# Patient Record
Sex: MALE | Race: WHITE | NOT HISPANIC OR LATINO | Employment: OTHER | URBAN - METROPOLITAN AREA
[De-identification: names, ages, dates, MRNs, and addresses within clinical notes are randomized per-mention and may not be internally consistent; named-entity substitution may affect disease eponyms.]

---

## 2017-01-04 ENCOUNTER — TELEPHONE (OUTPATIENT)
Dept: ADMINISTRATIVE | Facility: HOSPITAL | Age: 67
End: 2017-01-04

## 2017-01-09 ENCOUNTER — OFFICE VISIT (OUTPATIENT)
Dept: FAMILY MEDICINE | Facility: CLINIC | Age: 67
End: 2017-01-09
Payer: MEDICARE

## 2017-01-09 VITALS
HEIGHT: 68 IN | SYSTOLIC BLOOD PRESSURE: 126 MMHG | WEIGHT: 219.13 LBS | HEART RATE: 65 BPM | DIASTOLIC BLOOD PRESSURE: 80 MMHG | OXYGEN SATURATION: 100 % | BODY MASS INDEX: 33.21 KG/M2

## 2017-01-09 DIAGNOSIS — Z29.9 PREVENTIVE MEASURE: ICD-10-CM

## 2017-01-09 DIAGNOSIS — E78.5 HYPERLIPIDEMIA, UNSPECIFIED HYPERLIPIDEMIA TYPE: ICD-10-CM

## 2017-01-09 DIAGNOSIS — E11.59 HYPERTENSION ASSOCIATED WITH DIABETES: ICD-10-CM

## 2017-01-09 DIAGNOSIS — E66.9 OBESITY (BMI 30-39.9): ICD-10-CM

## 2017-01-09 DIAGNOSIS — I15.2 HYPERTENSION ASSOCIATED WITH DIABETES: ICD-10-CM

## 2017-01-09 DIAGNOSIS — I25.10 CORONARY ARTERY DISEASE INVOLVING NATIVE CORONARY ARTERY OF NATIVE HEART WITHOUT ANGINA PECTORIS: ICD-10-CM

## 2017-01-09 DIAGNOSIS — E11.42 DIABETIC POLYNEUROPATHY ASSOCIATED WITH TYPE 2 DIABETES MELLITUS: ICD-10-CM

## 2017-01-09 PROCEDURE — 99999 PR PBB SHADOW E&M-EST. PATIENT-LVL IV: CPT | Mod: PBBFAC,,,

## 2017-01-09 PROCEDURE — 3045F PR MOST RECENT HEMOGLOBIN A1C LEVEL 7.0-9.0%: CPT | Mod: S$GLB,,,

## 2017-01-09 PROCEDURE — 1160F RVW MEDS BY RX/DR IN RCRD: CPT | Mod: S$GLB,,,

## 2017-01-09 PROCEDURE — 99214 OFFICE O/P EST MOD 30 MIN: CPT | Mod: 25,S$GLB,,

## 2017-01-09 PROCEDURE — 3074F SYST BP LT 130 MM HG: CPT | Mod: S$GLB,,,

## 2017-01-09 PROCEDURE — G0009 ADMIN PNEUMOCOCCAL VACCINE: HCPCS | Mod: S$GLB,,,

## 2017-01-09 PROCEDURE — 3079F DIAST BP 80-89 MM HG: CPT | Mod: S$GLB,,,

## 2017-01-09 PROCEDURE — 99499 UNLISTED E&M SERVICE: CPT | Mod: S$GLB,,,

## 2017-01-09 PROCEDURE — 2022F DILAT RTA XM EVC RTNOPTHY: CPT | Mod: S$GLB,,,

## 2017-01-09 PROCEDURE — 90670 PCV13 VACCINE IM: CPT | Mod: S$GLB,,,

## 2017-01-09 PROCEDURE — 1159F MED LIST DOCD IN RCRD: CPT | Mod: S$GLB,,,

## 2017-01-09 PROCEDURE — 1126F AMNT PAIN NOTED NONE PRSNT: CPT | Mod: S$GLB,,,

## 2017-01-09 PROCEDURE — 4010F ACE/ARB THERAPY RXD/TAKEN: CPT | Mod: S$GLB,,,

## 2017-01-09 PROCEDURE — 1157F ADVNC CARE PLAN IN RCRD: CPT | Mod: S$GLB,,,

## 2017-01-09 RX ORDER — INSULIN ASPART 100 [IU]/ML
10 INJECTION, SOLUTION INTRAVENOUS; SUBCUTANEOUS 3 TIMES DAILY PRN
Qty: 30 ML | Refills: 11 | Status: SHIPPED | OUTPATIENT
Start: 2017-01-09 | End: 2017-06-02 | Stop reason: SDUPTHER

## 2017-01-09 NOTE — PROGRESS NOTES
Subjective:       Patient ID: Roger Zavaleta Jr. is a 66 y.o. male.    Chief Complaint: Annual Exam    HPI The patient presents for medical management of his chronic medical problems including diabetes with neuropathy, hypertension and hypercholesterolemia. Although he has a history of MI he has no stents or blockages and has no symptoms. He is compliant with his medication but is out of victoza for the past month. His Hgb A1C is 7.5.     Review of Systems   Constitutional: Negative.    Respiratory: Negative.  Negative for shortness of breath.    Cardiovascular: Negative for chest pain.   Psychiatric/Behavioral: Negative.    All other systems reviewed and are negative.      Objective:      Vitals:    01/09/17 1412   BP: 126/80   Pulse: 65     Physical Exam   Constitutional: He is oriented to person, place, and time. He appears well-developed and well-nourished. He is cooperative. No distress.   Obese    HENT:   Head: Normocephalic and atraumatic.   Right Ear: Hearing, tympanic membrane, external ear and ear canal normal.   Left Ear: Hearing, external ear and ear canal normal.   Nose: Nose normal.   Mouth/Throat: Oropharynx is clear and moist.   Eyes: Conjunctivae are normal. Pupils are equal, round, and reactive to light.   Neck: Normal range of motion. Neck supple. No thyromegaly present.   Cardiovascular: Normal rate, regular rhythm, normal heart sounds and intact distal pulses.    Pulmonary/Chest: Effort normal and breath sounds normal. No respiratory distress.   Musculoskeletal: Normal range of motion. He exhibits no edema or tenderness.   Lymphadenopathy:     He has no cervical adenopathy.   Neurological: He is alert and oriented to person, place, and time. He has normal strength. Coordination and gait normal.   Unable to sense monofilament to big toe bilaterally    Skin: Skin is warm, dry and intact. No cyanosis. Nails show no clubbing.   Psychiatric: He has a normal mood and affect. His speech is normal and  behavior is normal. Judgment and thought content normal. Cognition and memory are normal.   Vitals reviewed.      Assessment:       1. Uncontrolled type 2 diabetes mellitus with diabetic neuropathy, with long-term current use of insulin    2. Coronary artery disease involving native coronary artery of native heart without angina pectoris    3. Hyperlipidemia, unspecified hyperlipidemia type    4. Hypertension associated with diabetes    5. Obesity (BMI 30-39.9)    6. Diabetic polyneuropathy associated with type 2 diabetes mellitus    7. Preventive measure        Plan:       Uncontrolled type 2 diabetes mellitus with diabetic neuropathy, with long-term current use of insulin  -     Ambulatory referral to Ophthalmology    Coronary artery disease involving native coronary artery of native heart without angina pectoris    Hyperlipidemia, unspecified hyperlipidemia type    Hypertension associated with diabetes    Obesity (BMI 30-39.9)    Diabetic polyneuropathy associated with type 2 diabetes mellitus    Preventive measure  -     Pneumococcal Conjugate Vaccine (13 Valent) (IM)  -     zoster vaccine live, PF, (ZOSTAVAX, PF,) 19,400 unit/0.65 mL injection; Inject 19,400 Units into the skin once.  Dispense: 1 vial; Refill: 0    Other orders  -     insulin aspart (NOVOLOG) 100 unit/mL injection; Inject 10 Units into the skin 3 (three) times daily as needed for High Blood Sugar.  Dispense: 30 mL; Refill: 11  -     insulin detemir (LEVEMIR FLEXTOUCH) 100 unit/mL (3 mL) SubQ InPn pen; 30 Units once a day  Dispense: 15 mL; Refill: 11  -     liraglutide 0.6 mg/0.1 mL, 18 mg/3 mL, subq PNIJ (VICTOZA 3-BEL) 0.6 mg/0.1 mL (18 mg/3 mL) PnIj; Inject 1.8 mg into the skin once daily.  Dispense: 9 mL; Refill: 11      Return in about 3 months (around 4/9/2017).

## 2017-01-09 NOTE — MR AVS SNAPSHOT
[address] [facility] [place] [address]  Phone: [phone]  Fax: [phone]                  Roger Zavaleta JrPeg   2017 2:20 PM   Office Visit    Description:  Male : 1950   Provider:  Aldair Booth Jr., MD   Department:  [facility]           Reason for Visit     Annual Exam           Diagnoses this Visit        Comments    Uncontrolled type 2 diabetes mellitus with diabetic neuropathy, with long-term current use of insulin    -  Primary     Coronary artery disease involving native coronary artery of native heart without angina pectoris         Hyperlipidemia, unspecified hyperlipidemia type         Hypertension associated with diabetes         Obesity (BMI 30-39.9)         Diabetic polyneuropathy associated with type 2 diabetes mellitus         Preventive measure                To Do List           Goals (5 Years of Data)     None      Follow-Up and Disposition     Return in about 3 months (around 2017).       These Medications        Disp Refills Start End    insulin aspart (NOVOLOG) 100 unit/mL injection 30 mL 11 2017     Inject 10 Units into the skin 3 (three) times daily as needed for High Blood Sugar. - Subcutaneous    Pharmacy: [facility] [address] [place] [address] [id number] Ph #: [phone]       insulin detemir (LEVEMIR FLEXTOUCH) 100 unit/mL (3 mL) SubQ InPn pen 15 mL 11 2017     30 Units once a day    Pharmacy: [facility] [address] [place] [address] [id number] Ph #: [phone]       liraglutide 0.6 mg/0.1 mL, 18 mg/3 mL, subq PNIJ (VICTOZA 3-BEL) 0.6 mg/0.1 mL (18 mg/3 mL) PnIj 9 mL 11 2017    Inject 1.8 mg into the skin once daily. - Subcutaneous    Pharmacy: [facility] [address] [place] [address] [id number] Ph #: [phone]       zoster vaccine live, PF, (ZOSTAVAX, PF,) 19,400 unit/0.65 mL injection 1 vial 0 2017    Inject 19,400 Units into the skin once. - Subcutaneous     Pharmacy: [facility] [address] [place], [place] [id number], [place] - [address]  #: [phone]         [facility]     [facility] - 24/7 Assistance  Registered nurses in the [facility] provide clinical advisement, health education, appointment booking, and other advisory services.  Call for this free service at [phone].             Medications           Message regarding Medications     Verify the changes and/or additions to your medication regime listed below are the same as discussed with your clinician today.  If any of these changes or additions are incorrect, please notify your healthcare provider.        START taking these NEW medications        Refills    liraglutide 0.6 mg/0.1 mL, 18 mg/3 mL, subq PNIJ (VICTOZA 3-BEL) 0.6 mg/0.1 mL (18 mg/3 mL) PnIj 11    Sig: Inject 1.8 mg into the skin once daily.    Class: Normal    Route: Subcutaneous    zoster vaccine live, PF, (ZOSTAVAX, PF,) 19,400 unit/0.65 mL injection 0    Sig: Inject 19,400 Units into the skin once.    Class: Normal    Route: Subcutaneous      CHANGE how you are taking these medications     Start Taking Instead of    insulin aspart (NOVOLOG) 100 unit/mL injection NOVOLOG 100 unit/mL injection    Dosage:  Inject 10 Units into the skin 3 (three) times daily as needed for High Blood Sugar. Dosage:  INJECT 30 UNITS INTO THE SKIN THREE TIMES DAILY BEFORE MEALS    Reason for Change:  Reorder     insulin detemir (LEVEMIR FLEXTOUCH) 100 unit/mL (3 mL) SubQ InPn pen LEVEMIR FLEXTOUCH 100 unit/mL (3 mL) InPn pen    Dosage:  30 Units once a day Dosage:  INJECT 26 UNITS INTO THE SKIN TWO TIMES DAILY    Reason for Change:  Reorder            Verify that the below list of medications is an accurate representation of the medications you are currently taking.  If none reported, the list may be blank. If incorrect, please contact your healthcare provider. Carry this list with you in case of emergency.           Current Medications      "alpha lipoic acid 600 mg Cap Take 600 mg by mouth once daily.    aspirin 81 MG chewable tablet Take 81 mg by mouth once daily.      blood sugar diagnostic (ACCU-CHEK SMARTVIEW TEST STRIP) Strp 1 strip by Misc.(Non-Drug; Combo Route) route 3 (three) times daily as needed.    insulin aspart (NOVOLOG) 100 unit/mL injection Inject 10 Units into the skin 3 (three) times daily as needed for High Blood Sugar.    insulin detemir (LEVEMIR FLEXTOUCH) 100 unit/mL (3 mL) SubQ InPn pen 30 Units once a day    insulin syringe-needle U-100 0.3 mL 31 x 5/16" Syrg INJECT 30 UNITS UNDER THE SKIN THREE TIMES PER DAY    lancets Misc 1 lancet by Misc.(Non-Drug; Combo Route) route 3 (three) times daily as needed.    lisinopril (PRINIVIL,ZESTRIL) 20 MG tablet Take 1 tablet (20 mg total) by mouth once daily.    metformin (GLUCOPHAGE) 1000 MG tablet TAKE ONE TABLET BY MOUTH TWICE DAILY WITH MEALS    nitroGLYCERIN (NITROSTAT) 0.4 MG SL tablet Place 0.4 mg under the tongue every 5 (five) minutes as needed.      PEN NEEDLE 31 gauge x 5/16" Ndle USE ONE PEN NEEDLE ONCE DAILY    pravastatin (PRAVACHOL) 10 MG tablet TAKE ONE TABLET BY MOUTH ONCE DAILY IN THE EVENING    liraglutide 0.6 mg/0.1 mL, 18 mg/3 mL, subq PNIJ (VICTOZA 3-BEL) 0.6 mg/0.1 mL (18 mg/3 mL) PnIj Inject 1.8 mg into the skin once daily.    zoster vaccine live, PF, (ZOSTAVAX, PF,) 19,400 unit/0.65 mL injection Inject 19,400 Units into the skin once.           Clinical Reference Information           Vital Signs - Last Recorded  Most recent update: 1/9/2017  2:15 PM by Chaparrita Galarza MA    BP Pulse Ht Wt SpO2 BMI    126/80 (BP Location: Left arm, Patient Position: Sitting, BP Method: Manual) 65 5' 8" (1.727 m) 99.4 kg (219 lb 2.2 oz) 100% 33.32 kg/m2      Blood Pressure          Most Recent Value    BP  126/80      Allergies as of 1/9/2017     No Known Allergies      Immunizations Administered on Date of Encounter - 1/9/2017     Name Date Dose VIS Date Route    Pneumococcal " Conjugate - 13 Valent  Incomplete 0.5 mL 11/5/2015 Intramuscular      Orders Placed During Today's Visit      Normal Orders This Visit    Ambulatory referral to Ophthalmology     Pneumococcal Conjugate Vaccine (13 Valent) (IM)

## 2017-01-10 ENCOUNTER — PATIENT MESSAGE (OUTPATIENT)
Dept: FAMILY MEDICINE | Facility: CLINIC | Age: 67
End: 2017-01-10

## 2017-01-19 ENCOUNTER — PATIENT MESSAGE (OUTPATIENT)
Dept: FAMILY MEDICINE | Facility: CLINIC | Age: 67
End: 2017-01-19

## 2017-01-19 RX ORDER — LISINOPRIL 40 MG/1
40 TABLET ORAL DAILY
Qty: 90 TABLET | Refills: 3 | Status: SHIPPED | OUTPATIENT
Start: 2017-01-19 | End: 2017-05-25

## 2017-01-25 RX ORDER — PEN NEEDLE, DIABETIC 30 GX3/16"
1 NEEDLE, DISPOSABLE MISCELLANEOUS 3 TIMES DAILY
Qty: 150 EACH | Refills: 6 | OUTPATIENT
Start: 2017-01-25

## 2017-01-25 RX ORDER — PEN NEEDLE, DIABETIC 30 GX3/16"
NEEDLE, DISPOSABLE MISCELLANEOUS
COMMUNITY
End: 2017-06-13 | Stop reason: SDUPTHER

## 2017-02-02 ENCOUNTER — PATIENT MESSAGE (OUTPATIENT)
Dept: FAMILY MEDICINE | Facility: CLINIC | Age: 67
End: 2017-02-02

## 2017-02-02 RX ORDER — HYDROCHLOROTHIAZIDE 25 MG/1
25 TABLET ORAL DAILY
Qty: 90 TABLET | Refills: 3 | Status: SHIPPED | OUTPATIENT
Start: 2017-02-02 | End: 2018-01-29 | Stop reason: SDUPTHER

## 2017-02-09 ENCOUNTER — PATIENT MESSAGE (OUTPATIENT)
Dept: FAMILY MEDICINE | Facility: CLINIC | Age: 67
End: 2017-02-09

## 2017-02-09 ENCOUNTER — TELEPHONE (OUTPATIENT)
Dept: FAMILY MEDICINE | Facility: CLINIC | Age: 67
End: 2017-02-09

## 2017-02-27 RX ORDER — LISINOPRIL 20 MG/1
TABLET ORAL
Qty: 90 TABLET | Refills: 3 | Status: SHIPPED | OUTPATIENT
Start: 2017-02-27 | End: 2018-06-08

## 2017-03-07 RX ORDER — METFORMIN HYDROCHLORIDE 1000 MG/1
TABLET ORAL
Qty: 60 TABLET | Refills: 11 | Status: SHIPPED | OUTPATIENT
Start: 2017-03-07 | End: 2018-03-12 | Stop reason: SDUPTHER

## 2017-04-12 RX ORDER — BLOOD SUGAR DIAGNOSTIC
STRIP MISCELLANEOUS
Qty: 100 STRIP | Refills: 3 | Status: SHIPPED | OUTPATIENT
Start: 2017-04-12 | End: 2017-08-10 | Stop reason: SDUPTHER

## 2017-05-25 ENCOUNTER — OFFICE VISIT (OUTPATIENT)
Dept: INTERNAL MEDICINE | Facility: CLINIC | Age: 67
End: 2017-05-25
Payer: MEDICARE

## 2017-05-25 VITALS
OXYGEN SATURATION: 99 % | TEMPERATURE: 98 F | DIASTOLIC BLOOD PRESSURE: 68 MMHG | BODY MASS INDEX: 32.61 KG/M2 | WEIGHT: 215.19 LBS | HEIGHT: 68 IN | HEART RATE: 64 BPM | SYSTOLIC BLOOD PRESSURE: 132 MMHG | RESPIRATION RATE: 18 BRPM

## 2017-05-25 DIAGNOSIS — I15.2 HYPERTENSION ASSOCIATED WITH DIABETES: ICD-10-CM

## 2017-05-25 DIAGNOSIS — E11.59 HYPERTENSION ASSOCIATED WITH DIABETES: ICD-10-CM

## 2017-05-25 DIAGNOSIS — M10.9 GOUT, UNSPECIFIED CAUSE, UNSPECIFIED CHRONICITY, UNSPECIFIED SITE: ICD-10-CM
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
## 2017-05-25 DIAGNOSIS — E78.5 HYPERLIPIDEMIA, UNSPECIFIED HYPERLIPIDEMIA TYPE: ICD-10-CM

## 2017-05-25 PROCEDURE — 1125F AMNT PAIN NOTED PAIN PRSNT: CPT | Mod: S$GLB,,, | Performed by: INTERNAL MEDICINE

## 2017-05-25 PROCEDURE — 1159F MED LIST DOCD IN RCRD: CPT | Mod: S$GLB,,, | Performed by: INTERNAL MEDICINE

## 2017-05-25 PROCEDURE — 3045F PR MOST RECENT HEMOGLOBIN A1C LEVEL 7.0-9.0%: CPT | Mod: S$GLB,,, | Performed by: INTERNAL MEDICINE

## 2017-05-25 PROCEDURE — 4010F ACE/ARB THERAPY RXD/TAKEN: CPT | Mod: S$GLB,,, | Performed by: INTERNAL MEDICINE

## 2017-05-25 PROCEDURE — 99214 OFFICE O/P EST MOD 30 MIN: CPT | Mod: S$GLB,,, | Performed by: INTERNAL MEDICINE

## 2017-05-25 PROCEDURE — 99499 UNLISTED E&M SERVICE: CPT | Mod: S$GLB,,, | Performed by: INTERNAL MEDICINE

## 2017-05-25 RX ORDER — CALCIUM CARB/VITAMIN D3/VIT K1 500-100-40
TABLET,CHEWABLE ORAL
Qty: 300 EACH | Refills: 3 | Status: SHIPPED | OUTPATIENT
Start: 2017-05-25 | End: 2018-06-16 | Stop reason: SDUPTHER

## 2017-05-25 RX ORDER — ALLOPURINOL 300 MG/1
300 TABLET ORAL DAILY
Qty: 30 TABLET | Refills: 3 | Status: SHIPPED | OUTPATIENT
Start: 2017-05-25 | End: 2017-09-25 | Stop reason: SDUPTHER

## 2017-05-25 NOTE — PATIENT INSTRUCTIONS

## 2017-05-25 NOTE — PROGRESS NOTES
"Subjective:      Patient ID: Roger Zavaleta Jr. is a 66 y.o. male.    Chief Complaint: Knee Pain (left )    HPI: 66y/oWM, in his usual state of good health until he had an accidental fall.(trip).  Now has pain in his left knee.  Has dx:  -HTN  -Hyperlipidemia  -T2DM on long term insulin  -wonders re: gout, since he has arthritic type symptoms with certain foods.  -Hx  MI, but no stents/ACBG.    Review of Systems   Constitutional: Negative.    HENT: Negative.    Eyes: Negative.    Respiratory: Negative for chest tightness and shortness of breath.    Cardiovascular: Negative.    Gastrointestinal: Negative.    Endocrine: Negative.    Genitourinary: Negative.    Musculoskeletal: Positive for arthralgias and myalgias.   Allergic/Immunologic: Negative.    Neurological: Negative.    Hematological: Negative.    Psychiatric/Behavioral: Negative.        Objective:   /68 (BP Location: Left arm, Patient Position: Sitting, BP Method: Manual)   Pulse 64   Temp 97.7 °F (36.5 °C) (Oral)   Resp 18   Ht 5' 8" (1.727 m)   Wt 97.6 kg (215 lb 2.7 oz)   SpO2 99%   BMI 32.72 kg/m²     Physical Exam   Constitutional: He is oriented to person, place, and time. He appears well-developed and well-nourished.   HENT:   Head: Normocephalic and atraumatic.   Right Ear: Tympanic membrane, external ear and ear canal normal.   Left Ear: Tympanic membrane, external ear and ear canal normal.   Nose: Nose normal.   Mouth/Throat: Oropharynx is clear and moist.   Eyes: Conjunctivae and EOM are normal. Pupils are equal, round, and reactive to light.   Neck: Normal range of motion. Neck supple.   Cardiovascular: Normal rate, regular rhythm and normal heart sounds.    Pulmonary/Chest: Effort normal and breath sounds normal.   Abdominal: Soft. Bowel sounds are normal. There is no hepatosplenomegaly. There is no tenderness.   Musculoskeletal: Normal range of motion. He exhibits no edema.   Neurological: He is alert and oriented to person, place, " and time.   Skin: Skin is warm and dry.   Psychiatric: He has a normal mood and affect. His behavior is normal.   Nursing note and vitals reviewed.      Assessment:     1. Fall, accidental, initial encounter    2. Gout, unspecified cause, unspecified chronicity, unspecified site    3. Hyperlipidemia, unspecified hyperlipidemia type    4. Hypertension associated with diabetes    5. Uncontrolled type 2 diabetes mellitus with diabetic neuropathy, with long-term current use of insulin      Plan:     Fall, accidental, initial encounter  -     Cancel: Td Vaccinie  -     Tdap Vaccine    Gout, unspecified cause, unspecified chronicity, unspecified site  -     allopurinol (ZYLOPRIM) 300 MG tablet; Take 1 tablet (300 mg total) by mouth once daily.  Dispense: 30 tablet; Refill: 3  -     Uric acid; Future; Expected date: 05/25/2017    Hyperlipidemia, unspecified hyperlipidemia type    Hypertension associated with diabetes  -     CBC auto differential; Future; Expected date: 05/25/2017  -     Comprehensive metabolic panel; Future; Expected date: 05/25/2017    Uncontrolled type 2 diabetes mellitus with diabetic neuropathy, with long-term current use of insulin  -     Hemoglobin A1c; Future; Expected date: 05/25/2017  -     Urinalysis; Future; Expected date: 05/25/2017    Other orders  -     insulin syringe-needle U-100 0.3 mL 31 gauge x 5/16 Syrg; INJECT 30 UNITS UNDER THE SKIN THREE TIMES PER DAY  Dispense: 300 each; Refill: 3

## 2017-05-29 ENCOUNTER — PATIENT MESSAGE (OUTPATIENT)
Dept: ADMINISTRATIVE | Facility: OTHER | Age: 67
End: 2017-05-29

## 2017-05-31 ENCOUNTER — PATIENT MESSAGE (OUTPATIENT)
Dept: ADMINISTRATIVE | Facility: HOSPITAL | Age: 67
End: 2017-05-31

## 2017-06-01 ENCOUNTER — PATIENT MESSAGE (OUTPATIENT)
Dept: INTERNAL MEDICINE | Facility: CLINIC | Age: 67
End: 2017-06-01

## 2017-06-01 RX ORDER — INSULIN ASPART 100 [IU]/ML
INJECTION, SOLUTION INTRAVENOUS; SUBCUTANEOUS
Qty: 30 ML | Refills: 11 | Status: SHIPPED | OUTPATIENT
Start: 2017-06-01 | End: 2017-06-02 | Stop reason: DRUGHIGH

## 2017-06-02 ENCOUNTER — OFFICE VISIT (OUTPATIENT)
Dept: INTERNAL MEDICINE | Facility: CLINIC | Age: 67
End: 2017-06-02
Payer: MEDICARE

## 2017-06-02 VITALS
BODY MASS INDEX: 32.81 KG/M2 | DIASTOLIC BLOOD PRESSURE: 64 MMHG | SYSTOLIC BLOOD PRESSURE: 124 MMHG | HEIGHT: 68 IN | WEIGHT: 216.5 LBS | OXYGEN SATURATION: 98 % | TEMPERATURE: 98 F | RESPIRATION RATE: 16 BRPM | HEART RATE: 86 BPM

## 2017-06-02 DIAGNOSIS — L03.115 CELLULITIS OF RIGHT LOWER EXTREMITY: ICD-10-CM

## 2017-06-02 DIAGNOSIS — E11.59 HYPERTENSION ASSOCIATED WITH DIABETES: ICD-10-CM

## 2017-06-02 DIAGNOSIS — E78.5 HYPERLIPIDEMIA, UNSPECIFIED HYPERLIPIDEMIA TYPE: ICD-10-CM

## 2017-06-02 DIAGNOSIS — I15.2 HYPERTENSION ASSOCIATED WITH DIABETES: ICD-10-CM

## 2017-06-02 DIAGNOSIS — M10.9 GOUT, UNSPECIFIED CAUSE, UNSPECIFIED CHRONICITY, UNSPECIFIED SITE: ICD-10-CM

## 2017-06-02 PROCEDURE — 1159F MED LIST DOCD IN RCRD: CPT | Mod: S$GLB,,, | Performed by: INTERNAL MEDICINE

## 2017-06-02 PROCEDURE — 99214 OFFICE O/P EST MOD 30 MIN: CPT | Mod: S$GLB,,, | Performed by: INTERNAL MEDICINE

## 2017-06-02 PROCEDURE — 4010F ACE/ARB THERAPY RXD/TAKEN: CPT | Mod: S$GLB,,, | Performed by: INTERNAL MEDICINE

## 2017-06-02 PROCEDURE — 99499 UNLISTED E&M SERVICE: CPT | Mod: S$GLB,,, | Performed by: INTERNAL MEDICINE

## 2017-06-02 PROCEDURE — 1125F AMNT PAIN NOTED PAIN PRSNT: CPT | Mod: S$GLB,,, | Performed by: INTERNAL MEDICINE

## 2017-06-02 PROCEDURE — 3045F PR MOST RECENT HEMOGLOBIN A1C LEVEL 7.0-9.0%: CPT | Mod: S$GLB,,, | Performed by: INTERNAL MEDICINE

## 2017-06-02 RX ORDER — CEPHALEXIN 500 MG/1
500 CAPSULE ORAL 4 TIMES DAILY
Qty: 28 CAPSULE | Refills: 0 | Status: SHIPPED | OUTPATIENT
Start: 2017-06-02 | End: 2018-06-08

## 2017-06-02 RX ORDER — INSULIN ASPART 100 [IU]/ML
10 INJECTION, SOLUTION INTRAVENOUS; SUBCUTANEOUS 3 TIMES DAILY PRN
Qty: 30 ML | Refills: 11 | Status: SHIPPED | OUTPATIENT
Start: 2017-06-02 | End: 2018-10-25 | Stop reason: SDUPTHER

## 2017-06-02 NOTE — PROGRESS NOTES
Subjective:      Patient ID: Roger Zavaleta Jr. is a 66 y.o. male.    Chief Complaint: Knee Pain    HPI: 66y/oWM, had fallen in his yard 1 week ago.  Fell on top of his prosthetic right knee, which now is painful,  With a scab. No fluctuation, however is red.    His most recent labs show a high uric acid, high HGBA1C.  He admits to cheating in his diet, and with sweets.  05/25/17 1000 WBC 9.81 - Final result   05/25/17 1000 RBC 4.90 - Final result   05/25/17 1000 HGB 13.9 Low Final result   05/25/17 1000 HCT 39.7 Low Final result   05/25/17 1000 MCH 28.4 - Final result   05/25/17 1000 RDW 13.4 - Final result   05/25/17 1000  - Final result   05/25/17 1000 MPV 9.0 Low Final result   05/25/17 1000  High Final result   05/25/17 1000 BUN 16 - Final result   05/25/17 1000 CREATININE 0.98 - Final result   05/25/17 1000 CALCIUM 9.5 - Final result   05/25/17 1000  - Final result   05/25/17 1000 K 3.5 - Final result   05/25/17 1000 CL 96 - Final result   05/25/17 1000 PROT 7.8 - Final result   05/25/17 1000 ALBUMIN 4.1 - Final result   05/25/17 1000 BILITOT 1.1 High Final result   05/25/17 1000 AST 29 - Final result   05/25/17 1000 ALKPHOS 109 - Final result   05/25/17 1000 CO2 32 High Final result   05/25/17 1000 ALT 35 - Final result   05/25/17 1000 ANIONGAP 10 - Final result   05/25/17 1000 EGFRNONAA >60.0 - Final result   05/25/17 1000 ESTGFRAFRICA >60.0 - Final result   03/10/10 1213 MG 1.8 - Final result   05/25/17 1000           05/25/17 1000 HGBA1C 8.1 High Final result   05/25/17 1037 COLORU Yellow - Final result   05/25/17 1037 APPEARANCEUA Clear - Final result   05/25/17 1037 SPECGRAV 1.015 - Final result   05/25/17 1037 PHUR 6.0 - Final result   05/25/17 1037 KETONESU Negative - Final result   05/25/17 1037 OCCULTUA Negative - Final result   05/25/17 1037 NITRITE Negative - Final result   05/25/17 1037 UROBILINOGEN Negative -        Review of Systems   Constitutional: Negative.    HENT:  "Negative.    Eyes: Negative.    Respiratory: Negative.    Cardiovascular: Negative.    Gastrointestinal: Negative.    Endocrine: Positive for polydipsia, polyphagia and polyuria.   Genitourinary: Negative.    Musculoskeletal: Positive for arthralgias.   Skin: Negative.    Neurological: Negative.    Hematological: Negative.    Psychiatric/Behavioral: Negative.        Objective:   /64 (BP Location: Right arm, Patient Position: Sitting, BP Method: Manual)   Pulse 86   Temp 98.1 °F (36.7 °C) (Oral)   Resp 16   Ht 5' 8" (1.727 m)   Wt 98.2 kg (216 lb 7.9 oz)   SpO2 98%   BMI 32.92 kg/m²     Physical Exam   Constitutional: He is oriented to person, place, and time. He appears well-developed and well-nourished.   HENT:   Head: Normocephalic and atraumatic.   Eyes: Conjunctivae are normal. Pupils are equal, round, and reactive to light.   Neck: Normal range of motion. Neck supple.   Cardiovascular: Normal rate, regular rhythm and normal heart sounds.    Pulmonary/Chest: Effort normal and breath sounds normal.   Abdominal: Bowel sounds are normal.   Musculoskeletal: Normal range of motion. He exhibits edema and tenderness.        Right knee: He exhibits swelling, effusion and ecchymosis. He exhibits no laceration. Tenderness found. Medial joint line tenderness noted.   Neurological: He is alert and oriented to person, place, and time.   Skin: Skin is warm and dry.   Psychiatric: He has a normal mood and affect. His behavior is normal.   Nursing note and vitals reviewed.      Assessment:     1. Uncontrolled type 2 diabetes mellitus with diabetic neuropathy, with long-term current use of insulin    2. Hyperlipidemia, unspecified hyperlipidemia type    3. Hypertension associated with diabetes    4. Gout, unspecified cause, unspecified chronicity, unspecified site    5. Cellulitis of right lower extremity    In light of prosthesis, and being a diabetic, need to aggressively treat this. Received his tetanus " shot.  Plan:     Uncontrolled type 2 diabetes mellitus with diabetic neuropathy, with long-term current use of insulin  -     insulin aspart (NOVOLOG) 100 unit/mL injection; Inject 10 Units into the skin 3 (three) times daily as needed for High Blood Sugar.  Dispense: 30 mL; Refill: 11    Hyperlipidemia, unspecified hyperlipidemia type    Hypertension associated with diabetes    Gout, unspecified cause, unspecified chronicity, unspecified site    Cellulitis of right lower extremity  -     cephALEXin (KEFLEX) 500 MG capsule; Take 1 capsule (500 mg total) by mouth 4 (four) times daily.  Dispense: 28 capsule; Refill: 0    Will place on a sliding scale with Novolo-150   0 units                                                                    151-200 3 units                                                                     201-250  5 units                                                                     251-300  8 units                                                                     301-350  12 units                                                                     > 350  16 units   Increase Levemir to 40 units at night.

## 2017-06-12 ENCOUNTER — PATIENT MESSAGE (OUTPATIENT)
Dept: INTERNAL MEDICINE | Facility: CLINIC | Age: 67
End: 2017-06-12

## 2017-06-13 RX ORDER — PEN NEEDLE, DIABETIC 30 GX3/16"
NEEDLE, DISPOSABLE MISCELLANEOUS
Qty: 100 EACH | Refills: 3 | Status: SHIPPED | OUTPATIENT
Start: 2017-06-13 | End: 2018-07-17 | Stop reason: SDUPTHER

## 2017-08-10 RX ORDER — BLOOD SUGAR DIAGNOSTIC
STRIP MISCELLANEOUS
Qty: 100 STRIP | Refills: 3 | Status: SHIPPED | OUTPATIENT
Start: 2017-08-10 | End: 2017-11-29 | Stop reason: SDUPTHER

## 2017-09-25 DIAGNOSIS — M10.9 GOUT, UNSPECIFIED CAUSE, UNSPECIFIED CHRONICITY, UNSPECIFIED SITE: ICD-10-CM

## 2017-09-25 RX ORDER — ALLOPURINOL 300 MG/1
TABLET ORAL
Qty: 30 TABLET | Refills: 3 | Status: SHIPPED | OUTPATIENT
Start: 2017-09-25 | End: 2018-01-29 | Stop reason: SDUPTHER

## 2017-09-27 ENCOUNTER — PATIENT OUTREACH (OUTPATIENT)
Dept: ADMINISTRATIVE | Facility: HOSPITAL | Age: 67
End: 2017-09-27

## 2017-09-27 NOTE — PATIENT INSTRUCTIONS
Contacted patient to find if they have seen the eye doctor this year. Reminder sent to schedule an appointment for annual diabetic eye exam.

## 2017-10-15 RX ORDER — LANCETS
EACH MISCELLANEOUS
Qty: 200 EACH | Refills: 3 | Status: SHIPPED | OUTPATIENT
Start: 2017-10-15 | End: 2018-07-30

## 2017-11-02 ENCOUNTER — PATIENT MESSAGE (OUTPATIENT)
Dept: FAMILY MEDICINE | Facility: CLINIC | Age: 67
End: 2017-11-02

## 2017-11-07 ENCOUNTER — IMMUNIZATION (OUTPATIENT)
Dept: FAMILY MEDICINE | Facility: CLINIC | Age: 67
End: 2017-11-07
Payer: MEDICARE

## 2017-11-07 PROCEDURE — 90662 IIV NO PRSV INCREASED AG IM: CPT | Mod: S$GLB,,, | Performed by: FAMILY MEDICINE

## 2017-11-07 PROCEDURE — 99999 PR PBB SHADOW E&M-EST. PATIENT-LVL II: CPT | Mod: PBBFAC,,,

## 2017-11-07 PROCEDURE — G0008 ADMIN INFLUENZA VIRUS VAC: HCPCS | Mod: S$GLB,,, | Performed by: FAMILY MEDICINE

## 2017-11-15 RX ORDER — LANCETS
EACH MISCELLANEOUS
Qty: 200 EACH | Refills: 3 | Status: SHIPPED | OUTPATIENT
Start: 2017-11-15 | End: 2018-06-08 | Stop reason: SDUPTHER

## 2017-11-29 RX ORDER — BLOOD SUGAR DIAGNOSTIC
STRIP MISCELLANEOUS
Qty: 100 STRIP | Refills: 3 | Status: SHIPPED | OUTPATIENT
Start: 2017-11-29 | End: 2018-03-26 | Stop reason: SDUPTHER

## 2017-12-15 DIAGNOSIS — E11.9 TYPE 2 DIABETES MELLITUS WITHOUT COMPLICATION: ICD-10-CM

## 2018-01-29 DIAGNOSIS — M10.9 GOUT, UNSPECIFIED CAUSE, UNSPECIFIED CHRONICITY, UNSPECIFIED SITE: ICD-10-CM

## 2018-01-30 RX ORDER — INSULIN DETEMIR 100 [IU]/ML
INJECTION, SOLUTION SUBCUTANEOUS
Qty: 15 PACKET | Refills: 11 | Status: SHIPPED | OUTPATIENT
Start: 2018-01-30 | End: 2018-08-08 | Stop reason: SDUPTHER

## 2018-01-30 RX ORDER — ALLOPURINOL 300 MG/1
TABLET ORAL
Qty: 90 TABLET | Refills: 3 | Status: SHIPPED | OUTPATIENT
Start: 2018-01-30 | End: 2018-06-08

## 2018-01-30 RX ORDER — HYDROCHLOROTHIAZIDE 25 MG/1
TABLET ORAL
Qty: 90 TABLET | Refills: 3 | Status: SHIPPED | OUTPATIENT
Start: 2018-01-30 | End: 2018-06-08 | Stop reason: HOSPADM

## 2018-01-31 RX ORDER — LIRAGLUTIDE 6 MG/ML
1.8 INJECTION SUBCUTANEOUS DAILY
Qty: 9 SYRINGE | Refills: 11 | Status: SHIPPED | OUTPATIENT
Start: 2018-01-31 | End: 2018-10-15

## 2018-02-08 ENCOUNTER — PES CALL (OUTPATIENT)
Dept: ADMINISTRATIVE | Facility: CLINIC | Age: 68
End: 2018-02-08

## 2018-03-12 RX ORDER — METFORMIN HYDROCHLORIDE 1000 MG/1
TABLET ORAL
Qty: 60 TABLET | Refills: 0 | Status: SHIPPED | OUTPATIENT
Start: 2018-03-12 | End: 2018-04-14 | Stop reason: SDUPTHER

## 2018-03-27 RX ORDER — BLOOD SUGAR DIAGNOSTIC
STRIP MISCELLANEOUS
Qty: 100 STRIP | Refills: 3 | Status: SHIPPED | OUTPATIENT
Start: 2018-03-27 | End: 2018-07-30 | Stop reason: SDUPTHER

## 2018-04-15 RX ORDER — METFORMIN HYDROCHLORIDE 1000 MG/1
TABLET ORAL
Qty: 60 TABLET | Refills: 0 | Status: SHIPPED | OUTPATIENT
Start: 2018-04-15 | End: 2018-06-12 | Stop reason: SDUPTHER

## 2018-05-11 RX ORDER — METFORMIN HYDROCHLORIDE 1000 MG/1
1000 TABLET ORAL 2 TIMES DAILY WITH MEALS
Qty: 60 TABLET | Refills: 0 | OUTPATIENT
Start: 2018-05-11

## 2018-06-08 ENCOUNTER — OFFICE VISIT (OUTPATIENT)
Dept: FAMILY MEDICINE | Facility: CLINIC | Age: 68
End: 2018-06-08
Payer: MEDICARE

## 2018-06-08 VITALS
DIASTOLIC BLOOD PRESSURE: 60 MMHG | HEIGHT: 68 IN | HEART RATE: 73 BPM | OXYGEN SATURATION: 98 % | BODY MASS INDEX: 30.77 KG/M2 | WEIGHT: 203 LBS | SYSTOLIC BLOOD PRESSURE: 112 MMHG

## 2018-06-08 DIAGNOSIS — R10.13 EPIGASTRIC PAIN: ICD-10-CM

## 2018-06-08 DIAGNOSIS — R35.1 NOCTURIA: ICD-10-CM

## 2018-06-08 DIAGNOSIS — E78.5 HYPERLIPIDEMIA, UNSPECIFIED HYPERLIPIDEMIA TYPE: ICD-10-CM

## 2018-06-08 DIAGNOSIS — I25.10 CORONARY ARTERY DISEASE INVOLVING NATIVE CORONARY ARTERY OF NATIVE HEART WITHOUT ANGINA PECTORIS: ICD-10-CM

## 2018-06-08 DIAGNOSIS — Z12.5 SCREENING FOR PROSTATE CANCER: ICD-10-CM

## 2018-06-08 DIAGNOSIS — Z11.59 ENCOUNTER FOR HEPATITIS C SCREENING TEST FOR LOW RISK PATIENT: ICD-10-CM

## 2018-06-08 DIAGNOSIS — I15.2 HYPERTENSION ASSOCIATED WITH DIABETES: ICD-10-CM

## 2018-06-08 DIAGNOSIS — E11.42 DIABETIC POLYNEUROPATHY ASSOCIATED WITH TYPE 2 DIABETES MELLITUS: Primary | ICD-10-CM

## 2018-06-08 DIAGNOSIS — E11.59 HYPERTENSION ASSOCIATED WITH DIABETES: ICD-10-CM

## 2018-06-08 LAB
BILIRUB SERPL-MCNC: NEGATIVE MG/DL
BLOOD, POC UA: NEGATIVE
GLUCOSE UR QL STRIP: NORMAL
KETONES UR QL STRIP: NEGATIVE
LEUKOCYTE ESTERASE URINE, POC: NEGATIVE
NITRITE, POC UA: NEGATIVE
PH, POC UA: 6
PROTEIN, POC: NORMAL
SPECIFIC GRAVITY, POC UA: 1.01
UROBILINOGEN, POC UA: NORMAL

## 2018-06-08 PROCEDURE — 3078F DIAST BP <80 MM HG: CPT | Mod: CPTII,S$GLB,, | Performed by: INTERNAL MEDICINE

## 2018-06-08 PROCEDURE — 3074F SYST BP LT 130 MM HG: CPT | Mod: CPTII,S$GLB,, | Performed by: INTERNAL MEDICINE

## 2018-06-08 PROCEDURE — 99999 PR PBB SHADOW E&M-EST. PATIENT-LVL III: CPT | Mod: PBBFAC,,, | Performed by: INTERNAL MEDICINE

## 2018-06-08 PROCEDURE — 81000 URINALYSIS NONAUTO W/SCOPE: CPT | Mod: S$GLB,,, | Performed by: INTERNAL MEDICINE

## 2018-06-08 PROCEDURE — 99215 OFFICE O/P EST HI 40 MIN: CPT | Mod: 25,S$GLB,, | Performed by: INTERNAL MEDICINE

## 2018-06-08 PROCEDURE — 3045F PR MOST RECENT HEMOGLOBIN A1C LEVEL 7.0-9.0%: CPT | Mod: CPTII,S$GLB,, | Performed by: INTERNAL MEDICINE

## 2018-06-08 RX ORDER — CEPHRADINE 500 MG
CAPSULE ORAL
COMMUNITY
End: 2021-01-14

## 2018-06-08 RX ORDER — METFORMIN HYDROCHLORIDE 1000 MG/1
1000 TABLET ORAL 2 TIMES DAILY WITH MEALS
Qty: 60 TABLET | Refills: 0 | Status: CANCELLED | OUTPATIENT
Start: 2018-06-08

## 2018-06-08 RX ORDER — SYRINGE AND NEEDLE,INSULIN,1ML 31GX15/64"
SYRINGE, EMPTY DISPOSABLE MISCELLANEOUS
COMMUNITY
Start: 2018-04-30 | End: 2018-06-08

## 2018-06-08 RX ORDER — LISINOPRIL AND HYDROCHLOROTHIAZIDE 20; 25 MG/1; MG/1
TABLET ORAL
COMMUNITY
End: 2018-06-08

## 2018-06-08 RX ORDER — HYDROCHLOROTHIAZIDE 25 MG/1
25 TABLET ORAL DAILY
Qty: 90 TABLET | Refills: 3 | Status: CANCELLED | OUTPATIENT
Start: 2018-06-08

## 2018-06-08 RX ORDER — INSULIN ASPART 100 [IU]/ML
10 INJECTION, SOLUTION INTRAVENOUS; SUBCUTANEOUS 3 TIMES DAILY PRN
Qty: 30 ML | Refills: 11 | Status: CANCELLED | OUTPATIENT
Start: 2018-06-08

## 2018-06-08 RX ORDER — LISINOPRIL 10 MG/1
10 TABLET ORAL DAILY
Qty: 30 TABLET | Refills: 5 | Status: SHIPPED | OUTPATIENT
Start: 2018-06-08 | End: 2018-09-03 | Stop reason: SDUPTHER

## 2018-06-08 NOTE — PATIENT INSTRUCTIONS
I am changing your hydrochlorothiazide to lisinopril because it has more benefits for your kidneys and your heart. I am prescribing ranitidine for your abdominal pain and checking some labs.I am ordering your one-time hepatitis C screening. I have also made a foot doctor referral.  I am also checking your prostate test. Your urine was OK. Let's have you increase your levemir to 45 units. I have written you a sliding scale to follow for the novolog.     If sugar is :                                                      Then take: (novolog)    Less than 150                                                  0 units  151-200                                                            5 units  201-250                                                            10 units  251-300                                                            15 units  301-350                                                            20 units  351-400                                                            25 units  More than 400                                                  30 units and call MD

## 2018-06-11 NOTE — PROGRESS NOTES
Subjective:       Patient ID: Roger Zavaleta Jr. is a 67 y.o. male.    Chief Complaint: Establish Care    This is a new patient to me.  I have reviewed the PMH,  and FH for this patient. He is here to establish care and to check on diabetes. His last HgbA1c was 8.1. HE saw Dr Freeman last month. His last LDL was 109. He has had diabetes for over 20 years. His sugars have been 130-150 in am, 160-170 at lunch and 200-220 in the evenings. He had an MI years ago. He has been having nocturia.       Diabetes   He has type 2 diabetes mellitus. No MedicAlert identification noted. The initial diagnosis of diabetes was made 20 years ago. Hypoglycemia symptoms include dizziness, headaches, hunger, nervousness/anxiousness, sleepiness, speech difficulty, sweats and tremors. Pertinent negatives for hypoglycemia include no confusion, mood changes, pallor or seizures. Associated symptoms include blurred vision, chest pain, fatigue, foot paresthesias, polydipsia, polyphagia, polyuria, visual change and weakness. Pertinent negatives for diabetes include no foot ulcerations and no weight loss. Hypoglycemia complications include nocturnal hypoglycemia and required assistance. Pertinent negatives for hypoglycemia complications include no blackouts, no hospitalization and no required glucagon injection. Symptoms are stable. Diabetic complications include autonomic neuropathy, heart disease, peripheral neuropathy and retinopathy. Pertinent negatives for diabetic complications include no CVA or nephropathy. Risk factors for coronary artery disease include family history, hypertension, obesity and diabetes mellitus. Current diabetic treatment includes insulin injections and oral agent (triple therapy). He is compliant with treatment all of the time. He is currently taking insulin pre-breakfast, pre-lunch, pre-dinner and at bedtime. Insulin injections are given by patient. Rotation sites for injection include the abdominal wall. His weight  is stable. He is following a diabetic and generally healthy diet. When asked about meal planning, he reported none. He has not had a previous visit with a dietitian. He participates in exercise intermittently. He monitors blood glucose at home 3-4 x per day. He monitors urine at home <1 x per month. Blood glucose monitoring compliance is good. His home blood glucose trend is fluctuating minimally. He does not see a podiatrist.Eye exam is current.     Review of Systems   Constitutional: Positive for fatigue. Negative for chills, fever and weight loss.   HENT: Negative for congestion, ear discharge, ear pain, rhinorrhea and sore throat.    Eyes: Positive for blurred vision. Negative for discharge, redness and itching.   Respiratory: Negative for cough, chest tightness, shortness of breath and wheezing.    Cardiovascular: Positive for chest pain. Negative for palpitations and leg swelling.   Gastrointestinal: Negative for abdominal pain, constipation, diarrhea, nausea and vomiting.   Endocrine: Positive for polydipsia, polyphagia and polyuria. Negative for cold intolerance and heat intolerance.   Genitourinary: Negative for dysuria, flank pain, frequency and hematuria.   Musculoskeletal: Negative for arthralgias, back pain, joint swelling and myalgias.   Skin: Negative for color change, pallor and rash.   Neurological: Positive for dizziness, tremors, speech difficulty, weakness and headaches. Negative for seizures and numbness.   Psychiatric/Behavioral: Negative for confusion, dysphoric mood and sleep disturbance. The patient is nervous/anxious.        Objective:      Physical Exam   Constitutional: He appears well-developed and well-nourished.   HENT:   Head: Normocephalic and atraumatic.   Right Ear: External ear normal. Tympanic membrane is not erythematous. No middle ear effusion.   Left Ear: External ear normal. Tympanic membrane is not erythematous.  No middle ear effusion.   Mouth/Throat: No posterior  oropharyngeal edema or posterior oropharyngeal erythema. No tonsillar exudate.   Eyes: Conjunctivae and EOM are normal. Pupils are equal, round, and reactive to light.   Neck: No thyromegaly present.   Cardiovascular: Normal rate, regular rhythm, normal heart sounds and intact distal pulses.    No murmur heard.  Pulmonary/Chest: Effort normal and breath sounds normal. He has no wheezes.   Abdominal: He exhibits no distension. There is no tenderness.   Musculoskeletal: He exhibits no edema or tenderness.   Lymphadenopathy:     He has no cervical adenopathy.   Neurological: He displays normal reflexes. No cranial nerve deficit.   Skin: Skin is warm and dry. No rash noted.   Psychiatric: He has a normal mood and affect. His behavior is normal.       Assessment and Plan:     Problem List Items Addressed This Visit     CAD (coronary artery disease) - follow-up with cardiology.      HLD (hyperlipidemia) - HE has stopped taking his pravachol.       Diabetes mellitus type 2, uncontrolled with complications (CAD) - We will check his labs. I would like to increase his levemir and make a sliding scale for him to use.     Relevant Orders    Hemoglobin A1c (Completed)    Comprehensive metabolic panel (Completed)    CBC auto differential (Completed)    Lipid panel (Completed)    Microalbumin/creatinine urine ratio (Completed)    Ambulatory referral to Podiatry    Diabetic neuropathy - Primary - We will check labs.       Hypertension associated with diabetes - BP looks good today.       Epigastric pain Let's check labs. - we will rule out pancreatitis since he is on victoza. We will try ranitidine.     Relevant Orders    Amylase (Completed)    H. PYLORI ANTIBODY, IGG      Other Visit Diagnoses     Nocturia     - UA was OK.     Relevant Orders    POCT Urinalysis (Completed)    Screening for prostate cancer    - check PSA    Relevant Orders    PSA, Screening (Completed)    Encounter for hepatitis C screening test for low risk  patient    - check screening test for hep C.     Relevant Orders    Hepatitis C antibody

## 2018-06-12 RX ORDER — METFORMIN HYDROCHLORIDE 1000 MG/1
1000 TABLET ORAL 2 TIMES DAILY WITH MEALS
Qty: 60 TABLET | Refills: 5 | Status: SHIPPED | OUTPATIENT
Start: 2018-06-12 | End: 2018-12-10 | Stop reason: SDUPTHER

## 2018-06-18 ENCOUNTER — OFFICE VISIT (OUTPATIENT)
Dept: PODIATRY | Facility: CLINIC | Age: 68
End: 2018-06-18
Payer: MEDICARE

## 2018-06-18 VITALS
DIASTOLIC BLOOD PRESSURE: 70 MMHG | WEIGHT: 216 LBS | HEART RATE: 69 BPM | HEIGHT: 68 IN | BODY MASS INDEX: 32.74 KG/M2 | SYSTOLIC BLOOD PRESSURE: 157 MMHG | RESPIRATION RATE: 18 BRPM

## 2018-06-18 DIAGNOSIS — E11.49 TYPE II DIABETES MELLITUS WITH NEUROLOGICAL MANIFESTATIONS: Primary | ICD-10-CM

## 2018-06-18 DIAGNOSIS — B35.1 ONYCHOMYCOSIS DUE TO DERMATOPHYTE: ICD-10-CM

## 2018-06-18 DIAGNOSIS — R60.0 BILATERAL LOWER EXTREMITY EDEMA: ICD-10-CM

## 2018-06-18 DIAGNOSIS — L84 CORN OR CALLUS: ICD-10-CM

## 2018-06-18 DIAGNOSIS — M20.41 HAMMER TOES OF BOTH FEET: ICD-10-CM

## 2018-06-18 DIAGNOSIS — R20.0 NUMBNESS OF TOES: ICD-10-CM

## 2018-06-18 DIAGNOSIS — M20.42 HAMMER TOES OF BOTH FEET: ICD-10-CM

## 2018-06-18 PROCEDURE — 3045F PR MOST RECENT HEMOGLOBIN A1C LEVEL 7.0-9.0%: CPT | Mod: CPTII,S$GLB,, | Performed by: PODIATRIST

## 2018-06-18 PROCEDURE — 11721 DEBRIDE NAIL 6 OR MORE: CPT | Mod: Q9,S$GLB,, | Performed by: PODIATRIST

## 2018-06-18 PROCEDURE — 99499 UNLISTED E&M SERVICE: CPT | Mod: ,,, | Performed by: PODIATRIST

## 2018-06-18 PROCEDURE — 3077F SYST BP >= 140 MM HG: CPT | Mod: CPTII,S$GLB,, | Performed by: PODIATRIST

## 2018-06-18 PROCEDURE — 99203 OFFICE O/P NEW LOW 30 MIN: CPT | Mod: 25,S$GLB,, | Performed by: PODIATRIST

## 2018-06-18 PROCEDURE — 3078F DIAST BP <80 MM HG: CPT | Mod: CPTII,S$GLB,, | Performed by: PODIATRIST

## 2018-06-18 RX ORDER — SYRINGE AND NEEDLE,INSULIN,1ML 31GX15/64"
SYRINGE, EMPTY DISPOSABLE MISCELLANEOUS
Qty: 100 SYRINGE | Refills: 11 | Status: SHIPPED | OUTPATIENT
Start: 2018-06-18 | End: 2019-05-14 | Stop reason: SDUPTHER

## 2018-06-18 NOTE — PROGRESS NOTES
Subjective:      Patient ID: Roger Zavaleta Jr. is a 67 y.o. male.    Chief Complaint: Diabetic Foot Exam (Pcp Dr perez last seen 6/8/2018 )    Roger is a 67 y.o. male who presents to the clinic for evaluation and treatment of high risk feet. Roger has a past medical history of CAD (coronary artery disease) (50); Diabetes mellitus type II; and HLD (hyperlipidemia). The patient's chief complaint is painful, long, thick toenails on both feet. Especially both great toes and left 3rd to hurt to touch due to being incurvated around the borders. Has not seen a podiatrist in the past. Patient is here to have comprehensive DM foot exam and to establish care per recommendations of PCP. No other major pedal complaints today.    This patient has documented high risk feet requiring routine maintenance secondary to diabetes mellitis and those secondary complications of diabetes, as mentioned..    PCP: Evelia Perez MD    Date Last Seen by PCP:   Chief Complaint   Patient presents with    Diabetic Foot Exam     Pcp Dr perez last seen 6/8/2018        Current shoe gear:   Slip-on shoes          Hemoglobin A1C   Date Value Ref Range Status   06/08/2018 8.6 (H) 4.0 - 5.6 % Final     Comment:     ADA Screening Guidelines:  5.7-6.4%  Consistent with prediabetes  >or=6.5%  Consistent with diabetes  High levels of fetal hemoglobin interfere with the HbA1C  assay. Heterozygous hemoglobin variants (HbS, HgC, etc)do  not significantly interfere with this assay.   However, presence of multiple variants may affect accuracy.     05/25/2017 8.1 (H) 4.5 - 6.2 % Final     Comment:     According to ADA guidelines, hemoglobin A1C <7.0% represents  optimal control in non-pregnant diabetic patients.  Different  metrics may apply to specific populations.   Standards of Medical Care in Diabetes - 2016.  For the purpose of screening for the presence of diabetes:  <5.7%     Consistent with the absence of diabetes  5.7-6.4%  Consistent with increasing  risk for diabetes   (prediabetes)  >or=6.5%  Consistent with diabetes  Currently no consensus exists for use of hemoglobin A1C  for diagnosis of diabetes for children.     01/05/2017 7.5 (H) 4.5 - 6.2 % Final     Comment:     According to ADA guidelines, hemoglobin A1C <7.0% represents  optimal control in non-pregnant diabetic patients.  Different  metrics may apply to specific populations.   Standards of Medical Care in Diabetes - 2016.  For the purpose of screening for the presence of diabetes:  <5.7%     Consistent with the absence of diabetes  5.7-6.4%  Consistent with increasing risk for diabetes   (prediabetes)  >or=6.5%  Consistent with diabetes  Currently no consensus exists for use of hemoglobin A1C  for diagnosis of diabetes for children.       Past Medical History:   Diagnosis Date    CAD (coronary artery disease) 50    Diabetes mellitus type II     HLD (hyperlipidemia)        Past Surgical History:   Procedure Laterality Date    2 nasal surgery      HERNIA REPAIR      umbilical    rectal fissure      TONSILLECTOMY      TOTAL KNEE ARTHROPLASTY      TRIGGER FINGER RELEASE  9-30-13    left hand (middle finger)       Family History   Problem Relation Age of Onset    Heart disease Unknown         premature    Cancer Neg Hx     Heart disease Mother     Diabetes Mother     Alcohol abuse Father        Social History     Social History    Marital status:      Spouse name: N/A    Number of children: N/A    Years of education: N/A     Social History Main Topics    Smoking status: Never Smoker    Smokeless tobacco: Never Used    Alcohol use No    Drug use: No    Sexual activity: No     Other Topics Concern    None     Social History Narrative    He is a retired  and teacher. He grew up in [place]. He moved to [place] after going to [place]. 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 He is a non-smoker and doesn't use alcohol. He is  for over 40  "years. He lives in his own home with his wife. He has 2 adult daughters. He has his daughters cats in the house. He enjoys playing baseball with his grandson.                Current Outpatient Prescriptions   Medication Sig Dispense Refill    ACCU-CHEK SMARTVIEW TEST STRIP Strp USE ONE STRIP TO CHECK GLUCOSE THREE TIMES DAILY AS NEEDED 100 strip 3    alpha lipoic acid 200 mg Cap Take 1 capsule PO once daily      aspirin 81 MG chewable tablet Take 81 mg by mouth once daily.        insulin aspart (NOVOLOG) 100 unit/mL injection Inject 10 Units into the skin 3 (three) times daily as needed for High Blood Sugar. 30 mL 11    insulin syringe-needle U-100 0.3 mL 31 gauge x 15/64" Syrg USE SYRINGE THREE TIMES DAILY 100 Syringe 11    lancets Misc USE ONE  TO CHECK GLUCOSE THREE TIMES DAILY AS NEEDED 200 each 3    LEVEMIR FLEXTOUCH 100 unit/mL (3 mL) InPn pen INJECT 30 UNITS INTO THE SKIN ONCE DAILY AS DIRECTED (Patient taking differently: INJECT 40 UNITS INTO THE SKIN ONCE DAILY AS DIRECTED) 15 packet 11    lisinopril 10 MG tablet Take 1 tablet (10 mg total) by mouth once daily. 30 tablet 5    metFORMIN (GLUCOPHAGE) 1000 MG tablet Take 1 tablet (1,000 mg total) by mouth 2 (two) times daily with meals. 60 tablet 5    nitroGLYCERIN (NITROSTAT) 0.4 MG SL tablet Place 0.4 mg under the tongue every 5 (five) minutes as needed.        pen needle, diabetic (PEN NEEDLE) 31 gauge x 5/16" Ndle USE ONE PEN NEEDLE ONCE DAILY 100 each 3    ranitidine (ZANTAC) 300 MG tablet Take 1 tablet (300 mg total) by mouth nightly. 30 tablet 5    VICTOZA 2-BEL 0.6 mg/0.1 mL (18 mg/3 mL) PnIj INJECT 1.8 MG INTO THE SKIN ONCE DAILY 9 Syringe 11     No current facility-administered medications for this visit.        Review of patient's allergies indicates:  No Known Allergies    Review of Systems   Constitution: Negative for chills and fever.   Cardiovascular: Positive for leg swelling. Negative for chest pain and claudication. "   Respiratory: Negative for cough and shortness of breath.    Skin: Positive for dry skin and nail changes.   Musculoskeletal:        Toenail pain   Gastrointestinal: Negative for nausea and vomiting.   Neurological: Positive for numbness. Negative for paresthesias.   Psychiatric/Behavioral: Negative for altered mental status.           Objective:      Physical Exam   Constitutional: He is oriented to person, place, and time. He appears well-developed and well-nourished.   HENT:   Head: Normocephalic.   Cardiovascular: Intact distal pulses.    Pulses:       Dorsalis pedis pulses are 2+ on the right side, and 2+ on the left side.        Posterior tibial pulses are 1+ on the right side, and 1+ on the left side.   CRT < 3 sec to tips of toes. 1+ edema noted to b/l LE. Mild vericosities noted to b/l LEs.      Pulmonary/Chest: No respiratory distress.   Musculoskeletal:   Gastrocnemius equinus noted to b/l ankles with decreased DF noted on exam. MMT 5/5 in DF/PF/Inv/Ev resistance with no reproduction of pain in any direction. Passive range of motion of ankle and pedal joints is painless. Adequate pedal joint ROM.   Semi-reducible hammertoe contractures noted to toes 2-4 b/l-asymptomatic.     Feet:   Right Foot:   Protective Sensation: 10 sites tested. 4 sites sensed.   Skin Integrity: Positive for callus and dry skin. Negative for skin breakdown.   Left Foot:   Protective Sensation: 10 sites tested. 4 sites sensed.   Skin Integrity: Positive for callus and dry skin. Negative for skin breakdown.   Neurological: He is alert and oriented to person, place, and time. He has normal strength. A sensory deficit is present.   Light touch, proprioception, and sharp/dull sensation are all intact bilaterally. Protective threshold with the [place]-Wienstein monofilament is decreased distally bilaterally.     Skin: Skin is warm, dry and intact. No erythema.   No open lesions, lacerations or wounds noted. Nails are thickened, elongated,  discolored yellow/brown with subungual debris and brittleness to R 1-5 and L 1-5. Interdigital spaces clean, dry and intact b/l. No erythema noted to b/l foot. Skin texture thin, atrophic, dry. Pedal hair diminished. Skin temperature normal b/l foot.   Hyperkeratotic lesion noted to plantar medial hallux IPJ b/l (well trimmed). Skin lines present to lesion/s. No signs of deep tissue injury.      Psychiatric: He has a normal mood and affect. His behavior is normal. Judgment and thought content normal.   Vitals reviewed.            Assessment:       Encounter Diagnoses   Name Primary?    Type II diabetes mellitus with neurological manifestations Yes    Bilateral lower extremity edema     Corn or callus     Hammer toes of both feet     Numbness of toes     Onychomycosis due to dermatophyte          Plan:       Roger was seen today for diabetic foot exam.    Diagnoses and all orders for this visit:    Type II diabetes mellitus with neurological manifestations  -     DIABETIC SHOES FOR HOME USE    Bilateral lower extremity edema  -     DIABETIC SHOES FOR HOME USE    Corn or callus  -     DIABETIC SHOES FOR HOME USE    Hammer toes of both feet  -     DIABETIC SHOES FOR HOME USE    Numbness of toes  -     DIABETIC SHOES FOR HOME USE    Onychomycosis due to dermatophyte      I counseled the patient on his conditions, their implications and medical management.    - Shoe inspection. Diabetic Foot Education. Patient reminded of the importance of good nutrition and blood sugar control to help prevent podiatric complications of diabetes. Patient instructed on proper foot hygeine. We discussed wearing proper shoe gear, daily foot inspections, never walking without protective shoe gear, caution putting sharp instruments to feet     - Discussed DM foot care:  Wear comfortable, proper fitting shoes. Wash feet daily. Dry well. After drying, apply moisturizer to feet (no lotion to webspaces). Inspect feet daily for skin breaks,  blisters, swelling, or redness. Wear cotton socks (preferably white)  Change socks every day. Do NOT walk barefoot. Do NOT use heating pads or warm/hot water soaks     With patient's permission, nails were aggressively reduced and debrided 1,2,3,4, 5 R and 1,2, 3,4,5 L and filed to their soft tissue attachment mechanically and with electric , removing all offending nail and debris.  Patient tolerated this well and no blood was drawn. Patient reports relief following the procedure.     Rx diabetic shoes with custom molded inserts to be worn at all times while ambulating. Prescription provided with list of local retailers.     Discussed proper and consistent elevation of lower extremities, above the level of the heart, while at rest, to help control/improve edema.     Discussed regular and routine moisturizer to skin of both feet to help improve dry skin. Advised to apply twice daily until resolution of symptoms. Avoid between toes.     RTC 3 months, sooner PRN

## 2018-06-18 NOTE — LETTER
June 18, 2018      Evelia Yanez MD  [address] [address]           [facility]  [address], [address]   [place] [address]  Phone: [phone]  Fax: [phone]          Patient: Roger Zavaleta Jr.   MR Number: [id number]   YOB: 1950   Date of Visit: 6/18/2018       Dear Dr. Evelia Yanez:    Thank you for referring Roger Zavaleta to me for evaluation. Attached you will find relevant portions of my assessment and plan of care.    If you have questions, please do not hesitate to call me. I look forward to following Roger Zavaleta along with you.    Sincerely,    Shaniqua Brewer DPM    Enclosure  CC:  No Recipients    If you would like to receive this communication electronically, please contact [email or web address] or [phone] to request more information on [employer] Link access.    For providers and/or their staff who would like to refer a patient to [facility], please contact us through our one-stop-shop provider referral line, [facility], at [phone].    If you feel you have received this communication in error or would no longer like to receive these types of communications, please e-mail [email or web address]

## 2018-06-19 ENCOUNTER — PATIENT MESSAGE (OUTPATIENT)
Dept: FAMILY MEDICINE | Facility: CLINIC | Age: 68
End: 2018-06-19

## 2018-06-20 ENCOUNTER — TELEPHONE (OUTPATIENT)
Dept: FAMILY MEDICINE | Facility: CLINIC | Age: 68
End: 2018-06-20

## 2018-06-20 NOTE — TELEPHONE ENCOUNTER
----- Message from Britany Howe LPN sent at 6/20/2018  8:17 AM CDT ----  Dr Yanez,  when I went to my Podiatry appointment on the 18th my check in weight was 216 lbs.  Ten days earlier at my appointment with you it was 203.  I verified both of these with my home scale.  That's a 13# gain in just 10 days.  Am I overly concerned

## 2018-06-21 RX ORDER — FUROSEMIDE 20 MG/1
20 TABLET ORAL DAILY PRN
Qty: 30 TABLET | Refills: 2 | Status: SHIPPED | OUTPATIENT
Start: 2018-06-21 | End: 2019-01-01 | Stop reason: SDUPTHER

## 2018-06-21 NOTE — TELEPHONE ENCOUNTER
OK. It sounds like he might be retaining fluid at times. Watch salt intake . I have sent a prescription for a fluid pill that you can take once a day as needed for swelling. If that does not seem to help, make an appointment for further evaluation.

## 2018-06-21 NOTE — TELEPHONE ENCOUNTER
His legs were swollen on the 18th, today weight is 213 and less swollen, denies shortness of breath

## 2018-07-09 ENCOUNTER — OFFICE VISIT (OUTPATIENT)
Dept: FAMILY MEDICINE | Facility: CLINIC | Age: 68
End: 2018-07-09
Payer: MEDICARE

## 2018-07-09 VITALS
TEMPERATURE: 98 F | HEIGHT: 68 IN | RESPIRATION RATE: 14 BRPM | WEIGHT: 206.13 LBS | BODY MASS INDEX: 31.24 KG/M2 | SYSTOLIC BLOOD PRESSURE: 122 MMHG | HEART RATE: 66 BPM | DIASTOLIC BLOOD PRESSURE: 60 MMHG | OXYGEN SATURATION: 98 %

## 2018-07-09 DIAGNOSIS — I15.2 HYPERTENSION ASSOCIATED WITH DIABETES: ICD-10-CM

## 2018-07-09 DIAGNOSIS — K21.9 GASTROESOPHAGEAL REFLUX DISEASE, ESOPHAGITIS PRESENCE NOT SPECIFIED: ICD-10-CM

## 2018-07-09 DIAGNOSIS — E11.59 HYPERTENSION ASSOCIATED WITH DIABETES: ICD-10-CM

## 2018-07-09 DIAGNOSIS — Z79.4 LONG-TERM INSULIN USE: ICD-10-CM

## 2018-07-09 PROCEDURE — 3074F SYST BP LT 130 MM HG: CPT | Mod: CPTII,S$GLB,, | Performed by: INTERNAL MEDICINE

## 2018-07-09 PROCEDURE — 99999 PR PBB SHADOW E&M-EST. PATIENT-LVL III: CPT | Mod: PBBFAC,,, | Performed by: INTERNAL MEDICINE

## 2018-07-09 PROCEDURE — 3078F DIAST BP <80 MM HG: CPT | Mod: CPTII,S$GLB,, | Performed by: INTERNAL MEDICINE

## 2018-07-09 PROCEDURE — 99213 OFFICE O/P EST LOW 20 MIN: CPT | Mod: S$GLB,,, | Performed by: INTERNAL MEDICINE

## 2018-07-09 PROCEDURE — 3045F PR MOST RECENT HEMOGLOBIN A1C LEVEL 7.0-9.0%: CPT | Mod: CPTII,S$GLB,, | Performed by: INTERNAL MEDICINE

## 2018-07-09 NOTE — PATIENT INSTRUCTIONS
Continue ranitidine for stomach pains. Continue current insulin regimens for now. BS's are pretty good. Continue lisinopril for BP. Take furosemide if needed, but no swelling currently.

## 2018-07-09 NOTE — PROGRESS NOTES
Subjective:       Patient ID: Roger Zavaleta Jr. is a 67 y.o. male.    Chief Complaint: Follow-up (1 month Diabetes)     I have reviewed the PMH,  and  for this patient. He is here for follow-up after I increased his levemir to 45 units and put him on sliding scale for novolog. His sugars have been better. HE still has some high ones mid-day. I also gave him zantac for abdominal pain and he reports that it is helping. I changed his HCTZ to lisinopril and he initially had some swelling, but it resolved with just one dose of lasix. He is feeling good currently.       Diabetes   He has type 2 diabetes mellitus. No MedicAlert identification noted. The initial diagnosis of diabetes was made 20 years ago. Hypoglycemia symptoms include dizziness and sweats. Pertinent negatives for hypoglycemia include no confusion, headaches, hunger, mood changes, nervousness/anxiousness, pallor, seizures, speech difficulty or tremors. Associated symptoms include blurred vision, foot paresthesias, polyuria and weakness. Pertinent negatives for diabetes include no chest pain, no fatigue, no foot ulcerations, no polyphagia and no weight loss. Hypoglycemia complications include required assistance. Pertinent negatives for hypoglycemia complications include no blackouts, no hospitalization and no required glucagon injection. Symptoms are stable. Diabetic complications include autonomic neuropathy, heart disease, peripheral neuropathy and retinopathy. Pertinent negatives for diabetic complications include no CVA or nephropathy. Risk factors for coronary artery disease include family history, hypertension, obesity, diabetes mellitus and male sex. Current diabetic treatment includes diet, insulin injections, oral agent (triple therapy) and intensive insulin program. He is compliant with treatment most of the time. He is currently taking insulin pre-breakfast, pre-lunch, pre-dinner and at bedtime. Insulin injections are given by patient.  Rotation sites for injection include the abdominal wall. His weight is fluctuating minimally. He is following a low fat/cholesterol diet. Meal planning includes avoidance of concentrated sweets. He has not had a previous visit with a dietitian. He participates in exercise intermittently. He monitors blood glucose at home 3-4 x per day. He monitors urine at home <1 x per month. Blood glucose monitoring compliance is good. His home blood glucose trend is fluctuating minimally. He sees a podiatrist.Eye exam is current.     Review of Systems   Constitutional: Negative for chills, fatigue, fever and weight loss.   HENT: Negative for congestion, ear discharge, ear pain, rhinorrhea and sore throat.    Eyes: Positive for blurred vision. Negative for discharge, redness and itching.   Respiratory: Negative for cough, chest tightness, shortness of breath and wheezing.    Cardiovascular: Negative for chest pain, palpitations and leg swelling.   Gastrointestinal: Negative for abdominal pain, constipation, diarrhea, nausea and vomiting.   Endocrine: Positive for polyuria. Negative for cold intolerance, heat intolerance and polyphagia.   Genitourinary: Negative for dysuria, flank pain, frequency and hematuria.   Musculoskeletal: Negative for arthralgias, back pain, joint swelling and myalgias.   Skin: Negative for color change, pallor and rash.   Neurological: Positive for dizziness and weakness. Negative for tremors, seizures, speech difficulty, numbness and headaches.   Psychiatric/Behavioral: Negative for confusion, dysphoric mood and sleep disturbance. The patient is not nervous/anxious.        Objective:      Physical Exam   Constitutional: He appears well-developed and well-nourished.   HENT:   Head: Normocephalic and atraumatic.   Right Ear: External ear normal. Tympanic membrane is not erythematous. No middle ear effusion.   Left Ear: External ear normal. Tympanic membrane is not erythematous.  No middle ear effusion.    Mouth/Throat: No posterior oropharyngeal edema or posterior oropharyngeal erythema. No tonsillar exudate.   Eyes: Conjunctivae and EOM are normal. Pupils are equal, round, and reactive to light.   Neck: No thyromegaly present.   Cardiovascular: Normal rate, regular rhythm, normal heart sounds and intact distal pulses.    No murmur heard.  Pulmonary/Chest: Effort normal and breath sounds normal. He has no wheezes.   Abdominal: He exhibits no distension. There is no tenderness.   Musculoskeletal: He exhibits no edema or tenderness.   Lymphadenopathy:     He has no cervical adenopathy.   Neurological: He displays normal reflexes. No cranial nerve deficit.   Skin: Skin is warm and dry. No rash noted.   Psychiatric: He has a normal mood and affect. His behavior is normal.       Assessment and Plan:     Problem List Items Addressed This Visit     Diabetes mellitus type 2, uncontrolled with complications (CAD) - Primary - continue current medication regimen.       Hypertension associated with diabetes - bp is doing better after getting adjusted to lisinopril.       Long-term insulin use - on levemir and novolog.       Gastroesophageal reflux disease - zantac is helping. Continue it.

## 2018-07-18 RX ORDER — SYRINGE-NEEDLE,INSULIN,0.5 ML 30 GX5/16"
SYRINGE, EMPTY DISPOSABLE MISCELLANEOUS
Qty: 50 EACH | Refills: 7 | Status: SHIPPED | OUTPATIENT
Start: 2018-07-18 | End: 2019-08-26 | Stop reason: SDUPTHER

## 2018-07-30 ENCOUNTER — OFFICE VISIT (OUTPATIENT)
Dept: FAMILY MEDICINE | Facility: CLINIC | Age: 68
End: 2018-07-30
Payer: MEDICARE

## 2018-07-30 VITALS
SYSTOLIC BLOOD PRESSURE: 138 MMHG | HEIGHT: 68 IN | RESPIRATION RATE: 18 BRPM | DIASTOLIC BLOOD PRESSURE: 80 MMHG | OXYGEN SATURATION: 98 % | BODY MASS INDEX: 30.86 KG/M2 | TEMPERATURE: 98 F | HEART RATE: 78 BPM | WEIGHT: 203.63 LBS

## 2018-07-30 DIAGNOSIS — E11.59 HYPERTENSION ASSOCIATED WITH DIABETES: ICD-10-CM

## 2018-07-30 DIAGNOSIS — J20.9 ACUTE BRONCHITIS, UNSPECIFIED ORGANISM: Primary | ICD-10-CM

## 2018-07-30 DIAGNOSIS — I15.2 HYPERTENSION ASSOCIATED WITH DIABETES: ICD-10-CM

## 2018-07-30 DIAGNOSIS — J00 ACUTE RHINITIS: ICD-10-CM

## 2018-07-30 PROCEDURE — 99213 OFFICE O/P EST LOW 20 MIN: CPT | Mod: 25,S$GLB,, | Performed by: INTERNAL MEDICINE

## 2018-07-30 PROCEDURE — 96372 THER/PROPH/DIAG INJ SC/IM: CPT | Mod: S$GLB,,, | Performed by: INTERNAL MEDICINE

## 2018-07-30 PROCEDURE — 3045F PR MOST RECENT HEMOGLOBIN A1C LEVEL 7.0-9.0%: CPT | Mod: CPTII,S$GLB,, | Performed by: INTERNAL MEDICINE

## 2018-07-30 PROCEDURE — 3079F DIAST BP 80-89 MM HG: CPT | Mod: CPTII,S$GLB,, | Performed by: INTERNAL MEDICINE

## 2018-07-30 PROCEDURE — 3075F SYST BP GE 130 - 139MM HG: CPT | Mod: CPTII,S$GLB,, | Performed by: INTERNAL MEDICINE

## 2018-07-30 PROCEDURE — 99999 PR PBB SHADOW E&M-EST. PATIENT-LVL V: CPT | Mod: PBBFAC,,, | Performed by: INTERNAL MEDICINE

## 2018-07-30 RX ORDER — LANCETS
EACH MISCELLANEOUS 3 TIMES DAILY
COMMUNITY
End: 2018-07-30

## 2018-07-30 RX ORDER — BLOOD-GLUCOSE CONTROL, NORMAL
1 EACH MISCELLANEOUS 3 TIMES DAILY
Qty: 200 EACH | Refills: 3 | Status: SHIPPED | OUTPATIENT
Start: 2018-07-30 | End: 2018-08-08 | Stop reason: SDUPTHER

## 2018-07-30 RX ORDER — BENZONATATE 100 MG/1
100 CAPSULE ORAL 3 TIMES DAILY PRN
Qty: 30 CAPSULE | Refills: 1 | Status: SHIPPED | OUTPATIENT
Start: 2018-07-30 | End: 2018-08-09

## 2018-07-30 RX ORDER — TRIAMCINOLONE ACETONIDE 40 MG/ML
40 INJECTION, SUSPENSION INTRA-ARTICULAR; INTRAMUSCULAR
Status: COMPLETED | OUTPATIENT
Start: 2018-07-30 | End: 2018-07-30

## 2018-07-30 RX ADMIN — TRIAMCINOLONE ACETONIDE 40 MG: 40 INJECTION, SUSPENSION INTRA-ARTICULAR; INTRAMUSCULAR at 09:07

## 2018-07-30 NOTE — PROGRESS NOTES
Subjective:       Patient ID: Roger Zavaleta Jr. is a 67 y.o. male.    Chief Complaint: Cough (x 4 days); Nasal Congestion (x 4 days); Ears (both ears are stopped up x 4 days); Sore Throat; and Medication Refill     I have reviewed the PMH,  and  for this patient. He presents today for nasal congestion and cough x 4 days. He has had no fever and no sputum production. He has had fatigue. He tried treating with benadryl. He has diabetes and his last HgbA1c was 8.6, a month ago. His BP at home has been good at about 120/65.       Cough   This is a new problem. The current episode started in the past 7 days. The problem has been unchanged. The problem occurs every few minutes. The cough is non-productive. Associated symptoms include ear congestion, nasal congestion, postnasal drip and rhinorrhea. Pertinent negatives include no chest pain, chills, ear pain, eye redness, fever, headaches, heartburn, hemoptysis, myalgias, rash, sore throat, shortness of breath, sweats, weight loss or wheezing. Nothing aggravates the symptoms. Treatments tried: antihistamine. The treatment provided mild relief. His past medical history is significant for environmental allergies.     Review of Systems   Constitutional: Negative for activity change, appetite change, chills, diaphoresis, fatigue, fever and weight loss.   HENT: Positive for postnasal drip and rhinorrhea. Negative for congestion, drooling, ear discharge, ear pain, nosebleeds, sinus pain, sinus pressure, sneezing, sore throat, trouble swallowing and voice change.    Eyes: Negative for pain, discharge, redness and itching.   Respiratory: Negative for cough, hemoptysis, chest tightness, shortness of breath and wheezing.    Cardiovascular: Negative for chest pain and leg swelling.   Gastrointestinal: Negative for abdominal pain, constipation, diarrhea, heartburn and nausea.   Musculoskeletal: Negative for arthralgias, joint swelling and myalgias.   Skin: Negative for pallor and  rash.   Allergic/Immunologic: Positive for environmental allergies.   Neurological: Negative for dizziness, weakness, light-headedness and headaches.   Hematological: Negative for adenopathy. Does not bruise/bleed easily.   Psychiatric/Behavioral: Negative for agitation and sleep disturbance.       Objective:      Physical Exam   Constitutional: He is oriented to person, place, and time.   HENT:   Head: Normocephalic and atraumatic.   Right Ear: External ear normal. Tympanic membrane is not injected, not erythematous and not retracted. No middle ear effusion.   Left Ear: External ear normal. Tympanic membrane is not injected, not erythematous and not retracted.  No middle ear effusion.   Nose: Rhinorrhea present.   Mouth/Throat: Posterior oropharyngeal erythema present. No oropharyngeal exudate or posterior oropharyngeal edema.   Eyes: Conjunctivae and EOM are normal. Pupils are equal, round, and reactive to light. Right eye exhibits no discharge. Left eye exhibits no discharge.   Neck: Normal range of motion. Neck supple. No thyromegaly present.   Cardiovascular: Normal rate, regular rhythm, normal heart sounds and intact distal pulses.    No murmur heard.  Pulmonary/Chest: Effort normal and breath sounds normal. No respiratory distress. He has no wheezes. He has no rales.   Abdominal: Soft. He exhibits no distension.   Musculoskeletal: He exhibits no edema or tenderness.   Lymphadenopathy:     He has no cervical adenopathy.   Neurological: He is alert and oriented to person, place, and time.   Skin: Skin is warm and dry. No rash noted. No erythema.   Psychiatric: He has a normal mood and affect. His behavior is normal.       Assessment and Plan:     Problem List Items Addressed This Visit     Diabetes mellitus type 2, uncontrolled with complications (CAD) - stable. Working on sugars. HE is aware steroid shot may elevate sugars.       Hypertension associated with diabetes - BP controlled.       Other Visit  Diagnoses     Acute bronchitis, unspecified organism    -  Primary - treat with steroid shot and cough medicine.       Relevant Medications    triamcinolone acetonide injection 40 mg (Completed)    benzonatate (TESSALON) 100 MG capsule    Acute rhinitis    - steroid shot. OK to take benadryl if it helps.

## 2018-07-30 NOTE — PATIENT INSTRUCTIONS
I have refilled your test strips and lancets. You have an upper respiratory infection. I have ordered a steroid shot to help with symptoms. This may cause your sugars to go up temporarily, so be careful about diet. BP has been good. If you feel like you are getting worse -- with fever and colored drainage, please notify us. Rest and drink plenty of fluids.        What Is Acute Bronchitis?  Acute bronchitis is when the airways in your lungs (bronchial tubes) become red and swollen (inflamed). It is usually caused by a viral infection. But it can also occur because of a bacteria or allergen. Symptoms include a cough that produces yellow or greenish mucus and can last for days or sometimes weeks.  Inside healthy lungs    Air travels in and out of the lungs through the airways. The linings of these airways produce sticky mucus. This mucus traps particles that enter the lungs. Tiny structures called cilia then sweep the particles out of the airways.     Healthy airway: Airways are normally open. Air moves in and out easily.      Healthy cilia: Tiny, hairlike cilia sweep mucus and particles up and out of the airways.   Lungs with bronchitis  Bronchitis often occurs with a cold or the flu virus. The airways become inflamed (red and swollen). There is a deep hacking cough from the extra mucus. Other symptoms may include:  · Wheezing  · Chest discomfort  · Shortness of breath  · Mild fever  A second infection, this time due to bacteria, may then occur. And airways irritated by allergens or smoke are more likely to get infected.        Inflamed airway: Inflammation and extra mucus narrow the airway, causing shortness of breath.      Impaired cilia: Extra mucus impairs cilia, causing congestion and wheezing. Smoking makes the problem worse.   Making a diagnosis  A physical exam, health history, and certain tests help your healthcare provider make the diagnosis.  Health history  Your healthcare provider will ask you about your  symptoms.  The exam  Your provider listens to your chest for signs of congestion. He or she may also check your ears, nose, and throat.  Possible tests  · A sputum test for bacteria. This requires a sample of mucus from your lungs.  · A nasal or throat swab. This tests to see if you have a bacterial infection.  · A chest X-ray. This is done if your healthcare provider thinks you have pneumonia.  · Tests to check for an underlying condition. Other tests may be done to check for things such as allergies, asthma, or COPD (chronic obstructive pulmonary disease). You may need to see a specialist for more lung function testing.  Treating a cough  The main treatment for bronchitis is easing symptoms. Avoiding smoke, allergens, and other things that trigger coughing can often help. If the infection is bacterial, you may be given antibiotics. During the illness, it's important to get plenty of sleep. To ease symptoms:  · Dont smoke. Also avoid secondhand smoke.  · Use a humidifier. Or try breathing in steam from a hot shower. This may help loosen mucus.  · Drink a lot of water and juice. They can soothe the throat and may help thin mucus.  · Sit up or use extra pillows when in bed. This helps to lessen coughing and congestion.  · Ask your provider about using medicine. Ask about using cough medicine, pain and fever medicine, or a decongestant.  Antibiotics  Most cases of bronchitis are caused by cold or flu viruses. They dont need antibiotics to treat them, even if your mucus is thick and green or yellow. Antibiotics dont treat viral illness and antibiotics have not been shown to have any benefit in cases of acute bronchitis. Taking antibiotics when they are not needed increases your risk of getting an infection later that is antibiotic-resistant. Antibiotics can also cause severe cases of diarrhea that require other antibiotics to treat.  It is important that you accept your healthcare provider's opinion to not use  antibiotics. Your provider will prescribe antibiotics if the infection is caused by bacteria. If they are prescribed:  · Take all of the medicine. Take the medicine until it is used up, even if symptoms have improved. If you dont, the bronchitis may come back.  · Take the medicines as directed. For instance, some medicines should be taken with food.  · Ask about side effects. Ask your provider or pharmacist what side effects are common, and what to do about them.  Follow-up care  You should see your provider again in 2 to 3 weeks. By this time, symptoms should have improved. An infection that lasts longer may mean you have a more serious problem.  Prevention  · Avoid tobacco smoke. If you smoke, quit. Stay away from smoky places. Ask friends and family not to smoke around you, or in your home or car.  · Get checked for allergies.  · Ask your provider about getting a yearly flu shot. Also ask about pneumococcal or pneumonia shots.  · Wash your hands often. This helps reduce the chance of picking up viruses that cause colds and flu.  Call your healthcare provider if:  · Symptoms worsen, or you have new symptoms  · Breathing problems worsen or  become severe  · Symptoms dont get better within a week, or within 3 days of taking antibiotics   Date Last Reviewed: 2/1/2017  © 1746-7621 [employer]. [address] [place] [address], [place] [address]. All rights reserved. This information is not intended as a substitute for professional medical care. Always follow your healthcare professional's instructions.

## 2018-08-07 ENCOUNTER — PATIENT MESSAGE (OUTPATIENT)
Dept: FAMILY MEDICINE | Facility: CLINIC | Age: 68
End: 2018-08-07

## 2018-08-08 RX ORDER — BLOOD-GLUCOSE CONTROL, NORMAL
1 EACH MISCELLANEOUS 3 TIMES DAILY
Qty: 200 EACH | Refills: 3 | Status: SHIPPED | OUTPATIENT
Start: 2018-08-08 | End: 2022-01-12 | Stop reason: SDUPTHER

## 2018-08-10 DIAGNOSIS — E11.9 DIABETES MELLITUS WITHOUT COMPLICATION: ICD-10-CM

## 2018-09-04 ENCOUNTER — PATIENT MESSAGE (OUTPATIENT)
Dept: FAMILY MEDICINE | Facility: CLINIC | Age: 68
End: 2018-09-04

## 2018-09-05 ENCOUNTER — OFFICE VISIT (OUTPATIENT)
Dept: FAMILY MEDICINE | Facility: CLINIC | Age: 68
End: 2018-09-05
Payer: MEDICARE

## 2018-09-05 VITALS
BODY MASS INDEX: 31.67 KG/M2 | SYSTOLIC BLOOD PRESSURE: 184 MMHG | RESPIRATION RATE: 18 BRPM | HEIGHT: 68 IN | OXYGEN SATURATION: 98 % | DIASTOLIC BLOOD PRESSURE: 86 MMHG | WEIGHT: 209 LBS | HEART RATE: 78 BPM

## 2018-09-05 DIAGNOSIS — I10 HYPERTENSION, ESSENTIAL: Primary | ICD-10-CM

## 2018-09-05 PROBLEM — R10.13 EPIGASTRIC PAIN: Status: RESOLVED | Noted: 2018-06-08 | Resolved: 2018-09-05

## 2018-09-05 PROCEDURE — 99213 OFFICE O/P EST LOW 20 MIN: CPT | Mod: S$PBB,,, | Performed by: FAMILY MEDICINE

## 2018-09-05 PROCEDURE — 99215 OFFICE O/P EST HI 40 MIN: CPT | Mod: PBBFAC,PN,25 | Performed by: FAMILY MEDICINE

## 2018-09-05 PROCEDURE — 99999 PR PBB SHADOW E&M-EST. PATIENT-LVL V: CPT | Mod: PBBFAC,,, | Performed by: FAMILY MEDICINE

## 2018-09-05 PROCEDURE — 1101F PT FALLS ASSESS-DOCD LE1/YR: CPT | Mod: CPTII,,, | Performed by: FAMILY MEDICINE

## 2018-09-05 PROCEDURE — 3079F DIAST BP 80-89 MM HG: CPT | Mod: CPTII,,, | Performed by: FAMILY MEDICINE

## 2018-09-05 PROCEDURE — 3077F SYST BP >= 140 MM HG: CPT | Mod: CPTII,,, | Performed by: FAMILY MEDICINE

## 2018-09-05 PROCEDURE — 93010 ELECTROCARDIOGRAM REPORT: CPT | Mod: S$PBB,,, | Performed by: INTERNAL MEDICINE

## 2018-09-05 PROCEDURE — 93005 ELECTROCARDIOGRAM TRACING: CPT | Mod: PBBFAC,PN | Performed by: INTERNAL MEDICINE

## 2018-09-05 RX ORDER — METOPROLOL SUCCINATE 50 MG/1
50 TABLET, EXTENDED RELEASE ORAL DAILY
Qty: 30 TABLET | Refills: 0 | Status: SHIPPED | OUTPATIENT
Start: 2018-09-05 | End: 2018-10-15 | Stop reason: SDUPTHER

## 2018-09-05 NOTE — PROGRESS NOTES
FAMILY MEDICINE    Patient Active Problem List   Diagnosis    CAD (coronary artery disease)    HLD (hyperlipidemia)    Diabetes mellitus type 2, uncontrolled with complications (CAD)    Diabetic neuropathy    Obesity (BMI 30-39.9)    Hypertension, essential    Long-term insulin use    Gastroesophageal reflux disease       CC:   Chief Complaint   Patient presents with    Hypertension       SUBJECTIVE:  Roger Zavaleta Jr.   is a 67 y.o. male  - with CAD, HLD, DM2 and HTN presents with concerns for worsening blood pressure in that last 1 week and headache. Pt reports that he was feeling fatigued and weak about 3 days ago. He was routinely checking his blood pressure and noted BP >180/100 and he was unable to get it to improve so he went to the ER on 9/3/18. Evaluation in ER note BM >200/100. No medication, EKG or labs were done in the ER. He was discharged with Lisinopril 20 mg daily (previously on 10 mg daily).   Pt started Lisinopril 20 mg since ER visit. Weakness improved but does complain of + headache which his did take OTC aleve and then ASA with improvement. Headache dull and diffuse frontal mild 5/10 and resolved. No associated vision changes or focal neurological changes. Has been monitoring BP at home and range is 160-180/ still with adjustment of medication. No chest pain, SOB, MELENDREZ, or leg swelling. No decrease in exercise tolerance.       Hypertension   This is a recurrent problem. The current episode started in the past 7 days. The problem has been gradually worsening since onset. The problem is uncontrolled. Associated symptoms include blurred vision, headaches and malaise/fatigue. Pertinent negatives include no anxiety, chest pain, neck pain, orthopnea, palpitations, peripheral edema, PND or shortness of breath. Agents associated with hypertension include NSAIDs. Risk factors for coronary artery disease include diabetes mellitus, dyslipidemia, family history, male gender and obesity. Past  treatments include ACE inhibitors. The current treatment provides mild improvement. Compliance problems include exercise and diet.        ROS: Review of Systems   Constitutional: Positive for fatigue and malaise/fatigue. Negative for activity change, appetite change, chills and fever.   HENT: Negative for nosebleeds.    Eyes: Positive for blurred vision. Negative for photophobia and visual disturbance.   Respiratory: Negative for cough, chest tightness and shortness of breath.    Cardiovascular: Negative for chest pain, palpitations, orthopnea, leg swelling and PND.   Gastrointestinal: Negative for abdominal pain, diarrhea, nausea and vomiting.   Endocrine: Negative for cold intolerance, heat intolerance, polydipsia, polyphagia and polyuria.   Genitourinary: Negative for decreased urine volume, difficulty urinating and urgency.   Musculoskeletal: Negative for myalgias and neck pain.   Neurological: Positive for headaches. Negative for dizziness, tremors, speech difficulty, weakness, light-headedness and numbness.   Psychiatric/Behavioral: Negative for sleep disturbance.       Past Medical History:   Diagnosis Date    CAD (coronary artery disease) 50    Diabetes mellitus type II     HLD (hyperlipidemia)        Past Surgical History:   Procedure Laterality Date    2 nasal surgery      HERNIA REPAIR      umbilical    rectal fissure      TONSILLECTOMY      TOTAL KNEE ARTHROPLASTY      TRIGGER FINGER RELEASE  9-30-13    left hand (middle finger)       ALLERGIES: Review of patient's allergies indicates:  No Known Allergies    MEDS:   Current Outpatient Medications:     alpha lipoic acid 200 mg Cap, Take 1 capsule PO once daily, Disp: , Rfl:     aspirin 81 MG chewable tablet, Take 81 mg by mouth once daily.  , Disp: , Rfl:     blood sugar diagnostic (ACCU-CHEK SMARTVIEW TEST STRIP) Strp, USE ONE STRIP TO CHECK GLUCOSE THREE TIMES DAILY AS NEEDED, Disp: 100 strip, Rfl: 3    insulin aspart (NOVOLOG) 100 unit/mL  "injection, Inject 10 Units into the skin 3 (three) times daily as needed for High Blood Sugar., Disp: 30 mL, Rfl: 11    insulin detemir U-100 (LEVEMIR FLEXTOUCH U-100 INSULN) 100 unit/mL (3 mL) SubQ InPn pen, Inject 40 Units into the skin once daily., Disp: 15 packet, Rfl: 3    insulin syringe-needle U-100 0.3 mL 31 gauge x 15/64" Syrg, USE SYRINGE THREE TIMES DAILY, Disp: 100 Syringe, Rfl: 11    lancets (ULTRA THIN LANCETS) 30 gauge Misc, 1 lancet by Misc.(Non-Drug; Combo Route) route 3 (three) times daily., Disp: 200 each, Rfl: 3    lisinopril (PRINIVIL,ZESTRIL) 20 MG tablet, Take 1 tablet (20 mg total) by mouth once daily., Disp: 30 tablet, Rfl: 0    metFORMIN (GLUCOPHAGE) 1000 MG tablet, Take 1 tablet (1,000 mg total) by mouth 2 (two) times daily with meals., Disp: 60 tablet, Rfl: 5    nitroGLYCERIN (NITROSTAT) 0.4 MG SL tablet, Place 0.4 mg under the tongue every 5 (five) minutes as needed.  , Disp: , Rfl:     PEN NEEDLE 31 gauge x 5/16" Ndle, USE AS DIRECTED ONCE DAILY, Disp: 50 each, Rfl: 7    VICTOZA 2-BEL 0.6 mg/0.1 mL (18 mg/3 mL) PnIj, INJECT 1.8 MG INTO THE SKIN ONCE DAILY, Disp: 9 Syringe, Rfl: 11    furosemide (LASIX) 20 MG tablet, Take 1 tablet (20 mg total) by mouth daily as needed (swelling)., Disp: 30 tablet, Rfl: 2    metoprolol succinate (TOPROL-XL) 50 MG 24 hr tablet, Take 1 tablet (50 mg total) by mouth once daily., Disp: 30 tablet, Rfl: 0    ranitidine (ZANTAC) 300 MG tablet, Take 1 tablet (300 mg total) by mouth nightly., Disp: 30 tablet, Rfl: 5    OBJECTIVE:   Vitals:    09/05/18 1407 09/05/18 1414   BP: (!) 160/82 (!) 184/86   BP Location: Left arm    Patient Position: Sitting    BP Method: Large (Manual)    Pulse: 78    Resp: 18    SpO2: 98%    Weight: 94.8 kg (209 lb)    Height: 5' 8" (1.727 m)        Physical Exam   Constitutional: No distress.   Neck: Neck supple. No JVD present. No thyromegaly present.   Cardiovascular: Normal rate, regular rhythm, normal heart sounds and " intact distal pulses. Exam reveals no gallop and no friction rub.   No murmur heard.  Pulmonary/Chest: Effort normal and breath sounds normal.   Musculoskeletal: He exhibits no edema.   Lymphadenopathy:     He has no cervical adenopathy.   Neurological: He is alert. He displays normal reflexes.   Skin: Skin is warm. Capillary refill takes less than 2 seconds.         PERTINENT RESULTS:   No visits with results within 2 Month(s) from this visit.   Latest known visit with results is:   Lab Visit on 06/08/2018   Component Date Value Ref Range Status    Microalbum.,U,Random 06/08/2018 12.0  ug/mL Final    Creatinine, Random Ur 06/08/2018 186.0  23.0 - 375.0 mg/dL Final    Comment: The random urine reference ranges provided were established   for 24 hour urine collections.  No reference ranges exist for  random urine specimens.  Correlate clinically.      Microalb Creat Ratio 06/08/2018 6.5  0.0 - 30.0 ug/mg Final     Lab Visit on 06/08/2018   Component Date Value Ref Range Status    Microalbum.,U,Random 06/08/2018 12.0  ug/mL Final    Creatinine, Random Ur 06/08/2018 186.0  23.0 - 375.0 mg/dL Final    Comment: The random urine reference ranges provided were established   for 24 hour urine collections.  No reference ranges exist for  random urine specimens.  Correlate clinically.      Microalb Creat Ratio 06/08/2018 6.5  0.0 - 30.0 ug/mg Final   Lab Visit on 06/08/2018   Component Date Value Ref Range Status    Hemoglobin A1C 06/08/2018 8.6* 4.0 - 5.6 % Final    Comment: ADA Screening Guidelines:  5.7-6.4%  Consistent with prediabetes  >or=6.5%  Consistent with diabetes  High levels of fetal hemoglobin interfere with the HbA1C  assay. Heterozygous hemoglobin variants (HbS, HgC, etc)do  not significantly interfere with this assay.   However, presence of multiple variants may affect accuracy.      Estimated Avg Glucose 06/08/2018 200* 68 - 131 mg/dL Final    Sodium 06/08/2018 136  136 - 145 mmol/L Final     Potassium 06/08/2018 3.6  3.5 - 5.1 mmol/L Final    Chloride 06/08/2018 97  95 - 110 mmol/L Final    CO2 06/08/2018 28  23 - 29 mmol/L Final    Glucose 06/08/2018 186* 70 - 110 mg/dL Final    BUN, Bld 06/08/2018 14  2 - 20 mg/dL Final    Creatinine 06/08/2018 0.85  0.50 - 1.40 mg/dL Final    Calcium 06/08/2018 9.3  8.7 - 10.5 mg/dL Final    Total Protein 06/08/2018 7.6  6.0 - 8.4 g/dL Final    Albumin 06/08/2018 4.0  3.5 - 5.2 g/dL Final    Total Bilirubin 06/08/2018 0.7  0.1 - 1.0 mg/dL Final    Comment: For infants and newborns, interpretation of results should be based  on gestational age, weight and in agreement with clinical  observations.  Premature Infant recommended reference ranges:  Up to 24 hours.............<8.0 mg/dL  Up to 48 hours............<12.0 mg/dL  3-5 days..................<15.0 mg/dL  6-29 days.................<15.0 mg/dL      Alkaline Phosphatase 06/08/2018 108  38 - 126 U/L Final    AST 06/08/2018 37  15 - 46 U/L Final    ALT 06/08/2018 30  10 - 44 U/L Final    Anion Gap 06/08/2018 11  8 - 16 mmol/L Final    eGFR if African American 06/08/2018 >60.0  >60 mL/min/1.73 m^2 Final    eGFR if non African American 06/08/2018 >60.0  >60 mL/min/1.73 m^2 Final    Comment: Calculation used to obtain the estimated glomerular filtration  rate (eGFR) is the CKD-EPI equation.       WBC 06/08/2018 9.13  3.90 - 12.70 K/uL Final    RBC 06/08/2018 5.00  4.60 - 6.20 M/uL Final    Hemoglobin 06/08/2018 14.3  14.0 - 18.0 g/dL Final    Hematocrit 06/08/2018 40.3  40.0 - 54.0 % Final    MCV 06/08/2018 81* 82 - 98 fL Final    MCH 06/08/2018 28.6  27.0 - 31.0 pg Final    MCHC 06/08/2018 35.5  32.0 - 36.0 g/dL Final    RDW 06/08/2018 13.3  11.5 - 14.5 % Final    Platelets 06/08/2018 241  150 - 350 K/uL Final    MPV 06/08/2018 9.0* 9.2 - 12.9 fL Final    Gran # (ANC) 06/08/2018 6.7  1.8 - 7.7 K/uL Final    Lymph # 06/08/2018 1.8  1.0 - 4.8 K/uL Final    Mono # 06/08/2018 0.5  0.3 - 1.0 K/uL  Final    Eos # 06/08/2018 0.2  0.0 - 0.5 K/uL Final    Baso # 06/08/2018 0.02  0.00 - 0.20 K/uL Final    Gran% 06/08/2018 73.2* 38.0 - 73.0 % Final    Lymph% 06/08/2018 19.4  18.0 - 48.0 % Final    Mono% 06/08/2018 5.6  4.0 - 15.0 % Final    Eosinophil% 06/08/2018 1.6  0.0 - 8.0 % Final    Basophil% 06/08/2018 0.2  0.0 - 1.9 % Final    Differential Method 06/08/2018 Automated   Final    Cholesterol 06/08/2018 145  120 - 199 mg/dL Final    Comment: The National Cholesterol Education Program (NCEP) has set the  following guidelines (reference ranges) for Cholesterol:  Optimal.....................<200 mg/dL  Borderline High.............200-239 mg/dL  High........................> or = 240 mg/dL      Triglycerides 06/08/2018 94  30 - 150 mg/dL Final    Comment: The National Cholesterol Education Program (NCEP) has set the  following guidelines (reference values) for triglycerides:  Normal......................<150 mg/dL  Borderline High.............150-199 mg/dL  High........................200-499 mg/dL      HDL 06/08/2018 30* 40 - 75 mg/dL Final    Comment: The National Cholesterol Education Program (NCEP) has set the  following guidelines (reference values) for HDL Cholesterol:  Low...............<40 mg/dL  Optimal...........>60 mg/dL      LDL Cholesterol 06/08/2018 96.2  63.0 - 159.0 mg/dL Final    Comment: The National Cholesterol Education Program (NCEP) has set the  following guidelines (reference values) for LDL Cholesterol:  Optimal.......................<130 mg/dL  Borderline High...............130-159 mg/dL  High..........................160-189 mg/dL  Very High.....................>190 mg/dL      HDL/Chol Ratio 06/08/2018 20.7  20.0 - 50.0 % Final    Total Cholesterol/HDL Ratio 06/08/2018 4.8  2.0 - 5.0 Final    Non-HDL Cholesterol 06/08/2018 115  mg/dL Final    Comment: Risk category and Non-HDL cholesterol goals:  Coronary heart disease (CHD)or equivalent (10-year risk of CHD >20%):  Non-HDL  cholesterol goal     <130 mg/dL  Two or more CHD risk factors and 10-year risk of CHD <= 20%:  Non-HDL cholesterol goal     <160 mg/dL  0 to 1 CHD risk factor:  Non-HDL cholesterol goal     <190 mg/dL      PSA, SCREEN 06/08/2018 0.20  0.00 - 4.00 ng/mL Final    Comment: PSA Expected levels:  Hormonal Therapy: <0.05 ng/ml  Prostatectomy: <0.01 ng/ml  Radiation Therapy: <1.00 ng/ml      Amylase 06/08/2018 43  30 - 110 U/L Final    H Pylori IgG Interp 06/08/2018 Negative  Negative Final    Hepatitis C Ab 06/08/2018 Negative   Final   Office Visit on 06/08/2018   Component Date Value Ref Range Status    WBC, UA 06/08/2018 negative   Final    Nitrite, UA 06/08/2018 negative   Final    Urobilinogen, UA 06/08/2018 normal   Final    Protein 06/08/2018 trace   Final    pH, UA 06/08/2018 6   Final    Blood, UA 06/08/2018 negative   Final    Spec Grav UA 06/08/2018 1.015   Final    Ketones, UA 06/08/2018 negative   Final    Bilirubin 06/08/2018 negative   Final    Glucose, UA 06/08/2018 normal   Final       ASSESSMENT:  Problem List Items Addressed This Visit     Hypertension, essential - Primary    Current Assessment & Plan     - above goal and new worsening  - agree with ER Lisinopril increase to 20 mg daily  - add Metoprolol XR 50 mg daily  - will uptitrate meds to goal BP <130/80  - monitor closely  - once improved control will get Stress Echo  - EKG reviewed and unchanged from 2013 with possible ectopic atrial rhytm         Relevant Medications    metoprolol succinate (TOPROL-XL) 50 MG 24 hr tablet    Other Relevant Orders    EKG 12-lead    Urinalysis    TSH    Basic metabolic panel          PLAN:   Orders Placed This Encounter    Urinalysis    TSH    Basic metabolic panel    EKG 12-lead    metoprolol succinate (TOPROL-XL) 50 MG 24 hr tablet     Follow-up with Dr. Yanez in 1-2 weeks BP check.     Dr. Jane Almeida D.O.   Family Medicine

## 2018-09-05 NOTE — ASSESSMENT & PLAN NOTE
- above goal and new worsening  - agree with ER Lisinopril increase to 20 mg daily  - add Metoprolol XR 50 mg daily  - will uptitrate meds to goal BP <130/80  - monitor closely  - once improved control will get Stress Echo  - EKG reviewed and unchanged from 2013 with possible ectopic atrial rhytm  - pt encouraged to notify of BP results if no improvement

## 2018-09-05 NOTE — PATIENT INSTRUCTIONS
Check blood pressure daily in the AM  1. Seated with feet flat on the ground  2. Resting 5 mins  3. Arm (blood pressure cuff) at heart level  Goal blood pressure <130/80    No NSAIDs  - like Aleve, motrin, ibuprofen  - because they increase your blood pressure    Okay for Aspirin 81 mg daily for prevention of stroke and heart attack.   Okay for Tylenol 325 mg 1 tablet every 8 hours for headache.     Notify me with any concerns

## 2018-09-17 ENCOUNTER — PATIENT MESSAGE (OUTPATIENT)
Dept: FAMILY MEDICINE | Facility: CLINIC | Age: 68
End: 2018-09-17

## 2018-09-26 ENCOUNTER — PES CALL (OUTPATIENT)
Dept: ADMINISTRATIVE | Facility: CLINIC | Age: 68
End: 2018-09-26

## 2018-10-11 ENCOUNTER — TELEPHONE (OUTPATIENT)
Dept: FAMILY MEDICINE | Facility: CLINIC | Age: 68
End: 2018-10-11

## 2018-10-12 ENCOUNTER — IMMUNIZATION (OUTPATIENT)
Dept: INTERNAL MEDICINE | Facility: CLINIC | Age: 68
End: 2018-10-12
Payer: MEDICARE

## 2018-10-12 DIAGNOSIS — Z23 NEED FOR INFLUENZA VACCINATION: Primary | ICD-10-CM

## 2018-10-12 PROCEDURE — 99211 OFF/OP EST MAY X REQ PHY/QHP: CPT | Mod: PBBFAC,PN

## 2018-10-12 PROCEDURE — 90662 IIV NO PRSV INCREASED AG IM: CPT | Mod: PBBFAC,PN

## 2018-10-12 PROCEDURE — 99999 PR PBB SHADOW E&M-EST. PATIENT-LVL I: CPT | Mod: PBBFAC,,,

## 2018-10-15 ENCOUNTER — OFFICE VISIT (OUTPATIENT)
Dept: CARDIOLOGY | Facility: CLINIC | Age: 68
End: 2018-10-15
Payer: MEDICARE

## 2018-10-15 ENCOUNTER — OFFICE VISIT (OUTPATIENT)
Dept: FAMILY MEDICINE | Facility: CLINIC | Age: 68
End: 2018-10-15
Payer: MEDICARE

## 2018-10-15 VITALS
DIASTOLIC BLOOD PRESSURE: 82 MMHG | HEART RATE: 72 BPM | WEIGHT: 214.5 LBS | BODY MASS INDEX: 32.51 KG/M2 | HEIGHT: 68 IN | TEMPERATURE: 98 F | SYSTOLIC BLOOD PRESSURE: 172 MMHG | OXYGEN SATURATION: 98 %

## 2018-10-15 VITALS
HEART RATE: 81 BPM | DIASTOLIC BLOOD PRESSURE: 74 MMHG | OXYGEN SATURATION: 98 % | HEIGHT: 68 IN | BODY MASS INDEX: 32.49 KG/M2 | WEIGHT: 214.38 LBS | SYSTOLIC BLOOD PRESSURE: 170 MMHG

## 2018-10-15 DIAGNOSIS — R07.9 CHEST PAIN, UNSPECIFIED TYPE: Primary | ICD-10-CM

## 2018-10-15 DIAGNOSIS — E78.2 MIXED HYPERLIPIDEMIA: ICD-10-CM

## 2018-10-15 DIAGNOSIS — Z79.4 LONG-TERM INSULIN USE: ICD-10-CM

## 2018-10-15 DIAGNOSIS — I10 HYPERTENSION, ESSENTIAL: Primary | ICD-10-CM

## 2018-10-15 DIAGNOSIS — I10 HYPERTENSION, ESSENTIAL: ICD-10-CM

## 2018-10-15 DIAGNOSIS — R04.0 NOSEBLEED: ICD-10-CM

## 2018-10-15 DIAGNOSIS — E66.9 OBESITY (BMI 30-39.9): ICD-10-CM

## 2018-10-15 DIAGNOSIS — R06.09 DOE (DYSPNEA ON EXERTION): ICD-10-CM

## 2018-10-15 DIAGNOSIS — E11.641 UNCONTROLLED TYPE 2 DIABETES MELLITUS WITH HYPOGLYCEMIA AND COMA: ICD-10-CM

## 2018-10-15 DIAGNOSIS — I25.118 CORONARY ARTERY DISEASE OF NATIVE HEART WITH STABLE ANGINA PECTORIS, UNSPECIFIED VESSEL OR LESION TYPE: ICD-10-CM

## 2018-10-15 PROCEDURE — 3077F SYST BP >= 140 MM HG: CPT | Mod: CPTII,,, | Performed by: FAMILY MEDICINE

## 2018-10-15 PROCEDURE — 90732 PPSV23 VACC 2 YRS+ SUBQ/IM: CPT | Mod: PBBFAC,PN

## 2018-10-15 PROCEDURE — 99215 OFFICE O/P EST HI 40 MIN: CPT | Mod: PBBFAC,PN | Performed by: FAMILY MEDICINE

## 2018-10-15 PROCEDURE — 3077F SYST BP >= 140 MM HG: CPT | Mod: CPTII,,, | Performed by: INTERNAL MEDICINE

## 2018-10-15 PROCEDURE — 99213 OFFICE O/P EST LOW 20 MIN: CPT | Mod: S$PBB,,, | Performed by: FAMILY MEDICINE

## 2018-10-15 PROCEDURE — 3078F DIAST BP <80 MM HG: CPT | Mod: CPTII,,, | Performed by: INTERNAL MEDICINE

## 2018-10-15 PROCEDURE — 99999 PR PBB SHADOW E&M-EST. PATIENT-LVL V: CPT | Mod: PBBFAC,,, | Performed by: FAMILY MEDICINE

## 2018-10-15 PROCEDURE — 99214 OFFICE O/P EST MOD 30 MIN: CPT | Mod: PBBFAC,27,PN,25 | Performed by: INTERNAL MEDICINE

## 2018-10-15 PROCEDURE — 3045F PR MOST RECENT HEMOGLOBIN A1C LEVEL 7.0-9.0%: CPT | Mod: CPTII,,, | Performed by: INTERNAL MEDICINE

## 2018-10-15 PROCEDURE — 1101F PT FALLS ASSESS-DOCD LE1/YR: CPT | Mod: CPTII,,, | Performed by: INTERNAL MEDICINE

## 2018-10-15 PROCEDURE — 99205 OFFICE O/P NEW HI 60 MIN: CPT | Mod: S$PBB,,, | Performed by: INTERNAL MEDICINE

## 2018-10-15 PROCEDURE — 3079F DIAST BP 80-89 MM HG: CPT | Mod: CPTII,,, | Performed by: FAMILY MEDICINE

## 2018-10-15 PROCEDURE — 1101F PT FALLS ASSESS-DOCD LE1/YR: CPT | Mod: CPTII,,, | Performed by: FAMILY MEDICINE

## 2018-10-15 PROCEDURE — 99999 PR PBB SHADOW E&M-EST. PATIENT-LVL IV: CPT | Mod: PBBFAC,,, | Performed by: INTERNAL MEDICINE

## 2018-10-15 RX ORDER — LISINOPRIL 40 MG/1
40 TABLET ORAL DAILY
Qty: 90 TABLET | Refills: 0 | Status: SHIPPED | OUTPATIENT
Start: 2018-10-15 | End: 2019-01-14 | Stop reason: ALTCHOICE

## 2018-10-15 RX ORDER — ATORVASTATIN CALCIUM 40 MG/1
40 TABLET, FILM COATED ORAL DAILY
Qty: 90 TABLET | Refills: 3 | Status: SHIPPED | OUTPATIENT
Start: 2018-10-15 | End: 2020-01-29 | Stop reason: SDUPTHER

## 2018-10-15 RX ORDER — METOPROLOL SUCCINATE 50 MG/1
50 TABLET, EXTENDED RELEASE ORAL DAILY
Qty: 90 TABLET | Refills: 0 | Status: SHIPPED | OUTPATIENT
Start: 2018-10-15 | End: 2018-11-16 | Stop reason: DRUGHIGH

## 2018-10-15 NOTE — ASSESSMENT & PLAN NOTE
- BP still above goal  - rec increase lisinopril to 40 mg daily  - check BMP in 2 weeks  - refer to Cardiology for assistance

## 2018-10-15 NOTE — ASSESSMENT & PLAN NOTE
- goal BP control  - avoid irritation of nasal passages okay for topical petroleum ointment daily small amount  - discussed eval by ENT if continues

## 2018-10-15 NOTE — PATIENT INSTRUCTIONS
Assessment/Plan:  Roger Zavaleta Jr. is a 68 y.o. male with a past medical history of CAD, HLD, DM2, who presents for an initial appointment.     1. Chest Pain/MELENDREZ- Given history of CAD, agree with exercise stress echo ordered by PCP.  Add statin to regimen.      2. HTN- Continue to titrate up current regimen as tolerated.      3. HLD- Start atorvastatin 40 mg daily.        Return for blood pressure check in 2 weeks  Follow up in 1 month with exercise stress test prior

## 2018-10-15 NOTE — PROGRESS NOTES
"FAMILY MEDICINE    Patient Active Problem List   Diagnosis    CAD (coronary artery disease)    HLD (hyperlipidemia)    Diabetes mellitus type 2, uncontrolled with complications (CAD)    Diabetic neuropathy    Obesity (BMI 30-39.9)    Hypertension, essential    Long-term insulin use    Gastroesophageal reflux disease    Nosebleed       CC:   Chief Complaint   Patient presents with    Hypertension       SUBJECTIVE:  Roger Zavaleta Jr.   is a 68 y.o. male  - with poorly controlled HTN, DM2 and HLD presents for f/u BP and concerns for recurrent nosebleeds last week for about 3 days that has now resolved.   1. HTN: Was initially increased from Lisinopril 10 mg to 20 mg s/p ER visit (2/2 BP >200/100) and Metoprolol XL 50 mg was added at last visit 9/5/18 (BP was 184/86). Pt reports tolerating medication and no unwanted side effects. Has been monitoring BP and reports -170 and DBP 80's.   2. Nosebleeds: reports that he has had several in the past and typically from his left nostril noting "I have a sore up there and it bleeds from time to time." Right nostril bleeding moderate to severe and resolved with ice pack. No recurrence since 2 days ago. No changes in BP that he noted during that time. No headache, chest pain, SOB, MELENDREZ or leg swelling.         ROS: Review of Systems   Constitutional: Negative for activity change, appetite change, chills, fatigue and fever.   HENT: Positive for nosebleeds.    Eyes: Negative for visual disturbance.   Respiratory: Negative for cough, chest tightness and shortness of breath.    Cardiovascular: Negative for chest pain, palpitations and leg swelling.   Gastrointestinal: Negative for abdominal pain, constipation, diarrhea, nausea and vomiting.   Genitourinary: Negative for decreased urine volume.   Musculoskeletal: Negative for myalgias.   Skin: Negative for rash.   Neurological: Negative for dizziness, light-headedness and headaches.   Psychiatric/Behavioral: Negative " for sleep disturbance.       Past Medical History:   Diagnosis Date    CAD (coronary artery disease) 52    Diabetes mellitus type II     HLD (hyperlipidemia)        Past Surgical History:   Procedure Laterality Date    2 nasal surgery      COLONOSCOPY N/A 12/3/2013    [facility]formed [facility]    COLONOSCOPY N/A 10/16/2013    [facility]    HERNIA REPAIR      umbilical    rectal fissure      RELEASE, TRIGGER FINGER Left 9/30/2013    [facility]    TONSILLECTOMY      TOTAL KNEE ARTHROPLASTY      TRIGGER FINGER RELEASE  9-30-13    left hand (middle finger)       ALLERGIES: Review of patient's allergies indicates:  No Known Allergies    MEDS:   Current Outpatient Medications:     alpha lipoic acid 200 mg Cap, Take 1 capsule PO once daily, Disp: , Rfl:     aspirin 81 MG chewable tablet, Take 81 mg by mouth once daily.  , Disp: , Rfl:     furosemide (LASIX) 20 MG tablet, Take 1 tablet (20 mg total) by mouth daily as needed (swelling)., Disp: 30 tablet, Rfl: 2    insulin aspart (NOVOLOG) 100 unit/mL injection, Inject 10 Units into the skin 3 (three) times daily as needed for High Blood Sugar., Disp: 30 mL, Rfl: 11    insulin detemir U-100 (LEVEMIR FLEXTOUCH U-100 INSULN) 100 unit/mL (3 mL) SubQ InPn pen, Inject 40 Units into the skin once daily., Disp: 15 packet, Rfl: 3    lancets (ULTRA THIN LANCETS) 30 gauge Misc, 1 lancet by Misc.(Non-Drug; Combo Route) route 3 (three) times daily., Disp: 200 each, Rfl: 3    metFORMIN (GLUCOPHAGE) 1000 MG tablet, Take 1 tablet (1,000 mg total) by mouth 2 (two) times daily with meals., Disp: 60 tablet, Rfl: 5    metoprolol succinate (TOPROL-XL) 50 MG 24 hr tablet, Take 1 tablet (50 mg total) by mouth once daily., Disp: 90 tablet, Rfl: 0    ranitidine (ZANTAC) 300 MG tablet, Take 1 tablet (300 mg total) by mouth nightly., Disp: 30 tablet, Rfl: 5    blood  "sugar diagnostic (ACCU-CHEK SMARTVIEW TEST STRIP) Strp, USE ONE STRIP TO CHECK GLUCOSE THREE TIMES DAILY AS NEEDED, Disp: 100 strip, Rfl: 3    insulin syringe-needle U-100 0.3 mL 31 gauge x 15/64" Syrg, USE SYRINGE THREE TIMES DAILY, Disp: 100 Syringe, Rfl: 11    lisinopril (PRINIVIL,ZESTRIL) 40 MG tablet, Take 1 tablet (40 mg total) by mouth once daily., Disp: 90 tablet, Rfl: 0    nitroGLYCERIN (NITROSTAT) 0.4 MG SL tablet, Place 0.4 mg under the tongue every 5 (five) minutes as needed.  , Disp: , Rfl:     PEN NEEDLE 31 gauge x 5/16" Ndle, USE AS DIRECTED ONCE DAILY, Disp: 50 each, Rfl: 7    OBJECTIVE:   Vitals:    10/15/18 1248   BP: (!) 172/82   BP Location: Left arm   Patient Position: Sitting   BP Method: Large (Manual)   Pulse: 72   Temp: 97.6 °F (36.4 °C)   TempSrc: Oral   SpO2: 98%   Weight: 97.3 kg (214 lb 8 oz)   Height: 5' 8" (1.727 m)     Body mass index is 32.61 kg/m².    Physical Exam   Constitutional: No distress.   HENT:   Nose: No mucosal edema or rhinorrhea. No epistaxis.   Right nare: medial ulceration  Left nare: medial ulceration   Neck: Neck supple. No JVD present.   Cardiovascular: Normal rate, regular rhythm, normal heart sounds and intact distal pulses.   Pulmonary/Chest: Effort normal and breath sounds normal.   Musculoskeletal: He exhibits no edema.   Neurological: He is alert.       No visits with results within 1 Month(s) from this visit.   Latest known visit with results is:   Lab Visit on 09/05/2018   Component Date Value Ref Range Status    TSH 09/05/2018 1.990  0.400 - 4.000 uIU/mL Final    Comment: Warning:  Heterophilic antibodies in serum or plasma of   certain individuals are known to cause interference with   immunoassays. These antibodies may be present in blood samples   from individuals regularly exposed to animal or who have been   treated with animal products.  Patients taking high doses of supplemental biotin may have  negatively biased results.       Sodium " 09/05/2018 139  136 - 145 mmol/L Final    Potassium 09/05/2018 4.4  3.5 - 5.1 mmol/L Final    Chloride 09/05/2018 102  95 - 110 mmol/L Final    CO2 09/05/2018 26  23 - 29 mmol/L Final    Glucose 09/05/2018 89  70 - 110 mg/dL Final    BUN, Bld 09/05/2018 15  2 - 20 mg/dL Final    Creatinine 09/05/2018 0.76  0.50 - 1.40 mg/dL Final    Calcium 09/05/2018 9.5  8.7 - 10.5 mg/dL Final    Anion Gap 09/05/2018 11  8 - 16 mmol/L Final    eGFR if African American 09/05/2018 >60.0  >60 mL/min/1.73 m^2 Final    eGFR if non African American 09/05/2018 >60.0  >60 mL/min/1.73 m^2 Final    Comment: Calculation used to obtain the estimated glomerular filtration  rate (eGFR) is the CKD-EPI equation.             ASSESSMENT:  Problem List Items Addressed This Visit     Hypertension, essential - Primary    Current Assessment & Plan     - BP still above goal  - rec increase lisinopril to 40 mg daily  - check BMP in 2 weeks  - refer to Cardiology for assistance         Relevant Medications    metoprolol succinate (TOPROL-XL) 50 MG 24 hr tablet    lisinopril (PRINIVIL,ZESTRIL) 40 MG tablet    Other Relevant Orders    Ambulatory Referral to Cardiology    Basic metabolic panel    Nosebleed    Current Assessment & Plan     - goal BP control  - avoid irritation of nasal passages okay for topical petroleum ointment daily small amount  - discussed eval by ENT if continues               PLAN:   Orders Placed This Encounter    (In Office Administered) Pneumococcal Polysaccharide Vaccine (23 Valent) (SQ/IM)    Basic metabolic panel    Ambulatory Referral to Cardiology    metoprolol succinate (TOPROL-XL) 50 MG 24 hr tablet    lisinopril (PRINIVIL,ZESTRIL) 40 MG tablet       Follow-up with Dr. Yanez in 1-3 months.     Dr. Jane Almeida D.O.   Family Medicine

## 2018-10-15 NOTE — LETTER
October 15, 2018      Jane Almeida, DO  [address]  [address]   [facility] [address]           [facility]  [address] [place]   [facility] [address]  Phone: [phone]  Fax: [phone]          Patient: Roger Zavaleta Jr.   MR Number: [id number]   YOB: 1950   Date of Visit: 10/15/2018       Dear Dr. Jane Almeida:    Thank you for referring Roger Zavaleta to me for evaluation. Attached you will find relevant portions of my assessment and plan of care.    If you have questions, please do not hesitate to call me. I look forward to following Roger Zavaleta along with you.    Sincerely,    Carlos Enrique MD PhD    Enclosure  CC:  No Recipients    If you would like to receive this communication electronically, please contact [email or web address] or [phone] to request more information on [employer] access.    For providers and/or their staff who would like to refer a patient to [facility], please contact us through our one-stop-shop provider referral line, [facility], at [phone].    If you feel you have received this communication in error or would no longer like to receive these types of communications, please e-mail [email or web address]

## 2018-10-15 NOTE — PROGRESS NOTES
"[facility]    Chief Complaint   Patient presents with    Hypertension     Ref by Dr Almeida    Dizziness       Patient ID: Roger Zavaleta Jr. is a 68 y.o. male with a past medical history of CAD, HLD, DM2, who presents for an initial appointment.  Pertinent history events are as follows:     -Pt reports having an MI in 1999.  Had cath with no intervention  -Pt kindly referred by Dr. Almeida for evaluation of HTN.    HPI:  Mr. Zavaleta reports blood began "going up 1 month ago".  Readings were mainly in the 150's0160's prior to 1 month ago.  Have been up to 200's recently.  BP today is 170/74 with pulse of 81 bpm.  Currently taking lisinopril 20 mg daily and metoprolol succinate 50 mg daily.  Reports SOB when doing heavy yard work or playing baseball with his grandson.  Reports episode of chest pain at rest 3 weeks ago.  Chest pain localized to mid chest, non-radiating, 4/10 in intensity.  No associated n/v/d.  EKG from 9/5/2018 shows possible ectopic atrial rhythm with no ischemic ST/T wave changes.     Past Medical History:   Diagnosis Date    CAD (coronary artery disease) 50    Diabetes mellitus type II     HLD (hyperlipidemia)      Past Surgical History:   Procedure Laterality Date    2 nasal surgery      COLONOSCOPY N/A 12/3/2013    Performed by [facility] at [facility]    COLONOSCOPY N/A 10/16/2013    [facility]    HERNIA REPAIR      umbilical    rectal fissure      RELEASE, TRIGGER FINGER Left 9/30/2013    Per[facility]bs [facility] at [facility] OR [address] [place] [address]    TONSILLECTOMY      TOTAL KNEE ARTHROPLASTY      TRIGGER FINGER RELEASE  9-30-13    left hand (middle finger)     Social History     Socioeconomic History    Marital status:      Spouse name: Not on file    Number of children: Not on file    Years of education: Not on file    Highest education level: Not on file   Social Needs    Financial resource strain: Not on " file    Food insecurity - worry: Not on file    Food insecurity - inability: Not on file    Transportation needs - medical: Not on file    Transportation needs - non-medical: Not on file   Occupational History    Not on file   Tobacco Use    Smoking status: Never Smoker    Smokeless tobacco: Never Used   Substance and Sexual Activity    Alcohol use: No    Drug use: No    Sexual activity: No   Other Topics Concern    Not on file   Social History Narrative    He is a retired  and teacher. He grew up in [place]. He moved to [place] after going to [place]. 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 He is a non-smoker and doesn't use alcohol. He is  for over 40 years. He lives in his own home with his wife. He has 2 adult daughters. He has his daughters cats in the house. He enjoys playing baseball with his grandson.          Family History   Problem Relation Age of Onset    Heart disease Mother     Diabetes Mother     Alcohol abuse Father     Heart disease Unknown         premature    Cancer Neg Hx        Review of patient's allergies indicates:  No Known Allergies       Medication List           Accurate as of 10/15/18  2:53 PM. If you have any questions, ask your nurse or doctor.               CHANGE how you take these medications    lisinopril 40 MG tablet  Commonly known as:  PRINIVIL,ZESTRIL  Take 1 tablet (40 mg total) by mouth once daily.  What changed:    · medication strength  · how much to take  Changed by:  Jane Almeida, DO        CONTINUE taking these medications    alpha lipoic acid 200 mg Cap     aspirin 81 MG Chew     blood sugar diagnostic Strp  Commonly known as:  ACCU-CHEK SMARTVIEW TEST STRIP  USE ONE STRIP TO CHECK GLUCOSE THREE TIMES DAILY AS NEEDED     furosemide 20 MG tablet  Commonly known as:  LASIX  Take 1 tablet (20 mg total) by mouth daily as needed (swelling).     insulin aspart U-100 100 unit/mL injection  Commonly known as:  NovoLOG  "U-100 Insulin aspart  Inject 10 Units into the skin 3 (three) times daily as needed for High Blood Sugar.     insulin detemir U-100 100 unit/mL (3 mL) Inpn pen  Commonly known as:  LEVEMIR FLEXTOUCH U-100 INSULN  Inject 40 Units into the skin once daily.     insulin syringe-needle U-100 0.3 mL 31 gauge x 15/64" Syrg  USE SYRINGE THREE TIMES DAILY     lancets 30 gauge Misc  Commonly known as:  ULTRA THIN LANCETS  1 lancet by Misc.(Non-Drug; Combo Route) route 3 (three) times daily.     metFORMIN 1000 MG tablet  Commonly known as:  GLUCOPHAGE  Take 1 tablet (1,000 mg total) by mouth 2 (two) times daily with meals.     metoprolol succinate 50 MG 24 hr tablet  Commonly known as:  TOPROL-XL  Take 1 tablet (50 mg total) by mouth once daily.     nitroGLYCERIN 0.4 MG SL tablet  Commonly known as:  NITROSTAT     PEN NEEDLE 31 gauge x 5/16" Ndle  Generic drug:  pen needle, diabetic  USE AS DIRECTED ONCE DAILY        STOP taking these medications    ranitidine 300 MG tablet  Commonly known as:  ZANTAC  Stopped by:  Carlos Enrique MD PhD     VICTOZA 2-BEL 0.6 mg/0.1 mL (18 mg/3 mL) Pnij  Generic drug:  liraglutide 0.6 mg/0.1 mL (18 mg/3 mL) subq PNIJ  Stopped by:  Jane Almeida DO           Where to Get Your Medications      These medications were sent to [facility], [place] - [address] [address]  [address] [address], [facility] [address]    Phone:  [phone]   · lisinopril 40 MG tablet  · metoprolol succinate 50 MG 24 hr tablet         Review of Systems  Constitution: Denies chills, fever, and sweats.  HENT: Denies headaches or blurry vision.  Cardiovascular: Positive for chest pain.  Respiratory: Positive for MELENDREZ.  Gastrointestinal: Denies abdominal pain, nausea, or vomiting.  Musculoskeletal: Denies muscle cramps.  Neurological: Denies dizziness or focal weakness.  Psychiatric/Behavioral: Normal mental status.  Hematologic/Lymphatic: Denies bleeding problem or easy bruising/bleeding.  Skin: Denies rash or suspicious " "lesions    Physical Examination  BP (!) 170/74 (BP Location: Left arm, Patient Position: Sitting, BP Method: X-Large (Manual))   Pulse 81   Ht 5' 8" (1.727 m)   Wt 97.3 kg (214 lb 6.4 oz)   SpO2 98%   BMI 32.60 kg/m²     Constitutional: No acute distress, conversant  HEENT: Sclera anicteric, Pupils equal, round and reactive to light, extraocular motions intact, Oropharynx clear  Neck: No JVD, no carotid bruits  Cardiovascular: regular rate and rhythm, no murmur, rubs or gallops, normal S1/S2  Pulmonary: Clear to auscultation bilaterally  Abdominal: Abdomen soft, nontender, nondistended, positive bowel sounds  Extremities: No lower extremity edema,   Pulses:  Carotid pulses are 2+ on the right side, and 2+ on the left side.  Radial pulses are 2+ on the right side, and 2+ on the left side.   Femoral pulses are 2+ on the right side, and 2+ on the left side.  Skin: No ecchymosis, erythema, or ulcers  Psych: Alert and oriented x 3, appropriate affect  Neuro: CNII-XII intact, no focal deficits    Labs:  Most Recent Data  CBC:   Lab Results   Component Value Date    WBC 9.13 06/08/2018    HGB 14.3 06/08/2018    HCT 40.3 06/08/2018     06/08/2018    MCV 81 (L) 06/08/2018    RDW 13.3 06/08/2018     BMP:   Lab Results   Component Value Date     09/05/2018    K 4.4 09/05/2018     09/05/2018    CO2 26 09/05/2018    BUN 15 09/05/2018    CREATININE 0.76 09/05/2018    GLU 89 09/05/2018    CALCIUM 9.5 09/05/2018    MG 1.8 03/10/2010     LFTS;   Lab Results   Component Value Date    PROT 7.6 06/08/2018    ALBUMIN 4.0 06/08/2018    BILITOT 0.7 06/08/2018    AST 37 06/08/2018    ALKPHOS 108 06/08/2018    ALT 30 06/08/2018     COAGS:   Lab Results   Component Value Date    INR 1.0 04/12/2011     FLP:   Lab Results   Component Value Date    CHOL 145 06/08/2018    HDL 30 (L) 06/08/2018    LDLCALC 96.2 06/08/2018    TRIG 94 06/08/2018    CHOLHDL 20.7 06/08/2018     CARDIAC: No results found for: TROPONINI, CKMB, " BNP      EKG 9/5/2018:  Unusual P axis, possible ectopic atrial rhythm    Assessment/Plan:  Roger Zavaleta Jr. is a 68 y.o. male with a past medical history of CAD, HLD, DM2, who presents for an initial appointment.     1. Chest Pain/MELENDREZ- Given history of CAD, agree with exercise stress echo ordered by PCP.  Add statin to regimen.      2. HTN- Continue to titrate up current regimen as tolerated.      3. HLD- Start atorvastatin 40 mg daily.        Return for blood pressure check in 2 weeks  Follow up in 1 month with exercise stress test prior    Total duration of face to face visit time 30 minutes.  Total time spent counseling greater than fifty percent of total visit time.  Counseling included discussion regarding imaging findings, diagnosis, possibilities, treatment options, risks and benefits.  The patient had many questions regarding the options and long-term effects.    Carlos Enrique MD, PhD  Interventional Cardiology

## 2018-10-19 ENCOUNTER — OFFICE VISIT (OUTPATIENT)
Dept: FAMILY MEDICINE | Facility: CLINIC | Age: 68
End: 2018-10-19
Payer: MEDICARE

## 2018-10-19 VITALS
TEMPERATURE: 98 F | BODY MASS INDEX: 33.01 KG/M2 | HEART RATE: 66 BPM | WEIGHT: 217.81 LBS | HEIGHT: 68 IN | OXYGEN SATURATION: 96 % | SYSTOLIC BLOOD PRESSURE: 140 MMHG | DIASTOLIC BLOOD PRESSURE: 80 MMHG

## 2018-10-19 DIAGNOSIS — Z79.4 TYPE 2 DIABETES MELLITUS WITH DIABETIC POLYNEUROPATHY, WITH LONG-TERM CURRENT USE OF INSULIN: ICD-10-CM

## 2018-10-19 DIAGNOSIS — R04.0 EPISTAXIS, RECURRENT: Primary | ICD-10-CM

## 2018-10-19 DIAGNOSIS — I10 HYPERTENSION, ESSENTIAL: ICD-10-CM

## 2018-10-19 DIAGNOSIS — E11.42 TYPE 2 DIABETES MELLITUS WITH DIABETIC POLYNEUROPATHY, WITH LONG-TERM CURRENT USE OF INSULIN: ICD-10-CM

## 2018-10-19 PROCEDURE — 3079F DIAST BP 80-89 MM HG: CPT | Mod: CPTII,,, | Performed by: INTERNAL MEDICINE

## 2018-10-19 PROCEDURE — 1101F PT FALLS ASSESS-DOCD LE1/YR: CPT | Mod: CPTII,,, | Performed by: INTERNAL MEDICINE

## 2018-10-19 PROCEDURE — 99213 OFFICE O/P EST LOW 20 MIN: CPT | Mod: S$PBB,,, | Performed by: INTERNAL MEDICINE

## 2018-10-19 PROCEDURE — 99214 OFFICE O/P EST MOD 30 MIN: CPT | Mod: PBBFAC,PN | Performed by: INTERNAL MEDICINE

## 2018-10-19 PROCEDURE — 99999 PR PBB SHADOW E&M-EST. PATIENT-LVL IV: CPT | Mod: PBBFAC,,, | Performed by: INTERNAL MEDICINE

## 2018-10-19 PROCEDURE — 3077F SYST BP >= 140 MM HG: CPT | Mod: CPTII,,, | Performed by: INTERNAL MEDICINE

## 2018-10-19 PROCEDURE — 3045F PR MOST RECENT HEMOGLOBIN A1C LEVEL 7.0-9.0%: CPT | Mod: CPTII,,, | Performed by: INTERNAL MEDICINE

## 2018-10-19 NOTE — PATIENT INSTRUCTIONS
We have reviewed your prescription medications.  Lets' check labs again. Your sugars have been a lot better. We will refer you to ENT. Your BP is still high. Try taking the furosemide daily.

## 2018-10-19 NOTE — PROGRESS NOTES
Subjective:       Patient ID: Roger Zavaleta Jr. is a 68 y.o. male.    Chief Complaint: Epistaxis and Headache     I have reviewed the PM,  and  for this patient. The patient has been having frequent nose bleeds. He has been having nose bleeds 2-3 times a day spontaneously. He does have a history of nose bleeds about once a month, but this has gotten worse. He saw Dr. Munoz last week and he had elevated blood pressure. He also had scabs in his nose. He has been using saline nose spray, but he has stopped it. He has not seen ENT yet. He saw cardiology, and dr. Churchill added metoprolol, but his BP is still high-- ranging 170/90 in am and 160/90 in pm. He has diabetes. His sugars have been much better. He is using sliding scale in addition to scheduled humalog. He needs a repeat HgbA1c.       Epistaxis    The bleeding has been from both nares. This is a recurrent problem. The current episode started 1 to 4 weeks ago. He has experienced no nasal trauma. The problem occurs every few hours. The problem has been unchanged. The bleeding is associated with nothing. He has tried pressure and ice for the symptoms. The treatment provided mild relief. His past medical history is significant for allergies and frequent nosebleeds.     Review of Systems   Constitutional: Negative for chills, fatigue and fever.   HENT: Positive for nosebleeds. Negative for congestion, ear discharge, ear pain, rhinorrhea and sore throat.    Eyes: Negative for discharge, redness and itching.   Respiratory: Negative for cough, chest tightness, shortness of breath and wheezing.    Cardiovascular: Negative for chest pain, palpitations and leg swelling.   Gastrointestinal: Negative for abdominal pain, constipation, diarrhea, nausea and vomiting.   Endocrine: Negative for cold intolerance and heat intolerance.   Genitourinary: Negative for dysuria, flank pain, frequency and hematuria.   Musculoskeletal: Negative for arthralgias, back pain, joint swelling  and myalgias.   Skin: Negative for color change and rash.   Neurological: Negative for dizziness, tremors, numbness and headaches.   Psychiatric/Behavioral: Negative for dysphoric mood and sleep disturbance. The patient is not nervous/anxious.        Objective:      Physical Exam   Constitutional: He appears well-developed and well-nourished.   HENT:   Head: Normocephalic and atraumatic.   Right Ear: External ear normal. Tympanic membrane is not erythematous. No middle ear effusion.   Left Ear: External ear normal. Tympanic membrane is not erythematous.  No middle ear effusion.   Mouth/Throat: No posterior oropharyngeal edema or posterior oropharyngeal erythema. No tonsillar exudate.   Eyes: Conjunctivae and EOM are normal. Pupils are equal, round, and reactive to light.   Neck: No thyromegaly present.   Cardiovascular: Normal rate, regular rhythm, normal heart sounds and intact distal pulses.   No murmur heard.  Pulmonary/Chest: Effort normal and breath sounds normal. He has no wheezes.   Abdominal: He exhibits no distension. There is no tenderness.   Musculoskeletal: He exhibits no edema or tenderness.   Lymphadenopathy:     He has no cervical adenopathy.   Neurological: He displays normal reflexes. No cranial nerve deficit.   Skin: Skin is warm and dry. No rash noted.   Psychiatric: He has a normal mood and affect. His behavior is normal.       Assessment and Plan:     Problem List Items Addressed This Visit        Cardiac/Vascular    Hypertension, essential - BP is still high. We will have him take his lasix daily and recheck BP.       Other Visit Diagnoses     Epistaxis, recurrent    -  Primary - uncertain cause. No visible lesions today. Check labs and refer to ent.     Relevant Orders    CBC auto differential    Ambulatory consult to ENT    Type 2 diabetes mellitus with diabetic polyneuropathy, with long-term current use of insulin    - sugars have been better at home. Check HgbA1c.     Relevant Orders     Hemoglobin A1c

## 2018-10-21 ENCOUNTER — PATIENT MESSAGE (OUTPATIENT)
Dept: FAMILY MEDICINE | Facility: CLINIC | Age: 68
End: 2018-10-21

## 2018-10-22 ENCOUNTER — PATIENT MESSAGE (OUTPATIENT)
Dept: FAMILY MEDICINE | Facility: CLINIC | Age: 68
End: 2018-10-22

## 2018-10-23 ENCOUNTER — PATIENT MESSAGE (OUTPATIENT)
Dept: ADMINISTRATIVE | Facility: OTHER | Age: 68
End: 2018-10-23

## 2018-10-25 ENCOUNTER — OFFICE VISIT (OUTPATIENT)
Dept: FAMILY MEDICINE | Facility: CLINIC | Age: 68
End: 2018-10-25
Payer: MEDICARE

## 2018-10-25 VITALS
SYSTOLIC BLOOD PRESSURE: 140 MMHG | OXYGEN SATURATION: 97 % | WEIGHT: 216.5 LBS | HEART RATE: 69 BPM | TEMPERATURE: 98 F | RESPIRATION RATE: 18 BRPM | HEIGHT: 68 IN | BODY MASS INDEX: 32.81 KG/M2 | DIASTOLIC BLOOD PRESSURE: 70 MMHG

## 2018-10-25 DIAGNOSIS — I10 HYPERTENSION, ESSENTIAL: ICD-10-CM

## 2018-10-25 DIAGNOSIS — R04.0 EPISTAXIS: Primary | ICD-10-CM

## 2018-10-25 DIAGNOSIS — E11.9 TYPE 2 DIABETES MELLITUS WITHOUT COMPLICATION, WITH LONG-TERM CURRENT USE OF INSULIN: ICD-10-CM

## 2018-10-25 DIAGNOSIS — Z79.4 TYPE 2 DIABETES MELLITUS WITHOUT COMPLICATION, WITH LONG-TERM CURRENT USE OF INSULIN: ICD-10-CM

## 2018-10-25 PROCEDURE — 99999 PR PBB SHADOW E&M-EST. PATIENT-LVL IV: CPT | Mod: PBBFAC,,, | Performed by: INTERNAL MEDICINE

## 2018-10-25 PROCEDURE — 1101F PT FALLS ASSESS-DOCD LE1/YR: CPT | Mod: CPTII,,, | Performed by: INTERNAL MEDICINE

## 2018-10-25 PROCEDURE — 99214 OFFICE O/P EST MOD 30 MIN: CPT | Mod: PBBFAC,PN | Performed by: INTERNAL MEDICINE

## 2018-10-25 PROCEDURE — 3078F DIAST BP <80 MM HG: CPT | Mod: CPTII,,, | Performed by: INTERNAL MEDICINE

## 2018-10-25 PROCEDURE — 3077F SYST BP >= 140 MM HG: CPT | Mod: CPTII,,, | Performed by: INTERNAL MEDICINE

## 2018-10-25 PROCEDURE — 99213 OFFICE O/P EST LOW 20 MIN: CPT | Mod: S$PBB,,, | Performed by: INTERNAL MEDICINE

## 2018-10-25 PROCEDURE — 3045F PR MOST RECENT HEMOGLOBIN A1C LEVEL 7.0-9.0%: CPT | Mod: CPTII,,, | Performed by: INTERNAL MEDICINE

## 2018-10-25 RX ORDER — INSULIN ASPART 100 [IU]/ML
INJECTION, SOLUTION INTRAVENOUS; SUBCUTANEOUS
Qty: 10 ML | Refills: 11 | Status: SHIPPED | OUTPATIENT
Start: 2018-10-25 | End: 2019-05-27 | Stop reason: SDUPTHER

## 2018-10-25 NOTE — PROGRESS NOTES
Subjective:       Patient ID: Roger Zavaleta Jr. is a 68 y.o. male.    Chief Complaint: Follow-up (1 week follow up) and Results (lab results)    He is here for follow-up of nosebleeds. He has an appointment with ENT on Tuesday. His cbc did show a 2-point deop in HCT, but it is still quite good at 38. His HgbA1c was 8.4. He has been taking his levemir at bedtime and his novolog 10 units tid. He reports that sugars are good in the mornings -- around 120 or less. They are usually around 165 at lunch and dinner time. HE also reports that he has been using a nasal saline spray. He had a nose bleed this morning, but it was the first in 2 days.       Diabetes   He has type 2 diabetes mellitus. No MedicAlert identification noted. The initial diagnosis of diabetes was made 20 years ago. Hypoglycemia symptoms include sweats. Pertinent negatives for hypoglycemia include no confusion, dizziness, headaches, hunger, mood changes, nervousness/anxiousness, pallor, seizures, sleepiness, speech difficulty or tremors. Associated symptoms include foot paresthesias and polyuria. Pertinent negatives for diabetes include no blurred vision, no chest pain, no fatigue, no foot ulcerations, no polydipsia, no polyphagia, no visual change, no weakness and no weight loss. Pertinent negatives for hypoglycemia complications include no blackouts, no hospitalization, no nocturnal hypoglycemia, no required assistance and no required glucagon injection. Symptoms are worsening. Diabetic complications include autonomic neuropathy, heart disease and peripheral neuropathy. Pertinent negatives for diabetic complications include no CVA, nephropathy or retinopathy. Risk factors for coronary artery disease include dyslipidemia, family history, obesity and diabetes mellitus. Current diabetic treatment includes insulin injections and oral agent (triple therapy). He is compliant with treatment most of the time. He is currently taking insulin pre-breakfast,  pre-lunch and pre-dinner. Insulin injections are given by patient. Rotation sites for injection include the abdominal wall. His weight is fluctuating minimally. He is following a generally healthy diet. Meal planning includes avoidance of concentrated sweets. He has not had a previous visit with a dietitian. He participates in exercise intermittently. He monitors blood glucose at home 3-4 x per day. He monitors urine at home <1 x per month. Blood glucose monitoring compliance is good. His home blood glucose trend is increasing steadily. He sees a podiatrist.Eye exam is current.     Review of Systems   Constitutional: Negative for chills, fatigue, fever and weight loss.   HENT: Negative for congestion, ear discharge, ear pain, rhinorrhea and sore throat.    Eyes: Negative for blurred vision, discharge, redness and itching.   Respiratory: Negative for cough, chest tightness, shortness of breath and wheezing.    Cardiovascular: Negative for chest pain, palpitations and leg swelling.   Gastrointestinal: Negative for abdominal pain, constipation, diarrhea, nausea and vomiting.   Endocrine: Positive for polyuria. Negative for cold intolerance, heat intolerance, polydipsia and polyphagia.   Genitourinary: Negative for dysuria, flank pain, frequency and hematuria.   Musculoskeletal: Negative for arthralgias, back pain, joint swelling and myalgias.   Skin: Negative for color change, pallor and rash.   Neurological: Negative for dizziness, tremors, seizures, speech difficulty, weakness, numbness and headaches.   Psychiatric/Behavioral: Negative for confusion, dysphoric mood and sleep disturbance. The patient is not nervous/anxious.        Objective:      Physical Exam   Constitutional: He appears well-developed and well-nourished.   HENT:   Head: Normocephalic and atraumatic.   Right Ear: External ear normal. Tympanic membrane is not erythematous. No middle ear effusion.   Left Ear: External ear normal. Tympanic membrane is  not erythematous.  No middle ear effusion.   Mouth/Throat: No posterior oropharyngeal edema or posterior oropharyngeal erythema. No tonsillar exudate.   Eyes: Conjunctivae and EOM are normal. Pupils are equal, round, and reactive to light.   Neck: No thyromegaly present.   Cardiovascular: Normal rate, regular rhythm, normal heart sounds and intact distal pulses.   No murmur heard.  Pulmonary/Chest: Effort normal and breath sounds normal. He has no wheezes.   Abdominal: He exhibits no distension. There is no tenderness.   Musculoskeletal: He exhibits no edema or tenderness.   Lymphadenopathy:     He has no cervical adenopathy.   Neurological: He displays normal reflexes. No cranial nerve deficit.   Skin: Skin is warm and dry. No rash noted.   Psychiatric: He has a normal mood and affect. His behavior is normal.       Assessment and Plan:     Problem List Items Addressed This Visit        ENT    Epistaxis - Primary - he has appointment with ent Tuesday. Stop nasal saline spray for now.  Hct stable.        Cardiac/Vascular    Hypertension, essential - BP looks good. Stable.        Endocrine    Type 2 diabetes mellitus without complication, with long-term current use of insulin - HgbA1c was 8.4. Let's increase novolog to 12 units mid-day and take levemir with evening meal.

## 2018-10-25 NOTE — PATIENT INSTRUCTIONS
The nosebleeds seem to be getting less frequent. Hold the nasal saline spray for now. Your blood counts were OK. Your HgbA1c was a little high. Try taking levemir at supper time. Also, increase novolog to 12 units at lunch time.

## 2018-11-15 ENCOUNTER — TELEPHONE (OUTPATIENT)
Dept: FAMILY MEDICINE | Facility: CLINIC | Age: 68
End: 2018-11-15

## 2018-11-16 ENCOUNTER — TELEPHONE (OUTPATIENT)
Dept: FAMILY MEDICINE | Facility: CLINIC | Age: 68
End: 2018-11-16

## 2018-11-16 ENCOUNTER — OFFICE VISIT (OUTPATIENT)
Dept: CARDIOLOGY | Facility: CLINIC | Age: 68
End: 2018-11-16
Payer: MEDICARE

## 2018-11-16 VITALS
SYSTOLIC BLOOD PRESSURE: 176 MMHG | DIASTOLIC BLOOD PRESSURE: 81 MMHG | OXYGEN SATURATION: 98 % | HEART RATE: 82 BPM | HEIGHT: 68 IN | WEIGHT: 217.81 LBS | BODY MASS INDEX: 33.01 KG/M2

## 2018-11-16 DIAGNOSIS — R07.9 CHEST PAIN, UNSPECIFIED TYPE: ICD-10-CM

## 2018-11-16 DIAGNOSIS — E66.9 OBESITY (BMI 30-39.9): ICD-10-CM

## 2018-11-16 DIAGNOSIS — R06.09 DOE (DYSPNEA ON EXERTION): ICD-10-CM

## 2018-11-16 DIAGNOSIS — I25.10 CORONARY ARTERY DISEASE INVOLVING NATIVE CORONARY ARTERY OF NATIVE HEART WITHOUT ANGINA PECTORIS: ICD-10-CM

## 2018-11-16 DIAGNOSIS — E78.2 MIXED HYPERLIPIDEMIA: ICD-10-CM

## 2018-11-16 DIAGNOSIS — I10 HYPERTENSION, ESSENTIAL: Primary | ICD-10-CM

## 2018-11-16 PROCEDURE — 99215 OFFICE O/P EST HI 40 MIN: CPT | Mod: HCWC,S$GLB,, | Performed by: INTERNAL MEDICINE

## 2018-11-16 PROCEDURE — 3079F DIAST BP 80-89 MM HG: CPT | Mod: CPTII,HCWC,S$GLB, | Performed by: INTERNAL MEDICINE

## 2018-11-16 PROCEDURE — 3077F SYST BP >= 140 MM HG: CPT | Mod: CPTII,HCWC,S$GLB, | Performed by: INTERNAL MEDICINE

## 2018-11-16 PROCEDURE — 1101F PT FALLS ASSESS-DOCD LE1/YR: CPT | Mod: CPTII,HCWC,S$GLB, | Performed by: INTERNAL MEDICINE

## 2018-11-16 PROCEDURE — 99999 PR PBB SHADOW E&M-EST. PATIENT-LVL IV: CPT | Mod: PBBFAC,HCWC,, | Performed by: INTERNAL MEDICINE

## 2018-11-16 RX ORDER — METOPROLOL SUCCINATE 100 MG/1
100 TABLET, EXTENDED RELEASE ORAL DAILY
Qty: 30 TABLET | Refills: 11 | Status: SHIPPED | OUTPATIENT
Start: 2018-11-16 | End: 2019-06-25 | Stop reason: SDUPTHER

## 2018-11-16 NOTE — PATIENT INSTRUCTIONS
Assessment/Plan:  Roger Zavaleta Jr. is a 68 y.o. male with a past medical history of CAD, HLD, DM2, who presents for a follow up appointment.     1. Chest Pain/MELENDREZ- Mr. Zavaleta reports no further episodes of significant chest pain or SOB.  Exercise nuclear stress echo on 11/9/2018 showed normal LV systolic function with EF of 55%.  There was no evidence of stress induced myocardial ischemia. Continue ASA, statin, ACEI, beta blocker.        2. HTN- Increase metoprolol succinate to 100 mg daily.  Enroll in digital HTN program.      3. HLD- Start atorvastatin 40 mg daily.      4. Obesity- Pt extensively counseled on the importance of diet, exercise, and weight loss.  Continue to monitor BMI.    Follow up in 6 months

## 2018-11-16 NOTE — PROGRESS NOTES
"[facility]    Chief Complaint   Patient presents with    Results     Stress Test       Patient ID: Roger Zavaleta Jr. is a 68 y.o. male with a past medical history of CAD, HLD, DM2, who presents for a follow up appointment.  Pertinent history events are as follows:     -Pt reports having an MI in 1999.  Had cath with no intervention  -Pt kindly referred by Dr. Almeida for evaluation of HTN.    -At our initial clinic visit on 10/15/2018, Mr. Zavaleta reported blood pressure began "going up 1 month ago".  Readings were mainly in the 150's-160's prior to 1 month ago.  Have been up to 200's recently.  BP today is 170/74 with pulse of 81 bpm.  Currently taking lisinopril 20 mg daily and metoprolol succinate 50 mg daily.  Reports SOB when doing heavy yard work or playing baseball with his grandson.  Reports episode of chest pain at rest 3 weeks ago.  Chest pain localized to mid chest, non-radiating, 4/10 in intensity.  No associated n/v/d.  EKG from 9/5/2018 shows possible ectopic atrial rhythm with no ischemic ST/T wave changes.   Plan: Given history of CAD, agree with exercise stress echo ordered by PCP.  Add statin to regimen (start atorvastatin 40 mg daily).  Continue to titrate up current regimen as tolerated for better blood pressure control.     HPI:  Mr. Zavaleta reports no further episodes of significant chest pain or SOB.  Home blood pressures have been mainly in the 170's systolic.  Now taking lisinopril 40 mg daily.  Exercise nuclear stress echo on 11/9/2018 showed normal LV systolic function with EF of 55%.  There was no evidence of stress induced myocardial ischemia.      Past Medical History:   Diagnosis Date    CAD (coronary artery disease) 50    Diabetes mellitus type II     HLD (hyperlipidemia)      Past Surgical History:   Procedure Laterality Date    2 nasal surgery      COLONOSCOPY N/A 12/3/2013    [facility]formed [facility]    COLONOSCOPY N/A 10/16/2013    " [facility]    HERNIA REPAIR      umbilical    rectal fissure      RELEASE, TRIGGER FINGER Left 9/30/2013    [facility]    TONSILLECTOMY      TOTAL KNEE ARTHROPLASTY      TRIGGER FINGER RELEASE  9-30-13    left hand (middle finger)     Social History     Socioeconomic History    Marital status:      Spouse name: Not on file    Number of children: Not on file    Years of education: Not on file    Highest education level: Not on file   Social Needs    Financial resource strain: Not on file    Food insecurity - worry: Not on file    Food insecurity - inability: Not on file    Transportation needs - medical: Not on file    Transportation needs - non-medical: Not on file   Occupational History    Not on file   Tobacco Use    Smoking status: Never Smoker    Smokeless tobacco: Never Used   Substance and Sexual Activity    Alcohol use: No    Drug use: No    Sexual activity: No   Other Topics Concern    Not on file   Social History Narrative    He is a retired  and teacher. He grew up in [place]. He moved to [place] after going to [place]. 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 He is a non-smoker and doesn't use alcohol. He is  for over 40 years. He lives in his own home with his wife. He has 2 adult daughters. He has his daughters cats in the house. He enjoys playing baseball with his grandson.          Family History   Problem Relation Age of Onset    Heart disease Mother     Diabetes Mother     Alcohol abuse Father     Heart disease Unknown         premature    Cancer Neg Hx        Review of patient's allergies indicates:  No Known Allergies       Medication List           Accurate as of 11/16/18  2:58 PM. If you have any questions, ask your nurse or doctor.               CONTINUE taking these medications    alpha lipoic acid 200 mg Cap     aspirin 81 MG  "Chew     atorvastatin 40 MG tablet  Commonly known as:  LIPITOR  Take 1 tablet (40 mg total) by mouth once daily.     blood sugar diagnostic Strp  Commonly known as:  ACCU-CHEK SMARTVIEW TEST STRIP  USE ONE STRIP TO CHECK GLUCOSE THREE TIMES DAILY AS NEEDED     furosemide 20 MG tablet  Commonly known as:  LASIX  Take 1 tablet (20 mg total) by mouth daily as needed (swelling).     insulin detemir U-100 100 unit/mL (3 mL) Inpn pen  Commonly known as:  LEVEMIR FLEXTOUCH U-100 INSULN  Inject 40 Units into the skin once daily.     insulin syringe-needle U-100 0.3 mL 31 gauge x 15/64" Syrg  USE SYRINGE THREE TIMES DAILY     lancets 30 gauge Misc  Commonly known as:  ULTRA THIN LANCETS  1 lancet by Misc.(Non-Drug; Combo Route) route 3 (three) times daily.     lisinopril 40 MG tablet  Commonly known as:  PRINIVIL,ZESTRIL  Take 1 tablet (40 mg total) by mouth once daily.     metFORMIN 1000 MG tablet  Commonly known as:  GLUCOPHAGE  Take 1 tablet (1,000 mg total) by mouth 2 (two) times daily with meals.     metoprolol succinate 50 MG 24 hr tablet  Commonly known as:  TOPROL-XL  Take 1 tablet (50 mg total) by mouth once daily.     nitroGLYCERIN 0.4 MG SL tablet  Commonly known as:  NITROSTAT     NovoLOG U-100 Insulin aspart 100 unit/mL injection  Generic drug:  insulin aspart U-100  INJECT 10 UNITS INTO THE SKIN THREE TIMES DAILY AS NEEDED FOR HIGH BLOOD GLUCOSE     PEN NEEDLE 31 gauge x 5/16" Ndle  Generic drug:  pen needle, diabetic  USE AS DIRECTED ONCE DAILY            Review of Systems  Constitution: Denies chills, fever, and sweats.  HENT: Denies headaches or blurry vision.  Cardiovascular: Positive for chest pain.  Respiratory: Positive for MELENDREZ.  Gastrointestinal: Denies abdominal pain, nausea, or vomiting.  Musculoskeletal: Denies muscle cramps.  Neurological: Denies dizziness or focal weakness.  Psychiatric/Behavioral: Normal mental status.  Hematologic/Lymphatic: Denies bleeding problem or easy " "bruising/bleeding.  Skin: Denies rash or suspicious lesions    Physical Examination  BP (!) 176/81 (BP Location: Left arm, Patient Position: Sitting, BP Method: Large (Automatic))   Pulse 82   Ht 5' 8" (1.727 m)   Wt 98.8 kg (217 lb 12.8 oz)   SpO2 98%   BMI 33.12 kg/m²     Constitutional: No acute distress, conversant  HEENT: Sclera anicteric, Pupils equal, round and reactive to light, extraocular motions intact, Oropharynx clear  Neck: No JVD, no carotid bruits  Cardiovascular: regular rate and rhythm, no murmur, rubs or gallops, normal S1/S2  Pulmonary: Clear to auscultation bilaterally  Abdominal: Abdomen soft, nontender, nondistended, positive bowel sounds  Extremities: No lower extremity edema,   Pulses:  Carotid pulses are 2+ on the right side, and 2+ on the left side.  Radial pulses are 2+ on the right side, and 2+ on the left side.   Femoral pulses are 2+ on the right side, and 2+ on the left side.  Skin: No ecchymosis, erythema, or ulcers  Psych: Alert and oriented x 3, appropriate affect  Neuro: CNII-XII intact, no focal deficits    Labs:  Most Recent Data  CBC:   Lab Results   Component Value Date    WBC 9.80 10/20/2018    HGB 13.7 (L) 10/20/2018    HCT 38.9 (L) 10/20/2018     10/20/2018    MCV 81 (L) 10/20/2018    RDW 12.9 10/20/2018     BMP:   Lab Results   Component Value Date     09/05/2018    K 4.4 09/05/2018     09/05/2018    CO2 26 09/05/2018    BUN 15 09/05/2018    CREATININE 0.76 09/05/2018    GLU 89 09/05/2018    CALCIUM 9.5 09/05/2018    MG 1.8 03/10/2010     LFTS;   Lab Results   Component Value Date    PROT 7.6 06/08/2018    ALBUMIN 4.0 06/08/2018    BILITOT 0.7 06/08/2018    AST 37 06/08/2018    ALKPHOS 108 06/08/2018    ALT 30 06/08/2018     COAGS:   Lab Results   Component Value Date    INR 1.0 04/12/2011     FLP:   Lab Results   Component Value Date    CHOL 145 06/08/2018    HDL 30 (L) 06/08/2018    LDLCALC 96.2 06/08/2018    TRIG 94 06/08/2018    CHOLHDL 20.7 " 06/08/2018     CARDIAC: No results found for: TROPONINI, CKMB, BNP      EKG 9/5/2018:  Unusual P axis, possible ectopic atrial rhythm    Exercise Stress Echo 11/9/2018:  · The patient reported no symptoms during the stress test.  · The test was stopped secondary to leg pain.  · Overall, the patient's exercise capacity was below average.  · The EKG portion of this study is negative for myocardial ischemia.  · Left ventricle ejection fraction is normal at 55%  · The stress echo portion of this study is negative for myocardial ischemia.    Assessment/Plan:  Roger Zavaleta Jr. is a 68 y.o. male with a past medical history of CAD, HLD, DM2, who presents for a follow up appointment.     1. Chest Pain/MELENDREZ- Mr. Zavaleta reports no further episodes of significant chest pain or SOB.  Exercise nuclear stress echo on 11/9/2018 showed normal LV systolic function with EF of 55%.  There was no evidence of stress induced myocardial ischemia. Continue ASA, statin, ACEI, beta blocker.        2. HTN- Increase metoprolol succinate to 100 mg daily.  Enroll in digital HTN program.      3. HLD- Start atorvastatin 40 mg daily.      4. Obesity- Pt extensively counseled on the importance of diet, exercise, and weight loss.  Continue to monitor BMI.    Follow up in 6 months    Total duration of face to face visit time 30 minutes.  Total time spent counseling greater than fifty percent of total visit time.  Counseling included discussion regarding imaging findings, diagnosis, possibilities, treatment options, risks and benefits.  The patient had many questions regarding the options and long-term effects.    Carlos Enrique MD, PhD  Interventional Cardiology

## 2018-11-16 NOTE — TELEPHONE ENCOUNTER
----- Message from Joseline Eng sent at 11/15/2018  3:29 PM CST -----  Contact: [phone]  Patient called in returning your call. Please advise

## 2018-11-18 ENCOUNTER — PATIENT MESSAGE (OUTPATIENT)
Dept: ADMINISTRATIVE | Facility: OTHER | Age: 68
End: 2018-11-18

## 2018-12-02 ENCOUNTER — PATIENT MESSAGE (OUTPATIENT)
Dept: ADMINISTRATIVE | Facility: OTHER | Age: 68
End: 2018-12-02

## 2018-12-04 ENCOUNTER — TELEPHONE (OUTPATIENT)
Dept: FAMILY MEDICINE | Facility: CLINIC | Age: 68
End: 2018-12-04

## 2018-12-04 NOTE — TELEPHONE ENCOUNTER
Left message on patient voice mail. He does not need to make another appointment to Dr. Yanez this month. Patient was seen already. Vf/ma

## 2018-12-04 NOTE — TELEPHONE ENCOUNTER
Left message on patient voice mail hedoes not need another visit this month . Patient was seen 10/25/2018. Vf/ma

## 2018-12-06 DIAGNOSIS — I10 HYPERTENSION, ESSENTIAL: ICD-10-CM

## 2018-12-06 RX ORDER — METOPROLOL SUCCINATE 50 MG/1
TABLET, EXTENDED RELEASE ORAL
Qty: 90 TABLET | Refills: 0 | OUTPATIENT
Start: 2018-12-06

## 2018-12-10 RX ORDER — METFORMIN HYDROCHLORIDE 1000 MG/1
1000 TABLET ORAL 2 TIMES DAILY WITH MEALS
Qty: 60 TABLET | Refills: 5 | Status: SHIPPED | OUTPATIENT
Start: 2018-12-10 | End: 2019-05-21 | Stop reason: SDUPTHER

## 2018-12-14 ENCOUNTER — OFFICE VISIT (OUTPATIENT)
Dept: URGENT CARE | Facility: CLINIC | Age: 68
End: 2018-12-14
Payer: MEDICARE

## 2018-12-14 ENCOUNTER — PATIENT MESSAGE (OUTPATIENT)
Dept: FAMILY MEDICINE | Facility: CLINIC | Age: 68
End: 2018-12-14

## 2018-12-14 VITALS
TEMPERATURE: 97 F | HEART RATE: 72 BPM | DIASTOLIC BLOOD PRESSURE: 78 MMHG | SYSTOLIC BLOOD PRESSURE: 154 MMHG | HEIGHT: 68 IN | RESPIRATION RATE: 16 BRPM | BODY MASS INDEX: 33.34 KG/M2 | WEIGHT: 220 LBS | OXYGEN SATURATION: 99 %

## 2018-12-14 DIAGNOSIS — R03.0 ELEVATED BLOOD PRESSURE READING: Primary | ICD-10-CM

## 2018-12-14 DIAGNOSIS — L03.032 PARONYCHIA, TOE, LEFT: ICD-10-CM

## 2018-12-14 PROCEDURE — 99214 OFFICE O/P EST MOD 30 MIN: CPT | Mod: S$GLB,,, | Performed by: NURSE PRACTITIONER

## 2018-12-14 PROCEDURE — 3077F SYST BP >= 140 MM HG: CPT | Mod: CPTII,S$GLB,, | Performed by: NURSE PRACTITIONER

## 2018-12-14 PROCEDURE — 3078F DIAST BP <80 MM HG: CPT | Mod: CPTII,S$GLB,, | Performed by: NURSE PRACTITIONER

## 2018-12-14 PROCEDURE — 1101F PT FALLS ASSESS-DOCD LE1/YR: CPT | Mod: CPTII,S$GLB,, | Performed by: NURSE PRACTITIONER

## 2018-12-14 RX ORDER — CEPHALEXIN 500 MG/1
500 CAPSULE ORAL 4 TIMES DAILY
Qty: 40 CAPSULE | Refills: 0 | Status: SHIPPED | OUTPATIENT
Start: 2018-12-14 | End: 2018-12-24

## 2018-12-14 RX ORDER — MUPIROCIN 20 MG/G
OINTMENT TOPICAL
Qty: 22 G | Refills: 1 | Status: SHIPPED | OUTPATIENT
Start: 2018-12-14 | End: 2019-06-25

## 2018-12-15 NOTE — PATIENT INSTRUCTIONS
Please follow up with your Primary care provider within 2-5 days if your signs and symptoms have not resolved or worsen.     If your condition worsens or fails to improve we recommend that you receive another evaluation at the emergency room immediately or contact your primary medical clinic to discuss your concerns.    You must understand that you have received an Urgent Care treatment only and that you may be released before all of your medical problems are known or treated.   You, the patient, will arrange for follow up care as instructed.       Elevated Blood Pressure    Your blood pressure was elevated during your visit to the urgent care.     It was not so high that immediate care was needed but it is recommended that you monitor your blood pressure over the next week or two to make sure that it is not staying elevated.      Please have your blood pressure taken 2-3 times daily at different times of the day.  Write all of those blood pressures down and record the time that they were taken.     Keep all that information and take it with you to see your Primary Care Physician.     If you are taking antihypertensives, please continue your medication regularly as prescribed.      If your blood pressure is consistently above 140/90 you will need to follow up with your PCP more quickly.     - According to ACEP guidelines, workup and treatment of hypertension in asymptomatic patient is not warranted in the ED:    (1) Initiating treatment for asymptomatic hypertension in the ED is not necessary when patients have follow-up.    (2) Rapidly lowering blood pressure in asymptomatic patients in the ED is unnecessary and may be harmful in some patients.    (3) When ED treatment for asymptomatic hypertension is initiated, blood pressure management should attempt to gradually lower blood pressure and should not be expected to be normalized during the initial ED visit.        Call your doctor immediately or seek emergency care  if you have any of the following:  · Chest pain or shortness of breath (call 911)  · Moderate to severe headache  · Weakness in the muscles of your face, arms, or legs  · Trouble speaking  · Extreme drowsiness  · Confusion  · Fainting or dizziness  · Pulsating or rushing sound in your ears  · Unexplained nosebleed  · Weakness, tingling, or numbness of your face, arms, or legs  · Change in vision  · Blood pressure measured at home that is greater than 180/110     You were also provided a DASH diet plan flyer, published by the [employer].   It is the only recommended diet used for lowering your blood pressure.  Please read through and try to f    Paronychia of the Finger or Toe  Paronychia is an infection near a fingernail or toenail. It usually occurs when an opening in the cuticle or an ingrown toenail lets bacteria under the skin.  The infection will need to be drained if pus is present. If the infection has been caught early, you may need only antibiotic treatment. Healing will take about 1 to 2 weeks.  Home care  Follow these guidelines when caring for yourself at home:  · Clean and soak the toe or finger. Do this 2 times a day for the first 3 days. To do so:  ¨ Soak your foot or hand in a tub of warm water for 5 minutes. Or hold your toe or finger under a faucet of warm running water for 5 minutes.  ¨ Clean any crust away with soap and water using a cotton swab.  ¨ Put antibiotic ointment on the infected area.  · Change the dressing daily or any time it gets dirty.  · If you were given antibiotics, take them as directed until they are all gone.  · If your infection is on a toe, wear comfortable shoes with a lot of toe room. You can also wear open-toed sandals while your toe heals.  · You may use over-the-counter medicine (acetaminophen or ibuprofen to help with pain, unless another medicine was prescribed. If you have chronic liver or kidney disease, talk with your healthcare provider before  using these medicines. Also talk with your provider if you've had a stomach ulcer or GI (gastrointestinal) bleeding.  Prevention  The following can prevent paronychia:  · Avoid cutting or playing with your cuticles at home.  · Don't bite your nails.  · Don't suck on your thumbs or fingers.  Follow-up care  Follow up with your healthcare provider, or as advised.  When to seek medical advice  Call your healthcare provider right away if any of these occur:  · Redness, pain, or swelling of the finger or toe gets worse  · Red streaks in the skin leading away from the wound  · Pus or fluid draining from the nail area  · Fever of 100.4ºF (38ºC) or higher, or as directed by your provider  Date Last Reviewed: 8/1/2016  © 4175-5034 [employer]. [address] [place] [address], [place] [address]. All rights reserved. This information is not intended as a substitute for professional medical care. Always follow your healthcare professional's instructions.

## 2018-12-15 NOTE — PROGRESS NOTES
"Subjective:       Patient ID: Roger Zaavleta Jr. is a 68 y.o. male.    Vitals:  height is 5' 8" (1.727 m) and weight is 99.8 kg (220 lb). His oral temperature is 97.1 °F (36.2 °C). His blood pressure is 154/78 (abnormal) and his pulse is 72. His respiration is 16 and oxygen saturation is 99%.     Chief Complaint: Nail Problem    Patient states his sock got on left great toe nail and part of nail came off while pulling sock off.      Nail Problem   This is a new problem. Episode onset: 3 days ago. The problem occurs constantly. The problem has been unchanged. Pertinent negatives include no abdominal pain, anorexia, arthralgias, change in bowel habit, chest pain, chills, congestion, coughing, diaphoresis, fatigue, fever, headaches, joint swelling, myalgias, nausea, neck pain, numbness, rash, sore throat, swollen glands, urinary symptoms, vertigo, visual change, vomiting or weakness. Nothing aggravates the symptoms. Treatments tried: neosporin. The treatment provided no relief.       Constitution: Negative for chills, sweating, fatigue and fever.   HENT: Negative for congestion and sore throat.    Neck: Negative for neck pain and painful lymph nodes.   Cardiovascular: Negative for chest pain and leg swelling.   Eyes: Negative for double vision and blurred vision.   Respiratory: Negative for cough and shortness of breath.    Gastrointestinal: Negative for abdominal pain, nausea, vomiting and diarrhea.   Genitourinary: Negative for dysuria, frequency and urgency.   Musculoskeletal: Negative for joint pain, joint swelling, muscle cramps and muscle ache.   Skin: Positive for erythema (TTP, and warmth to touch, see diagram). Negative for color change, pale and rash.   Allergic/Immunologic: Negative for seasonal allergies.   Neurological: Negative for dizziness, history of vertigo, light-headedness, passing out, headaches and numbness.   Hematologic/Lymphatic: Negative for swollen lymph nodes, easy bruising/bleeding and " history of blood clots. Does not bruise/bleed easily.   Psychiatric/Behavioral: Negative for nervous/anxious, sleep disturbance and depression. The patient is not nervous/anxious.        Objective:      Physical Exam   Constitutional: He is oriented to person, place, and time. He appears well-developed and well-nourished.   HENT:   Head: Normocephalic and atraumatic. Head is without abrasion, without contusion and without laceration.   Right Ear: External ear normal.   Left Ear: External ear normal.   Nose: Nose normal.   Mouth/Throat: Oropharynx is clear and moist.   Eyes: Conjunctivae, EOM and lids are normal. Pupils are equal, round, and reactive to light.   Neck: Trachea normal, full passive range of motion without pain and phonation normal. Neck supple.   Cardiovascular: Normal rate, regular rhythm and normal heart sounds.   Pulmonary/Chest: Effort normal and breath sounds normal. No stridor. No respiratory distress.   Musculoskeletal: Normal range of motion.        Feet:    Neurological: He is alert and oriented to person, place, and time.   Skin: Skin is warm, dry and intact. Capillary refill takes less than 2 seconds. No abrasion, no bruising, no burn, no ecchymosis, no laceration, no lesion and no rash noted. There is erythema (TTP, and warmth to touch, see diagram).   Psychiatric: He has a normal mood and affect. His speech is normal and behavior is normal. Judgment and thought content normal. Cognition and memory are normal.   Nursing note and vitals reviewed.      Assessment:       1. Elevated blood pressure reading    2. Paronychia, toe, left        Plan:         Elevated blood pressure reading    Paronychia, toe, left  -     cephALEXin (KEFLEX) 500 MG capsule; Take 1 capsule (500 mg total) by mouth 4 (four) times daily. for 10 days  Dispense: 40 capsule; Refill: 0  -     mupirocin (BACTROBAN) 2 % ointment; Apply to affected area 3 times daily  Dispense: 22 g; Refill: 1      Patient Instructions      Please follow up with your Primary care provider within 2-5 days if your signs and symptoms have not resolved or worsen.     If your condition worsens or fails to improve we recommend that you receive another evaluation at the emergency room immediately or contact your primary medical clinic to discuss your concerns.    You must understand that you have received an Urgent Care treatment only and that you may be released before all of your medical problems are known or treated.   You, the patient, will arrange for follow up care as instructed.       Elevated Blood Pressure    Your blood pressure was elevated during your visit to the urgent care.     It was not so high that immediate care was needed but it is recommended that you monitor your blood pressure over the next week or two to make sure that it is not staying elevated.      Please have your blood pressure taken 2-3 times daily at different times of the day.  Write all of those blood pressures down and record the time that they were taken.     Keep all that information and take it with you to see your Primary Care Physician.     If you are taking antihypertensives, please continue your medication regularly as prescribed.      If your blood pressure is consistently above 140/90 you will need to follow up with your PCP more quickly.     - According to ACEP guidelines, workup and treatment of hypertension in asymptomatic patient is not warranted in the ED:    (1) Initiating treatment for asymptomatic hypertension in the ED is not necessary when patients have follow-up.    (2) Rapidly lowering blood pressure in asymptomatic patients in the ED is unnecessary and may be harmful in some patients.    (3) When ED treatment for asymptomatic hypertension is initiated, blood pressure management should attempt to gradually lower blood pressure and should not be expected to be normalized during the initial ED visit.        Call your doctor immediately or seek emergency  care if you have any of the following:  · Chest pain or shortness of breath (call 911)  · Moderate to severe headache  · Weakness in the muscles of your face, arms, or legs  · Trouble speaking  · Extreme drowsiness  · Confusion  · Fainting or dizziness  · Pulsating or rushing sound in your ears  · Unexplained nosebleed  · Weakness, tingling, or numbness of your face, arms, or legs  · Change in vision  · Blood pressure measured at home that is greater than 180/110     You were also provided a DASH diet plan flyer, published by the [employer].   It is the only recommended diet used for lowering your blood pressure.  Please read through and try to f    Paronychia of the Finger or Toe  Paronychia is an infection near a fingernail or toenail. It usually occurs when an opening in the cuticle or an ingrown toenail lets bacteria under the skin.  The infection will need to be drained if pus is present. If the infection has been caught early, you may need only antibiotic treatment. Healing will take about 1 to 2 weeks.  Home care  Follow these guidelines when caring for yourself at home:  · Clean and soak the toe or finger. Do this 2 times a day for the first 3 days. To do so:  ¨ Soak your foot or hand in a tub of warm water for 5 minutes. Or hold your toe or finger under a faucet of warm running water for 5 minutes.  ¨ Clean any crust away with soap and water using a cotton swab.  ¨ Put antibiotic ointment on the infected area.  · Change the dressing daily or any time it gets dirty.  · If you were given antibiotics, take them as directed until they are all gone.  · If your infection is on a toe, wear comfortable shoes with a lot of toe room. You can also wear open-toed sandals while your toe heals.  · You may use over-the-counter medicine (acetaminophen or ibuprofen to help with pain, unless another medicine was prescribed. If you have chronic liver or kidney disease, talk with your healthcare provider  before using these medicines. Also talk with your provider if you've had a stomach ulcer or GI (gastrointestinal) bleeding.  Prevention  The following can prevent paronychia:  · Avoid cutting or playing with your cuticles at home.  · Don't bite your nails.  · Don't suck on your thumbs or fingers.  Follow-up care  Follow up with your healthcare provider, or as advised.  When to seek medical advice  Call your healthcare provider right away if any of these occur:  · Redness, pain, or swelling of the finger or toe gets worse  · Red streaks in the skin leading away from the wound  · Pus or fluid draining from the nail area  · Fever of 100.4ºF (38ºC) or higher, or as directed by your provider  Date Last Reviewed: 8/1/2016 © 2000-2017 [employer]. [address] [place] [address], [place] [address]. All rights reserved. This information is not intended as a substitute for professional medical care. Always follow your healthcare professional's instructions.

## 2018-12-17 ENCOUNTER — TELEPHONE (OUTPATIENT)
Dept: URGENT CARE | Facility: CLINIC | Age: 68
End: 2018-12-17

## 2018-12-24 ENCOUNTER — TELEPHONE (OUTPATIENT)
Dept: PODIATRY | Facility: CLINIC | Age: 68
End: 2018-12-24

## 2018-12-24 NOTE — TELEPHONE ENCOUNTER
Called pt left message  On voice mail    Noted message  Wants to make appt with dr. isidro  Made aware dr. isidro works at [facility] not a [facility] and dr. isidro  Will be living [facility] in [facility] call clinic back to help  Further assist in making an appt  With podiatry clinic  At [phone] thank you

## 2019-01-01 RX ORDER — FUROSEMIDE 20 MG/1
TABLET ORAL
Qty: 90 TABLET | Refills: 0 | Status: SHIPPED | OUTPATIENT
Start: 2019-01-01 | End: 2019-01-09 | Stop reason: SDUPTHER

## 2019-01-08 RX ORDER — FUROSEMIDE 20 MG/1
TABLET ORAL
Qty: 90 TABLET | Refills: 0 | Status: SHIPPED | OUTPATIENT
Start: 2019-01-08 | End: 2019-01-21

## 2019-01-09 DIAGNOSIS — I10 HYPERTENSION, ESSENTIAL: Primary | ICD-10-CM

## 2019-01-09 RX ORDER — FUROSEMIDE 20 MG/1
20 TABLET ORAL DAILY
Qty: 30 TABLET | Refills: 3 | Status: SHIPPED | OUTPATIENT
Start: 2019-01-09 | End: 2019-04-25 | Stop reason: ALTCHOICE

## 2019-01-10 ENCOUNTER — PATIENT OUTREACH (OUTPATIENT)
Dept: OTHER | Facility: OTHER | Age: 69
End: 2019-01-10

## 2019-01-10 DIAGNOSIS — I10 HYPERTENSION, ESSENTIAL: Primary | ICD-10-CM

## 2019-01-10 NOTE — PROGRESS NOTES
HPI:  Mr. Zavaleta is a 69 y/o male newly enrolled in the Hypertension Digital medicine Clinic. Compelling PMH includes HTN, T2DM and CAD. Reviewed allergies and current list of medications on file. Attributes elevated readings to reduced exercise secondary to chronic knee pain. Patient s/p double knee replace in the 90's. Reports frequent use of ibuprofen until 2 weeks when he switched to acetaminophen. Pain not alleviated wit use of OTC medications and I advised patient to discuss further with PCP. He endorses adherence to lisinopril every morning and metoprolol every evening with no complaints. Reviewed technique and patient not fully relaxed prior to readings. Asked patient to sit at least 3 to 5 minutes prior to checking blood pressure. Blood pressure also elevated at ;ast reading In office on 12/14/18.     Questionnaires:   Depression: Responses to the depression screening suggest patient is having depressive symptoms. Patient is not followed for this and is not interested in referral.   Sleep Apnea: Not Indicated    Last 5 Patient Entered Readings                                      Current 30 Day Average: 177/84     Recent Readings 1/13/2019 1/12/2019 1/11/2019 1/10/2019 1/9/2019    SBP (mmHg) 184 178 168 181 180    DBP (mmHg) 91 79 80 87 86    Pulse - 60 67 59 58        Patient denies s/s of hypotension (lightheadedness, dizziness, nausea, fatigue) associated with low readings. Instructed patient to inform me if this occurs, patient confirms understanding.    Patient denies s/s of hypertension (SOB, CP, severe headaches, changes in vision) associated with high readings. Instructed patient to go to the ED if BP >180/110 and accompanied by hypertensive s/s, patient confirms understanding.    Assessment:  Patient's current 30-day average is not at goal of <130/80 mmHg based on ACC/AHA HTN guidelines. Change ACEI to ARB based on increased cancer risk and elevated readings despite maximum dose.     Plan:  Stop  lisinopril 40 mg and start irbesartan 150 mg once daily.   Patients health , Callie Zendejas, will be following up every 3-4 weeks.   I will continue to monitor regularly and will follow-up in 2 weeks, sooner if blood pressure begins to trend upward or downward.     Current medication regimen:  Hypertension Medications             furosemide (LASIX) 20 MG tablet TAKE 1 TABLET BY MOUTH ONCE DAILY AS NEEDED    furosemide (LASIX) 20 MG tablet Take 1 tablet (20 mg total) by mouth once daily.    irbesartan (AVAPRO) 150 MG tablet Take 1 tablet (150 mg total) by mouth once daily.    metoprolol succinate (TOPROL-XL) 100 MG 24 hr tablet Take 1 tablet (100 mg total) by mouth once daily.    nitroGLYCERIN (NITROSTAT) 0.4 MG SL tablet Place 0.4 mg under the tongue every 5 (five) minutes as needed.            Patient has my contact information and knows to call with any concerns or clinical changes.

## 2019-01-10 NOTE — LETTER
Jojo Webb PharmD  [address] [place] [address]     Dear Roger Zavaleta Jr.,    Welcome to the [facility]pert[facility]!           My name is Jojo Webb PharmD and I am your dedicated Digital Medicine clinician.  As an expert in medication management, I will help ensure that the medications you are taking continue to provide you with the intended benefits.        I am Callie Zendejas and I will be your health  for the duration of the program.  My  job is to help you identify lifestyle changes to improve your blood pressure control.  We will talk about nutrition, exercise, and other ways that you may be able to adjust your current habits to better your health. Together, we will work to improve your overall health and encourage you to meet your goals for a healthier lifestyle.    What we expect from YOU:    You will need to take blood pressure readings multiple times a week and no less than one reading per week.   It is important that you take your measurements at different times during the day, when possible.     What you should expect from your Digital Medicine Care Team:   We will provide you with education about high blood pressure, including lifestyle changes that could help you to control your blood pressure.   We will review your weekly readings and provide you with monthly blood pressure progress reports after you have been in the program for more than 30 days.   We will send monthly progress reports on your blood pressure control to your physician so they can follow along with your progress as well.    You will be able to reach me by phone at [phone] or through your [facility] account by clicking my name under Care Team on the right side of the home screen.    I look forward to working with you to achieve your blood pressure goals!    Sincerely,    Jojo Webb PharmD  Your personal clinician    Please visit  [email or web address] to learn more about high blood pressure and what you can do lower your blood pressure.                                                                                           Roger Zavaleta Jr.  [address] [place] [id number]

## 2019-01-14 RX ORDER — IRBESARTAN 150 MG/1
150 TABLET ORAL DAILY
Qty: 90 TABLET | Refills: 3 | Status: SHIPPED | OUTPATIENT
Start: 2019-01-14 | End: 2019-02-15 | Stop reason: DRUGHIGH

## 2019-01-21 ENCOUNTER — OFFICE VISIT (OUTPATIENT)
Dept: FAMILY MEDICINE | Facility: CLINIC | Age: 69
End: 2019-01-21
Payer: MEDICARE

## 2019-01-21 VITALS
DIASTOLIC BLOOD PRESSURE: 68 MMHG | RESPIRATION RATE: 18 BRPM | OXYGEN SATURATION: 98 % | WEIGHT: 220.19 LBS | TEMPERATURE: 98 F | SYSTOLIC BLOOD PRESSURE: 143 MMHG | BODY MASS INDEX: 33.37 KG/M2 | HEIGHT: 68 IN | HEART RATE: 60 BPM

## 2019-01-21 DIAGNOSIS — I10 HYPERTENSION, ESSENTIAL: ICD-10-CM

## 2019-01-21 DIAGNOSIS — I25.10 CORONARY ARTERY DISEASE INVOLVING NATIVE CORONARY ARTERY OF NATIVE HEART WITHOUT ANGINA PECTORIS: ICD-10-CM

## 2019-01-21 DIAGNOSIS — Z79.4 TYPE 2 DIABETES MELLITUS WITH DIABETIC POLYNEUROPATHY, WITH LONG-TERM CURRENT USE OF INSULIN: Primary | ICD-10-CM

## 2019-01-21 DIAGNOSIS — E11.42 TYPE 2 DIABETES MELLITUS WITH DIABETIC POLYNEUROPATHY, WITH LONG-TERM CURRENT USE OF INSULIN: Primary | ICD-10-CM

## 2019-01-21 DIAGNOSIS — M79.675 PAIN OF TOE OF LEFT FOOT: ICD-10-CM

## 2019-01-21 DIAGNOSIS — E11.42 DIABETIC POLYNEUROPATHY ASSOCIATED WITH TYPE 2 DIABETES MELLITUS: ICD-10-CM

## 2019-01-21 DIAGNOSIS — E78.2 MIXED HYPERLIPIDEMIA: ICD-10-CM

## 2019-01-21 PROCEDURE — 3078F PR MOST RECENT DIASTOLIC BLOOD PRESSURE < 80 MM HG: ICD-10-PCS | Mod: CPTII,S$GLB,, | Performed by: INTERNAL MEDICINE

## 2019-01-21 PROCEDURE — 1101F PT FALLS ASSESS-DOCD LE1/YR: CPT | Mod: CPTII,S$GLB,, | Performed by: INTERNAL MEDICINE

## 2019-01-21 PROCEDURE — 3078F DIAST BP <80 MM HG: CPT | Mod: CPTII,S$GLB,, | Performed by: INTERNAL MEDICINE

## 2019-01-21 PROCEDURE — 3046F PR MOST RECENT HEMOGLOBIN A1C LEVEL > 9.0%: ICD-10-PCS | Mod: CPTII,S$GLB,, | Performed by: INTERNAL MEDICINE

## 2019-01-21 PROCEDURE — 3077F SYST BP >= 140 MM HG: CPT | Mod: CPTII,S$GLB,, | Performed by: INTERNAL MEDICINE

## 2019-01-21 PROCEDURE — 99214 OFFICE O/P EST MOD 30 MIN: CPT | Mod: S$GLB,,, | Performed by: INTERNAL MEDICINE

## 2019-01-21 PROCEDURE — 3077F PR MOST RECENT SYSTOLIC BLOOD PRESSURE >= 140 MM HG: ICD-10-PCS | Mod: CPTII,S$GLB,, | Performed by: INTERNAL MEDICINE

## 2019-01-21 PROCEDURE — 3046F HEMOGLOBIN A1C LEVEL >9.0%: CPT | Mod: CPTII,S$GLB,, | Performed by: INTERNAL MEDICINE

## 2019-01-21 PROCEDURE — 99214 PR OFFICE/OUTPT VISIT, EST, LEVL IV, 30-39 MIN: ICD-10-PCS | Mod: S$GLB,,, | Performed by: INTERNAL MEDICINE

## 2019-01-21 PROCEDURE — 1101F PR PT FALLS ASSESS DOC 0-1 FALLS W/OUT INJ PAST YR: ICD-10-PCS | Mod: CPTII,S$GLB,, | Performed by: INTERNAL MEDICINE

## 2019-01-21 PROCEDURE — 99999 PR PBB SHADOW E&M-EST. PATIENT-LVL IV: CPT | Mod: PBBFAC,,, | Performed by: INTERNAL MEDICINE

## 2019-01-21 PROCEDURE — 99999 PR PBB SHADOW E&M-EST. PATIENT-LVL IV: ICD-10-PCS | Mod: PBBFAC,,, | Performed by: INTERNAL MEDICINE

## 2019-01-21 NOTE — PATIENT INSTRUCTIONS
We have reviewed your prescription medications. Your sugars seem to be doing better. We will order a HgbA1c to recheck this. You are also planning on getting started with digital diabetes program. We reviewed the BP values from your digital hypertesnion. They have called in irbesartan instead of lisinopril. I will refer you to Dr Patel about foot pain. We will try to get records of shingles shot you had at [facility].

## 2019-01-21 NOTE — PROGRESS NOTES
Last 5 Patient Entered Readings                                      Current 30 Day Average: 173/81     Recent Readings 1/19/2019 1/18/2019 1/17/2019 1/16/2019 1/15/2019    SBP (mmHg) 158 172 169 171 156    DBP (mmHg) 76 81 77 77 74    Pulse 77 75 62 62 63        1/21/19: Spoke with patient's pharmacist at [facility]. Irbesartan 150 mg on back order. Authorized medication to be changed to 300 mg and patient directed to take one-half tablet daily. Note sent to patient. Will push call two weeks as patient has not started new medication.

## 2019-01-21 NOTE — PROGRESS NOTES
Subjective:       Patient ID: Roger Zavaleta Jr. is a 68 y.o. male.    Chief Complaint: Follow-up (3 month follow up) and Infected (left foot big toe and last three toes all on left foot x 4 weeks)     I have reviewed the PMH, SH and FH for this patient.  The patient presents today for follow-up of chronic conditions.  He  has a past medical history of CAD (coronary artery disease) (50), Diabetes mellitus type II, and HLD (hyperlipidemia).  He has been doing well.  He has been participating in the digital hypertension program.  He shows me his blood pressure is on the cr and they appear to have been running high.  He reports that the pharmacist from the digital hypertension team has changed his lisinopril to Avapro but he has not picked it up yet.  We will be unable to evaluate his blood pressure based on this information.  He is very happy with the digital hypertension program and has signed up for digital diabetes monitoring as well.  His last hemoglobin A1c was 8.4 in October.  He has increased his insulin to 12 units at lunch.  He reports that his sugars have been pretty good ranging from about 150-200.  His last CBC showed a slightly low hematocrit of 38. The only complaint he has today is infection in his toes of his left foot.  He reports that he cut the cuticle on his right great  toe and a few days later it started draining.  Currently, he feels much better and is no longer draining.  The toes on the left foot are still painful.  There are no abnormalities noted on these toes.        Diabetes   He has type 2 diabetes mellitus. No MedicAlert identification noted. The initial diagnosis of diabetes was made 20 years ago. Hypoglycemia symptoms include sleepiness. Pertinent negatives for hypoglycemia include no confusion, dizziness, headaches, hunger, mood changes, nervousness/anxiousness, pallor, seizures, speech difficulty, sweats or tremors. Associated symptoms include blurred vision, fatigue, foot  paresthesias and polyuria. Pertinent negatives for diabetes include no chest pain, no foot ulcerations, no polydipsia, no polyphagia, no visual change, no weakness and no weight loss. Pertinent negatives for hypoglycemia complications include no blackouts, no hospitalization, no nocturnal hypoglycemia, no required assistance and no required glucagon injection. Symptoms are stable. Diabetic complications include autonomic neuropathy, peripheral neuropathy and retinopathy. Pertinent negatives for diabetic complications include no CVA, heart disease, impotence, nephropathy or PVD. Risk factors for coronary artery disease include no known risk factors, family history, hypertension, obesity, sedentary lifestyle, diabetes mellitus and male sex. Current diabetic treatment includes insulin injections and oral agent (triple therapy). He is compliant with treatment all of the time. He is currently taking insulin pre-breakfast, pre-lunch and pre-dinner. Insulin injections are given by patient. Rotation sites for injection include the abdominal wall. His weight is fluctuating minimally. He is following a generally healthy diet. When asked about meal planning, he reported none. He has not had a previous visit with a dietitian. He rarely participates in exercise. He monitors blood glucose at home 3-4 x per day. He monitors urine at home <1 x per month. Blood glucose monitoring compliance is good. There is no change in his home blood glucose trend. He sees a podiatrist.Eye exam is current.     Active Ambulatory Problems     Diagnosis Date Noted    CAD (coronary artery disease)     HLD (hyperlipidemia)     Type 2 diabetes mellitus with diabetic polyneuropathy, with long-term current use of insulin 09/08/2014    Diabetic neuropathy 09/08/2014    Obesity (BMI 30-39.9) 09/08/2014    Hypertension, essential 11/10/2014    Long-term insulin use 07/09/2018    Gastroesophageal reflux disease 07/09/2018    Epistaxis 10/15/2018     Chest pain 10/15/2018    MELENDREZ (dyspnea on exertion) 10/15/2018     Resolved Ambulatory Problems     Diagnosis Date Noted    Diabetes mellitus, type 2     Trigger middle finger of left hand 04/17/2013    Dupuytren contracture 04/17/2013    Transaminasemia 04/17/2013    Finger pain 09/30/2013    Type II or unspecified type diabetes mellitus without mention of complication, not stated as uncontrolled 08/19/2014    Rib sprain 08/19/2014    Back strain 08/19/2014    Otitis externa of left ear 08/25/2014    BMI 35.0-35.9,adult 11/10/2014    Abnormal biliary HIDA scan 10/22/2015    RUQ abdominal pain 10/22/2015    Hypotension due to drugs 10/22/2015    Epigastric pain 06/08/2018     Past Medical History:   Diagnosis Date    CAD (coronary artery disease) 50    Diabetes mellitus type II     HLD (hyperlipidemia)        HEALTH MAINTENANCE:   Health Maintenance   Topic Date Due    Zoster Vaccine  10/11/2008    Hemoglobin A1c  04/20/2019    Eye Exam  05/17/2019    Lipid Panel  06/08/2019    Foot Exam  06/18/2019    Colonoscopy  12/03/2023    TETANUS VACCINE  05/25/2027    Hepatitis C Screening  Completed    Pneumococcal Vaccine (65+ Low/Medium Risk)  Completed    Influenza Vaccine  Completed       Review of Systems   Constitutional: Positive for fatigue. Negative for appetite change, chills, fever, unexpected weight change and weight loss.   HENT: Negative for congestion, ear pain, mouth sores, postnasal drip, rhinorrhea, sinus pressure, sinus pain and sore throat.    Eyes: Positive for blurred vision. Negative for photophobia, discharge, redness, itching and visual disturbance.   Respiratory: Negative for cough, chest tightness, shortness of breath and wheezing.    Cardiovascular: Negative for chest pain, palpitations and leg swelling.   Gastrointestinal: Negative for abdominal pain, blood in stool, constipation, diarrhea, nausea and vomiting.   Endocrine: Positive for polyuria. Negative for cold  intolerance, heat intolerance, polydipsia and polyphagia.   Genitourinary: Negative for difficulty urinating, discharge, dysuria, flank pain, frequency, impotence and urgency.   Musculoskeletal: Negative for arthralgias, back pain, joint swelling, myalgias and neck pain.   Skin: Negative for pallor, rash and wound.   Neurological: Negative for dizziness, tremors, seizures, syncope, speech difficulty, weakness, light-headedness, numbness and headaches.   Hematological: Negative for adenopathy. Does not bruise/bleed easily.   Psychiatric/Behavioral: Negative for agitation, confusion and sleep disturbance. The patient is not nervous/anxious.        Objective:          LABS    CMP  Sodium   Date Value Ref Range Status   09/05/2018 139 136 - 145 mmol/L Final     Potassium   Date Value Ref Range Status   09/05/2018 4.4 3.5 - 5.1 mmol/L Final     Chloride   Date Value Ref Range Status   09/05/2018 102 95 - 110 mmol/L Final     CO2   Date Value Ref Range Status   09/05/2018 26 23 - 29 mmol/L Final     Glucose   Date Value Ref Range Status   09/05/2018 89 70 - 110 mg/dL Final     BUN, Bld   Date Value Ref Range Status   09/05/2018 15 2 - 20 mg/dL Final     Creatinine   Date Value Ref Range Status   09/05/2018 0.76 0.50 - 1.40 mg/dL Final     Calcium   Date Value Ref Range Status   09/05/2018 9.5 8.7 - 10.5 mg/dL Final     Total Protein   Date Value Ref Range Status   06/08/2018 7.6 6.0 - 8.4 g/dL Final     Albumin   Date Value Ref Range Status   06/08/2018 4.0 3.5 - 5.2 g/dL Final     Total Bilirubin   Date Value Ref Range Status   06/08/2018 0.7 0.1 - 1.0 mg/dL Final     Comment:     For infants and newborns, interpretation of results should be based  on gestational age, weight and in agreement with clinical  observations.  Premature Infant recommended reference ranges:  Up to 24 hours.............<8.0 mg/dL  Up to 48 hours............<12.0 mg/dL  3-5 days..................<15.0 mg/dL  6-29 days.................<15.0  mg/dL       Alkaline Phosphatase   Date Value Ref Range Status   06/08/2018 108 38 - 126 U/L Final     AST   Date Value Ref Range Status   06/08/2018 37 15 - 46 U/L Final     ALT   Date Value Ref Range Status   06/08/2018 30 10 - 44 U/L Final     Anion Gap   Date Value Ref Range Status   09/05/2018 11 8 - 16 mmol/L Final     eGFR if    Date Value Ref Range Status   09/05/2018 >60.0 >60 mL/min/1.73 m^2 Final     eGFR if non    Date Value Ref Range Status   09/05/2018 >60.0 >60 mL/min/1.73 m^2 Final     Comment:     Calculation used to obtain the estimated glomerular filtration  rate (eGFR) is the CKD-EPI equation.          Lab Results   Component Value Date    WBC 9.80 10/20/2018    HGB 13.7 (L) 10/20/2018    HCT 38.9 (L) 10/20/2018    MCV 81 (L) 10/20/2018     10/20/2018       Recent Labs   Lab Result Units 01/21/19  0946   HDL mg/dL 41   Cholesterol mg/dL 100*   Triglycerides mg/dL 76   LDL Cholesterol mg/dL 43.8*   HDL/Chol Ratio % 41.0   Non-HDL Cholesterol mg/dL 59   Total Cholesterol/HDL Ratio  2.4         A1C:  Recent Labs   Lab Result Units 01/21/19  0946   Hemoglobin A1C % 9.3*         Physical Exam   Constitutional: He appears well-developed and well-nourished.   HENT:   Head: Normocephalic and atraumatic.   Right Ear: External ear normal. Tympanic membrane is not erythematous. No middle ear effusion.   Left Ear: External ear normal. Tympanic membrane is not erythematous.  No middle ear effusion.   Mouth/Throat: No posterior oropharyngeal edema or posterior oropharyngeal erythema. No tonsillar exudate.   Eyes: Conjunctivae and EOM are normal. Pupils are equal, round, and reactive to light.   Neck: No thyromegaly present.   Cardiovascular: Normal rate, regular rhythm, normal heart sounds and intact distal pulses.   No murmur heard.  Pulmonary/Chest: Effort normal and breath sounds normal. He has no wheezes.   Abdominal: He exhibits no distension. There is no tenderness.    Musculoskeletal: He exhibits no edema or tenderness.   Lymphadenopathy:     He has no cervical adenopathy.   Neurological: He displays normal reflexes. No cranial nerve deficit.   Skin: Skin is warm and dry. No rash noted.   Psychiatric: He has a normal mood and affect. His behavior is normal.             Assessment and Plan:     Problem List Items Addressed This Visit        Neuro    Diabetic neuropathy - he is not currently on any medications for this, other than controlling diabetes.        Cardiac/Vascular    CAD (coronary artery disease) - chronic. Stable.       HLD (hyperlipidemia) - he takes lipitor. Stable.     Relevant Orders    Lipid panel (Completed)    Hypertension, essential - He is on digital hypertension program. They changed his medicine, but he has not picked it up yet. BP was running high.        Endocrine    Type 2 diabetes mellitus with diabetic polyneuropathy, with long-term current use of insulin - Primary - His sugars are too high. We need to recheck HgbA1c.     Relevant Orders    Hemoglobin A1c (Completed)      Other Visit Diagnoses     Pain of toe of left foot    - we will refer to podiatry. No obvious deformity.     Relevant Orders    Ambulatory referral to Podiatry          Orders Placed This Encounter    Hemoglobin A1c    Lipid panel    Ambulatory referral to Podiatry         Follow-up with me in 6 months.

## 2019-01-22 ENCOUNTER — OFFICE VISIT (OUTPATIENT)
Dept: PODIATRY | Facility: CLINIC | Age: 69
End: 2019-01-22
Payer: MEDICARE

## 2019-01-22 ENCOUNTER — PATIENT MESSAGE (OUTPATIENT)
Dept: OTHER | Facility: OTHER | Age: 69
End: 2019-01-22

## 2019-01-22 VITALS
HEIGHT: 68 IN | SYSTOLIC BLOOD PRESSURE: 149 MMHG | WEIGHT: 222 LBS | BODY MASS INDEX: 33.65 KG/M2 | HEART RATE: 78 BPM | DIASTOLIC BLOOD PRESSURE: 75 MMHG | RESPIRATION RATE: 16 BRPM

## 2019-01-22 DIAGNOSIS — E11.49 TYPE II DIABETES MELLITUS WITH NEUROLOGICAL MANIFESTATIONS: Primary | ICD-10-CM

## 2019-01-22 DIAGNOSIS — B35.1 ONYCHOMYCOSIS DUE TO DERMATOPHYTE: ICD-10-CM

## 2019-01-22 PROCEDURE — 3046F HEMOGLOBIN A1C LEVEL >9.0%: CPT | Mod: CPTII,S$GLB,, | Performed by: PODIATRIST

## 2019-01-22 PROCEDURE — 3078F PR MOST RECENT DIASTOLIC BLOOD PRESSURE < 80 MM HG: ICD-10-PCS | Mod: CPTII,S$GLB,, | Performed by: PODIATRIST

## 2019-01-22 PROCEDURE — 3077F SYST BP >= 140 MM HG: CPT | Mod: CPTII,S$GLB,, | Performed by: PODIATRIST

## 2019-01-22 PROCEDURE — 11720 DEBRIDE NAIL 1-5: CPT | Mod: Q9,S$GLB,, | Performed by: PODIATRIST

## 2019-01-22 PROCEDURE — 99999 PR PBB SHADOW E&M-EST. PATIENT-LVL IV: CPT | Mod: PBBFAC,,, | Performed by: PODIATRIST

## 2019-01-22 PROCEDURE — 99999 PR PBB SHADOW E&M-EST. PATIENT-LVL IV: ICD-10-PCS | Mod: PBBFAC,,, | Performed by: PODIATRIST

## 2019-01-22 PROCEDURE — 99214 PR OFFICE/OUTPT VISIT, EST, LEVL IV, 30-39 MIN: ICD-10-PCS | Mod: 25,S$GLB,, | Performed by: PODIATRIST

## 2019-01-22 PROCEDURE — 1101F PT FALLS ASSESS-DOCD LE1/YR: CPT | Mod: CPTII,S$GLB,, | Performed by: PODIATRIST

## 2019-01-22 PROCEDURE — 99214 OFFICE O/P EST MOD 30 MIN: CPT | Mod: 25,S$GLB,, | Performed by: PODIATRIST

## 2019-01-22 PROCEDURE — 3046F PR MOST RECENT HEMOGLOBIN A1C LEVEL > 9.0%: ICD-10-PCS | Mod: CPTII,S$GLB,, | Performed by: PODIATRIST

## 2019-01-22 PROCEDURE — 3077F PR MOST RECENT SYSTOLIC BLOOD PRESSURE >= 140 MM HG: ICD-10-PCS | Mod: CPTII,S$GLB,, | Performed by: PODIATRIST

## 2019-01-22 PROCEDURE — 3078F DIAST BP <80 MM HG: CPT | Mod: CPTII,S$GLB,, | Performed by: PODIATRIST

## 2019-01-22 PROCEDURE — 1101F PR PT FALLS ASSESS DOC 0-1 FALLS W/OUT INJ PAST YR: ICD-10-PCS | Mod: CPTII,S$GLB,, | Performed by: PODIATRIST

## 2019-01-22 PROCEDURE — 11720 PR DEBRIDEMENT OF NAIL(S), 1-5: ICD-10-PCS | Mod: Q9,S$GLB,, | Performed by: PODIATRIST

## 2019-01-22 NOTE — PROGRESS NOTES
Subjective:      Patient ID: Roger Zavaleta Jr. is a 68 y.o. male.    Chief Complaint: Diabetes Mellitus (PCP Dr. Evelia Yanez last appt 1/21/19) and Nail Problem (ingrown nail left foot )    Roger is a 68 y.o. male who presents to the clinic for evaluation and treatment of high risk feet. Roger has a past medical history of CAD (coronary artery disease) (50), Diabetes mellitus type II, and HLD (hyperlipidemia). The patient's chief complaint is long, thick toenails. This patient has documented high risk feet requiring routine maintenance secondary to diabetes mellitis and those secondary complications of diabetes, as mentioned..  Patient also mentions about his left hallux.  He tells me he once had an ingrown toenail of the left hallux.  He went to urgent care and prescribed antibiotics and some cream.  It is now fully healed.  He has no complaint on the left hallux.  He also has concerns of his left 4th and 5th toenail discoloration.     PCP: Evelia Yanez MD    Date Last Seen by PCP: 1/21/19    Current shoe gear:  Affected Foot: Casual shoes     Unaffected Foot: Casual shoes    Hemoglobin A1C   Date Value Ref Range Status   01/21/2019 9.3 (H) 4.0 - 5.6 % Final     Comment:     ADA Screening Guidelines:  5.7-6.4%  Consistent with prediabetes  >or=6.5%  Consistent with diabetes  High levels of fetal hemoglobin interfere with the HbA1C  assay. Heterozygous hemoglobin variants (HbS, HgC, etc)do  not significantly interfere with this assay.   However, presence of multiple variants may affect accuracy.     10/20/2018 8.4 (H) 4.0 - 5.6 % Final     Comment:     ADA Screening Guidelines:  5.7-6.4%  Consistent with prediabetes  >or=6.5%  Consistent with diabetes  High levels of fetal hemoglobin interfere with the HbA1C  assay. Heterozygous hemoglobin variants (HbS, HgC, etc)do  not significantly interfere with this assay.   However, presence of multiple variants may affect accuracy.     06/08/2018 8.6 (H) 4.0 - 5.6 %  Final     Comment:     ADA Screening Guidelines:  5.7-6.4%  Consistent with prediabetes  >or=6.5%  Consistent with diabetes  High levels of fetal hemoglobin interfere with the HbA1C  assay. Heterozygous hemoglobin variants (HbS, HgC, etc)do  not significantly interfere with this assay.   However, presence of multiple variants may affect accuracy.         Review of Systems   Constitution: Negative for decreased appetite, fever, weakness and malaise/fatigue.   HENT: Negative for congestion.    Cardiovascular: Negative for chest pain and leg swelling.   Respiratory: Negative for cough and shortness of breath.    Skin: Positive for color change. Negative for nail changes and rash.   Musculoskeletal: Negative for arthritis, joint pain, joint swelling and muscle weakness.   Gastrointestinal: Negative for bloating, abdominal pain, nausea and vomiting.   Neurological: Positive for numbness and sensory change. Negative for headaches.   Psychiatric/Behavioral: Negative for altered mental status.           Objective:      Physical Exam   Constitutional: He is oriented to person, place, and time. He appears well-developed and well-nourished. No distress.   Musculoskeletal: He exhibits no edema, tenderness or deformity.   Neurological: He is alert and oriented to person, place, and time.   Skin: Skin is warm. Capillary refill takes less than 2 seconds. No erythema.   Psychiatric: He has a normal mood and affect. His behavior is normal.     Vascular: Distal DP/PT pulses palpable 1/4 b/l. CRT < 3 sec to tips of toes. No edema noted to b/l LE. No vericosities noted to b/l LEs. Hair growth present b/l LE, warm to touch b/l LE and cool to touch to toes.    Dermatologic: No open lesions, lacerations or wounds. Interdigital spaces clean, dry and intact b/l. No erythema, rubor, calor noted to b/l LE  Toenails 4 and 5th toe left are elongated by 2-3 mm, thickened by 2-3 mm, discolored/yellowed, dystrophic, brittle with subungual debris.  No incurvation. Mild xerosis noted, bilat. No open wounds.    Musculoskeletal: Equinus noted b/l ankles with < 10 deg DF noted b/l. MMT 5/5 in DF/PF/Inv/Ev resistance with no reproduction of pain in any direction. Passive range of motion of ankle and pedal joints is painless b/l. No calf tenderness b/l LE, Compartments soft/compressible b/l.     Neurological: motor and sensory sensation decreased b/l        Assessment:       Encounter Diagnoses   Name Primary?    Type II diabetes mellitus with neurological manifestations Yes    Onychomycosis due to dermatophyte          Plan:       Roger was seen today for diabetes mellitus and nail problem.    Diagnoses and all orders for this visit:    Type II diabetes mellitus with neurological manifestations    Onychomycosis due to dermatophyte      I counseled the patient on his conditions, their implications and medical management.    68 years old male with diabetic foot risk assessment, painful mycotic nail of the left foot 4th and 5th toe.    -nails x 2 sharply debrided with a nail Nipper.  Patient tolerated well. No complication  -It was discussed the importance of wearing shoes with adequate room in toe box to accommodate toe deformities. Recommended New Balance/[employer]ics shoe brands with adequate arch supports to alleviate abnormal pressure and improve stability of foot while walking. Avoid flat shoes and barefoot walking as these will exacerbate or worsen symptoms.   - Shoe inspection. General Foot Education. Patient reminded of the importance of good nutrition. Patient instructed on proper foot hygeine. We discussed wearing proper shoe gear, daily foot inspections, never walking without protective shoe gear, caution putting sharp instruments to feet   - Discussed general foot care:  Wear comfortable, proper fitting shoes. Wash feet daily. Dry well. After drying, apply moisturizer to feet (no lotion to webspaces). Inspect feet daily for skin breaks, blisters, swelling, or  redness. Wear cotton socks (preferably white)  Change socks every day. Do NOT walk barefoot. Do NOT use heating pads or warm/hot water soaks   -The nature of the condition, options for management, as well as potential risks and complications were discussed in detail with patient. Patient was amenable to my recommendations and left my office fully informed and will follow up as instructed or sooner if necessary.    -Patient was advised of signs and symptoms of infection including redness, drainage, purulence, odor, streaking, fever, chills and I advised patient to seek medical attention (ER or urgent care) if these symptoms arise.   -f/u 6 months

## 2019-01-22 NOTE — LETTER
January 22, 2019      Evelia Yanez MD  [address]  [address]   [facility] [address]           [place]  [address]   [facility] [address]  Phone: [phone]  Fax: [phone]          Patient: Roger Zavaleta Jr.   MR Number: [id number]   YOB: 1950   Date of Visit: 1/22/2019       Dear Dr. Evelia Yanez:    Thank you for referring Roger Zavaleta to me for evaluation. Attached you will find relevant portions of my assessment and plan of care.    If you have questions, please do not hesitate to call me. I look forward to following Roger Zavaleta along with you.    Sincerely,    Zena Patel, ARIELLE    Enclosure  CC:  No Recipients    If you would like to receive this communication electronically, please contact [email or web address] or [phone] to request more information on [employer] access.    For providers and/or their staff who would like to refer a patient to [facility], please contact us through our one-stop-shop provider referral line, [facility], at [phone].    If you feel you have received this communication in error or would no longer like to receive these types of communications, please e-mail [email or web address]

## 2019-02-01 ENCOUNTER — PATIENT OUTREACH (OUTPATIENT)
Dept: OTHER | Facility: OTHER | Age: 69
End: 2019-02-01

## 2019-02-01 NOTE — PROGRESS NOTES
HPI:  Called patient for follow-up and to discuss elevated readings. States that he has been checking blood pressure prior to medications. Asked patient to check blood pressure at least 1 hour or more after medications to assess response, patient agreed. Started irbesartan 150 mg four days ago and has no complaints at this time.     Last 5 Patient Entered Readings                                      Current 30 Day Average: 172/82     Recent Readings 1/31/2019 1/30/2019 1/28/2019 1/27/2019 1/24/2019    SBP (mmHg) 187 172 182 162 165    DBP (mmHg) 94 80 87 85 82    Pulse 56 57 57 57 58        Patient denies s/s of hypotension (lightheadedness, dizziness, nausea, fatigue) associated with low readings. Instructed patient to inform me if this occurs, patient confirms understanding.    Patient denies s/s of hypertension (SOB, CP, severe headaches, changes in vision) associated with high readings. Instructed patient to go to the ED if BP >180/110 and accompanied by hypertensive s/s, patient confirms understanding.    Assessment:  Patient's current 30-day average is not at goal of <130/80 mmHg based on ACC/AHA HTN guidelines. Will continue to monitor readings to assess response to regimen.     Plan:  Continue current regimen.  Patients health , Callie Zendejas, will be following up every 3-4 weeks.   I will continue to monitor regularly and will follow-up in 1 week, sooner if blood pressure begins to trend upward or downward.     Current medication regimen:  Hypertension Medications             furosemide (LASIX) 20 MG tablet Take 1 tablet (20 mg total) by mouth once daily.    irbesartan (AVAPRO) 150 MG tablet Take 1 tablet (150 mg total) by mouth once daily.    metoprolol succinate (TOPROL-XL) 100 MG 24 hr tablet Take 1 tablet (100 mg total) by mouth once daily.    nitroGLYCERIN (NITROSTAT) 0.4 MG SL tablet Place 0.4 mg under the tongue every 5 (five) minutes as needed.          Patient has my contact information  and knows to call with any concerns or clinical changes.

## 2019-02-08 ENCOUNTER — PATIENT OUTREACH (OUTPATIENT)
Dept: OTHER | Facility: OTHER | Age: 69
End: 2019-02-08

## 2019-02-08 DIAGNOSIS — I10 HYPERTENSION, ESSENTIAL: Primary | ICD-10-CM

## 2019-02-08 NOTE — PROGRESS NOTES
HPI:  Called patient for follow-up. Tolerating irbesartan 150 mg with no notable side effects. Attributes higher readings on 2/11 and 2/12 to increased stress secondary to death of family pet. He has no additional questions or concerns at this time.     Last 5 Patient Entered Readings                                      Current 30 Day Average: 166/81     Recent Readings 2/12/2019 2/11/2019 2/8/2019 2/5/2019 2/4/2019    SBP (mmHg) 178 162 164 163 141    DBP (mmHg) 89 75 78 79 73    Pulse 63 84 67 59 68        Patient denies s/s of hypotension (lightheadedness, dizziness, nausea, fatigue) associated with low readings. Instructed patient to inform me if this occurs, patient confirms understanding.    Patient denies s/s of hypertension (SOB, CP, severe headaches, changes in vision) associated with high readings. Instructed patient to go to the ED if BP >180/110 and accompanied by hypertensive s/s, patient confirms understanding.    Assessment:  Patient's current 30-day average is not at goal of <130/80 mmHg.     Plan:  Increase irbesartan to 300 mg once daily.  Repeat BMP in 2 to 3 weeks.   Patients health , Callie Zendejas, will be following up every 3-4 weeks.   I will continue to monitor regularly and will follow-up in 2 weeks, sooner if blood pressure begins to trend upward or downward.     Current medication regimen:  Hypertension Medications             furosemide (LASIX) 20 MG tablet Take 1 tablet (20 mg total) by mouth once daily.    irbesartan (AVAPRO) 300 MG tablet Take 1 tablet (300 mg total) by mouth once daily.    metoprolol succinate (TOPROL-XL) 100 MG 24 hr tablet Take 1 tablet (100 mg total) by mouth once daily.    nitroGLYCERIN (NITROSTAT) 0.4 MG SL tablet Place 0.4 mg under the tongue every 5 (five) minutes as needed.          Patient has my contact information and knows to call with any concerns or clinical changes.

## 2019-02-15 RX ORDER — IRBESARTAN 300 MG/1
300 TABLET ORAL DAILY
Qty: 90 TABLET | Refills: 1
Start: 2019-02-15 | End: 2019-03-18 | Stop reason: SDUPTHER

## 2019-02-28 ENCOUNTER — TELEPHONE (OUTPATIENT)
Dept: FAMILY MEDICINE | Facility: CLINIC | Age: 69
End: 2019-02-28

## 2019-02-28 ENCOUNTER — PATIENT OUTREACH (OUTPATIENT)
Dept: OTHER | Facility: OTHER | Age: 69
End: 2019-02-28

## 2019-02-28 DIAGNOSIS — Z79.4 TYPE 2 DIABETES MELLITUS WITH HYPERGLYCEMIA, WITH LONG-TERM CURRENT USE OF INSULIN: Primary | ICD-10-CM

## 2019-02-28 DIAGNOSIS — E11.65 TYPE 2 DIABETES MELLITUS WITH HYPERGLYCEMIA, WITH LONG-TERM CURRENT USE OF INSULIN: Primary | ICD-10-CM

## 2019-02-28 NOTE — TELEPHONE ENCOUNTER
Spoke with patient, went over recent lab work. Informed pt of elevated HgbA1c and that Dr Yanez is referring him to endocrinology. Also, to increase levemir to 45 units a day and follow-up with Dr Yanez in 3 months. Pt verbalizes understanding.

## 2019-02-28 NOTE — TELEPHONE ENCOUNTER
----- Message from Evelia Yanez MD sent at 2/28/2019  8:15 AM CST -----  His sugars are actually a lot worse. HgbA1c was 9.3. I think we should refer him to endocrinology. Cholesterol was good. Increase levemir to 45 units a day and follow-up in 3 months. It can take a whiel to get into endocrinology.

## 2019-02-28 NOTE — PROGRESS NOTES
Last 5 Patient Entered Readings                                      Current 30 Day Average: 165/79     Recent Readings 2/27/2019 2/26/2019 2/25/2019 2/17/2019 2/14/2019    SBP (mmHg) 168 165 161 159 189    DBP (mmHg) 80 60 78 77 87    Pulse 64 70 64 81 67        Digital Medicine: Health  Introduction    Introduced Mr. Roger Zavaleta Jr. to Digital Medicine. Discussed health  role and recommended lifestyle modifications.    Lifestyle Assessment:  Current Dietary Habits(i.e. low sodium, food labels, dining out): Patient reports he eats out and at home. Patient states he eats out 4 ties a week. Patient reports he eats a variety of foods when eating out and at home. Patient states he cooks with salt but juarez snot put additional salt on his his food when the meal is done. Patient reports she buys fresh vegetables. Patient states he loves to snack on potatoe chips . Discussed trying salt free seasonings, eating more home cooked, choosing a low sodium chips. Will send patient some tips on health snacks and a slit of low sodium chips via personal e-mail.     Exercise: Patient reports he tries to go for a walk in the neighborhood 2-3 blocks everyday when his knees are not hurting.    Alcohol/Tobacco: Patient reports he does not smoke or drink.     Medication Adherence: has been compliant with the medicaiton regimen.    Reviewed AHA/AACE recommendations:  Limit sodium intake to <2000mg/day  Recommended CHO intake, 45-65% of daily caloric intake  Perform 150 minutes of physical activity per week    Reviewed the importance of self-monitoring, medication adherence, and that the health  can be used as a resource for lifestyle modifications to help reduce or maintain a healthy lifestyle.  Reviewed that the Digital Medicine team is not available for emergencies and instructed the patient to call 911 or [facility] ([phone] or [phone]) if one arises.

## 2019-03-01 ENCOUNTER — PATIENT OUTREACH (OUTPATIENT)
Dept: OTHER | Facility: OTHER | Age: 69
End: 2019-03-01

## 2019-03-01 DIAGNOSIS — I10 HYPERTENSION, ESSENTIAL: Primary | ICD-10-CM

## 2019-03-01 RX ORDER — AMLODIPINE BESYLATE 5 MG/1
5 TABLET ORAL DAILY
Qty: 30 TABLET | Refills: 3 | Status: SHIPPED | OUTPATIENT
Start: 2019-03-01 | End: 2019-04-25

## 2019-03-01 NOTE — PROGRESS NOTES
HPI:  Called Mr. Roger Zavaleta  for hypertension follow-up. Patient reports adherence to medication regimen with no complaints. Patient continues to work on reducing sodium intake and is unsure why BP remains elevated.     Last 5 Patient Entered Readings                                      Current 30 Day Average: 165/79     Recent Readings 2/27/2019 2/26/2019 2/25/2019 2/17/2019 2/14/2019    SBP (mmHg) 168 165 161 159 189    DBP (mmHg) 80 60 78 77 87    Pulse 64 70 64 81 67          Patient denies s/s of hypotension (lightheadedness, dizziness, nausea, fatigue) associated with low readings. Instructed patient to inform me if this occurs, patient confirms understanding.    Patient denies s/s of hypertension (SOB, CP, severe headaches, changes in vision) associated with high readings. Instructed patient to go to the ED if BP >180/110 and accompanied by hypertensive s/s, patient confirms understanding.    Assessment:  Patient's current 30-day average is not at goal of <130/80 mmHg.    Plan:  Add amlodipine 5 mg once daily. Continue irbesartan and metoprolol.   Patients health , Callie Zendejas, will be following up every 3-4 weeks.   I will continue to monitor regularly and will follow-up in 3 weeks, sooner if blood pressure begins to trend upward or downward.     Current medication regimen:  Hypertension Medications             amLODIPine (NORVASC) 5 MG tablet Take 1 tablet (5 mg total) by mouth once daily.    furosemide (LASIX) 20 MG tablet Take 1 tablet (20 mg total) by mouth once daily.    irbesartan (AVAPRO) 300 MG tablet Take 1 tablet (300 mg total) by mouth once daily.    metoprolol succinate (TOPROL-XL) 100 MG 24 hr tablet Take 1 tablet (100 mg total) by mouth once daily.    nitroGLYCERIN (NITROSTAT) 0.4 MG SL tablet Place 0.4 mg under the tongue every 5 (five) minutes as needed.          Patient has my contact information and knows to call with any concerns or clinical changes.

## 2019-03-06 RX ORDER — INSULIN DETEMIR 100 [IU]/ML
INJECTION, SOLUTION SUBCUTANEOUS
Qty: 1 BOX | Refills: 3 | Status: SHIPPED | OUTPATIENT
Start: 2019-03-06 | End: 2019-05-27 | Stop reason: SDUPTHER

## 2019-03-12 ENCOUNTER — PATIENT MESSAGE (OUTPATIENT)
Dept: FAMILY MEDICINE | Facility: CLINIC | Age: 69
End: 2019-03-12

## 2019-03-18 ENCOUNTER — TELEPHONE (OUTPATIENT)
Dept: FAMILY MEDICINE | Facility: CLINIC | Age: 69
End: 2019-03-18

## 2019-03-18 DIAGNOSIS — I10 HYPERTENSION, ESSENTIAL: ICD-10-CM

## 2019-03-18 RX ORDER — IRBESARTAN 300 MG/1
300 TABLET ORAL DAILY
Qty: 90 TABLET | Refills: 1 | Status: SHIPPED | OUTPATIENT
Start: 2019-03-18 | End: 2019-06-25

## 2019-03-18 RX ORDER — IRBESARTAN 150 MG/1
150 TABLET ORAL DAILY
Qty: 90 TABLET | Refills: 1 | Status: SHIPPED | OUTPATIENT
Start: 2019-03-18 | End: 2019-03-18 | Stop reason: SDUPTHER

## 2019-03-18 NOTE — TELEPHONE ENCOUNTER
Please let him know that I sent in the 150 mg insrtead of the 300 mg, so he does not need to cut these in half.

## 2019-03-18 NOTE — TELEPHONE ENCOUNTER
Attempted to reach patient, no answer, left message regarding medication refill. Dr Yanez refill Avapro with 150mg, therefore he does not need to cut them in half. Instructed patient to call office if he has any questions or concerns.

## 2019-03-25 ENCOUNTER — TELEPHONE (OUTPATIENT)
Dept: FAMILY MEDICINE | Facility: CLINIC | Age: 69
End: 2019-03-25

## 2019-03-25 NOTE — TELEPHONE ENCOUNTER
----- Message from Gina Pizano sent at 3/25/2019  9:47 AM CDT -----  Contact: [phone]  Type:  Patient Returning Call    Who Called:Patient  Who Left Message for Patient:Nurse  Does the patient know what this is regarding?:N/A  Would the patient rather a call back or a response via [facility]? Call back  Best Call Back Number:[phone]  Additional Information: N/A

## 2019-03-25 NOTE — TELEPHONE ENCOUNTER
----- Message from Evelia Yanez MD sent at 3/25/2019  9:15 AM CDT -----  Please check on him and see if he is still swelling. I just noticed that digital hypertension pharmacist had added amlodipine and it often causes swelling.

## 2019-03-25 NOTE — TELEPHONE ENCOUNTER
Spoke with patient. Pt states that his leg swelling has leveled out and has not gotten any worse. Instructed patient to call office if swelling gets any worse or if any concerns arise.

## 2019-03-26 ENCOUNTER — PATIENT OUTREACH (OUTPATIENT)
Dept: OTHER | Facility: OTHER | Age: 69
End: 2019-03-26

## 2019-03-26 NOTE — PROGRESS NOTES
"HPI:  Called Mr. Roger Zavaleta JrPeg for hypertension follow-up. Started amlodipine nightly as discussed with no change in blood pressure however, he has noticed some BETO. States that it is "pretty bothersome" and noticeable at the end of the day. Patient reports adherence to all other medications as prescribed.     Last 5 Patient Entered Readings                                      Current 30 Day Average: 165/75     Recent Readings 3/21/2019 3/15/2019 3/11/2019 3/7/2019 3/2/2019    SBP (mmHg) 166 151 167 172 166    DBP (mmHg) 80 72 83 75 74    Pulse 70 75 71 74 68          Patient denies s/s of hypotension (lightheadedness, dizziness, nausea, fatigue) associated with low readings. Instructed patient to inform me if this occurs, patient confirms understanding.    Patient denies s/s of hypertension (SOB, CP, severe headaches, changes in vision) associated with high readings. Instructed patient to go to the ED if BP >180/110 and accompanied by hypertensive s/s, patient confirms understanding.    Assessment:  Patient's current 30-day average is not at goal of <130/80 mmHg.    Plan:  Instructed patient to take one-half tablet of amlodipine and continue other medications as directed.  May benefit from switching furosemide to chlorthalidone, note sent to PCP per provider request.   Patients health , Callie Zendejas, will be following up every 3-4 weeks.   I will continue to monitor regularly and will follow-up in 2 weeks, sooner if blood pressure begins to trend upward or downward.     Current medication regimen:  Hypertension Medications             amLODIPine (NORVASC) 5 MG tablet Take 1 tablet (5 mg total) by mouth once daily.    furosemide (LASIX) 20 MG tablet Take 1 tablet (20 mg total) by mouth once daily.    irbesartan (AVAPRO) 300 MG tablet Take 1 tablet (300 mg total) by mouth once daily.    metoprolol succinate (TOPROL-XL) 100 MG 24 hr tablet Take 1 tablet (100 mg total) by mouth once daily.    " nitroGLYCERIN (NITROSTAT) 0.4 MG SL tablet Place 0.4 mg under the tongue every 5 (five) minutes as needed.          Patient has my contact information and knows to call with any concerns or clinical changes.

## 2019-04-16 RX ORDER — BLOOD SUGAR DIAGNOSTIC
STRIP MISCELLANEOUS
Qty: 100 STRIP | Refills: 3 | Status: SHIPPED | OUTPATIENT
Start: 2019-04-16 | End: 2019-07-16 | Stop reason: SDUPTHER

## 2019-04-25 ENCOUNTER — PATIENT OUTREACH (OUTPATIENT)
Dept: OTHER | Facility: OTHER | Age: 69
End: 2019-04-25

## 2019-04-25 DIAGNOSIS — I10 HYPERTENSION, ESSENTIAL: ICD-10-CM

## 2019-04-25 RX ORDER — AMLODIPINE BESYLATE 5 MG/1
2.5 TABLET ORAL DAILY
Qty: 30 TABLET | Refills: 3
Start: 2019-04-25 | End: 2019-05-27

## 2019-04-25 RX ORDER — CHLORTHALIDONE 25 MG/1
25 TABLET ORAL DAILY
Qty: 30 TABLET | Refills: 3 | Status: SHIPPED | OUTPATIENT
Start: 2019-04-25 | End: 2019-08-12 | Stop reason: SDUPTHER

## 2019-04-29 ENCOUNTER — PATIENT OUTREACH (OUTPATIENT)
Dept: OTHER | Facility: OTHER | Age: 69
End: 2019-04-29

## 2019-04-29 NOTE — PROGRESS NOTES
HPI:  Called Mr. Roger Zavaleta  to clarify medication regimen. Patient picked up chlorthalidone however, he cannot recall which medication to stop taking.     Last 5 Patient Entered Readings                                      Current 30 Day Average: 162/82     Recent Readings 4/26/2019 4/25/2019 4/24/2019 4/17/2019 4/10/2019    SBP (mmHg) 140 185 162 160 158    DBP (mmHg) 73 92 83 81 83    Pulse 69 68 76 65 60          Patient denies s/s of hypotension (lightheadedness, dizziness, nausea, fatigue) associated with low readings. Instructed patient to inform me if this occurs, patient confirms understanding.    Patient denies s/s of hypertension (SOB, CP, severe headaches, changes in vision) associated with high readings. Instructed patient to go to the ED if BP >180/110 and accompanied by hypertensive s/s, patient confirms understanding.    Assessment:  Patient's current 30-day average is not at goal of <130/80 mmHg.    Plan:  Instructed patient to stop taking furosemide and start taking chlorthalidone.  Patients health , Callie Zendejas, will be following up every 3-4 weeks.   I will continue to monitor regularly and will follow-up in 2 to 3 weeks, sooner if blood pressure begins to trend upward or downward.     Current medication regimen:  Hypertension Medications             amLODIPine (NORVASC) 5 MG tablet Take 0.5 tablets (2.5 mg total) by mouth once daily.    chlorthalidone (HYGROTEN) 25 MG Tab Take 1 tablet (25 mg total) by mouth once daily.    irbesartan (AVAPRO) 300 MG tablet Take 1 tablet (300 mg total) by mouth once daily.    metoprolol succinate (TOPROL-XL) 100 MG 24 hr tablet Take 1 tablet (100 mg total) by mouth once daily.    nitroGLYCERIN (NITROSTAT) 0.4 MG SL tablet Place 0.4 mg under the tongue every 5 (five) minutes as needed.          Patient has my contact information and knows to call with any concerns or clinical changes.

## 2019-05-02 ENCOUNTER — PATIENT MESSAGE (OUTPATIENT)
Dept: FAMILY MEDICINE | Facility: CLINIC | Age: 69
End: 2019-05-02

## 2019-05-02 DIAGNOSIS — E11.42 TYPE 2 DIABETES MELLITUS WITH DIABETIC POLYNEUROPATHY, WITH LONG-TERM CURRENT USE OF INSULIN: Primary | ICD-10-CM

## 2019-05-02 DIAGNOSIS — Z79.4 TYPE 2 DIABETES MELLITUS WITH DIABETIC POLYNEUROPATHY, WITH LONG-TERM CURRENT USE OF INSULIN: Primary | ICD-10-CM

## 2019-05-03 ENCOUNTER — PATIENT MESSAGE (OUTPATIENT)
Dept: FAMILY MEDICINE | Facility: CLINIC | Age: 69
End: 2019-05-03

## 2019-05-07 ENCOUNTER — PATIENT MESSAGE (OUTPATIENT)
Dept: FAMILY MEDICINE | Facility: CLINIC | Age: 69
End: 2019-05-07

## 2019-05-08 ENCOUNTER — PATIENT MESSAGE (OUTPATIENT)
Dept: FAMILY MEDICINE | Facility: CLINIC | Age: 69
End: 2019-05-08

## 2019-05-15 RX ORDER — SYRINGE AND NEEDLE,INSULIN,1ML 31GX15/64"
SYRINGE, EMPTY DISPOSABLE MISCELLANEOUS
Qty: 100 SYRINGE | Refills: 1 | Status: SHIPPED | OUTPATIENT
Start: 2019-05-15 | End: 2019-08-09 | Stop reason: SDUPTHER

## 2019-05-16 ENCOUNTER — PATIENT OUTREACH (OUTPATIENT)
Dept: OTHER | Facility: OTHER | Age: 69
End: 2019-05-16

## 2019-05-16 NOTE — PROGRESS NOTES
Last 5 Patient Entered Readings                                      Current 30 Day Average: 156/80     Recent Readings 5/9/2019 5/3/2019 4/26/2019 4/25/2019 4/24/2019    SBP (mmHg) 144 143 140 185 162    DBP (mmHg) 79 74 73 92 83    Pulse 67 74 69 68 76              Digital Medicine: Health  Follow Up / New  Introduction    Left voicemail to follow up with Mr. Roger Zavaleta Jr..  Current BP average 156/80 mmHg is not at goal, 130/80 mmHg

## 2019-05-20 ENCOUNTER — PATIENT MESSAGE (OUTPATIENT)
Dept: ADMINISTRATIVE | Facility: OTHER | Age: 69
End: 2019-05-20

## 2019-05-21 RX ORDER — METFORMIN HYDROCHLORIDE 1000 MG/1
1000 TABLET ORAL 2 TIMES DAILY WITH MEALS
Qty: 180 TABLET | Refills: 0 | Status: SHIPPED | OUTPATIENT
Start: 2019-05-21 | End: 2019-09-02 | Stop reason: SDUPTHER

## 2019-05-24 NOTE — PROGRESS NOTES
Last 5 Patient Entered Readings                                      Current 30 Day Average: 150/78     Recent Readings 5/22/2019 5/21/2019 5/20/2019 5/18/2019 5/17/2019    SBP (mmHg) 151 138 148 141 143    DBP (mmHg) 75 66 78 80 78    Pulse 62 67 72 67 83          Digital Medicine: Health  Follow Up    Unable to leave voicemail to follow up with Mr. Roger Zavaleta Jr..  Current BP average 150/78 mmHg is not at goal, 130/80 mmHg.  Sent [employer] message.

## 2019-05-27 ENCOUNTER — OFFICE VISIT (OUTPATIENT)
Dept: FAMILY MEDICINE | Facility: CLINIC | Age: 69
End: 2019-05-27
Payer: MEDICARE

## 2019-05-27 VITALS
HEART RATE: 71 BPM | BODY MASS INDEX: 33.48 KG/M2 | OXYGEN SATURATION: 98 % | WEIGHT: 220.88 LBS | TEMPERATURE: 98 F | SYSTOLIC BLOOD PRESSURE: 116 MMHG | HEIGHT: 68 IN | DIASTOLIC BLOOD PRESSURE: 60 MMHG

## 2019-05-27 DIAGNOSIS — I10 HYPERTENSION, ESSENTIAL: Primary | ICD-10-CM

## 2019-05-27 DIAGNOSIS — E78.2 MIXED HYPERLIPIDEMIA: ICD-10-CM

## 2019-05-27 PROBLEM — R07.9 CHEST PAIN: Status: RESOLVED | Noted: 2018-10-15 | Resolved: 2019-05-27

## 2019-05-27 PROCEDURE — 3078F PR MOST RECENT DIASTOLIC BLOOD PRESSURE < 80 MM HG: ICD-10-PCS | Mod: HCNC,CPTII,S$GLB, | Performed by: INTERNAL MEDICINE

## 2019-05-27 PROCEDURE — 99999 PR PBB SHADOW E&M-EST. PATIENT-LVL III: CPT | Mod: PBBFAC,HCNC,, | Performed by: INTERNAL MEDICINE

## 2019-05-27 PROCEDURE — 99214 OFFICE O/P EST MOD 30 MIN: CPT | Mod: HCNC,S$GLB,, | Performed by: INTERNAL MEDICINE

## 2019-05-27 PROCEDURE — 3046F PR MOST RECENT HEMOGLOBIN A1C LEVEL > 9.0%: ICD-10-PCS | Mod: HCNC,CPTII,S$GLB, | Performed by: INTERNAL MEDICINE

## 2019-05-27 PROCEDURE — 3078F DIAST BP <80 MM HG: CPT | Mod: HCNC,CPTII,S$GLB, | Performed by: INTERNAL MEDICINE

## 2019-05-27 PROCEDURE — 99999 PR PBB SHADOW E&M-EST. PATIENT-LVL III: ICD-10-PCS | Mod: PBBFAC,HCNC,, | Performed by: INTERNAL MEDICINE

## 2019-05-27 PROCEDURE — 3074F PR MOST RECENT SYSTOLIC BLOOD PRESSURE < 130 MM HG: ICD-10-PCS | Mod: HCNC,CPTII,S$GLB, | Performed by: INTERNAL MEDICINE

## 2019-05-27 PROCEDURE — 1101F PR PT FALLS ASSESS DOC 0-1 FALLS W/OUT INJ PAST YR: ICD-10-PCS | Mod: HCNC,CPTII,S$GLB, | Performed by: INTERNAL MEDICINE

## 2019-05-27 PROCEDURE — 99499 RISK ADDL DX/OHS AUDIT: ICD-10-PCS | Mod: HCNC,S$GLB,, | Performed by: INTERNAL MEDICINE

## 2019-05-27 PROCEDURE — 3074F SYST BP LT 130 MM HG: CPT | Mod: HCNC,CPTII,S$GLB, | Performed by: INTERNAL MEDICINE

## 2019-05-27 PROCEDURE — 3046F HEMOGLOBIN A1C LEVEL >9.0%: CPT | Mod: HCNC,CPTII,S$GLB, | Performed by: INTERNAL MEDICINE

## 2019-05-27 PROCEDURE — 99214 PR OFFICE/OUTPT VISIT, EST, LEVL IV, 30-39 MIN: ICD-10-PCS | Mod: HCNC,S$GLB,, | Performed by: INTERNAL MEDICINE

## 2019-05-27 PROCEDURE — 1101F PT FALLS ASSESS-DOCD LE1/YR: CPT | Mod: HCNC,CPTII,S$GLB, | Performed by: INTERNAL MEDICINE

## 2019-05-27 PROCEDURE — 99499 UNLISTED E&M SERVICE: CPT | Mod: HCNC,S$GLB,, | Performed by: INTERNAL MEDICINE

## 2019-05-27 RX ORDER — INSULIN ASPART 100 [IU]/ML
10 INJECTION, SOLUTION INTRAVENOUS; SUBCUTANEOUS
Qty: 10 ML | Refills: 5 | Status: SHIPPED | OUTPATIENT
Start: 2019-05-27 | End: 2019-06-25

## 2019-05-27 NOTE — PATIENT INSTRUCTIONS
We have reviewed your prescription medications. We will increase humalog at lunchtime to 15 units. Continue 10 units the other meals and the 45 units of levemir. Try to get in 5-6 servings of fruits and vegetables and reduce carbs and sugars when possible. We will recheck labs in about 3 months -- We're trying to get you to a goal of 7 for HgbA1c.

## 2019-05-28 ENCOUNTER — PATIENT OUTREACH (OUTPATIENT)
Dept: OTHER | Facility: OTHER | Age: 69
End: 2019-05-28

## 2019-05-28 NOTE — PROGRESS NOTES
HPI:  Called Mr. Roger Zavaleta  for hypertension follow-up. Patient reports adherence to medication regimen with no complaints. Seen in office yesterday and BP was 116/60 mmHg and patient is pleased. States that he had not charged cuff in since receiving however, he charged yesterday and will resume taking readings.     Last 5 Patient Entered Readings                                      Current 30 Day Average: 144/76     Recent Readings 5/22/2019 5/21/2019 5/20/2019 5/18/2019 5/17/2019    SBP (mmHg) 151 138 148 141 143    DBP (mmHg) 75 66 78 80 78    Pulse 62 67 72 67 83          Patient denies s/s of hypotension (lightheadedness, dizziness, nausea, fatigue) associated with low readings. Instructed patient to inform me if this occurs, patient confirms understanding.    Patient denies s/s of hypertension (SOB, CP, severe headaches, changes in vision) associated with high readings. Instructed patient to go to the ED if BP >180/110 and accompanied by hypertensive s/s, patient confirms understanding.    Assessment:  Patient's current 30-day average is not at goal of <130/80 mmHg however, readings are trending down appropriately since adding chlorthalidone. Labs remain within normal range.     Plan:  Continue current regimen.  Patients health , Yaneli Rojas, will be following up every 3-4 weeks.   I will continue to monitor regularly and will follow-up in 5 weeks, sooner if blood pressure begins to trend upward or downward.     Current medication regimen:  Hypertension Medications             chlorthalidone (HYGROTEN) 25 MG Tab Take 1 tablet (25 mg total) by mouth once daily.    irbesartan (AVAPRO) 300 MG tablet Take 1 tablet (300 mg total) by mouth once daily.    metoprolol succinate (TOPROL-XL) 100 MG 24 hr tablet Take 1 tablet (100 mg total) by mouth once daily.    nitroGLYCERIN (NITROSTAT) 0.4 MG SL tablet Place 0.4 mg under the tongue every 5 (five) minutes as needed.          Patient has my contact  information and knows to call with any concerns or clinical changes.

## 2019-05-28 NOTE — PROGRESS NOTES
Subjective:       Patient ID: Roger Zavaleta Jr. is a 68 y.o. male.    Chief Complaint: Diabetes     I have reviewed the PMH,  and  for this patient. He  has a past medical history of CAD (coronary artery disease) (50), Diabetes mellitus type II, and HLD (hyperlipidemia).  He is here today for follow-up of diabetes.  His most recent hemoglobin A1c showed that his sugars have not been well controlled.  The last hemoglobin A1c was 9.3.  He reports that he has been taking 45 units of Levemir in the evening, and Humalog 10 mg t.i.d. with meals.  He reports that his blood sugars are good in the morning about 130-150.  In the afternoon, his blood sugars are high at about 200.  In the evening, his sugars are good again at about 110.  He reports that he does eat a large lunch.  He is not having any low sugar episodes.  His blood tests also showed that his kidney function is good, and his cholesterol has been good with an LDL of 43.8.    Diabetes   He has type 2 diabetes mellitus. No MedicAlert identification noted. The initial diagnosis of diabetes was made 25 years ago. Hypoglycemia symptoms include dizziness and sleepiness. Pertinent negatives for hypoglycemia include no confusion, headaches, hunger, mood changes, nervousness/anxiousness, pallor, seizures, speech difficulty, sweats or tremors. Associated symptoms include fatigue, foot paresthesias, polydipsia, polyuria and weakness. Pertinent negatives for diabetes include no blurred vision, no chest pain, no foot ulcerations, no polyphagia, no visual change and no weight loss. Pertinent negatives for hypoglycemia complications include no blackouts, no hospitalization, no nocturnal hypoglycemia, no required assistance and no required glucagon injection. Symptoms are stable. Diabetic complications include autonomic neuropathy, heart disease, peripheral neuropathy, PVD and retinopathy. Pertinent negatives for diabetic complications include no CVA, impotence or  nephropathy. Risk factors for coronary artery disease include dyslipidemia, family history, hypertension, obesity, sedentary lifestyle, stress, diabetes mellitus and male sex. Current diabetic treatment includes insulin injections, oral agent (monotherapy), oral agent (triple therapy) and intensive insulin program. He is compliant with treatment most of the time. He is currently taking insulin pre-breakfast, pre-lunch, pre-dinner and at bedtime. Insulin injections are given by patient. Rotation sites for injection include the abdominal wall. His weight is fluctuating minimally. He is following a diabetic, low fat/cholesterol and low salt diet. Meal planning includes avoidance of concentrated sweets. He has not had a previous visit with a dietitian. He participates in exercise intermittently. He monitors blood glucose at home 3-4 x per day. He monitors urine at home <1 x per month. Blood glucose monitoring compliance is good. His home blood glucose trend is fluctuating dramatically. He sees a podiatrist.Eye exam is not current.     Active Ambulatory Problems     Diagnosis Date Noted    CAD (coronary artery disease)     HLD (hyperlipidemia)     Type 2 diabetes mellitus with diabetic polyneuropathy, with long-term current use of insulin 09/08/2014    Diabetic neuropathy 09/08/2014    Obesity (BMI 30-39.9) 09/08/2014    Hypertension, essential 11/10/2014    Long-term insulin use 07/09/2018    Gastroesophageal reflux disease 07/09/2018    Epistaxis 10/15/2018    MELENDREZ (dyspnea on exertion) 10/15/2018     Resolved Ambulatory Problems     Diagnosis Date Noted    Diabetes mellitus, type 2     Trigger middle finger of left hand 04/17/2013    Dupuytren contracture 04/17/2013    Transaminasemia 04/17/2013    Finger pain 09/30/2013    Type II or unspecified type diabetes mellitus without mention of complication, not stated as uncontrolled 08/19/2014    Rib sprain 08/19/2014    Back strain 08/19/2014    Otitis  "externa of left ear 08/25/2014    BMI 35.0-35.9,adult 11/10/2014    Abnormal biliary HIDA scan 10/22/2015    RUQ abdominal pain 10/22/2015    Hypotension due to drugs 10/22/2015    Epigastric pain 06/08/2018    Chest pain 10/15/2018     Past Medical History:   Diagnosis Date    CAD (coronary artery disease) 50    Diabetes mellitus type II     HLD (hyperlipidemia)          MEDICATIONS:  Current Outpatient Medications:     ACCU-CHEK SMARTVIEW TEST STRIP Strp, USE 1 STRIP TO CHECK GLUCOSE THREE TIMES DAILY AS NEEDED, Disp: 100 strip, Rfl: 3    alpha lipoic acid 200 mg Cap, Take 1 capsule PO once daily, Disp: , Rfl:     aspirin 81 MG chewable tablet, Take 81 mg by mouth once daily.  , Disp: , Rfl:     atorvastatin (LIPITOR) 40 MG tablet, Take 1 tablet (40 mg total) by mouth once daily., Disp: 90 tablet, Rfl: 3    chlorthalidone (HYGROTEN) 25 MG Tab, Take 1 tablet (25 mg total) by mouth once daily., Disp: 30 tablet, Rfl: 3    insulin aspart U-100 (NOVOLOG U-100 INSULIN ASPART) 100 unit/mL injection, Inject 10 Units into the skin 3 (three) times daily with meals., Disp: 10 mL, Rfl: 5    insulin detemir U-100 (LEVEMIR FLEXTOUCH U-100 INSULN) 100 unit/mL (3 mL) SubQ InPn pen, Inject 45 Units into the skin once daily., Disp: 1 Box, Rfl: 1    insulin syringe-needle U-100 0.3 mL 31 gauge x 15/64" Syrg, USE  THREE TIMES DAILY, Disp: 100 Syringe, Rfl: 1    irbesartan (AVAPRO) 300 MG tablet, Take 1 tablet (300 mg total) by mouth once daily., Disp: 90 tablet, Rfl: 1    lancets (ULTRA THIN LANCETS) 30 gauge Misc, 1 lancet by Misc.(Non-Drug; Combo Route) route 3 (three) times daily., Disp: 200 each, Rfl: 3    metFORMIN (GLUCOPHAGE) 1000 MG tablet, Take 1 tablet (1,000 mg total) by mouth 2 (two) times daily with meals., Disp: 180 tablet, Rfl: 0    metoprolol succinate (TOPROL-XL) 100 MG 24 hr tablet, Take 1 tablet (100 mg total) by mouth once daily., Disp: 30 tablet, Rfl: 11    mupirocin (BACTROBAN) 2 % " "ointment, Apply to affected area 3 times daily, Disp: 22 g, Rfl: 1    nitroGLYCERIN (NITROSTAT) 0.4 MG SL tablet, Place 0.4 mg under the tongue every 5 (five) minutes as needed.  , Disp: , Rfl:     PEN NEEDLE 31 gauge x 5/16" Ndle, USE AS DIRECTED ONCE DAILY, Disp: 50 each, Rfl: 7      HEALTH MAINTENANCE:   Health Maintenance   Topic Date Due    Eye Exam  05/17/2019    Influenza Vaccine  08/01/2019    Hemoglobin A1c  08/06/2019    Lipid Panel  01/21/2020    Foot Exam  01/22/2020    High Dose Statin  05/27/2020    Aspirin/Antiplatelet Therapy  05/27/2020    Colonoscopy  12/03/2023    TETANUS VACCINE  05/25/2027    Hepatitis C Screening  Completed    Pneumococcal Vaccine (65+ Low/Medium Risk)  Completed       Review of Systems   Constitutional: Positive for fatigue. Negative for chills, fever and weight loss.   HENT: Negative for congestion, ear discharge, ear pain, rhinorrhea and sore throat.    Eyes: Negative for blurred vision, discharge, redness and itching.   Respiratory: Negative for cough, chest tightness, shortness of breath and wheezing.    Cardiovascular: Negative for chest pain, palpitations and leg swelling.   Gastrointestinal: Negative for abdominal pain, constipation, diarrhea, nausea and vomiting.   Endocrine: Positive for polydipsia and polyuria. Negative for cold intolerance, heat intolerance and polyphagia.   Genitourinary: Negative for dysuria, flank pain, frequency, hematuria and impotence.   Musculoskeletal: Negative for arthralgias, back pain, joint swelling and myalgias.   Skin: Negative for color change, pallor and rash.   Neurological: Positive for dizziness and weakness. Negative for tremors, seizures, speech difficulty, numbness and headaches.   Psychiatric/Behavioral: Negative for confusion, dysphoric mood and sleep disturbance. The patient is not nervous/anxious.        Objective:          LABS    CMP  Sodium   Date Value Ref Range Status   05/06/2019 138 136 - 145 mmol/L " Final     Potassium   Date Value Ref Range Status   05/06/2019 3.8 3.5 - 5.1 mmol/L Final     Chloride   Date Value Ref Range Status   05/06/2019 100 95 - 110 mmol/L Final     CO2   Date Value Ref Range Status   05/06/2019 28 23 - 29 mmol/L Final     Glucose   Date Value Ref Range Status   05/06/2019 156 (H) 70 - 110 mg/dL Final     BUN, Bld   Date Value Ref Range Status   05/06/2019 15 2 - 20 mg/dL Final     Creatinine   Date Value Ref Range Status   05/06/2019 1.01 0.50 - 1.40 mg/dL Final     Calcium   Date Value Ref Range Status   05/06/2019 9.7 8.7 - 10.5 mg/dL Final     Total Protein   Date Value Ref Range Status   06/08/2018 7.6 6.0 - 8.4 g/dL Final     Albumin   Date Value Ref Range Status   06/08/2018 4.0 3.5 - 5.2 g/dL Final     Total Bilirubin   Date Value Ref Range Status   06/08/2018 0.7 0.1 - 1.0 mg/dL Final     Comment:     For infants and newborns, interpretation of results should be based  on gestational age, weight and in agreement with clinical  observations.  Premature Infant recommended reference ranges:  Up to 24 hours.............<8.0 mg/dL  Up to 48 hours............<12.0 mg/dL  3-5 days..................<15.0 mg/dL  6-29 days.................<15.0 mg/dL       Alkaline Phosphatase   Date Value Ref Range Status   06/08/2018 108 38 - 126 U/L Final     AST   Date Value Ref Range Status   06/08/2018 37 15 - 46 U/L Final     ALT   Date Value Ref Range Status   06/08/2018 30 10 - 44 U/L Final     Anion Gap   Date Value Ref Range Status   05/06/2019 10 8 - 16 mmol/L Final     eGFR if    Date Value Ref Range Status   05/06/2019 >60.0 >60 mL/min/1.73 m^2 Final     eGFR if non    Date Value Ref Range Status   05/06/2019 >60.0 >60 mL/min/1.73 m^2 Final     Comment:     Calculation used to obtain the estimated glomerular filtration  rate (eGFR) is the CKD-EPI equation.          Lab Results   Component Value Date    WBC 9.80 10/20/2018    HGB 13.7 (L) 10/20/2018    HCT 38.9  (L) 10/20/2018    MCV 81 (L) 10/20/2018     10/20/2018       No results for input(s): TSH, HDL, CHOL, TRIG, LDLCALC, CHOLHDL, NONHDLCHOL, TOTALCHOLEST in the last 2160 hours.      A1C:  Recent Labs   Lab Result Units 05/06/19  0703   Hemoglobin A1C % 9.3*         Physical Exam   Constitutional: He appears well-developed and well-nourished. He does not have a sickly appearance.   HENT:   Head: Normocephalic and atraumatic.   Right Ear: External ear normal. Tympanic membrane is not erythematous. No middle ear effusion.   Left Ear: External ear normal. Tympanic membrane is not erythematous.  No middle ear effusion.   Nose: No rhinorrhea. Right sinus exhibits no maxillary sinus tenderness and no frontal sinus tenderness. Left sinus exhibits no maxillary sinus tenderness and no frontal sinus tenderness.   Mouth/Throat: No posterior oropharyngeal edema or posterior oropharyngeal erythema. No tonsillar exudate.   Eyes: Pupils are equal, round, and reactive to light. Conjunctivae and EOM are normal. Right eye exhibits no discharge. Left eye exhibits no discharge. Right conjunctiva is not injected. Left conjunctiva is not injected.   Neck: No thyromegaly present.   Cardiovascular: Normal rate, regular rhythm, normal heart sounds and intact distal pulses.   No murmur heard.  Pulmonary/Chest: Effort normal and breath sounds normal. He has no wheezes. He has no rhonchi. He has no rales.   Abdominal: Bowel sounds are normal. He exhibits no distension. There is no tenderness.   Musculoskeletal: He exhibits no edema or tenderness.   Lymphadenopathy:     He has no cervical adenopathy.   Neurological: He displays normal reflexes. No cranial nerve deficit.   Skin: Skin is warm and dry. No lesion and no rash noted.   Psychiatric: He has a normal mood and affect. His behavior is normal. His mood appears not anxious. His speech is not rapid and/or pressured. He is not agitated and not aggressive. He does not exhibit a depressed  mood.             Assessment and Plan:     Problem List Items Addressed This Visit        Cardiac/Vascular    HLD (hyperlipidemia) -  On atorvastatin. Labs were good.       Hypertension, essential - Primary - bp looks good on current meds. Continue them.       Other Visit Diagnoses     Uncontrolled type 2 diabetes mellitus with diabetic neuropathy, with long-term current use of insulin     - we will try increasing humalog to 15 units at lunch. Continue 10 units with other meals and levemir on afternoon. We also talked about exercising and reducing carbs and sugars. Try to eat 5-6 servings of fruits and vegetables daily.     Relevant Medications    insulin detemir U-100 (LEVEMIR FLEXTOUCH U-100 INSULN) 100 unit/mL (3 mL) SubQ InPn pen    insulin aspart U-100 (NOVOLOG U-100 INSULIN ASPART) 100 unit/mL injection          Orders Placed This Encounter    insulin detemir U-100 (LEVEMIR FLEXTOUCH U-100 INSULN) 100 unit/mL (3 mL) SubQ InPn pen    insulin aspart U-100 (NOVOLOG U-100 INSULIN ASPART) 100 unit/mL injection         Follow-up with me in 3 months.

## 2019-06-03 ENCOUNTER — CLINICAL SUPPORT (OUTPATIENT)
Dept: FAMILY MEDICINE | Facility: CLINIC | Age: 69
End: 2019-06-03
Attending: INTERNAL MEDICINE
Payer: MEDICARE

## 2019-06-03 ENCOUNTER — OFFICE VISIT (OUTPATIENT)
Dept: PODIATRY | Facility: CLINIC | Age: 69
End: 2019-06-03
Payer: MEDICARE

## 2019-06-03 VITALS
BODY MASS INDEX: 33.34 KG/M2 | SYSTOLIC BLOOD PRESSURE: 122 MMHG | HEIGHT: 68 IN | DIASTOLIC BLOOD PRESSURE: 73 MMHG | WEIGHT: 220 LBS | RESPIRATION RATE: 16 BRPM | HEART RATE: 71 BPM

## 2019-06-03 DIAGNOSIS — Z79.4 TYPE 2 DIABETES MELLITUS WITH DIABETIC POLYNEUROPATHY, WITH LONG-TERM CURRENT USE OF INSULIN: ICD-10-CM

## 2019-06-03 DIAGNOSIS — E11.9 DIABETES MELLITUS WITHOUT COMPLICATION: Primary | ICD-10-CM

## 2019-06-03 DIAGNOSIS — E11.42 TYPE 2 DIABETES MELLITUS WITH DIABETIC POLYNEUROPATHY, WITH LONG-TERM CURRENT USE OF INSULIN: ICD-10-CM

## 2019-06-03 DIAGNOSIS — E11.69 ONYCHOMYCOSIS OF MULTIPLE TOENAILS WITH TYPE 2 DIABETES MELLITUS: ICD-10-CM

## 2019-06-03 DIAGNOSIS — E11.9 DIABETES MELLITUS WITHOUT COMPLICATION: ICD-10-CM

## 2019-06-03 DIAGNOSIS — E11.42 DIABETIC POLYNEUROPATHY ASSOCIATED WITH TYPE 2 DIABETES MELLITUS: Primary | ICD-10-CM

## 2019-06-03 DIAGNOSIS — B35.1 ONYCHOMYCOSIS OF MULTIPLE TOENAILS WITH TYPE 2 DIABETES MELLITUS: ICD-10-CM

## 2019-06-03 PROCEDURE — 1101F PT FALLS ASSESS-DOCD LE1/YR: CPT | Mod: HCNC,CPTII,S$GLB, | Performed by: PODIATRIST

## 2019-06-03 PROCEDURE — 1101F PR PT FALLS ASSESS DOC 0-1 FALLS W/OUT INJ PAST YR: ICD-10-PCS | Mod: HCNC,CPTII,S$GLB, | Performed by: PODIATRIST

## 2019-06-03 PROCEDURE — 3046F HEMOGLOBIN A1C LEVEL >9.0%: CPT | Mod: HCNC,CPTII,S$GLB, | Performed by: PODIATRIST

## 2019-06-03 PROCEDURE — 99213 PR OFFICE/OUTPT VISIT, EST, LEVL III, 20-29 MIN: ICD-10-PCS | Mod: HCNC,S$GLB,, | Performed by: PODIATRIST

## 2019-06-03 PROCEDURE — 3074F SYST BP LT 130 MM HG: CPT | Mod: HCNC,CPTII,S$GLB, | Performed by: PODIATRIST

## 2019-06-03 PROCEDURE — 99213 OFFICE O/P EST LOW 20 MIN: CPT | Mod: HCNC,S$GLB,, | Performed by: PODIATRIST

## 2019-06-03 PROCEDURE — 92250 FUNDUS PHOTOGRAPHY W/I&R: CPT | Mod: 26,HCNC,S$GLB, | Performed by: OPHTHALMOLOGY

## 2019-06-03 PROCEDURE — 92250 DIABETIC EYE SCREENING PHOTO: ICD-10-PCS | Mod: 26,HCNC,S$GLB, | Performed by: OPHTHALMOLOGY

## 2019-06-03 PROCEDURE — 99999 PR PBB SHADOW E&M-EST. PATIENT-LVL IV: CPT | Mod: PBBFAC,HCNC,, | Performed by: PODIATRIST

## 2019-06-03 PROCEDURE — 99999 PR PBB SHADOW E&M-EST. PATIENT-LVL IV: ICD-10-PCS | Mod: PBBFAC,HCNC,, | Performed by: PODIATRIST

## 2019-06-03 PROCEDURE — 3046F PR MOST RECENT HEMOGLOBIN A1C LEVEL > 9.0%: ICD-10-PCS | Mod: HCNC,CPTII,S$GLB, | Performed by: PODIATRIST

## 2019-06-03 PROCEDURE — 3078F DIAST BP <80 MM HG: CPT | Mod: HCNC,CPTII,S$GLB, | Performed by: PODIATRIST

## 2019-06-03 PROCEDURE — 3078F PR MOST RECENT DIASTOLIC BLOOD PRESSURE < 80 MM HG: ICD-10-PCS | Mod: HCNC,CPTII,S$GLB, | Performed by: PODIATRIST

## 2019-06-03 PROCEDURE — 3074F PR MOST RECENT SYSTOLIC BLOOD PRESSURE < 130 MM HG: ICD-10-PCS | Mod: HCNC,CPTII,S$GLB, | Performed by: PODIATRIST

## 2019-06-03 NOTE — PROGRESS NOTES
Roger GILLESPIE Zavaleta  is a 68 y.o. male here for a diabetic eye screening with non-dilated fundus photos per   Dr Patel.    Patient cooperative?: Yes  Small pupils?: No  Last eye exam: unknown    For exam results, see Encounter Report.

## 2019-06-03 NOTE — PROGRESS NOTES
Subjective:      Patient ID: Roger Zavaleta Jr. is a 68 y.o. male.    Chief Complaint: Diabetes Mellitus and Diabetic Foot Exam (Dr RENATE Yanez last appt 05/27/19)    Roger is a 68 y.o. male who presents to the clinic for evaluation and treatment of high risk feet. Roger has a past medical history of CAD (coronary artery disease) (50), Diabetes mellitus type II, and HLD (hyperlipidemia). The patient's chief complaint is long, thick toenails. This patient has documented high risk feet requiring routine maintenance secondary to diabetes mellitis and those secondary complications of diabetes, as mentioned..  No other complaints than painful and mycotic nails x 10.  He tells me he has underlying DM neuropathy and sometimes he feels numbness and tingling.     PCP: Evelia Yanez MD    Date Last Seen by PCP: 5/27/19    Current shoe gear:  Affected Foot: Casual shoes     Unaffected Foot: Casual shoes    Hemoglobin A1C   Date Value Ref Range Status   05/06/2019 9.3 (H) 4.0 - 5.6 % Final     Comment:     ADA Screening Guidelines:  5.7-6.4%  Consistent with prediabetes  >or=6.5%  Consistent with diabetes  High levels of fetal hemoglobin interfere with the HbA1C  assay. Heterozygous hemoglobin variants (HbS, HgC, etc)do  not significantly interfere with this assay.   However, presence of multiple variants may affect accuracy.     01/21/2019 9.3 (H) 4.0 - 5.6 % Final     Comment:     ADA Screening Guidelines:  5.7-6.4%  Consistent with prediabetes  >or=6.5%  Consistent with diabetes  High levels of fetal hemoglobin interfere with the HbA1C  assay. Heterozygous hemoglobin variants (HbS, HgC, etc)do  not significantly interfere with this assay.   However, presence of multiple variants may affect accuracy.     10/20/2018 8.4 (H) 4.0 - 5.6 % Final     Comment:     ADA Screening Guidelines:  5.7-6.4%  Consistent with prediabetes  >or=6.5%  Consistent with diabetes  High levels of fetal hemoglobin interfere with the HbA1C  assay.  Heterozygous hemoglobin variants (HbS, HgC, etc)do  not significantly interfere with this assay.   However, presence of multiple variants may affect accuracy.         Review of Systems   Constitution: Negative for decreased appetite, fever and malaise/fatigue.   HENT: Negative for congestion.    Cardiovascular: Negative for chest pain and leg swelling.   Respiratory: Negative for cough and shortness of breath.    Skin: Positive for color change. Negative for nail changes and rash.   Musculoskeletal: Negative for arthritis, joint pain, joint swelling and muscle weakness.   Gastrointestinal: Negative for bloating, abdominal pain, nausea and vomiting.   Neurological: Positive for numbness and sensory change. Negative for headaches and weakness.   Psychiatric/Behavioral: Negative for altered mental status.           Objective:      Physical Exam   Constitutional: He is oriented to person, place, and time. He appears well-developed and well-nourished. No distress.   Musculoskeletal: He exhibits no edema, tenderness or deformity.   Neurological: He is alert and oriented to person, place, and time.   Skin: Skin is warm. Capillary refill takes less than 2 seconds. No erythema.   Psychiatric: He has a normal mood and affect. His behavior is normal.     Vascular: Distal DP/PT pulses palpable 1/4 b/l. CRT < 3 sec to tips of toes. No edema noted to b/l LE. No vericosities noted to b/l LEs. Hair growth present b/l LE, warm to touch b/l LE and cool to touch to toes.    Dermatologic: No open lesions, lacerations or wounds. Interdigital spaces clean, dry and intact b/l. No erythema, rubor, calor noted to b/l LE  Toenails 4 and 5th toe left are elongated by 2-3 mm, thickened by 2-3 mm, discolored/yellowed, dystrophic, brittle with subungual debris. No incurvation. Mild xerosis noted, bilat. No open wounds.    Musculoskeletal: Equinus noted b/l ankles with < 10 deg DF noted b/l. MMT 5/5 in DF/PF/Inv/Ev resistance with no reproduction  of pain in any direction. Passive range of motion of ankle and pedal joints is painless b/l. No calf tenderness b/l LE, Compartments soft/compressible b/l.     Neurological:   Light touch, proprioception, and sharp/dull sensation are decreased b/l. Protective threshold with the [place] monofilament is decreased b/l. Vibratory sensation decreased b/l.         Assessment:       Encounter Diagnoses   Name Primary?    Diabetic polyneuropathy associated with type 2 diabetes mellitus Yes    Type 2 diabetes mellitus with diabetic polyneuropathy, with long-term current use of insulin     Onychomycosis of multiple toenails with type 2 diabetes mellitus          Plan:       Roger was seen today for diabetes mellitus and diabetic foot exam.    Diagnoses and all orders for this visit:    Diabetic polyneuropathy associated with type 2 diabetes mellitus    Type 2 diabetes mellitus with diabetic polyneuropathy, with long-term current use of insulin    Onychomycosis of multiple toenails with type 2 diabetes mellitus      I counseled the patient on his conditions, their implications and medical management.    68 years old male with diabetic foot risk assessment, painful mycotic nail of the left foot 4th and 5th toe.    -nails x 2 sharply debrided with a nail Nipper.  Patient tolerated well. No complication (courtesy)  -It was discussed the importance of wearing shoes with adequate room in toe box to accommodate toe deformities. Recommended New Balance/[employer] shoe brands with adequate arch supports to alleviate abnormal pressure and improve stability of foot while walking. Avoid flat shoes and barefoot walking as these will exacerbate or worsen symptoms.   - Shoe inspection. General Foot Education. Patient reminded of the importance of good nutrition. Patient instructed on proper foot hygeine. We discussed wearing proper shoe gear, daily foot inspections, never walking without protective shoe gear, caution putting sharp  instruments to feet   - Discussed general foot care:  Wear comfortable, proper fitting shoes. Wash feet daily. Dry well. After drying, apply moisturizer to feet (no lotion to webspaces). Inspect feet daily for skin breaks, blisters, swelling, or redness. Wear cotton socks (preferably white)  Change socks every day. Do NOT walk barefoot. Do NOT use heating pads or warm/hot water soaks   -The nature of the condition, options for management, as well as potential risks and complications were discussed in detail with patient. Patient was amenable to my recommendations and left my office fully informed and will follow up as instructed or sooner if necessary.    -Patient was advised of signs and symptoms of infection including redness, drainage, purulence, odor, streaking, fever, chills and I advised patient to seek medical attention (ER or urgent care) if these symptoms arise.   -f/u 6 months

## 2019-06-04 ENCOUNTER — PATIENT MESSAGE (OUTPATIENT)
Dept: ADMINISTRATIVE | Facility: OTHER | Age: 69
End: 2019-06-04

## 2019-06-04 NOTE — PROGRESS NOTES
Last 5 Patient Entered Readings                                      Current 30 Day Average: 142/74     Recent Readings 6/2/2019 5/29/2019 5/22/2019 5/21/2019 5/20/2019    SBP (mmHg) 143 128 151 138 148    DBP (mmHg) 66 70 75 66 78    Pulse 83 77 62 67 72          Digital Medicine: Health  Follow Up / New  intro    Left voicemail to follow up with Mr. Roger Zavaleta Jr..  Current BP average 142/74 mmHg is not at goal, 130/80 mmHg.

## 2019-06-06 ENCOUNTER — TELEPHONE (OUTPATIENT)
Dept: OPHTHALMOLOGY | Facility: CLINIC | Age: 69
End: 2019-06-06

## 2019-06-17 ENCOUNTER — PATIENT MESSAGE (OUTPATIENT)
Dept: FAMILY MEDICINE | Facility: CLINIC | Age: 69
End: 2019-06-17

## 2019-06-17 NOTE — TELEPHONE ENCOUNTER
Spoke with patient and appointment made to see Dr. Freeman tomorrow morning being patient won't be back home until after 5pm today

## 2019-06-18 ENCOUNTER — OFFICE VISIT (OUTPATIENT)
Dept: INTERNAL MEDICINE | Facility: CLINIC | Age: 69
End: 2019-06-18
Payer: MEDICARE

## 2019-06-18 VITALS
WEIGHT: 219.81 LBS | TEMPERATURE: 98 F | HEART RATE: 67 BPM | SYSTOLIC BLOOD PRESSURE: 128 MMHG | BODY MASS INDEX: 33.31 KG/M2 | DIASTOLIC BLOOD PRESSURE: 64 MMHG | HEIGHT: 68 IN | OXYGEN SATURATION: 98 %

## 2019-06-18 DIAGNOSIS — E78.2 MIXED HYPERLIPIDEMIA: ICD-10-CM

## 2019-06-18 DIAGNOSIS — I10 HYPERTENSION, ESSENTIAL: ICD-10-CM

## 2019-06-18 DIAGNOSIS — Z79.4 TYPE 2 DIABETES MELLITUS WITH DIABETIC POLYNEUROPATHY, WITH LONG-TERM CURRENT USE OF INSULIN: ICD-10-CM

## 2019-06-18 DIAGNOSIS — E11.42 TYPE 2 DIABETES MELLITUS WITH DIABETIC POLYNEUROPATHY, WITH LONG-TERM CURRENT USE OF INSULIN: ICD-10-CM

## 2019-06-18 DIAGNOSIS — H60.501 ACUTE OTITIS EXTERNA OF RIGHT EAR, UNSPECIFIED TYPE: Primary | ICD-10-CM

## 2019-06-18 PROCEDURE — 99499 RISK ADDL DX/OHS AUDIT: ICD-10-PCS | Mod: HCNC,S$GLB,, | Performed by: INTERNAL MEDICINE

## 2019-06-18 PROCEDURE — 1101F PT FALLS ASSESS-DOCD LE1/YR: CPT | Mod: HCNC,CPTII,S$GLB, | Performed by: INTERNAL MEDICINE

## 2019-06-18 PROCEDURE — 3078F DIAST BP <80 MM HG: CPT | Mod: HCNC,CPTII,S$GLB, | Performed by: INTERNAL MEDICINE

## 2019-06-18 PROCEDURE — 99214 PR OFFICE/OUTPT VISIT, EST, LEVL IV, 30-39 MIN: ICD-10-PCS | Mod: HCNC,S$GLB,, | Performed by: INTERNAL MEDICINE

## 2019-06-18 PROCEDURE — 1101F PR PT FALLS ASSESS DOC 0-1 FALLS W/OUT INJ PAST YR: ICD-10-PCS | Mod: HCNC,CPTII,S$GLB, | Performed by: INTERNAL MEDICINE

## 2019-06-18 PROCEDURE — 99999 PR PBB SHADOW E&M-EST. PATIENT-LVL III: ICD-10-PCS | Mod: PBBFAC,HCNC,, | Performed by: INTERNAL MEDICINE

## 2019-06-18 PROCEDURE — 99499 UNLISTED E&M SERVICE: CPT | Mod: HCNC,S$GLB,, | Performed by: INTERNAL MEDICINE

## 2019-06-18 PROCEDURE — 3074F PR MOST RECENT SYSTOLIC BLOOD PRESSURE < 130 MM HG: ICD-10-PCS | Mod: HCNC,CPTII,S$GLB, | Performed by: INTERNAL MEDICINE

## 2019-06-18 PROCEDURE — 3046F HEMOGLOBIN A1C LEVEL >9.0%: CPT | Mod: HCNC,CPTII,S$GLB, | Performed by: INTERNAL MEDICINE

## 2019-06-18 PROCEDURE — 99214 OFFICE O/P EST MOD 30 MIN: CPT | Mod: HCNC,S$GLB,, | Performed by: INTERNAL MEDICINE

## 2019-06-18 PROCEDURE — 2024F 7 FLD RTA PHOTO EVC RTNOPTHY: CPT | Mod: HCNC,S$GLB,, | Performed by: INTERNAL MEDICINE

## 2019-06-18 PROCEDURE — 99999 PR PBB SHADOW E&M-EST. PATIENT-LVL III: CPT | Mod: PBBFAC,HCNC,, | Performed by: INTERNAL MEDICINE

## 2019-06-18 PROCEDURE — 2024F PR 7 FIELD PHOTOS WITH INTERP/ REVIEW: ICD-10-PCS | Mod: HCNC,S$GLB,, | Performed by: INTERNAL MEDICINE

## 2019-06-18 PROCEDURE — 3046F PR MOST RECENT HEMOGLOBIN A1C LEVEL > 9.0%: ICD-10-PCS | Mod: HCNC,CPTII,S$GLB, | Performed by: INTERNAL MEDICINE

## 2019-06-18 PROCEDURE — 3074F SYST BP LT 130 MM HG: CPT | Mod: HCNC,CPTII,S$GLB, | Performed by: INTERNAL MEDICINE

## 2019-06-18 PROCEDURE — 3078F PR MOST RECENT DIASTOLIC BLOOD PRESSURE < 80 MM HG: ICD-10-PCS | Mod: HCNC,CPTII,S$GLB, | Performed by: INTERNAL MEDICINE

## 2019-06-18 RX ORDER — CIPROFLOXACIN AND DEXAMETHASONE 3; 1 MG/ML; MG/ML
4 SUSPENSION/ DROPS AURICULAR (OTIC) 2 TIMES DAILY
Qty: 7.5 ML | Refills: 0 | Status: SHIPPED | OUTPATIENT
Start: 2019-06-18 | End: 2020-01-29
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
## 2019-06-20 NOTE — PROGRESS NOTES
Last 5 Patient Entered Readings                                      Current 30 Day Average: 142/73     Recent Readings 6/15/2019 6/12/2019 6/6/2019 6/5/2019 6/2/2019    SBP (mmHg) 132 160 138 147 143    DBP (mmHg) 74 81 76 79 66    Pulse 73 74 64 70 83          Digital Medicine: Health  Follow Up    Left voicemail to follow up with Mr. Roger Zavaleta Jr..  Current BP average 142/73 mmHg is not at goal, 130/80 mmHg.      '

## 2019-06-21 NOTE — PROGRESS NOTES
Subjective:      Patient ID: Roger Zavaleta Jr. is a 68 y.o. male.    Chief Complaint:  Diabetes (New patient )    History of Present Illness  Roger Zavaleta Jr. presents today for evaluation & management of type 2 DM. This is his first visit with me.      With regards to diabetes:    Diagnosed: about   Known complications:  DKA -  RN +  PN +  Nephropathy -    Current Regimen:  Novolog 15units TIDAC   Levemir 45units at night   Metformin 1000mg BID     Does not miss any doses.     Other medications tried:  None     Glucose Monitor:   4 times a day testing  Log reviewed: Yes oral recall  Fastin-130  Before meals: 200s   Before bed: 190-200    Diet/Exercise:  Eats 3 meals a day.   Snacks : none  Drinks : Sweet tea, water   Exercise - tries to stay active     Hypoglycemia:  None  Knows how to correct with 15 grams of carbs- juice, coke, or a peppermint.      Education - last visit:   Eye Exam: upcomming appointment next month  Podiatry: 6/3/19    Denies history of pancreatitis & personal/family history of medullary thyroid cancer.     Diabetes Management Status  Statin: Taking  ACE/ARB: Taking  Screening or Prevention Patient's value Goal Complete/Controlled?   HgA1C Testing and Control   Lab Results   Component Value Date    HGBA1C 9.3 (H) 2019      Annually/Less than 8% No   Lipid profile : 2019 Annually Yes   LDL control Lab Results   Component Value Date    LDLCALC 43.8 (L) 2019    Annually/Less than 100 mg/dl  Yes   Nephropathy screening Lab Results   Component Value Date    LABMICR 12.0 2018     Lab Results   Component Value Date    PROTEINUA Negative 2018    Annually Yes   Blood pressure BP Readings from Last 1 Encounters:   19 102/60    Less than 140/90 Yes   Dilated retinal exam : 2019 Annually Yes   Foot exam   : 2019 Annually Yes     Review of Systems   Constitutional: Negative for fatigue and unexpected weight change.   Eyes: Negative for visual  disturbance.   Respiratory: Negative for cough and shortness of breath.    Cardiovascular: Negative for chest pain.   Gastrointestinal: Negative for abdominal pain.   Endocrine: Negative for cold intolerance, heat intolerance, polydipsia, polyphagia and polyuria.   Musculoskeletal: Negative for arthralgias.   Skin: Negative for wound.   Neurological: Negative for headaches.   Hematological: Does not bruise/bleed easily.   Psychiatric/Behavioral: Negative for sleep disturbance.     Objective:   Physical Exam   Constitutional: He appears well-developed.   HENT:   Right Ear: External ear normal.   Left Ear: External ear normal.   Nose: Nose normal.   Hearing Normal     Neck: No tracheal deviation present. No thyromegaly present.   No nodules.   Cardiovascular: Normal rate.   No murmur heard.  No edema present   Pulmonary/Chest: Effort normal and breath sounds normal.   Abdominal: Soft. He exhibits no mass. No hernia.   Neurological: He is alert. No cranial nerve deficit or sensory deficit.   Skin: No rash noted.   Psychiatric: He has a normal mood and affect. Judgment normal.   Vitals reviewed.  Appropriate footwear, Foot exam deferred, done 6/3/19 by podiatry.  No ulcers or wounds.   Injection sites are okay. No lipo hypertropthy or atrophy.    Body mass index is 33.37 kg/m².    Lab Review:   Lab Results   Component Value Date    HGBA1C 9.3 (H) 05/06/2019     Lab Results   Component Value Date    CHOL 100 (L) 01/21/2019    HDL 41 01/21/2019    LDLCALC 43.8 (L) 01/21/2019    TRIG 76 01/21/2019    CHOLHDL 41.0 01/21/2019     Lab Results   Component Value Date     05/06/2019    K 3.8 05/06/2019     05/06/2019    CO2 28 05/06/2019     (H) 05/06/2019    BUN 15 05/06/2019    CREATININE 1.01 05/06/2019    CALCIUM 9.7 05/06/2019    PROT 7.6 06/08/2018    ALBUMIN 4.0 06/08/2018    BILITOT 0.7 06/08/2018    ALKPHOS 108 06/08/2018    AST 37 06/08/2018    ALT 30 06/08/2018    ANIONGAP 10 05/06/2019     ESTGFRAFRICA >60.0 05/06/2019    EGFRNONAA >60.0 05/06/2019    TSH 1.990 09/05/2018     Assessment and Plan     1. Type 2 diabetes mellitus with diabetic polyneuropathy, with long-term current use of insulin  semaglutide (OZEMPIC) 0.25 mg or 0.5 mg(2 mg/1.5 mL) PnIj    insulin aspart U-100 (NOVOLOG U-100 INSULIN ASPART) 100 unit/mL injection    Hemoglobin A1c    Comprehensive metabolic panel    Microalbumin/creatinine urine ratio   2. Hypertension, essential     3. Mixed hyperlipidemia     4. Obesity (BMI 30-39.9)     5. Diabetic polyneuropathy associated with type 2 diabetes mellitus       Type 2 diabetes mellitus with diabetic polyneuropathy, with long-term current use of insulin  -- Labs and urine prior  -- Medication Changes:  CONTINUE:  Metformin 1000mg BID  Levemir 45units nightly  INCREASE: Novolog 15units TIDAC plus correction scale 180/25   START: Ozempic 0.25 mg weekly for 4 weeks & then 0.5 mg weekly thereafter. Discussed MOA & SE.   -- Reviewed goals of therapy are to get the best control we can without hypoglycemia  -- Reviewed patient's current insulin regimen. Clarified proper insulin dose and timing in relation to meals, etc. Insulin injection sites and proper rotation instructed.    -- Advised frequent self blood glucose monitoring.  Patient encouraged to document glucose results and bring them to every clinic visit.  -- Hypoglycemia precautions discussed. Instructed on precautions before driving.    -- Call for Bg repeatedly < 90 or > 180.   -- Close adherence to lifestyle changes recommended.   -- Periodic follow ups for eye evaluations, foot care and dental care suggested. UTD    Hypertension, essential  -- Controlled.  -- Blood pressure goals discussed with patient.    HLD (hyperlipidemia)  -- Controlled.  -- On statin per ADA recommendations.    Obesity (BMI 30-39.9)  -- Encouraged dietary and lifestyle modifications.  -- Emphasized weight loss goals.    Diabetic neuropathy  -- Optimize glucose  control.     Follow up in about 3 months (around 9/25/2019).  Labs prior to next appointment with me

## 2019-06-25 ENCOUNTER — OFFICE VISIT (OUTPATIENT)
Dept: ENDOCRINOLOGY | Facility: CLINIC | Age: 69
End: 2019-06-25
Payer: MEDICARE

## 2019-06-25 VITALS
BODY MASS INDEX: 33.26 KG/M2 | HEIGHT: 68 IN | SYSTOLIC BLOOD PRESSURE: 102 MMHG | HEART RATE: 86 BPM | WEIGHT: 219.44 LBS | DIASTOLIC BLOOD PRESSURE: 60 MMHG

## 2019-06-25 DIAGNOSIS — E11.42 TYPE 2 DIABETES MELLITUS WITH DIABETIC POLYNEUROPATHY, WITH LONG-TERM CURRENT USE OF INSULIN: Primary | ICD-10-CM

## 2019-06-25 DIAGNOSIS — E11.42 DIABETIC POLYNEUROPATHY ASSOCIATED WITH TYPE 2 DIABETES MELLITUS: ICD-10-CM

## 2019-06-25 DIAGNOSIS — E66.9 OBESITY (BMI 30-39.9): ICD-10-CM

## 2019-06-25 DIAGNOSIS — Z79.4 TYPE 2 DIABETES MELLITUS WITH DIABETIC POLYNEUROPATHY, WITH LONG-TERM CURRENT USE OF INSULIN: Primary | ICD-10-CM

## 2019-06-25 DIAGNOSIS — E78.2 MIXED HYPERLIPIDEMIA: ICD-10-CM

## 2019-06-25 DIAGNOSIS — I10 HYPERTENSION, ESSENTIAL: ICD-10-CM

## 2019-06-25 PROCEDURE — 3046F HEMOGLOBIN A1C LEVEL >9.0%: CPT | Mod: HCNC,CPTII,S$GLB, | Performed by: NURSE PRACTITIONER

## 2019-06-25 PROCEDURE — 3078F DIAST BP <80 MM HG: CPT | Mod: HCNC,CPTII,S$GLB, | Performed by: NURSE PRACTITIONER

## 2019-06-25 PROCEDURE — 99499 RISK ADDL DX/OHS AUDIT: ICD-10-PCS | Mod: HCNC,S$GLB,, | Performed by: NURSE PRACTITIONER

## 2019-06-25 PROCEDURE — 99999 PR PBB SHADOW E&M-EST. PATIENT-LVL III: ICD-10-PCS | Mod: PBBFAC,HCNC,, | Performed by: NURSE PRACTITIONER

## 2019-06-25 PROCEDURE — 99204 OFFICE O/P NEW MOD 45 MIN: CPT | Mod: HCNC,S$GLB,, | Performed by: NURSE PRACTITIONER

## 2019-06-25 PROCEDURE — 99204 PR OFFICE/OUTPT VISIT, NEW, LEVL IV, 45-59 MIN: ICD-10-PCS | Mod: HCNC,S$GLB,, | Performed by: NURSE PRACTITIONER

## 2019-06-25 PROCEDURE — 2024F 7 FLD RTA PHOTO EVC RTNOPTHY: CPT | Mod: HCNC,S$GLB,, | Performed by: NURSE PRACTITIONER

## 2019-06-25 PROCEDURE — 3046F PR MOST RECENT HEMOGLOBIN A1C LEVEL > 9.0%: ICD-10-PCS | Mod: HCNC,CPTII,S$GLB, | Performed by: NURSE PRACTITIONER

## 2019-06-25 PROCEDURE — 3074F PR MOST RECENT SYSTOLIC BLOOD PRESSURE < 130 MM HG: ICD-10-PCS | Mod: HCNC,CPTII,S$GLB, | Performed by: NURSE PRACTITIONER

## 2019-06-25 PROCEDURE — 3074F SYST BP LT 130 MM HG: CPT | Mod: HCNC,CPTII,S$GLB, | Performed by: NURSE PRACTITIONER

## 2019-06-25 PROCEDURE — 99999 PR PBB SHADOW E&M-EST. PATIENT-LVL III: CPT | Mod: PBBFAC,HCNC,, | Performed by: NURSE PRACTITIONER

## 2019-06-25 PROCEDURE — 1101F PR PT FALLS ASSESS DOC 0-1 FALLS W/OUT INJ PAST YR: ICD-10-PCS | Mod: HCNC,CPTII,S$GLB, | Performed by: NURSE PRACTITIONER

## 2019-06-25 PROCEDURE — 1101F PT FALLS ASSESS-DOCD LE1/YR: CPT | Mod: HCNC,CPTII,S$GLB, | Performed by: NURSE PRACTITIONER

## 2019-06-25 PROCEDURE — 3078F PR MOST RECENT DIASTOLIC BLOOD PRESSURE < 80 MM HG: ICD-10-PCS | Mod: HCNC,CPTII,S$GLB, | Performed by: NURSE PRACTITIONER

## 2019-06-25 PROCEDURE — 2024F PR 7 FIELD PHOTOS WITH INTERP/ REVIEW: ICD-10-PCS | Mod: HCNC,S$GLB,, | Performed by: NURSE PRACTITIONER

## 2019-06-25 PROCEDURE — 99499 UNLISTED E&M SERVICE: CPT | Mod: HCNC,S$GLB,, | Performed by: NURSE PRACTITIONER

## 2019-06-25 RX ORDER — METOPROLOL SUCCINATE 100 MG/1
100 TABLET, EXTENDED RELEASE ORAL DAILY
Qty: 30 TABLET | Refills: 11 | Status: SHIPPED | OUTPATIENT
Start: 2019-06-25 | End: 2020-01-29 | Stop reason: SDUPTHER

## 2019-06-25 RX ORDER — INSULIN ASPART 100 [IU]/ML
15 INJECTION, SOLUTION INTRAVENOUS; SUBCUTANEOUS
Qty: 10 ML | Refills: 5 | Status: SHIPPED | OUTPATIENT
Start: 2019-06-25 | End: 2020-01-17

## 2019-06-25 NOTE — ASSESSMENT & PLAN NOTE
-- Labs and urine prior  -- Medication Changes:  CONTINUE:  Metformin 1000mg BID  Levemir 45units nightly  INCREASE: Novolog 15units TIDAC plus correction scale 180/25   START: Ozempic 0.25 mg weekly for 4 weeks & then 0.5 mg weekly thereafter. Discussed MOA & SE.   -- Reviewed goals of therapy are to get the best control we can without hypoglycemia  -- Reviewed patient's current insulin regimen. Clarified proper insulin dose and timing in relation to meals, etc. Insulin injection sites and proper rotation instructed.    -- Advised frequent self blood glucose monitoring.  Patient encouraged to document glucose results and bring them to every clinic visit.  -- Hypoglycemia precautions discussed. Instructed on precautions before driving.    -- Call for Bg repeatedly < 90 or > 180.   -- Close adherence to lifestyle changes recommended.   -- Periodic follow ups for eye evaluations, foot care and dental care suggested. UTD

## 2019-06-25 NOTE — LETTER
June 25, 2019      Evelia Yanez MD  [address] [facility]  [id number] [place] [id number]           [facility]  [address]  [place] [address]  Phone: [phone]          Patient: Roger Zavaleta Jr.   MR Number: [id number]   YOB: 1950   Date of Visit: 6/25/2019       Dear Dr. Evelia Yanez:    Thank you for referring Roger Zavaleta to me for evaluation. Attached you will find relevant portions of my assessment and plan of care.    If you have questions, please do not hesitate to call me. I look forward to following Roger Zavaleta along with you.    Sincerely,    Rica Pina, MARIZA    Enclosure  CC:  No Recipients    If you would like to receive this communication electronically, please contact [email or web address] or [phone] to request more information on [employer] access.    For providers and/or their staff who would like to refer a patient to [facility], please contact us through our one-stop-shop provider referral line, [facility], at [phone].    If you feel you have received this communication in error or would no longer like to receive these types of communications, please e-mail [email or web address]

## 2019-06-27 NOTE — PROGRESS NOTES
Last 5 Patient Entered Readings                                      Current 30 Day Average: 143/79     Recent Readings 6/26/2019 6/23/2019 6/23/2019 6/21/2019 6/15/2019    SBP (mmHg) 155 141 170 139 132    DBP (mmHg) 87 93 94 67 74    Pulse 71 74 71 71 73        Digital Medicine: Health  Follow Up    I introduced myself as patient's new health .  Discussed his recent higher readings.  He says that he charged his cuff a couple of week ago for a couple of hours.  I asked him to charge it again and leave it on the  for the day to make sure it is fully charged.    Lifestyle Modifications:    1.Dietary Modifications (Sodium intake <2,000mg/day, food labels, dining out): States he has been watching his salt more closely.  He states that he is currently drinking about 48 oz of water per day, but says he could increase that.    2.Physical Activity: Patient states that he doesn't do any formal exercise. He says his activity is some walking outside.    3.Medication Therapy: Patient has been compliant with the medication regimen.    4.Patient has the following medication side effects/concerns:   (Frequency/Alleviating factors/Precipitating factors, etc.)     Follow up with Mr. Roger Zavaleta Jr. completed. No further questions or concerns. Will continue to follow up to achieve health goals.

## 2019-07-03 ENCOUNTER — PATIENT OUTREACH (OUTPATIENT)
Dept: OTHER | Facility: OTHER | Age: 69
End: 2019-07-03

## 2019-07-03 RX ORDER — IRBESARTAN 300 MG/1
300 TABLET ORAL DAILY
COMMUNITY
End: 2020-01-28 | Stop reason: SDUPTHER

## 2019-07-03 NOTE — PROGRESS NOTES
HPI:  Called Mr. Roger Zavaleta  for hypertension digital medicine follow-up. Stopped irbesartan last week by accident and notices that BP has been much higher. Also, did not realize that device should be charged monthly.     Last 5 Patient Entered Readings                                      Current 30 Day Average: 146/84     Recent Readings 7/2/2019 7/2/2019 7/1/2019 7/1/2019 6/26/2019    SBP (mmHg) 165 161 138 154 155    DBP (mmHg) 89 94 76 99 87    Pulse 66 66 63 62 71          Patient denies s/s of hypotension (lightheadedness, dizziness, nausea, fatigue) associated with low readings. Instructed patient to inform me if this occurs, patient confirms understanding.    Patient denies s/s of hypertension (SOB, CP, severe headaches, changes in vision) associated with high readings. Instructed patient to go to the ED if BP >180/110 and accompanied by hypertensive s/s, patient confirms understanding.    Assessment:  Patient's current 30-day average is not at goal of <130/80 mmHg.       Plan:  Restart irbesartan and continue other medications as prescribed.  Check BP frequently after charging cuff.   Patients health , Yaneli Rojas, will follow-up as scheduled.    I will continue to monitor regularly and will follow-up in 3 weeks, sooner if blood pressure begins to trend upward or downward.     Current medication regimen:  Hypertension Medications             irbesartan (AVAPRO) 300 MG tablet Take 300 mg by mouth once daily.    chlorthalidone (HYGROTEN) 25 MG Tab Take 1 tablet (25 mg total) by mouth once daily.    metoprolol succinate (TOPROL-XL) 100 MG 24 hr tablet Take 1 tablet (100 mg total) by mouth once daily.    nitroGLYCERIN (NITROSTAT) 0.4 MG SL tablet Place 0.4 mg under the tongue every 5 (five) minutes as needed.          Patient has my contact information and knows to call with any concerns or clinical changes.

## 2019-07-11 ENCOUNTER — PATIENT OUTREACH (OUTPATIENT)
Dept: OTHER | Facility: OTHER | Age: 69
End: 2019-07-11
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
## 2019-07-24 ENCOUNTER — OFFICE VISIT (OUTPATIENT)
Dept: FAMILY MEDICINE | Facility: CLINIC | Age: 69
End: 2019-07-24
Payer: MEDICARE

## 2019-07-24 VITALS
BODY MASS INDEX: 32.96 KG/M2 | DIASTOLIC BLOOD PRESSURE: 60 MMHG | HEART RATE: 59 BPM | WEIGHT: 217.5 LBS | SYSTOLIC BLOOD PRESSURE: 126 MMHG | TEMPERATURE: 98 F | OXYGEN SATURATION: 99 % | HEIGHT: 68 IN

## 2019-07-24 DIAGNOSIS — E11.42 TYPE 2 DIABETES MELLITUS WITH DIABETIC POLYNEUROPATHY, WITH LONG-TERM CURRENT USE OF INSULIN: ICD-10-CM

## 2019-07-24 DIAGNOSIS — L98.9 SKIN LESION OF BACK: Primary | ICD-10-CM

## 2019-07-24 DIAGNOSIS — Z79.4 TYPE 2 DIABETES MELLITUS WITH DIABETIC POLYNEUROPATHY, WITH LONG-TERM CURRENT USE OF INSULIN: ICD-10-CM

## 2019-07-24 DIAGNOSIS — I10 HYPERTENSION, ESSENTIAL: ICD-10-CM

## 2019-07-24 PROCEDURE — 3046F HEMOGLOBIN A1C LEVEL >9.0%: CPT | Mod: HCNC,CPTII,S$GLB, | Performed by: INTERNAL MEDICINE

## 2019-07-24 PROCEDURE — 1101F PR PT FALLS ASSESS DOC 0-1 FALLS W/OUT INJ PAST YR: ICD-10-PCS | Mod: HCNC,CPTII,S$GLB, | Performed by: INTERNAL MEDICINE

## 2019-07-24 PROCEDURE — 2024F PR 7 FIELD PHOTOS WITH INTERP/ REVIEW: ICD-10-PCS | Mod: HCNC,S$GLB,, | Performed by: INTERNAL MEDICINE

## 2019-07-24 PROCEDURE — 3074F PR MOST RECENT SYSTOLIC BLOOD PRESSURE < 130 MM HG: ICD-10-PCS | Mod: HCNC,CPTII,S$GLB, | Performed by: INTERNAL MEDICINE

## 2019-07-24 PROCEDURE — 3078F DIAST BP <80 MM HG: CPT | Mod: HCNC,CPTII,S$GLB, | Performed by: INTERNAL MEDICINE

## 2019-07-24 PROCEDURE — 2024F 7 FLD RTA PHOTO EVC RTNOPTHY: CPT | Mod: HCNC,S$GLB,, | Performed by: INTERNAL MEDICINE

## 2019-07-24 PROCEDURE — 99213 PR OFFICE/OUTPT VISIT, EST, LEVL III, 20-29 MIN: ICD-10-PCS | Mod: HCNC,S$GLB,, | Performed by: INTERNAL MEDICINE

## 2019-07-24 PROCEDURE — 99999 PR PBB SHADOW E&M-EST. PATIENT-LVL IV: ICD-10-PCS | Mod: PBBFAC,HCNC,, | Performed by: INTERNAL MEDICINE

## 2019-07-24 PROCEDURE — 1101F PT FALLS ASSESS-DOCD LE1/YR: CPT | Mod: HCNC,CPTII,S$GLB, | Performed by: INTERNAL MEDICINE

## 2019-07-24 PROCEDURE — 99499 RISK ADDL DX/OHS AUDIT: ICD-10-PCS | Mod: HCNC,S$GLB,, | Performed by: INTERNAL MEDICINE

## 2019-07-24 PROCEDURE — 99499 UNLISTED E&M SERVICE: CPT | Mod: HCNC,S$GLB,, | Performed by: INTERNAL MEDICINE

## 2019-07-24 PROCEDURE — 3074F SYST BP LT 130 MM HG: CPT | Mod: HCNC,CPTII,S$GLB, | Performed by: INTERNAL MEDICINE

## 2019-07-24 PROCEDURE — 3046F PR MOST RECENT HEMOGLOBIN A1C LEVEL > 9.0%: ICD-10-PCS | Mod: HCNC,CPTII,S$GLB, | Performed by: INTERNAL MEDICINE

## 2019-07-24 PROCEDURE — 99213 OFFICE O/P EST LOW 20 MIN: CPT | Mod: HCNC,S$GLB,, | Performed by: INTERNAL MEDICINE

## 2019-07-24 PROCEDURE — 3078F PR MOST RECENT DIASTOLIC BLOOD PRESSURE < 80 MM HG: ICD-10-PCS | Mod: HCNC,CPTII,S$GLB, | Performed by: INTERNAL MEDICINE

## 2019-07-24 PROCEDURE — 99999 PR PBB SHADOW E&M-EST. PATIENT-LVL IV: CPT | Mod: PBBFAC,HCNC,, | Performed by: INTERNAL MEDICINE

## 2019-07-24 RX ORDER — SYRING-NEEDL,DISP,INSUL,0.3 ML 31GX15/64"
SYRINGE, EMPTY DISPOSABLE MISCELLANEOUS
Refills: 1 | COMMUNITY
Start: 2019-06-26

## 2019-07-24 NOTE — PROGRESS NOTES
Subjective:       Patient ID: Roger Zavaleta Jr. is a 68 y.o. male.    Chief Complaint: scaly spot on back     I have reviewed the PMH,  and  for this patient. He  has a past medical history of CAD (coronary artery disease) (50), Diabetes mellitus type II, and HLD (hyperlipidemia).  He presents today for evaluation of a lesion on his back.  He reports that it has been there for a 3-4 months.  He describes it as itchy.  It does not seem to be getting larger but it is not getting any better.  He has tried using Neosporin, anti itch cream, and cortisone, but he gets no relief.  He would like to see a dermatologist.  His blood sugars have been looking a lot better.  He reports that it was 117 yesterday morning and 135 today.  He went to an endocrinologist in June and she adjusted his medications.  He is supposed to follow up with her in September.  His last hemoglobin A1c was 9.3 and we are hoping that is improving after these medication changes.    Rash   This is a chronic problem. The current episode started more than 1 month ago. The problem is unchanged. The affected locations include theback. He was exposed to nothing. Pertinent negatives include no anorexia, congestion, cough, diarrhea, eye pain, facial edema, fatigue, fever, joint pain, nail changes, rhinorrhea, shortness of breath, sore throat or vomiting. Past treatments include anti-itch cream and antibiotic cream. The treatment provided no relief. There is no history of allergies, asthma, eczema or varicella.     Active Ambulatory Problems     Diagnosis Date Noted    CAD (coronary artery disease)     HLD (hyperlipidemia)     Type 2 diabetes mellitus with diabetic polyneuropathy, with long-term current use of insulin 09/08/2014    Diabetic neuropathy 09/08/2014    Obesity (BMI 30-39.9) 09/08/2014    Hypertension, essential 11/10/2014    Long-term insulin use 07/09/2018    Gastroesophageal reflux disease 07/09/2018    Epistaxis 10/15/2018    MELENDREZ  "(dyspnea on exertion) 10/15/2018     Resolved Ambulatory Problems     Diagnosis Date Noted    Diabetes mellitus, type 2     Trigger middle finger of left hand 04/17/2013    Dupuytren contracture 04/17/2013    Transaminasemia 04/17/2013    Finger pain 09/30/2013    Type II or unspecified type diabetes mellitus without mention of complication, not stated as uncontrolled 08/19/2014    Rib sprain 08/19/2014    Back strain 08/19/2014    Otitis externa of left ear 08/25/2014    BMI 35.0-35.9,adult 11/10/2014    Abnormal biliary HIDA scan 10/22/2015    RUQ abdominal pain 10/22/2015    Hypotension due to drugs 10/22/2015    Epigastric pain 06/08/2018    Chest pain 10/15/2018     Past Medical History:   Diagnosis Date    CAD (coronary artery disease) 50    Diabetes mellitus type II     HLD (hyperlipidemia)          MEDICATIONS:  Current Outpatient Medications:     alpha lipoic acid 200 mg Cap, Take 1 capsule PO once daily, Disp: , Rfl:     aspirin 81 MG chewable tablet, Take 81 mg by mouth once daily.  , Disp: , Rfl:     atorvastatin (LIPITOR) 40 MG tablet, Take 1 tablet (40 mg total) by mouth once daily., Disp: 90 tablet, Rfl: 3    BD VEO INSULIN SYR HALF UNIT 0.3 mL 31 gauge x 15/64" Syrg, THREE TIMES DAILY AS DIRECTED, Disp: , Rfl: 1    blood sugar diagnostic (ACCU-CHEK SMARTVIEW TEST STRIP) Strp, USE 1 STRIP TO CHECK GLUCOSE THREE TIMES DAILY AS NEEDED, Disp: 100 strip, Rfl: 3    chlorthalidone (HYGROTEN) 25 MG Tab, Take 1 tablet (25 mg total) by mouth once daily., Disp: 30 tablet, Rfl: 3    ciprofloxacin-dexamethasone 0.3-0.1% (CIPRODEX) 0.3-0.1 % DrpS, Place 4 drops into both ears 2 (two) times daily., Disp: 7.5 mL, Rfl: 0    insulin aspart U-100 (NOVOLOG U-100 INSULIN ASPART) 100 unit/mL injection, Inject 15 Units into the skin 3 (three) times daily with meals. Plus sliding scale. Max TDD 75 units, Disp: 10 mL, Rfl: 5    insulin detemir U-100 (LEVEMIR FLEXTOUCH U-100 INSULN) 100 unit/mL (3 " "mL) SubQ InPn pen, Inject 45 Units into the skin once daily., Disp: 1 Box, Rfl: 1    insulin syringe-needle U-100 0.3 mL 31 gauge x 15/64" Syrg, USE  THREE TIMES DAILY, Disp: 100 Syringe, Rfl: 1    irbesartan (AVAPRO) 300 MG tablet, Take 300 mg by mouth once daily., Disp: , Rfl:     lancets (ULTRA THIN LANCETS) 30 gauge Misc, 1 lancet by Misc.(Non-Drug; Combo Route) route 3 (three) times daily., Disp: 200 each, Rfl: 3    metFORMIN (GLUCOPHAGE) 1000 MG tablet, Take 1 tablet (1,000 mg total) by mouth 2 (two) times daily with meals., Disp: 180 tablet, Rfl: 0    metoprolol succinate (TOPROL-XL) 100 MG 24 hr tablet, Take 1 tablet (100 mg total) by mouth once daily., Disp: 30 tablet, Rfl: 11    nitroGLYCERIN (NITROSTAT) 0.4 MG SL tablet, Place 0.4 mg under the tongue every 5 (five) minutes as needed.  , Disp: , Rfl:     PEN NEEDLE 31 gauge x 5/16" Ndle, USE AS DIRECTED ONCE DAILY, Disp: 50 each, Rfl: 7    semaglutide (OZEMPIC) 0.25 mg or 0.5 mg(2 mg/1.5 mL) PnIj, Inject 0.25 mg into the skin every 7 days. Take 0.25 mg for 4 weeks, then increase to 0.5 mg weekly, Disp: 2 Syringe, Rfl: 11      HEALTH MAINTENANCE:   Health Maintenance   Topic Date Due    Influenza Vaccine  08/01/2019    Hemoglobin A1c  08/06/2019    Lipid Panel  01/21/2020    Foot Exam  01/22/2020    Eye Exam  06/05/2020    High Dose Statin  07/24/2020    Aspirin/Antiplatelet Therapy  07/24/2020    Colonoscopy  12/03/2023    TETANUS VACCINE  05/25/2027    Hepatitis C Screening  Completed    Pneumococcal Vaccine (65+ Low/Medium Risk)  Completed       Review of Systems   Constitutional: Negative for chills, fatigue and fever.   HENT: Negative for congestion, ear discharge, ear pain, rhinorrhea and sore throat.    Eyes: Negative for pain, discharge, redness and itching.   Respiratory: Negative for cough, chest tightness, shortness of breath and wheezing.    Cardiovascular: Negative for chest pain, palpitations and leg swelling. "   Gastrointestinal: Negative for abdominal pain, anorexia, constipation, diarrhea, nausea and vomiting.   Endocrine: Negative for cold intolerance and heat intolerance.   Genitourinary: Negative for dysuria, flank pain, frequency and hematuria.   Musculoskeletal: Negative for arthralgias, back pain, joint pain, joint swelling and myalgias.   Skin: Positive for rash. Negative for color change and nail changes.   Neurological: Negative for dizziness, tremors, numbness and headaches.   Psychiatric/Behavioral: Negative for dysphoric mood and sleep disturbance. The patient is not nervous/anxious.        Objective:          A1C:  Recent Labs   Lab 01/05/17  0747 05/25/17  1000 06/08/18  1144 10/20/18  0802 01/21/19  0946 05/06/19  0703   Hemoglobin A1C 7.5 H 8.1 H 8.6 H 8.4 H 9.3 H 9.3 H     CBC:  Recent Labs   Lab 05/25/17  1000 06/08/18  1144 10/20/18  0802   WBC 9.81 9.13 9.80   RBC 4.90 5.00 4.80   Hemoglobin 13.9 L 14.3 13.7 L   Hematocrit 39.7 L 40.3 38.9 L   Platelets 246 241 209   Mean Corpuscular Volume 81 L 81 L 81 L   Mean Corpuscular Hemoglobin 28.4 28.6 28.5   Mean Corpuscular Hemoglobin Conc 35.0 35.5 35.2     CMP:  Recent Labs   Lab 05/25/17  1000 06/08/18  1144  05/06/19  0703   Glucose 157 H 186 H   < > 156 H   Calcium 9.5 9.3   < > 9.7   Albumin 4.1 4.0  --   --    Total Protein 7.8 7.6  --   --    Sodium 138 136   < > 138   Potassium 3.5 3.6   < > 3.8   CO2 32 H 28   < > 28   Chloride 96 97   < > 100   BUN, Bld 16 14   < > 15   Creatinine 0.98 0.85   < > 1.01   Alkaline Phosphatase 109 108  --   --    ALT 35 30  --   --    AST 29 37  --   --    Total Bilirubin 1.1 H 0.7  --   --     < > = values in this interval not displayed.     LIPIDS:  Recent Labs   Lab 01/05/17  0747 06/08/18  1144 09/05/18  1505 01/21/19  0946   TSH  --   --  1.990  --    HDL 39 L 30 L  --  41   Cholesterol 170 145  --  100 L   Triglycerides 107 94  --  76   LDL Cholesterol 109.6 96.2  --  43.8 L   Hdl/Cholesterol Ratio 22.9 20.7   --  41.0   Non-HDL Cholesterol 131 115  --  59   Total Cholesterol/HDL Ratio 4.4 4.8  --  2.4     TSH:  Recent Labs   Lab 09/05/18  1505   TSH 1.990           Physical Exam   Constitutional: He appears well-developed and well-nourished. He does not have a sickly appearance.   HENT:   Head: Normocephalic and atraumatic.   Right Ear: External ear normal. Tympanic membrane is not erythematous. No middle ear effusion.   Left Ear: External ear normal. Tympanic membrane is not erythematous.  No middle ear effusion.   Nose: No rhinorrhea. Right sinus exhibits no maxillary sinus tenderness and no frontal sinus tenderness. Left sinus exhibits no maxillary sinus tenderness and no frontal sinus tenderness.   Mouth/Throat: No posterior oropharyngeal edema or posterior oropharyngeal erythema. No tonsillar exudate.   Eyes: Pupils are equal, round, and reactive to light. Conjunctivae and EOM are normal. Right eye exhibits no discharge. Left eye exhibits no discharge. Right conjunctiva is not injected. Left conjunctiva is not injected.   Neck: No thyromegaly present.   Cardiovascular: Normal rate, regular rhythm, normal heart sounds and intact distal pulses.   No murmur heard.  Pulmonary/Chest: Effort normal and breath sounds normal. He has no wheezes. He has no rhonchi. He has no rales.   Abdominal: Bowel sounds are normal. He exhibits no distension. There is no tenderness.   Musculoskeletal: He exhibits no edema or tenderness.   Lymphadenopathy:     He has no cervical adenopathy.   Neurological: He displays normal reflexes. No cranial nerve deficit.   Skin: Skin is warm and dry. Lesion noted. No rash noted.        Psychiatric: He has a normal mood and affect. His behavior is normal. His mood appears not anxious. His speech is not rapid and/or pressured. He is not agitated and not aggressive. He does not exhibit a depressed mood.             Assessment and Plan:     Problem List Items Addressed This Visit        Cardiac/Vascular     Hypertension, essential - BP looks good. Continue current medications. We will check labs. Continue to work on diet to reduce salt and exercise 3 times a week for 30 minutes a day.         Endocrine    Type 2 diabetes mellitus with diabetic polyneuropathy, with long-term current use of insulin - continue seeing endocrinology. He had eye exam done. Sugars seem to be getting better.       Other Visit Diagnoses     Skin lesion of back    -  Primary - refer to dermatology. Looks like seborrheic keratoses.     Relevant Orders    Ambulatory consult to Dermatology          Orders Placed This Encounter    Ambulatory consult to Dermatology         Follow-up with me in 5 months..

## 2019-07-24 NOTE — PATIENT INSTRUCTIONS
We have reviewed your prescription medications. We will refer to dermatology for skine lesion. It looks like a seborrheic keratoses. Continue to follow-up with endocrinology. It sounds like sugars are getting better. Follow-up with me in about 5 months (December).

## 2019-07-25 ENCOUNTER — TELEPHONE (OUTPATIENT)
Dept: FAMILY MEDICINE | Facility: CLINIC | Age: 69
End: 2019-07-25

## 2019-07-25 NOTE — TELEPHONE ENCOUNTER
----- Message from Radha Calixto sent at 2019  7:48 AM CDT -----  Contact: my chart  Waqar Roger GILLESPIEPeg Peg PATRICIA, 68 yrs, None    MRN:   [id number]  ::   1950  Lang:   English  Last BMI:   33.07 kg/m²  Pt Jung Status:   Jung  Prim Cvg::   HUMANA MANAGED MEDICARE/HUMANA MEDICARE HMO  Cvg Last Verified Date:   2019  Patient Types:   None  PCP:   Evelia Yanez MD  Care Team:     Allergies:   No Known Allergies  Patient Portal:   Active, 2019 8:51 AM  FYI  None   More Detail >>  Appointment Request  Roger Zavaleta Jr.  Sent: Sun 2019  7:18 PM  To: [facility]     Roger Zavaelta JrPeg  MRN: [id number] : 1950  Entered: [phone]      Message     Appointment Request From: Roger Zavaleta Jr.    With Provider: Evelia Yanez MD [[facility]]    Preferred Date Range: 2019 - 2019    Preferred Times: Any time    Reason for visit: Scaly spot on my back    Comments:  Spot on my back

## 2019-07-29 ENCOUNTER — PATIENT MESSAGE (OUTPATIENT)
Dept: ADMINISTRATIVE | Facility: OTHER | Age: 69
End: 2019-07-29

## 2019-07-30 DIAGNOSIS — E11.9 TYPE 2 DIABETES MELLITUS: ICD-10-CM

## 2019-08-01 ENCOUNTER — PATIENT OUTREACH (OUTPATIENT)
Dept: OTHER | Facility: OTHER | Age: 69
End: 2019-08-01

## 2019-08-01 DIAGNOSIS — E11.42 TYPE 2 DIABETES MELLITUS WITH DIABETIC POLYNEUROPATHY, WITH LONG-TERM CURRENT USE OF INSULIN: Primary | ICD-10-CM

## 2019-08-01 DIAGNOSIS — Z79.4 TYPE 2 DIABETES MELLITUS WITH DIABETIC POLYNEUROPATHY, WITH LONG-TERM CURRENT USE OF INSULIN: Primary | ICD-10-CM

## 2019-08-01 NOTE — PROGRESS NOTES
"HPI:  Mr. Roger Zavaleta is newly enrolled in the diabetes digital medicine program however, familiar to me from [facility]. Briefly discussed BP readings and patient is concerned with accuracy of cuff as in office readings have been much lower since last medication adjustment. Discovered that cuff will not hold charge for more than 1 week. Start Ozempic 0.25 mg and has recently increased to 0.5 mg this week with no complaints.      Last 5 Patient Entered Readings                                      Current 30 Day Average: 153/86     Recent Readings 8/6/2019 8/2/2019 8/1/2019 7/30/2019 7/29/2019    SBP (mmHg) 143 147 161 168 158    DBP (mmHg) 77 75 79 91 95    Pulse 65 66 74 61 71        Patient denies s/s of hypotension (lightheadedness, dizziness, nausea, fatigue) associated with low readings. Instructed patient to inform me if this occurs, patient confirms understanding.    Patient denies s/s of hypertension (SOB, CP, severe headaches, changes in vision) associated with high readings. Instructed patient to go to the ED if BP >180/110 and accompanied by hypertensive s/s, patient confirms understanding.    Last 6 Patient Entered Readings Most Recent A1c: 9.3% on 5/6/2019  (Goal: 7%)     Recent Readings 8/9/2019 8/8/2019 8/8/2019 8/8/2019 8/8/2019    Blood Glucose (mg/dL) 225 121 228 252 175        Patient denies s/s or episodes of hypoglycemia (weakness, dizziness, hunger, shakiness, nausea, headache, heart palpitations, sweating, fatigue, anxiety, etc.).    Patient denies s/s of hyperglycemia (headache, increased thirst, increased urination, fatigue, blurred vision).    Assessment:  Patient's current 30-day average is not at goal of <130/80 mmHg based on ACC/AHA HTN guidelines.     Explained the patients A1C goal is less than or equal to 7%.     When asked what the patient thinks is causing BG to be elevated, he states: poor choices in "food" however, he has been making better selections after meeting with " endocrinology.     Reviewed SMBG goals (FPG  mg/dL, 2-h ppg < 160 mg/dL, Bedtime < 150 mg/dL).   Expected SMBG monitoring schedule: four times daily      Is the patient on ACE inhibitor or angiotensin II receptor blocker? Yes  irbesartan (Avapro)  Is the patient on a STATIN? Yes    Reviewed treatment of hypoglycemia (15/15 rule).  History of hypoglycemia: No     Plan:  Continue current regimen.  Will assess BG readings in further detail after he has been consistent with higher dose of Ozempic.  Patient referred to the [facility] for cuff assessment.   Patients health , Yaneli Rojas, will follow-up as scheduled.    I will continue to monitor regularly and will follow-up in 3 weeks, sooner if blood pressure or blood glucose begins to trend upward or downward.     Current medication regimen:  Hypertension Medications             chlorthalidone (HYGROTEN) 25 MG Tab Take 1 tablet (25 mg total) by mouth once daily.    irbesartan (AVAPRO) 300 MG tablet Take 300 mg by mouth once daily.    metoprolol succinate (TOPROL-XL) 100 MG 24 hr tablet Take 1 tablet (100 mg total) by mouth once daily.    nitroGLYCERIN (NITROSTAT) 0.4 MG SL tablet Place 0.4 mg under the tongue every 5 (five) minutes as needed.          Diabetes Medications             insulin aspart U-100 (NOVOLOG U-100 INSULIN ASPART) 100 unit/mL injection Inject 15 Units into the skin 3 (three) times daily with meals. Plus sliding scale. Max TDD 75 units    insulin detemir U-100 (LEVEMIR FLEXTOUCH U-100 INSULN) 100 unit/mL (3 mL) SubQ InPn pen Inject 45 Units into the skin once daily.    metFORMIN (GLUCOPHAGE) 1000 MG tablet Take 1 tablet (1,000 mg total) by mouth 2 (two) times daily with meals.    semaglutide (OZEMPIC) 0.25 mg or 0.5 mg(2 mg/1.5 mL) PnIj Inject 0.25 mg into the skin every 7 days. Take 0.25 mg for 4 weeks, then increase to 0.5 mg weekly        Patient has my contact information and knows to call with any concerns or clinical  changes.

## 2019-08-08 ENCOUNTER — PATIENT MESSAGE (OUTPATIENT)
Dept: ADMINISTRATIVE | Facility: OTHER | Age: 69
End: 2019-08-08

## 2019-08-09 RX ORDER — SYRING-NEEDL,DISP,INSUL,0.3 ML 31GX15/64"
SYRINGE, EMPTY DISPOSABLE MISCELLANEOUS
Qty: 100 SYRINGE | Refills: 3 | Status: SHIPPED | OUTPATIENT
Start: 2019-08-09 | End: 2020-04-15

## 2019-08-12 DIAGNOSIS — I10 HYPERTENSION, ESSENTIAL: ICD-10-CM

## 2019-08-13 ENCOUNTER — PATIENT OUTREACH (OUTPATIENT)
Dept: OTHER | Facility: OTHER | Age: 69
End: 2019-08-13

## 2019-08-13 RX ORDER — CHLORTHALIDONE 25 MG/1
TABLET ORAL
Qty: 30 TABLET | Refills: 5 | Status: SHIPPED | OUTPATIENT
Start: 2019-08-13 | End: 2020-02-20

## 2019-08-13 NOTE — PROGRESS NOTES
Last 5 Patient Entered Readings                                      Current 30 Day Average: 151/85     Recent Readings 8/12/2019 8/6/2019 8/2/2019 8/1/2019 7/30/2019    SBP (mmHg) 124 143 147 161 168    DBP (mmHg) 75 77 75 79 91    Pulse 74 65 66 74 61          Last 6 Patient Entered Readings                                          Most Recent A1c: 9.3% on 5/6/2019  (Goal: 7%)     Recent Readings 8/13/2019 8/12/2019 8/12/2019 8/12/2019 8/12/2019    Blood Glucose (mg/dL) 195 247 110 126 212          Digital Medicine: Health  Follow Up    Left voicemail to follow up with Mr. Roger Zavaleta Jr..  Current BP average 151/85 mmHg is not at goal,130/80 mmHg.

## 2019-08-20 NOTE — PROGRESS NOTES
Last 5 Patient Entered Readings                                      Current 30 Day Average: 152/81     Recent Readings 8/17/2019 8/12/2019 8/6/2019 8/2/2019 8/1/2019    SBP (mmHg) 166 124 143 147 161    DBP (mmHg) 77 75 77 75 79    Pulse 63 74 65 66 74          Last 6 Patient Entered Readings                                          Most Recent A1c: 9.3% on 5/6/2019  (Goal: 7%)     Recent Readings 8/20/2019 8/19/2019 8/19/2019 8/19/2019 8/18/2019    Blood Glucose (mg/dL) 197 204 234 190 215          Digital Medicine: Health  Follow Up    Left voicemail to follow up with Mr. Roger Zavaleta Jr..  Current BP average 152/81 mmHg is not at goal, 130/80 mmHg.  Sent [employer] message asking patient to contact me.

## 2019-08-20 NOTE — PROGRESS NOTES
Last 5 Patient Entered Readings                                      Current 30 Day Average: 152/81     Recent Readings 8/17/2019 8/12/2019 8/6/2019 8/2/2019 8/1/2019    SBP (mmHg) 166 124 143 147 161    DBP (mmHg) 77 75 77 75 79    Pulse 63 74 65 66 74          Last 6 Patient Entered Readings                                          Most Recent A1c: 9.3% on 5/6/2019  (Goal: 7%)     Recent Readings 8/20/2019 8/19/2019 8/19/2019 8/19/2019 8/18/2019    Blood Glucose (mg/dL) 197 204 234 190 215        Digital Medicine: Health  Follow Up    Patient states that he has been feeling well.      Lifestyle Modifications:    1.Dietary Modifications (Sodium intake <2,000mg/day, food labels, dining out): Patient says that higher BG readings are because he has been making poor food choices.  I asked what I could to to help him make better choices.  He says that he knows what to do and declined my offer of sending resources.    2.Physical Activity: Patient says that he continues to walk on daily basis.  He says that he hasn't been riding his bike because he needs a new one, so overall he is not exercising as much as in the past.  I asked if he would be willing to increase walking or add another activity to replace the bike riding.  He says maybe when the weather gets cooler.    3.Medication Therapy: Patient has been compliant with the medication regimen.    4.Patient has the following medication side effects/concerns: none reported  (Frequency/Alleviating factors/Precipitating factors, etc.)     Follow up with Mr. Roger Zavaleta Jr. completed. No further questions or concerns. Will continue to follow up to achieve health goals.

## 2019-08-27 RX ORDER — PEN NEEDLE, DIABETIC 30 GX3/16"
NEEDLE, DISPOSABLE MISCELLANEOUS
Qty: 50 EACH | Refills: 7 | Status: SHIPPED | OUTPATIENT
Start: 2019-08-27 | End: 2021-03-24 | Stop reason: SDUPTHER

## 2019-08-30 ENCOUNTER — PATIENT OUTREACH (OUTPATIENT)
Dept: OTHER | Facility: OTHER | Age: 69
End: 2019-08-30

## 2019-08-30 DIAGNOSIS — Z79.4 TYPE 2 DIABETES MELLITUS WITH DIABETIC POLYNEUROPATHY, WITH LONG-TERM CURRENT USE OF INSULIN: ICD-10-CM

## 2019-08-30 DIAGNOSIS — E11.42 TYPE 2 DIABETES MELLITUS WITH DIABETIC POLYNEUROPATHY, WITH LONG-TERM CURRENT USE OF INSULIN: ICD-10-CM

## 2019-08-30 NOTE — PROGRESS NOTES
Last 5 Patient Entered Readings                                      Current 30 Day Average: 148/74     Recent Readings 2019    SBP (mmHg) 158 136 166 124 143    DBP (mmHg) 72 66 77 75 77    Pulse 63 67 63 74 65          Last 6 Patient Entered Readings                                          Most Recent A1c: 9.3% on 2019  (Goal: 7%)     Recent Readings 2019    Blood Glucose (mg/dL) 242 263 258 178 167        2019: LVM for follow-up. Per [facility] pt still needs to exchange cuff however, will discuss increasing Ozempic as BG readings still remains poorly controlled.     2019: LVM for follow-up. WCB in 1 week.     2019: Spoke briefly with patient. He is headed out of town for his mother in-laws  and will return Monday evening. Offered to place patient on [facility] however, he asked asked that I follow-up with him anytime after next Monday.

## 2019-09-03 RX ORDER — METFORMIN HYDROCHLORIDE 1000 MG/1
TABLET ORAL
Qty: 180 TABLET | Refills: 1 | Status: SHIPPED | OUTPATIENT
Start: 2019-09-03 | End: 2020-02-20

## 2019-09-13 NOTE — PROGRESS NOTES
Digital Medicine: Clinician Follow-Up    Called patient for follow-up. He reports adherence to medication regimen with no complaints. States that he has not taken device to the [facility] as he was out of town for mother in-laws  however, he will do so next week. Attempted to use cuff upon his return and reports that cuff was twisted and continued to get very tight which caused the high blood pressure reading. He tried several times then decided to charge the device and forgot to recheck blood pressure. He has been seen in clinic multiple in the month of August and blood pressures have been well controlled. He continues to tolerate Ozempic and has increased to 0.5 mg x2 weeks ago.         Follow Up  Follow-up reason(s): reading review      Alert received.   Care Team received high BP alert.  Patient is not experiencing symptoms.                  Medication Adherence:     Patient is not taking Novolog correctly.    Intervention(s): patient education       INTERVENTION(S)  reviewed appropriate dose schedule    PLAN  patient verbalizes understanding    Patient will begin taking Novolog 15 units plus SS and continue other medications as prescribed.  Patient referred to the [facility] to assess cuff.  Follow-up in 2 weeks.           Topic    Hemoglobin A1C        Last 5 Patient Entered Readings                                      Current 30 Day Average: 158/84     Recent Readings 9/10/2019 9/10/2019 9/10/2019 9/10/2019 2019    SBP (mmHg) 165 259 203 195 175    DBP (mmHg) 73 93 55 169 83    Pulse 72 69 72 72 65        Last 6 Patient Entered Readings                                          Most Recent A1c: 9.3% on 2019  (Goal: 7%)     Recent Readings 2019    Blood Glucose (mg/dL) 171 215 87 273 176           Hypertension Medications             chlorthalidone (HYGROTEN) 25 MG Tab TAKE 1 TABLET BY MOUTH ONCE DAILY    irbesartan (AVAPRO) 300 MG tablet Take 300 mg by mouth  once daily.    metoprolol succinate (TOPROL-XL) 100 MG 24 hr tablet Take 1 tablet (100 mg total) by mouth once daily.    nitroGLYCERIN (NITROSTAT) 0.4 MG SL tablet Place 0.4 mg under the tongue every 5 (five) minutes as needed.          Diabetes Medications             insulin aspart U-100 (NOVOLOG U-100 INSULIN ASPART) 100 unit/mL injection Inject 15 Units into the skin 3 (three) times daily with meals. Plus sliding scale. Max TDD 75 units    insulin detemir U-100 (LEVEMIR FLEXTOUCH U-100 INSULN) 100 unit/mL (3 mL) SubQ InPn pen Inject 45 Units into the skin once daily.    metFORMIN (GLUCOPHAGE) 1000 MG tablet TAKE 1 TABLET BY MOUTH TWICE DAILY WITH MEALS    semaglutide (OZEMPIC) 0.25 mg or 0.5 mg(2 mg/1.5 mL) PnIj Inject 0.25 mg into the skin every 7 days. Take 0.25 mg for 4 weeks, then increase to 0.5 mg weekly

## 2019-09-17 ENCOUNTER — PATIENT OUTREACH (OUTPATIENT)
Dept: OTHER | Facility: OTHER | Age: 69
End: 2019-09-17

## 2019-09-17 NOTE — PROGRESS NOTES
Digital Medicine: Health  Follow-Up    Patient states that he has not replaced his digital blood pressure cuff.  He says that he is making sure that it is properly charged and that cuff is not wrinkled or bunched up.  He states that he believes some of the higher readings, especially the higher diastolic readings are incorrect.  I reviewed proper technique and urged him to take his cuff to the [facility] if he does not think the readings are correct.                  Diet:   Patient reports eating or drinking the following: fruit, water, fresh vegetables and unsalted nuts, lean proteinHe has the following dietary restrictions: low sodium diet and diabetic    Eating style: travels frequently and confused about food/nutrition    Barriers to a Healthy Diet: lack of knowledge    Patient states that he brings snacks with him while traveling to avoid eating all of his meals out.  He says that he brings fruit and nuts with him.  I discussed limiting the amount of fruit because of the sugar content.     Physical Activity:   When asked if exercising, patient responded: yes    He exercises for 30 minutes per day 6 day(s) a week.      Patient participates in the following activities: walking and exercising his dogs    Medication Adherence:   He misses doses: never      Patient states that he is taking his medications as prescribed.    Tobacco and Alcohol:       Patient is not eligible for referral to smoking cessation.        SDOH    INTERVENTION(S)  recommended diet modifications, reviewed monitoring technique and encouragement/support    PLAN  patient verbalizes understanding          Topic    Hemoglobin A1C        Last 5 Patient Entered Readings                                      Current 30 Day Average: 154/85     Recent Readings 9/14/2019 9/14/2019 9/13/2019 9/10/2019 9/10/2019    SBP (mmHg) 180 187 116 165 259    DBP (mmHg) 92 101 65 73 93    Pulse 75 71 78 72 69        Last 6 Patient Entered Readings                                           Most Recent A1c: 9.3% on 5/6/2019  (Goal: 7%)     Recent Readings 9/17/2019 9/16/2019 9/16/2019 9/16/2019 9/16/2019    Blood Glucose (mg/dL) 208 84 210 193 248

## 2019-09-25 ENCOUNTER — PATIENT OUTREACH (OUTPATIENT)
Dept: OTHER | Facility: OTHER | Age: 69
End: 2019-09-25

## 2019-09-25 DIAGNOSIS — E11.42 TYPE 2 DIABETES MELLITUS WITH DIABETIC POLYNEUROPATHY, WITH LONG-TERM CURRENT USE OF INSULIN: Primary | ICD-10-CM

## 2019-09-25 DIAGNOSIS — Z79.4 TYPE 2 DIABETES MELLITUS WITH DIABETIC POLYNEUROPATHY, WITH LONG-TERM CURRENT USE OF INSULIN: Primary | ICD-10-CM

## 2019-10-01 ENCOUNTER — PES CALL (OUTPATIENT)
Dept: ADMINISTRATIVE | Facility: CLINIC | Age: 69
End: 2019-10-01

## 2019-10-07 NOTE — PROGRESS NOTES
Digital Medicine: Health  Follow-Up    Patient returned my call.  He says he has had a stomach virus for the past three days and hopes it is almost over. Patient states he is getting his new BP cuff this week.  He does not believe that his recent BP readings are accurate.    The history is provided by the patient.     Follow Up  Follow-up reason(s): reading review      Alert received.   Care Team received low BG alert.  Patient is not experiencing symptoms.  Reading was invalid because patient states it was an error.  Possibly not enough blood on the strip.      Assessment/Plan    SDOH    INTERVENTION(S)  reviewed monitoring technique and encouragement/support          Topic    Hemoglobin A1C        Last 5 Patient Entered Readings                                      Current 30 Day Average: 160/91     Recent Readings 10/6/2019 10/4/2019 10/2/2019 10/1/2019 9/25/2019    SBP (mmHg) 109 119 129 121 230    DBP (mmHg) 66 68 72 68 132    Pulse 66 68 78 72 74        Last 6 Patient Entered Readings                                          Most Recent A1c: 9.3% on 5/6/2019  (Goal: 8%)     Recent Readings 10/7/2019 10/6/2019 10/6/2019 10/6/2019 10/5/2019    Blood Glucose (mg/dL) 181 211 217 184 155

## 2019-10-18 NOTE — PROGRESS NOTES
"Digital Medicine: Clinician Follow-Up    Called patient for hypertension and diabetes digital medicine follow-up. Mr. Zavaleta's home readings for both blood pressure and blood glucose and have been consistently elevated. Home blood pressure readings are starting to improve and patient reports receiving new cuff that fits his arm "much better". He reports adherence to current antihypertensive regimen with no complaints and denies missed doses. Patient reports adherence to antidiabetic regimen as prescribed with no complaints however, not fully adherent to diabetic diet. He has difficulty understanding the "types" of food to eat and is open to reviewing further with health .     The history is provided by the patient. No  was used.           Sleep Apnea  Patient not previously diagnosed with YAA and     Medication Affordability  Patient is currently not having problems affording medications    Current 30-day average is not at goal of <130/80 mmHg however, most recent readings are within optimal range since exchanging device.    Diabetes is not well controlled secondary to compliance to diabetic diet. Patient's last A1C was 9.3% and home readings range from  mg/dL.    INTERVENTION(S)  recommended med change    PLAN  patient amenable to changes    Continue current antihypertensive regimen as prescribed.  Increase Ozempic to 1 mg weekly and continue other medications as prescribed. Patient plans to start new regimen on Sunday.  Refer to health  for further education on dietary changes.   Continue monitoring and follow-up in 2 weeks.           Topic    Hemoglobin A1C        Last 5 Patient Entered Readings                                      Current 30 Day Average: 154/88     Recent Readings 10/13/2019 10/9/2019 10/6/2019 10/4/2019 10/2/2019    SBP (mmHg) 135 120 109 119 129    DBP (mmHg) 72 65 66 68 72    Pulse 58 71 66 68 78        Last 6 Patient Entered Readings                       "                    Most Recent A1c: 9.3% on 5/6/2019  (Goal: 7%)     Recent Readings 10/18/2019 10/17/2019 10/17/2019 10/17/2019 10/17/2019    Blood Glucose (mg/dL) 175 266 201 157 211           Hypertension Medications             chlorthalidone (HYGROTEN) 25 MG Tab TAKE 1 TABLET BY MOUTH ONCE DAILY    irbesartan (AVAPRO) 300 MG tablet Take 300 mg by mouth once daily.    metoprolol succinate (TOPROL-XL) 100 MG 24 hr tablet Take 1 tablet (100 mg total) by mouth once daily.    nitroGLYCERIN (NITROSTAT) 0.4 MG SL tablet Place 0.4 mg under the tongue every 5 (five) minutes as needed.          Diabetes Medications             insulin aspart U-100 (NOVOLOG U-100 INSULIN ASPART) 100 unit/mL injection Inject 15 Units into the skin 3 (three) times daily with meals. Plus sliding scale. Max TDD 75 units    insulin detemir U-100 (LEVEMIR FLEXTOUCH U-100 INSULN) 100 unit/mL (3 mL) SubQ InPn pen Inject 45 Units into the skin once daily.    metFORMIN (GLUCOPHAGE) 1000 MG tablet TAKE 1 TABLET BY MOUTH TWICE DAILY WITH MEALS    semaglutide (OZEMPIC) 1 mg/dose (2 mg/1.5 mL) PnIj Inject 1 mg into the skin every 7 days.

## 2019-11-04 ENCOUNTER — PATIENT OUTREACH (OUTPATIENT)
Dept: OTHER | Facility: OTHER | Age: 69
End: 2019-11-04

## 2019-11-04 NOTE — PROGRESS NOTES
Digital Medicine: Health  Follow-Up    Patient says he is feeling good.  He states that he ran out strips and order some directly from the .  I advised that he can have them sent to him through DME and gave him their phone number.   He says he has been taking his readings with his other glucometer.   He states he was a little aggravated on 11/2/19 when he took his BP reading causing it to be a little higher.    The history is provided by the patient.     Follow Up  Follow-up reason(s): reading review          INTERVENTION(S)  recommended diet modifications, reviewed monitoring technique and encouragement/support    PLAN  patient verbalizes understanding          Topic    Hemoglobin A1C        Last 5 Patient Entered Readings                                      Current 30 Day Average: 126/66     Recent Readings 11/2/2019 10/26/2019 10/20/2019 10/18/2019 10/13/2019    SBP (mmHg) 149 124 129 116 135    DBP (mmHg) 70 65 66 61 72    Pulse 72 65 69 62 58        Last 6 Patient Entered Readings                                          Most Recent A1c: 9.3% on 5/6/2019  (Goal: 7%)     Recent Readings 11/3/2019 11/3/2019 11/3/2019 11/2/2019 11/1/2019    Blood Glucose (mg/dL) 86 237 177 158 252                    Diet Screening   No change to diet.      Intervention(s): increasing water intake    Assigning the following patient goals: maintain low sodium diet and reduce carbs    Physical Activity Screening   When asked if exercising, patient responded: yes    He exercises for 60 minutes per day 7 day(s) a week.      Patient participates in the following activities: yard/housework and walking    Medication Adherence Screening   He misses doses: never        SDOH

## 2019-11-06 ENCOUNTER — PATIENT OUTREACH (OUTPATIENT)
Dept: OTHER | Facility: OTHER | Age: 69
End: 2019-11-06

## 2019-11-06 NOTE — PROGRESS NOTES
Digital Medicine: Clinician Follow-Up    Called patient for digital medicine follow-up. Increased Ozempic to 1 mg as discussed and is tolerating well so far. He has only taken 2 doses and BG readings are starting to improve though readings are not quite at goal. Patient denies experiencing symptoms of hyper/hypoglycemia at this time. Blood pressure remains well controlled and is now at goal after exchanging BP cuff.     The history is provided by the patient. No  was used.     Follow Up  Follow-up reason(s): reading review and medication change follow-up      Patient started new medication.    Date:  10/27/2019  Is patient tolerating med change?:  Yes          PLAN  additional monitoring needed    Hypertension: Current average is not at goal of <130/80 mmHg. Continue current regimen as prescribed.  Diabetes is not well controlled based on patient's last A1C of 9.3% however, home readings are improving. Reminded patient that A1C is due.  Continue current medication regimen as prescribed.  Follow-up in 2-3 weeks.           Topic    Hemoglobin A1C        Last 5 Patient Entered Readings                                      Current 30 Day Average: 129/67     Recent Readings 11/2/2019 10/26/2019 10/20/2019 10/18/2019 10/13/2019    SBP (mmHg) 149 124 129 116 135    DBP (mmHg) 70 65 66 61 72    Pulse 72 65 69 62 58        Last 6 Patient Entered Readings                                          Most Recent A1c: 9.3% on 5/6/2019  (Goal: 7%)     Recent Readings 11/5/2019 11/5/2019 11/5/2019 11/4/2019 11/3/2019    Blood Glucose (mg/dL) 219 155 199 159 86           Hypertension Medications             chlorthalidone (HYGROTEN) 25 MG Tab TAKE 1 TABLET BY MOUTH ONCE DAILY    irbesartan (AVAPRO) 300 MG tablet Take 300 mg by mouth once daily.    metoprolol succinate (TOPROL-XL) 100 MG 24 hr tablet Take 1 tablet (100 mg total) by mouth once daily.    nitroGLYCERIN (NITROSTAT) 0.4 MG SL tablet Place 0.4 mg under  the tongue every 5 (five) minutes as needed.          Diabetes Medications             insulin aspart U-100 (NOVOLOG U-100 INSULIN ASPART) 100 unit/mL injection Inject 15 Units into the skin 3 (three) times daily with meals. Plus sliding scale. Max TDD 75 units    insulin detemir U-100 (LEVEMIR FLEXTOUCH U-100 INSULN) 100 unit/mL (3 mL) SubQ InPn pen Inject 45 Units into the skin once daily.    metFORMIN (GLUCOPHAGE) 1000 MG tablet TAKE 1 TABLET BY MOUTH TWICE DAILY WITH MEALS    semaglutide (OZEMPIC) 1 mg/dose (2 mg/1.5 mL) PnIj Inject 1 mg into the skin every 7 days.               Screenings

## 2019-11-12 ENCOUNTER — OFFICE VISIT (OUTPATIENT)
Dept: URGENT CARE | Facility: CLINIC | Age: 69
End: 2019-11-12
Payer: MEDICARE

## 2019-11-12 VITALS
WEIGHT: 217 LBS | BODY MASS INDEX: 32.89 KG/M2 | HEART RATE: 74 BPM | DIASTOLIC BLOOD PRESSURE: 64 MMHG | RESPIRATION RATE: 16 BRPM | TEMPERATURE: 98 F | HEIGHT: 68 IN | SYSTOLIC BLOOD PRESSURE: 125 MMHG | OXYGEN SATURATION: 100 %

## 2019-11-12 DIAGNOSIS — J32.9 SINUSITIS, UNSPECIFIED CHRONICITY, UNSPECIFIED LOCATION: ICD-10-CM

## 2019-11-12 DIAGNOSIS — R10.9 ABDOMINAL PAIN, UNSPECIFIED ABDOMINAL LOCATION: Primary | ICD-10-CM

## 2019-11-12 LAB
BILIRUB UR QL STRIP: NEGATIVE
CTP QC/QA: YES
GLUCOSE UR QL STRIP: NEGATIVE
KETONES UR QL STRIP: NEGATIVE
LEUKOCYTE ESTERASE UR QL STRIP: NEGATIVE
PH, POC UA: 6.5
POC BLOOD, URINE: NEGATIVE
POC NITRATES, URINE: NEGATIVE
PROT UR QL STRIP: NEGATIVE
S PYO RRNA THROAT QL PROBE: NEGATIVE
SP GR UR STRIP: 1.01 (ref 1–1.03)
UROBILINOGEN UR STRIP-ACNC: NORMAL (ref 0.3–2.2)

## 2019-11-12 PROCEDURE — 3074F PR MOST RECENT SYSTOLIC BLOOD PRESSURE < 130 MM HG: ICD-10-PCS | Mod: CPTII,S$GLB,, | Performed by: NURSE PRACTITIONER

## 2019-11-12 PROCEDURE — 81003 POCT URINALYSIS, DIPSTICK, AUTOMATED, W/O SCOPE: ICD-10-PCS | Mod: QW,S$GLB,, | Performed by: NURSE PRACTITIONER

## 2019-11-12 PROCEDURE — 87880 STREP A ASSAY W/OPTIC: CPT | Mod: QW,S$GLB,, | Performed by: NURSE PRACTITIONER

## 2019-11-12 PROCEDURE — 99214 PR OFFICE/OUTPT VISIT, EST, LEVL IV, 30-39 MIN: ICD-10-PCS | Mod: S$GLB,,, | Performed by: NURSE PRACTITIONER

## 2019-11-12 PROCEDURE — 3074F SYST BP LT 130 MM HG: CPT | Mod: CPTII,S$GLB,, | Performed by: NURSE PRACTITIONER

## 2019-11-12 PROCEDURE — 99214 OFFICE O/P EST MOD 30 MIN: CPT | Mod: S$GLB,,, | Performed by: NURSE PRACTITIONER

## 2019-11-12 PROCEDURE — 81003 URINALYSIS AUTO W/O SCOPE: CPT | Mod: QW,S$GLB,, | Performed by: NURSE PRACTITIONER

## 2019-11-12 PROCEDURE — 1101F PT FALLS ASSESS-DOCD LE1/YR: CPT | Mod: CPTII,S$GLB,, | Performed by: NURSE PRACTITIONER

## 2019-11-12 PROCEDURE — 3078F DIAST BP <80 MM HG: CPT | Mod: CPTII,S$GLB,, | Performed by: NURSE PRACTITIONER

## 2019-11-12 PROCEDURE — 3078F PR MOST RECENT DIASTOLIC BLOOD PRESSURE < 80 MM HG: ICD-10-PCS | Mod: CPTII,S$GLB,, | Performed by: NURSE PRACTITIONER

## 2019-11-12 PROCEDURE — 1101F PR PT FALLS ASSESS DOC 0-1 FALLS W/OUT INJ PAST YR: ICD-10-PCS | Mod: CPTII,S$GLB,, | Performed by: NURSE PRACTITIONER

## 2019-11-12 PROCEDURE — 87880 POCT RAPID STREP A: ICD-10-PCS | Mod: QW,S$GLB,, | Performed by: NURSE PRACTITIONER

## 2019-11-12 RX ORDER — AMOXICILLIN AND CLAVULANATE POTASSIUM 875; 125 MG/1; MG/1
1 TABLET, FILM COATED ORAL 2 TIMES DAILY
Qty: 20 TABLET | Refills: 0 | Status: SHIPPED | OUTPATIENT
Start: 2019-11-12 | End: 2019-11-22

## 2019-11-13 NOTE — PATIENT INSTRUCTIONS
"Please follow up with your Primary care provider within 2-5 days if your signs and symptoms have not resolved or worsen.  The usual course of cold symptoms are 10-14 days.     If your condition worsens or fails to improve we recommend that you receive another evaluation at the emergency room immediately or contact your primary medical clinic to discuss your concerns.     You must understand that you have received an Urgent Care treatment only and that you may be released before all of your medical problems are known or treated.   You, the patient, will arrange for follow up care as instructed.     Tylenol or Ibuprofen can also be used as directed for pain/fever unless you have an allergy to them or medical condition such as stomach ulcers, kidney or liver disease or blood thinners etc for which you should not be taking these type of medications.     Take over the counter cough medication as directed as needed for cough.  You should avoid medications with pseudoephedrine or phenylephrine (any medication with "D") if you have high blood pressure as this can cause an elevation in your blood pressure. Instead consider Corcidin HBP as needed to prevent an elevated blood pressure.     Natural remedies of symptoms (as needed) include humidification, saline nasal sprays, and/or steamy showers.  Increase fluids, warm tea with honey, cough drops as needed.  You may also use salt water gargles for sore throat.    IF you received a steroid shot today - As discussed, this can elevate your blood pressure, elevate your blood sugar, water weight gain, nervous energy, redness to the face and dimpling of the skin at the injection site.     You have been given Augmentin for an infection today.  Please take all the antibiotic as prescribed on the bottle.      Augmentin is hard on the stomach and should always be taken with food.      Augmentin can cause diarrhea.  As with any antibiotics, use probiotics and/or high culture yogurt about " 2 hours apart from the antibiotic and about 1 week after the antibiotic to replace the gut katie lost with antibiotic use.      If you are female and on BCP use additional methods to prevent pregnancy while on antibiotics and for one cycle after.       Sinusitis (Antibiotic Treatment)    The sinuses are air-filled spaces within the bones of the face. They connect to the inside of the nose. Sinusitis is an inflammation of the tissue lining the sinus cavity. Sinus inflammation can occur during a cold. It can also be due to allergies to pollens and other particles in the air. Sinusitis can cause symptoms of sinus congestion and fullness. A sinus infection causes fever, headache and facial pain. There is often green or yellow drainage from the nose or into the back of the throat (post-nasal drip). You have been given antibiotics to treat this condition.  Home care:  · Take the full course of antibiotics as instructed. Do not stop taking them, even if you feel better.  · Drink plenty of water, hot tea, and other liquids. This may help thin mucus. It also may promote sinus drainage.  · Heat may help soothe painful areas of the face. Use a towel soaked in hot water. Or,  the shower and direct the hot spray onto your face. Using a vaporizer along with a menthol rub at night may also help.   · An expectorant containing guaifenesin may help thin the mucus and promote drainage from the sinuses.  · Over-the-counter decongestants may be used unless a similar medicine was prescribed. Nasal sprays work the fastest. Use one that contains phenylephrine or oxymetazoline. First blow the nose gently. Then use the spray. Do not use these medicines more often than directed on the label or symptoms may get worse. You may also use tablets containing pseudoephedrine. Avoid products that combine ingredients, because side effects may be increased. Read labels. You can also ask the pharmacist for help. (NOTE: Persons with high blood  pressure should not use decongestants. They can raise blood pressure.)  · Over-the-counter antihistamines may help if allergies contributed to your sinusitis.    · Do not use nasal rinses or irrigation during an acute sinus infection, unless told to by your health care provider. Rinsing may spread the infection to other sinuses.  · Use acetaminophen or ibuprofen to control pain, unless another pain medicine was prescribed. (If you have chronic liver or kidney disease or ever had a stomach ulcer, talk with your doctor before using these medicines. Aspirin should never be used in anyone under 18 years of age who is ill with a fever. It may cause severe liver damage.)  · Don't smoke. This can worsen symptoms.  Follow-up care  Follow up with your healthcare provider or our staff if you are not improving within the next week.  When to seek medical advice  Call your healthcare provider if any of these occur:  · Facial pain or headache becoming more severe  · Stiff neck  · Unusual drowsiness or confusion  · Swelling of the forehead or eyelids  · Vision problems, including blurred or double vision  · Fever of 100.4ºF (38ºC) or higher, or as directed by your healthcare provider  · Seizure  · Breathing problems  · Symptoms not resolving within 10 days  Date Last Reviewed: 4/13/2015  © 1927-3923 [employer]. [address] [place] [address], [place] [address]. All rights reserved. This information is not intended as a substitute for professional medical care. Always follow your healthcare professional's instructions.

## 2019-11-13 NOTE — PROGRESS NOTES
"Subjective:       Patient ID: Roger Zavaleta Jr. is a 69 y.o. male.    Vitals:  height is 5' 8" (1.727 m) and weight is 98.4 kg (217 lb). His oral temperature is 98 °F (36.7 °C). His blood pressure is 125/64 and his pulse is 74. His respiration is 16 and oxygen saturation is 100%.     Chief Complaint: Cough    Cough   This is a new problem. Episode onset: 2 weeks. The problem has been gradually worsening. The problem occurs constantly. The cough is non-productive. Associated symptoms include nasal congestion, postnasal drip and a sore throat. Pertinent negatives include no chest pain, chills, ear congestion, ear pain, eye redness, fever, headaches, heartburn, hemoptysis, myalgias, rash, rhinorrhea, shortness of breath, sweats, weight loss or wheezing. Nothing aggravates the symptoms. He has tried nothing (salt water) for the symptoms. The treatment provided no relief. There is no history of asthma, bronchiectasis, bronchitis, COPD, emphysema, environmental allergies or pneumonia.       Constitution: Negative for chills, sweating, fatigue and fever.   HENT: Positive for postnasal drip and sore throat. Negative for ear pain, congestion, sinus pain, sinus pressure, trouble swallowing and voice change.    Neck: Negative for painful lymph nodes.   Cardiovascular: Negative for chest pain.   Eyes: Negative for eye redness.   Respiratory: Positive for cough. Negative for chest tightness, sputum production, bloody sputum, COPD, shortness of breath, stridor, wheezing and asthma.    Gastrointestinal: Negative for nausea, vomiting and heartburn.   Musculoskeletal: Negative for muscle ache.   Skin: Negative for rash.   Allergic/Immunologic: Negative for environmental allergies, seasonal allergies and asthma.   Neurological: Negative for headaches.   Hematologic/Lymphatic: Negative for swollen lymph nodes.       Objective:      Physical Exam      Assessment:       1. Abdominal pain, unspecified abdominal location    2. Sinusitis, " unspecified chronicity, unspecified location        Plan:       Results for orders placed or performed in visit on 11/12/19   POCT rapid strep A   Result Value Ref Range    Rapid Strep A Screen Negative Negative     Acceptable Yes    POCT Urinalysis, Dipstick, Automated, W/O Scope   Result Value Ref Range    POC Blood, Urine Negative Negative    POC Bilirubin, Urine Negative Negative    POC Urobilinogen, Urine normal 0.3 - 2.2    POC Ketones, Urine Negative Negative    POC Protein, Urine Negative Negative    POC Nitrates, Urine Negative Negative    POC Glucose, Urine Negative Negative    pH, UA 6.5     POC Specific Gravity, Urine 1.010 1.003 - 1.029    POC Leukocytes, Urine Negative Negative       Abdominal pain, unspecified abdominal location  -     POCT Urinalysis, Dipstick, Automated, W/O Scope    Sinusitis, unspecified chronicity, unspecified location  -     POCT rapid strep A    Other orders  -     amoxicillin-clavulanate 875-125mg (AUGMENTIN) 875-125 mg per tablet; Take 1 tablet by mouth 2 (two) times daily. for 10 days  Dispense: 20 tablet; Refill: 0

## 2019-11-17 ENCOUNTER — PATIENT MESSAGE (OUTPATIENT)
Dept: FAMILY MEDICINE | Facility: CLINIC | Age: 69
End: 2019-11-17

## 2019-11-21 ENCOUNTER — CLINICAL SUPPORT (OUTPATIENT)
Dept: INTERNAL MEDICINE | Facility: CLINIC | Age: 69
End: 2019-11-21
Payer: MEDICARE

## 2019-11-21 DIAGNOSIS — Z23 INFLUENZA VACCINE NEEDED: Primary | ICD-10-CM

## 2019-11-21 PROCEDURE — 90662 FLU VACCINE - HIGH DOSE (65+) PRESERVATIVE FREE IM: ICD-10-PCS | Mod: HCNC,S$GLB,, | Performed by: INTERNAL MEDICINE

## 2019-11-21 PROCEDURE — G0008 ADMIN INFLUENZA VIRUS VAC: HCPCS | Mod: HCNC,S$GLB,, | Performed by: INTERNAL MEDICINE

## 2019-11-21 PROCEDURE — G0008 FLU VACCINE - HIGH DOSE (65+) PRESERVATIVE FREE IM: ICD-10-PCS | Mod: HCNC,S$GLB,, | Performed by: INTERNAL MEDICINE

## 2019-11-21 PROCEDURE — 90662 IIV NO PRSV INCREASED AG IM: CPT | Mod: HCNC,S$GLB,, | Performed by: INTERNAL MEDICINE

## 2019-11-21 NOTE — PROGRESS NOTES
Pt received flu vaccine. No adverse reactions noted. No sxs of distress noted. Pt tolerated vaccine well. Instructed pt to wait 15 mins after admin. Pt verbalized understanding.

## 2019-11-25 ENCOUNTER — PATIENT OUTREACH (OUTPATIENT)
Dept: OTHER | Facility: OTHER | Age: 69
End: 2019-11-25

## 2019-11-25 NOTE — PROGRESS NOTES
Digital Medicine: Clinician Follow-Up    Called patient to follow up. Patient Started on Ozempic 1 mg SQ weekly on 10/27. Patient has not missed any doses and denies any side effects. Did have urgent care visit on 11/12 complaining of abdominal pain but attributed this to cough. He reports adherence to all medications.    The history is provided by the patient. No  was used.     Follow Up  Follow-up reason(s): medication change follow-up      Medication Change: dose increase    Patient started new medication.    Date:  10/27/2019  Is patient tolerating med change?:  Yes      INTERVENTION(S)  encouragement/support    PLAN  continue monitoring    - Patient will follow up with A1c and CMP in the next few weeks  - Will follow up in 4-5 weeks    Camila Gant PharmD  PGY-2 Internal Medicine Resident  [phone]    Jojo Webb PharmD  Digital Medicine Clinician  [phone]    I have reviewed the following information with the patient:   1. The resident discusses all changes with the preceptor prior to executing clinical decisions.   2. The preceptor, Jojo Webb, is the patient's primary and long term Digital Medicine Clinician. The patient has contact information for both the resident and preceptor.  3. The patient may refuse to work with the resident at and request to be managed solely by the preceptor at any point.         4.  A collaborative practice agreement exists between the preceptor and the referring physician.    Patient verbalized understanding.        Topic    Hemoglobin A1C        Last 5 Patient Entered Readings                                      Current 30 Day Average: 129/68     Recent Readings 11/25/2019 11/19/2019 11/16/2019 11/12/2019 11/11/2019    SBP (mmHg) 123 130 134 137 117    DBP (mmHg) 70 68 69 73 66    Pulse 68 75 64 80 95        Last 6 Patient Entered Readings                                          Most Recent A1c: 9.3% on 5/6/2019  (Goal: 7%)     Recent Readings  11/25/2019 11/25/2019 11/24/2019 11/24/2019 11/24/2019    Blood Glucose (mg/dL) 189 165 139 86 163           Hypertension Medications             chlorthalidone (HYGROTEN) 25 MG Tab TAKE 1 TABLET BY MOUTH ONCE DAILY    irbesartan (AVAPRO) 300 MG tablet Take 300 mg by mouth once daily.    metoprolol succinate (TOPROL-XL) 100 MG 24 hr tablet Take 1 tablet (100 mg total) by mouth once daily.    nitroGLYCERIN (NITROSTAT) 0.4 MG SL tablet Place 0.4 mg under the tongue every 5 (five) minutes as needed.          Diabetes Medications             insulin aspart U-100 (NOVOLOG U-100 INSULIN ASPART) 100 unit/mL injection Inject 15 Units into the skin 3 (three) times daily with meals. Plus sliding scale. Max TDD 75 units    insulin detemir U-100 (LEVEMIR FLEXTOUCH U-100 INSULN) 100 unit/mL (3 mL) SubQ InPn pen Inject 45 Units into the skin once daily.    metFORMIN (GLUCOPHAGE) 1000 MG tablet TAKE 1 TABLET BY MOUTH TWICE DAILY WITH MEALS    semaglutide (OZEMPIC) 1 mg/dose (2 mg/1.5 mL) PnIj Inject 1 mg into the skin every 7 days.                           Medication Adherence Screening   He misses doses: never

## 2019-11-29 ENCOUNTER — PATIENT OUTREACH (OUTPATIENT)
Dept: OTHER | Facility: OTHER | Age: 69
End: 2019-11-29

## 2019-11-29 NOTE — PROGRESS NOTES
Digital Medicine: Health  Follow-Up    Patient returned my call and reported the following:  He had taken his morning medications without eating because he had received a call regarding a family emergency and had to leave the house.  When he returned home, he began to feels symptoms such as feeling hot and dizzy.  He then took a reading and treated his low blood sugar with glucose tablet and candy.  He says he felt better and re-tested.  I will follow up in two weeks and address lifestyle.    The history is provided by the patient. No  was used.     Follow Up  Follow-up reason(s): reading review      Alert received.   Care Team received low BG alert.  Patient is experiencing symptomsof hypoglycemia.        Intervention/Plan        Topic    Hemoglobin A1C        Last 5 Patient Entered Readings                                      Current 30 Day Average: 129/68     Recent Readings 11/27/2019 11/25/2019 11/19/2019 11/16/2019 11/12/2019    SBP (mmHg) 128 123 130 134 137    DBP (mmHg) 66 70 68 69 73    Pulse 66 68 75 64 80        Last 6 Patient Entered Readings                                          Most Recent A1c: 9.3% on 5/6/2019  (Goal: 7%)     Recent Readings 11/28/2019 11/28/2019 11/28/2019 11/28/2019 11/27/2019    Blood Glucose (mg/dL) 154 206 197 179 169                Screenings    SDOH

## 2019-12-09 ENCOUNTER — PATIENT OUTREACH (OUTPATIENT)
Dept: OTHER | Facility: OTHER | Age: 69
End: 2019-12-09

## 2019-12-09 NOTE — PROGRESS NOTES
Digital Medicine: Health  Follow-Up    Patient verified low BG reading on 12/5/19.  He states that he was sick with a cough and sore throat.  He was taking cough medicine.  Patient also reports he was not eating since he was not feeling well.  He woke up feeling dizzy and sweating.  He ate some hard candy to treat hypoglycemic symptoms.    The history is provided by the patient. No  was used.     Follow Up  Follow-up reason(s): reading review      Alert received.   Care Team received low BG alert.  Patient is experiencing symptomsof hypoglycemia.        INTERVENTION(S)  recommended diet modifications, reviewed monitoring technique, encouragement/support, denied resources and denied questions    PLAN  patient verbalizes understanding and patient amenable to changes          Topic    Hemoglobin A1C        Last 5 Patient Entered Readings                                      Current 30 Day Average: 131/69     Recent Readings 12/7/2019 12/2/2019 11/27/2019 11/25/2019 11/19/2019    SBP (mmHg) 148 134 128 123 130    DBP (mmHg) 75 67 66 70 68    Pulse 62 77 66 68 75        Last 6 Patient Entered Readings                                          Most Recent A1c: 9.3% on 5/6/2019  (Goal: 7%)     Recent Readings 12/9/2019 12/8/2019 12/8/2019 12/8/2019 12/8/2019    Blood Glucose (mg/dL) 134 197 131 215 186                Screenings    SDOH

## 2019-12-10 ENCOUNTER — PATIENT OUTREACH (OUTPATIENT)
Dept: OTHER | Facility: OTHER | Age: 69
End: 2019-12-10

## 2019-12-10 NOTE — PROGRESS NOTES
Digital Medicine: Health  Follow-Up    Patient verified low BG reading 12/10/19.  He says he does not know what caused his blood sugar to drop.  He says he had eaten and taken medications as normal.  He reports no change in physical activity.  He says he helped his daughter move a piece of furniture, but that was prior to eating his evening meal. He reports he was having symptoms of hypoglycemia such as sweating.  He drank some grape juice and ate some hard candy to bring his blood sugar back up to a safe level.    The history is provided by the patient. No  was used.     Follow Up  Follow-up reason(s): reading review      Alert received.   Care Team received low BG alert.  Patient is experiencing symptomsof hypoglycemia.        INTERVENTION(S)  encouragement/support    PLAN  patient verbalizes understanding and continue monitoring          Topic    Hemoglobin A1C        Last 5 Patient Entered Readings                                      Current 30 Day Average: 131/69     Recent Readings 12/7/2019 12/2/2019 11/27/2019 11/25/2019 11/19/2019    SBP (mmHg) 148 134 128 123 130    DBP (mmHg) 75 67 66 70 68    Pulse 62 77 66 68 75        Last 6 Patient Entered Readings                                          Most Recent A1c: 9.3% on 5/6/2019  (Goal: 7%)     Recent Readings 12/10/2019 12/10/2019 12/9/2019 12/9/2019 12/9/2019    Blood Glucose (mg/dL) 175 186 80 66 198                Screenings    SDOH   [facility]  Nephrology Daily Progress Note    Marlen Duarte  [id number]  [de-identified]year old      HPI:   Marlen Duarte is a [de-identified]Bouchra Bejarano MD

## 2019-12-24 RX ORDER — INSULIN DETEMIR 100 [IU]/ML
INJECTION, SOLUTION SUBCUTANEOUS
Qty: 15 ML | Refills: 0 | Status: SHIPPED | OUTPATIENT
Start: 2019-12-24 | End: 2020-06-29

## 2019-12-24 NOTE — TELEPHONE ENCOUNTER
Left a message informing pt that his script was sent to his pharmacy and also that he will need to be seen before refills can be given.

## 2020-01-06 ENCOUNTER — PATIENT OUTREACH (OUTPATIENT)
Dept: OTHER | Facility: OTHER | Age: 70
End: 2020-01-06

## 2020-01-06 NOTE — PROGRESS NOTES
Digital Medicine: Health  Follow-Up    Received low BG alert on 1/4/2020 and 1/6/2020. Patient reports that both low readings were an error.  He says that he did not have enough blood on the test strip.  He denies having any symptoms of hypoglycemia.    The history is provided by the patient. No  was used.     Follow Up  Follow-up reason(s): reading review      Alert received.   Care Team received low BG alert.  Patient is not experiencing symptoms.  Reading was invalid because Patient says he did not have enough blood on the test strip.      INTERVENTION(S)  reviewed monitoring technique and denied resources    PLAN  patient verbalizes understanding and continue monitoring          Topic    Hemoglobin A1C        Last 5 Patient Entered Readings                                      Current 30 Day Average: 128/68     Recent Readings 1/1/2020 12/27/2019 12/26/2019 12/24/2019 12/22/2019    SBP (mmHg) 112 119 100 147 116    DBP (mmHg) 65 61 57 85 67    Pulse 72 93 97 74 74        Last 6 Patient Entered Readings                                          Most Recent A1c: 9.3% on 5/6/2019  (Goal: 7%)     Recent Readings 1/6/2020 1/6/2020 1/5/2020 1/5/2020 1/5/2020    Blood Glucose (mg/dL) 210 17 174 238 175                    Medication Adherence Screening   He misses doses: never    Patient is not selectively taking diuretics.    He does not wonder if medications are working.  He knows purpose of medications.      Reviewed proper dosage of insulin.  Patient was not sure if he was supposed to start with 10 or 15 units.        SDOH

## 2020-01-15 ENCOUNTER — PATIENT OUTREACH (OUTPATIENT)
Dept: ADMINISTRATIVE | Facility: HOSPITAL | Age: 70
End: 2020-01-15

## 2020-01-17 ENCOUNTER — PATIENT OUTREACH (OUTPATIENT)
Dept: OTHER | Facility: OTHER | Age: 70
End: 2020-01-17

## 2020-01-17 DIAGNOSIS — Z79.4 TYPE 2 DIABETES MELLITUS WITH DIABETIC POLYNEUROPATHY, WITH LONG-TERM CURRENT USE OF INSULIN: ICD-10-CM

## 2020-01-17 DIAGNOSIS — E11.42 TYPE 2 DIABETES MELLITUS WITH DIABETIC POLYNEUROPATHY, WITH LONG-TERM CURRENT USE OF INSULIN: ICD-10-CM

## 2020-01-17 RX ORDER — INSULIN ASPART 100 [IU]/ML
20 INJECTION, SOLUTION INTRAVENOUS; SUBCUTANEOUS
Qty: 10 ML | Refills: 5
Start: 2020-01-17 | End: 2020-02-04

## 2020-01-17 NOTE — PROGRESS NOTES
Digital Medicine: Clinician Follow-Up    Called patient for digital medicine follow-up. Overall, patient is doing well with no complaints. Confirmed that initial blood pressure reading of 175/99 mmHg was an error. Patient says that his cat knocked his device over during the reading. No changes in blood pressure medications or concerns at this time. Patient's diabetes is not well controlled despite having added Ozempic and titrating medication to maximal dose. Currently not following a diabetic diet though he has been provided with resources and has attended education. Patient says that he just doesn't monitor what eats or review the resources. Completed a brief dietary recall and it includes the following:    Breakfast: yogurt, gomez, 1-2 biscuits with jelly  Lunch: sandwich with fries or chips  Dinner: Fish, chicken or hamburgers.  Drinks: 2-3 large glasses of sweet tea    Patient willing to replace 1-2 glasses of sweet tea with water and stop using jelly on biscuit. He is also, willing to keep a food journal and review his resources from diabetes education. Patient using between 17-20 units of Novolog with each meal along with Levemir, metformin and Ozempic.     The history is provided by the patient. No  was used.     Follow Up  Follow-up reason(s): reading review and medication change              Assessment:  Patient's current 30-day average is at goal of <130/80 mmHg.     Diabetes is not well controlled based on patient's last A1C of 9.3%. SMBG readings reviewed they range from 165-309 mg/dL.     Plan:  Continue current antihypertensive regimen.  Novolog increased to 20 units with meals. Continue other medications as prescribed.  Consider adding Jardiance 10 mg at next follow-up.  Patient's health , Yaneli Rojas, will follow-up as scheduled.    I will continue to monitor regularly and will follow-up in 2 weeks, sooner if blood pressure or blood glucose begins to trend upward or  downward.          Patient has my contact information and knows to call with any concerns or clinical changes.                      Topic    Hemoglobin A1C        Last 5 Patient Entered Readings                                      Current 30 Day Average: 126/70     Recent Readings 1/17/2020 1/17/2020 1/11/2020 1/10/2020 1/6/2020    SBP (mmHg) 133 175 120 119 144    DBP (mmHg) 71 99 57 61 73    Pulse 64 64 76 67 79        Last 6 Patient Entered Readings                                          Most Recent A1c: 9.3% on 5/6/2019  (Goal: 7%)     Recent Readings 1/17/2020 1/16/2020 1/16/2020 1/16/2020 1/15/2020    Blood Glucose (mg/dL) 220 136 309 176 189           Hypertension Medications             chlorthalidone (HYGROTEN) 25 MG Tab TAKE 1 TABLET BY MOUTH ONCE DAILY    irbesartan (AVAPRO) 300 MG tablet Take 300 mg by mouth once daily.    metoprolol succinate (TOPROL-XL) 100 MG 24 hr tablet Take 1 tablet (100 mg total) by mouth once daily.    nitroGLYCERIN (NITROSTAT) 0.4 MG SL tablet Place 0.4 mg under the tongue every 5 (five) minutes as needed.          Diabetes Medications             insulin aspart U-100 (NOVOLOG U-100 INSULIN ASPART) 100 unit/mL injection Inject 15 Units into the skin 3 (three) times daily with meals. Plus sliding scale. Max TDD 75 units    LEVEMIR FLEXTOUCH U-100 INSULN 100 unit/mL (3 mL) InPn pen INJECT 45 UNITS SUBCUTANEOUSLY ONCE DAILY    metFORMIN (GLUCOPHAGE) 1000 MG tablet TAKE 1 TABLET BY MOUTH TWICE DAILY WITH MEALS    semaglutide (OZEMPIC) 1 mg/dose (2 mg/1.5 mL) PnIj Inject 1 mg into the skin every 7 days.               Screenings

## 2020-01-17 NOTE — PROGRESS NOTES
Digital Medicine: Clinician Follow-Up    HPI    Intervention/Plan        Topic    Hemoglobin A1C        Last 5 Patient Entered Readings                                      Current 30 Day Average: 126/70     Recent Readings 1/17/2020 1/17/2020 1/11/2020 1/10/2020 1/6/2020    SBP (mmHg) 133 175 120 119 144    DBP (mmHg) 71 99 57 61 73    Pulse 64 64 76 67 79        Last 6 Patient Entered Readings                                          Most Recent A1c: 9.3% on 5/6/2019  (Goal: 7%)     Recent Readings 1/17/2020 1/16/2020 1/16/2020 1/16/2020 1/15/2020    Blood Glucose (mg/dL) 220 136 309 176 189           Hypertension Medications             chlorthalidone (HYGROTEN) 25 MG Tab TAKE 1 TABLET BY MOUTH ONCE DAILY    irbesartan (AVAPRO) 300 MG tablet Take 300 mg by mouth once daily.    metoprolol succinate (TOPROL-XL) 100 MG 24 hr tablet Take 1 tablet (100 mg total) by mouth once daily.    nitroGLYCERIN (NITROSTAT) 0.4 MG SL tablet Place 0.4 mg under the tongue every 5 (five) minutes as needed.          Diabetes Medications             insulin aspart U-100 (NOVOLOG U-100 INSULIN ASPART) 100 unit/mL injection Inject 15 Units into the skin 3 (three) times daily with meals. Plus sliding scale. Max TDD 75 units    LEVEMIR FLEXTOUCH U-100 INSULN 100 unit/mL (3 mL) InPn pen INJECT 45 UNITS SUBCUTANEOUSLY ONCE DAILY    metFORMIN (GLUCOPHAGE) 1000 MG tablet TAKE 1 TABLET BY MOUTH TWICE DAILY WITH MEALS    semaglutide (OZEMPIC) 1 mg/dose (2 mg/1.5 mL) PnIj Inject 1 mg into the skin every 7 days.               Screenings

## 2020-01-27 DIAGNOSIS — I10 HYPERTENSION, ESSENTIAL: ICD-10-CM

## 2020-01-28 RX ORDER — IRBESARTAN 150 MG/1
150 TABLET ORAL DAILY
Qty: 30 TABLET | Refills: 0 | Status: SHIPPED | OUTPATIENT
Start: 2020-01-28 | End: 2020-02-20

## 2020-01-29 ENCOUNTER — OFFICE VISIT (OUTPATIENT)
Dept: FAMILY MEDICINE | Facility: CLINIC | Age: 70
End: 2020-01-29
Payer: MEDICARE

## 2020-01-29 VITALS
TEMPERATURE: 98 F | OXYGEN SATURATION: 97 % | BODY MASS INDEX: 29.99 KG/M2 | HEIGHT: 68 IN | DIASTOLIC BLOOD PRESSURE: 54 MMHG | WEIGHT: 197.88 LBS | SYSTOLIC BLOOD PRESSURE: 100 MMHG | HEART RATE: 64 BPM

## 2020-01-29 DIAGNOSIS — E78.2 MIXED HYPERLIPIDEMIA: ICD-10-CM

## 2020-01-29 DIAGNOSIS — I25.10 CORONARY ARTERY DISEASE INVOLVING NATIVE CORONARY ARTERY OF NATIVE HEART WITHOUT ANGINA PECTORIS: Primary | ICD-10-CM

## 2020-01-29 DIAGNOSIS — H91.93 BILATERAL HEARING LOSS, UNSPECIFIED HEARING LOSS TYPE: ICD-10-CM

## 2020-01-29 DIAGNOSIS — I10 HYPERTENSION, ESSENTIAL: ICD-10-CM

## 2020-01-29 DIAGNOSIS — Z79.4 TYPE 2 DIABETES MELLITUS WITH DIABETIC POLYNEUROPATHY, WITH LONG-TERM CURRENT USE OF INSULIN: ICD-10-CM

## 2020-01-29 DIAGNOSIS — J20.9 ACUTE BRONCHITIS, UNSPECIFIED ORGANISM: ICD-10-CM

## 2020-01-29 DIAGNOSIS — E11.42 TYPE 2 DIABETES MELLITUS WITH DIABETIC POLYNEUROPATHY, WITH LONG-TERM CURRENT USE OF INSULIN: ICD-10-CM

## 2020-01-29 DIAGNOSIS — E11.42 DIABETIC POLYNEUROPATHY ASSOCIATED WITH TYPE 2 DIABETES MELLITUS: ICD-10-CM

## 2020-01-29 PROCEDURE — 1101F PR PT FALLS ASSESS DOC 0-1 FALLS W/OUT INJ PAST YR: ICD-10-PCS | Mod: HCNC,CPTII,S$GLB, | Performed by: INTERNAL MEDICINE

## 2020-01-29 PROCEDURE — 1101F PT FALLS ASSESS-DOCD LE1/YR: CPT | Mod: HCNC,CPTII,S$GLB, | Performed by: INTERNAL MEDICINE

## 2020-01-29 PROCEDURE — 1159F PR MEDICATION LIST DOCUMENTED IN MEDICAL RECORD: ICD-10-PCS | Mod: HCNC,S$GLB,, | Performed by: INTERNAL MEDICINE

## 2020-01-29 PROCEDURE — 1126F PR PAIN SEVERITY QUANTIFIED, NO PAIN PRESENT: ICD-10-PCS | Mod: HCNC,S$GLB,, | Performed by: INTERNAL MEDICINE

## 2020-01-29 PROCEDURE — 99214 PR OFFICE/OUTPT VISIT, EST, LEVL IV, 30-39 MIN: ICD-10-PCS | Mod: 25,HCNC,S$GLB, | Performed by: INTERNAL MEDICINE

## 2020-01-29 PROCEDURE — 3078F PR MOST RECENT DIASTOLIC BLOOD PRESSURE < 80 MM HG: ICD-10-PCS | Mod: HCNC,CPTII,S$GLB, | Performed by: INTERNAL MEDICINE

## 2020-01-29 PROCEDURE — 3074F SYST BP LT 130 MM HG: CPT | Mod: HCNC,CPTII,S$GLB, | Performed by: INTERNAL MEDICINE

## 2020-01-29 PROCEDURE — 96372 PR INJECTION,THERAP/PROPH/DIAG2ST, IM OR SUBCUT: ICD-10-PCS | Mod: HCNC,S$GLB,, | Performed by: INTERNAL MEDICINE

## 2020-01-29 PROCEDURE — 99214 OFFICE O/P EST MOD 30 MIN: CPT | Mod: 25,HCNC,S$GLB, | Performed by: INTERNAL MEDICINE

## 2020-01-29 PROCEDURE — 99999 PR PBB SHADOW E&M-EST. PATIENT-LVL IV: ICD-10-PCS | Mod: PBBFAC,HCNC,, | Performed by: INTERNAL MEDICINE

## 2020-01-29 PROCEDURE — 99999 PR PBB SHADOW E&M-EST. PATIENT-LVL IV: CPT | Mod: PBBFAC,HCNC,, | Performed by: INTERNAL MEDICINE

## 2020-01-29 PROCEDURE — 99499 RISK ADDL DX/OHS AUDIT: ICD-10-PCS | Mod: HCNC,S$GLB,, | Performed by: INTERNAL MEDICINE

## 2020-01-29 PROCEDURE — 1126F AMNT PAIN NOTED NONE PRSNT: CPT | Mod: HCNC,S$GLB,, | Performed by: INTERNAL MEDICINE

## 2020-01-29 PROCEDURE — 96372 THER/PROPH/DIAG INJ SC/IM: CPT | Mod: HCNC,S$GLB,, | Performed by: INTERNAL MEDICINE

## 2020-01-29 PROCEDURE — 3078F DIAST BP <80 MM HG: CPT | Mod: HCNC,CPTII,S$GLB, | Performed by: INTERNAL MEDICINE

## 2020-01-29 PROCEDURE — 1159F MED LIST DOCD IN RCRD: CPT | Mod: HCNC,S$GLB,, | Performed by: INTERNAL MEDICINE

## 2020-01-29 PROCEDURE — 3074F PR MOST RECENT SYSTOLIC BLOOD PRESSURE < 130 MM HG: ICD-10-PCS | Mod: HCNC,CPTII,S$GLB, | Performed by: INTERNAL MEDICINE

## 2020-01-29 PROCEDURE — 99499 UNLISTED E&M SERVICE: CPT | Mod: HCNC,S$GLB,, | Performed by: INTERNAL MEDICINE

## 2020-01-29 RX ORDER — TRIAMCINOLONE ACETONIDE 40 MG/ML
40 INJECTION, SUSPENSION INTRA-ARTICULAR; INTRAMUSCULAR
Status: COMPLETED | OUTPATIENT
Start: 2020-01-29 | End: 2020-01-29

## 2020-01-29 RX ORDER — METOPROLOL SUCCINATE 50 MG/1
50 TABLET, EXTENDED RELEASE ORAL DAILY
Qty: 30 TABLET | Refills: 5 | Status: SHIPPED | OUTPATIENT
Start: 2020-01-29 | End: 2020-08-12

## 2020-01-29 RX ORDER — ATORVASTATIN CALCIUM 40 MG/1
40 TABLET, FILM COATED ORAL DAILY
Qty: 90 TABLET | Refills: 3 | Status: SHIPPED | OUTPATIENT
Start: 2020-01-29 | End: 2021-01-14

## 2020-01-29 RX ORDER — DOXYCYCLINE HYCLATE 100 MG
100 TABLET ORAL 2 TIMES DAILY
Qty: 20 TABLET | Refills: 0 | Status: SHIPPED | OUTPATIENT
Start: 2020-01-29 | End: 2020-02-08

## 2020-01-29 RX ORDER — SYRING-NEEDL,DISP,INSUL,0.3 ML 31GX15/64"
SYRINGE, EMPTY DISPOSABLE MISCELLANEOUS
COMMUNITY
Start: 2020-01-08 | End: 2020-01-29 | Stop reason: SDUPTHER

## 2020-01-29 RX ADMIN — TRIAMCINOLONE ACETONIDE 40 MG: 40 INJECTION, SUSPENSION INTRA-ARTICULAR; INTRAMUSCULAR at 08:01

## 2020-01-29 NOTE — PROGRESS NOTES
Subjective:       Patient ID: Roger Zavaleta Jr. is a 69 y.o. male.    Chief Complaint: Diabetes and Cough     I have reviewed the PMH,  and  for this patient    He  has a past medical history of CAD (coronary artery disease) (50), Diabetes mellitus type II, and HLD (hyperlipidemia).     The patient presents today for follow-up of chronic conditions.  He has seen Endocrinology about his diabetes.  He is also enrolled in the digital diabetic program.  He is now using 20 units of fast acting insulin with meals instead of following a sliding scale.  He reports that this is easier for him.  His hemoglobin A1c today is much better than it has been.  Today it is 7.2.  He had other blood work done before this visit, and it showed that his white blood cell count was elevated at 12.25.  His other labs were good.  His cholesterol was excellent with an LDL of 94.  His blood pressure today is only 100/54.  He states that he sees Dr. Enrique for Cardiology.  However, he has not seen him in over a year.  He is not having chest pain or shortness of breath.  He does complain of a cough for several months.  He is not on any medications which cause cough.  He reports that he was coughing so bad that he got dizzy and so he went to urgent care.  He was treated with amoxicillin and got a little better but then he got worse again.  As mentioned, his white blood cell count is elevated.  His temperature is normal at 97.8.  He is not having fever.  He has a mildly productive cough.  His throat is sore.  He has not been taking anything for the cough.    The patient denies chest pain, shortness of breath, nausea, or dizziness.     Active Ambulatory Problems     Diagnosis Date Noted    CAD (coronary artery disease)     HLD (hyperlipidemia)     Type 2 diabetes mellitus with diabetic polyneuropathy, with long-term current use of insulin 09/08/2014    Diabetic neuropathy 09/08/2014    BMI 30.0-30.9,adult 09/08/2014    Hypertension,  "essential 11/10/2014    Long-term insulin use 07/09/2018    Gastroesophageal reflux disease 07/09/2018    Epistaxis 10/15/2018    MELENDREZ (dyspnea on exertion) 10/15/2018     Resolved Ambulatory Problems     Diagnosis Date Noted    Diabetes mellitus, type 2     Trigger middle finger of left hand 04/17/2013    Dupuytren contracture 04/17/2013    Transaminasemia 04/17/2013    Finger pain 09/30/2013    Type II or unspecified type diabetes mellitus without mention of complication, not stated as uncontrolled 08/19/2014    Rib sprain 08/19/2014    Back strain 08/19/2014    Otitis externa of left ear 08/25/2014    BMI 35.0-35.9,adult 11/10/2014    Abnormal biliary HIDA scan 10/22/2015    RUQ abdominal pain 10/22/2015    Hypotension due to drugs 10/22/2015    Epigastric pain 06/08/2018    Chest pain 10/15/2018     Past Medical History:   Diagnosis Date    Diabetes mellitus type II          MEDICATIONS:  Current Outpatient Medications:     alpha lipoic acid 200 mg Cap, Take 1 capsule PO once daily, Disp: , Rfl:     aspirin 81 MG chewable tablet, Take 81 mg by mouth once daily.  , Disp: , Rfl:     BD VEO INSULIN SYR HALF UNIT 0.3 mL 31 gauge x 15/64" Syrg, THREE TIMES DAILY AS DIRECTED, Disp: , Rfl: 1    BD VEO INSULIN SYRINGE UF 0.3 mL 31 gauge x 15/64" Syrg, THREE TIMES DAILY AS DIRECTED, Disp: 100 Syringe, Rfl: 3    blood sugar diagnostic (ACCU-CHEK SMARTVIEW TEST STRIP) Strp, USE 1 STRIP TO CHECK GLUCOSE THREE TIMES DAILY AS NEEDED, Disp: 100 strip, Rfl: 3    chlorthalidone (HYGROTEN) 25 MG Tab, TAKE 1 TABLET BY MOUTH ONCE DAILY, Disp: 30 tablet, Rfl: 5    insulin aspart U-100 (NOVOLOG U-100 INSULIN ASPART) 100 unit/mL injection, Inject 20 Units into the skin 3 (three) times daily with meals. Plus sliding scale. Max TDD 75 units, Disp: 10 mL, Rfl: 5    irbesartan (AVAPRO) 150 MG tablet, Take 1 tablet (150 mg total) by mouth once daily., Disp: 30 tablet, Rfl: 0    LEVEMIR FLEXTOUCH U-100 INSULN 100 " "unit/mL (3 mL) InPn pen, INJECT 45 UNITS SUBCUTANEOUSLY ONCE DAILY, Disp: 15 mL, Rfl: 0    metFORMIN (GLUCOPHAGE) 1000 MG tablet, TAKE 1 TABLET BY MOUTH TWICE DAILY WITH MEALS, Disp: 180 tablet, Rfl: 1    metoprolol succinate (TOPROL-XL) 50 MG 24 hr tablet, Take 1 tablet (50 mg total) by mouth once daily., Disp: 30 tablet, Rfl: 5    nitroGLYCERIN (NITROSTAT) 0.4 MG SL tablet, Place 0.4 mg under the tongue every 5 (five) minutes as needed.  , Disp: , Rfl:     pen needle, diabetic 31 gauge x 5/16" Ndle, USE AS DIRECTED ONCE DAILY, Disp: 50 each, Rfl: 7    semaglutide (OZEMPIC) 1 mg/dose (2 mg/1.5 mL) PnIj, Inject 1 mg into the skin every 7 days., Disp: 3 mL, Rfl: 5    atorvastatin (LIPITOR) 40 MG tablet, Take 1 tablet (40 mg total) by mouth once daily., Disp: 90 tablet, Rfl: 3    doxycycline (VIBRA-TABS) 100 MG tablet, Take 1 tablet (100 mg total) by mouth 2 (two) times daily. for 10 days, Disp: 20 tablet, Rfl: 0    lancets (ULTRA THIN LANCETS) 30 gauge Misc, 1 lancet by Misc.(Non-Drug; Combo Route) route 3 (three) times daily., Disp: 200 each, Rfl: 3    Current Facility-Administered Medications:     triamcinolone acetonide injection 40 mg, 40 mg, Intramuscular, 1 time in Clinic/HOD, Evelia Yanez MD      HEALTH MAINTENANCE:   Health Maintenance   Topic Date Due    Foot Exam  01/22/2020    Hemoglobin A1c  04/22/2020    Eye Exam  06/05/2020    Lipid Panel  01/22/2021    High Dose Statin  01/29/2021    Aspirin/Antiplatelet Therapy  01/29/2021    Colonoscopy  12/03/2023    TETANUS VACCINE  05/25/2027    Hepatitis C Screening  Completed    Pneumococcal Vaccine (65+ Low/Medium Risk)  Completed       Review of Systems   Constitutional: Negative for activity change, chills, fatigue, fever and unexpected weight change.   HENT: Positive for hearing loss and rhinorrhea. Negative for congestion, ear discharge, ear pain, sore throat and trouble swallowing.    Eyes: Positive for visual disturbance. Negative " for discharge, redness and itching.   Respiratory: Positive for wheezing. Negative for cough, chest tightness and shortness of breath.    Cardiovascular: Negative for chest pain, palpitations and leg swelling.   Gastrointestinal: Positive for diarrhea and vomiting. Negative for abdominal pain, blood in stool, constipation and nausea.   Endocrine: Negative for cold intolerance, heat intolerance, polydipsia and polyuria.   Genitourinary: Negative for difficulty urinating, dysuria, flank pain, frequency, hematuria and urgency.   Musculoskeletal: Positive for arthralgias. Negative for back pain, joint swelling, myalgias and neck pain.   Skin: Negative for color change and rash.   Neurological: Positive for weakness and headaches. Negative for dizziness, tremors and numbness.   Psychiatric/Behavioral: Negative for confusion, dysphoric mood and sleep disturbance. The patient is not nervous/anxious.        Objective:          A1C:  Recent Labs   Lab 05/25/17  1000 06/08/18  1144 10/20/18  0802 01/21/19  0946 05/06/19  0703 01/22/20  0809   Hemoglobin A1C 8.1 H 8.6 H 8.4 H 9.3 H 9.3 H 7.2 H     CBC:  Recent Labs   Lab 05/25/17  1000 06/08/18  1144 10/20/18  0802 01/22/20  0809   WBC 9.81 9.13 9.80 12.25   RBC 4.90 5.00 4.80 4.88   Hemoglobin 13.9 L 14.3 13.7 L 14.6   Hematocrit 39.7 L 40.3 38.9 L 42.1   Platelets 246 241 209 255   Mean Corpuscular Volume 81 L 81 L 81 L 86   Mean Corpuscular Hemoglobin 28.4 28.6 28.5 29.9   Mean Corpuscular Hemoglobin Conc 35.0 35.5 35.2 34.7     CMP:  Recent Labs   Lab 05/25/17  1000 06/08/18  1144  01/22/20  0809   Glucose 157 H 186 H   < > 184 H   Calcium 9.5 9.3   < > 9.3   Albumin 4.1 4.0  --  4.1   Total Protein 7.8 7.6  --  7.7   Sodium 138 136   < > 139   Potassium 3.5 3.6   < > 4.3   CO2 32 H 28   < > 28   Chloride 96 97   < > 99   BUN, Bld 16 14   < > 16   Creatinine 0.98 0.85   < > 0.95   Alkaline Phosphatase 109 108  --  129 H   ALT 35 30  --  18   AST 29 37  --  29   Total  Bilirubin 1.1 H 0.7  --  0.9    < > = values in this interval not displayed.     LIPIDS:  Recent Labs   Lab 06/08/18  1144 09/05/18  1505 01/21/19  0946 01/22/20  0809   TSH  --  1.990  --   --    HDL 30 L  --  41 36 L   Cholesterol 145  --  100 L 155   Triglycerides 94  --  76 123   LDL Cholesterol 96.2  --  43.8 L 94.4   Hdl/Cholesterol Ratio 20.7  --  41.0 23.2   Non-HDL Cholesterol 115  --  59 119   Total Cholesterol/HDL Ratio 4.8  --  2.4 4.3     TSH:  Recent Labs   Lab 09/05/18  1505   TSH 1.990           Physical Exam   Constitutional: He appears well-developed and well-nourished. He does not have a sickly appearance.   HENT:   Head: Normocephalic and atraumatic.   Right Ear: External ear normal. Tympanic membrane is not erythematous. No middle ear effusion.   Left Ear: External ear normal. Tympanic membrane is not erythematous.  No middle ear effusion.   Nose: Rhinorrhea present. Right sinus exhibits no maxillary sinus tenderness and no frontal sinus tenderness. Left sinus exhibits no maxillary sinus tenderness and no frontal sinus tenderness.   Mouth/Throat: No posterior oropharyngeal edema or posterior oropharyngeal erythema. No tonsillar exudate.   Hearing aides   Eyes: Pupils are equal, round, and reactive to light. Conjunctivae and EOM are normal. Right eye exhibits no discharge. Left eye exhibits no discharge. Right conjunctiva is not injected. Left conjunctiva is not injected.   Neck: No thyromegaly present.   Cardiovascular: Normal rate, regular rhythm, normal heart sounds and intact distal pulses.   No murmur heard.  Pulmonary/Chest: Effort normal and breath sounds normal. No accessory muscle usage. No tachypnea. No respiratory distress. He has no wheezes. He has no rhonchi. He has no rales.           Abdominal: Bowel sounds are normal. He exhibits no distension. There is no tenderness.   Musculoskeletal: He exhibits no edema or tenderness.   Lymphadenopathy:     He has no cervical adenopathy.    Neurological: He displays normal reflexes. No cranial nerve deficit.   Skin: Skin is warm and dry. No lesion and no rash noted.   Psychiatric: He has a normal mood and affect. His behavior is normal. His mood appears not anxious. His speech is not rapid and/or pressured. He is not agitated and not aggressive. He does not exhibit a depressed mood.             Assessment and Plan:     Problem List Items Addressed This Visit        Neuro    Diabetic neuropathy- chronic.  Stable.       Cardiac/Vascular    CAD (coronary artery disease) - Primary - he needs to follow up with Dr. Enrique or Dr. Jama.  No symptoms at this time.      HLD (hyperlipidemia) - I refilled his atorvastatin.  Cholesterol looks good.      Hypertension, essential- BP was a little low.  I am reducing his metoprolol to 50 mg a day.  Continue irbesartan and chlorthalidone.       Endocrine    Type 2 diabetes mellitus with diabetic polyneuropathy, with long-term current use of insulin- he saw Endocrinology.  He is now taking a scheduled fast acting insulin and this seems to help.  He is taking 20 units 3 times a day.  He is also on Ozempic.  And metformin.  Hemoglobin A1c was much better at 7.2.  He has also lost weight.      BMI 30.0-30.9,adult- he has been working on his weight and his BMI is down to 30 from 32.  Keep working on diet.      Other Visit Diagnoses     Acute bronchitis, unspecified organism    - he had an elevated white blood cell count so we will treat with antibiotics.  Treat with doxycycline and a steroid shot.          Orders Placed This Encounter    metoprolol succinate (TOPROL-XL) 50 MG 24 hr tablet    atorvastatin (LIPITOR) 40 MG tablet    doxycycline (VIBRA-TABS) 100 MG tablet    triamcinolone acetonide injection 40 mg         Follow-up with me in 6 months.       Lazara Yanez MD,  FACP  Internal Medicine

## 2020-01-29 NOTE — PATIENT INSTRUCTIONS
We have reviewed your prescription medications.   You already had your flu shot this year.   Your diabetes looks much better!  I believe your WBC count is up because of your infection.  We will treat your bronchitis with doxycycline and a steroid shot.   Continue to use digital diabetes program.   Your blood sugars look much better!  Follow-up with cardiology as recommended. I refilled your cholesterol medicine.   We are also reducing your metoprolol to 50 mg a day because BP is a little low.   Follow-up in 6 months.

## 2020-02-03 ENCOUNTER — PATIENT OUTREACH (OUTPATIENT)
Dept: OTHER | Facility: OTHER | Age: 70
End: 2020-02-03

## 2020-02-03 NOTE — PROGRESS NOTES
Digital Medicine: Clinician Follow-Up    Called Mr. Roger Zavaleta Jr. for hypertension and diabetes digital medicine follow-up. He is doing well but, feeling slightly fatigued today due to bronchitis. Reports use of antibiotics and steroids and says that he was informed that his blood sugars may be slightly elevated for a few days. Novolog increased at last follow-up and patient is tolerating current regimen well with no complaints. A1C down from 9.3% to 7.2%. Blood pressure has been stable, but on the lower end of normal at last office visit. Metoprolol reduced to 50 mg and irbesartan and chlorthalidone were continued.     Patient denies s/s of hypotension (lightheadedness, dizziness, nausea, fatigue) associated with low readings. Instructed patient to inform me if this occurs, patient confirms understanding.    Patient denies s/s of hypertension (SOB, CP, severe headaches, changes in vision) associated with high readings. Instructed patient to go to the ED if BP >180/110 and accompanied by hypertensive s/s, patient confirms understanding.  mg/dL)      Patient denies s/s or episodes of hypoglycemia (weakness, dizziness, hunger, shakiness, nausea, headache, heart palpitations, sweating, fatigue, anxiety, etc.).    Patient denies s/s of hyperglycemia (headache, increased thirst, increased urination, fatigue, blurred vision).              The history is provided by the patient. No  was used.     Follow Up  Follow-up reason(s): reading review and medication change follow-up      Patient started new medication.    Is patient tolerating med change?:  Yes          Assessment:  Patient's current 30-day average is T goal of <130/80 mmHg based on ACC/AHA HTN guidelines.     Diabetes now slightly uncontrolled based on patient last A1C however, improved overall.     Plan:  Continue current regimen.  Patient's health , Yaneli Rojas, will follow-up as scheduled.    I will continue to monitor regularly  and will follow-up in 4-6 weeks, sooner if blood pressure or blood glucose begins to trend upward or downward.     Patient has my contact information and knows to call with any concerns or clinical changes.          There are no preventive care reminders to display for this patient.    Last 5 Patient Entered Readings                                      Current 30 Day Average: 126/70     Recent Readings 2/2/2020 1/26/2020 1/24/2020 1/20/2020 1/17/2020    SBP (mmHg) 129 131 102 133 133    DBP (mmHg) 69 68 60 69 71    Pulse 62 78 69 64 64        Last 6 Patient Entered Readings                                          Most Recent A1c: 7.2% on 1/22/2020  (Goal: 7%)     Recent Readings 2/3/2020 2/2/2020 2/2/2020 2/1/2020 2/1/2020    Blood Glucose (mg/dL) 208 153 122 164 92           Hypertension Medications             chlorthalidone (HYGROTEN) 25 MG Tab TAKE 1 TABLET BY MOUTH ONCE DAILY    irbesartan (AVAPRO) 150 MG tablet Take 1 tablet (150 mg total) by mouth once daily.    metoprolol succinate (TOPROL-XL) 50 MG 24 hr tablet Take 1 tablet (50 mg total) by mouth once daily.    nitroGLYCERIN (NITROSTAT) 0.4 MG SL tablet Place 0.4 mg under the tongue every 5 (five) minutes as needed.          Diabetes Medications             insulin aspart U-100 (NOVOLOG U-100 INSULIN ASPART) 100 unit/mL injection Inject 20 Units into the skin 3 (three) times daily with meals. Plus sliding scale. Max TDD 75 units    LEVEMIR FLEXTOUCH U-100 INSULN 100 unit/mL (3 mL) InPn pen INJECT 45 UNITS SUBCUTANEOUSLY ONCE DAILY    metFORMIN (GLUCOPHAGE) 1000 MG tablet TAKE 1 TABLET BY MOUTH TWICE DAILY WITH MEALS    semaglutide (OZEMPIC) 1 mg/dose (2 mg/1.5 mL) PnIj Inject 1 mg into the skin every 7 days.               Screenings

## 2020-02-04 DIAGNOSIS — Z79.4 TYPE 2 DIABETES MELLITUS WITH DIABETIC POLYNEUROPATHY, WITH LONG-TERM CURRENT USE OF INSULIN: ICD-10-CM

## 2020-02-04 DIAGNOSIS — E11.42 TYPE 2 DIABETES MELLITUS WITH DIABETIC POLYNEUROPATHY, WITH LONG-TERM CURRENT USE OF INSULIN: ICD-10-CM

## 2020-02-04 RX ORDER — INSULIN ASPART 100 [IU]/ML
10 INJECTION, SOLUTION INTRAVENOUS; SUBCUTANEOUS
Qty: 10 ML | Refills: 5 | Status: SHIPPED | OUTPATIENT
Start: 2020-02-04 | End: 2020-03-11

## 2020-02-07 ENCOUNTER — PATIENT OUTREACH (OUTPATIENT)
Dept: OTHER | Facility: OTHER | Age: 70
End: 2020-02-07

## 2020-02-07 NOTE — PROGRESS NOTES
Digital Medicine: Health  Follow-Up    Received low BG alert.  Patient states that he was feeling a little hot, so he checked his BG and received the low reading.  He says he ate some candy and rechecked.  BG was at a normal level.  He ate lunch and threw up so he checked his BG again and it was in a normal level.      The history is provided by the patient. No  was used.     Follow Up  Follow-up reason(s): reading review      Alert received.   Care Team received low BG alert.  Patient is experiencing symptomsof hypotension.        INTERVENTION(S)  denied resources and denied questions    PLAN  continue monitoring      There are no preventive care reminders to display for this patient.    Last 5 Patient Entered Readings                                      Current 30 Day Average: 122/69     Recent Readings 2/6/2020 2/2/2020 1/26/2020 1/24/2020 1/20/2020    SBP (mmHg) 112 129 131 102 133    DBP (mmHg) 64 69 68 60 69    Pulse 76 62 78 69 64        Last 6 Patient Entered Readings                                          Most Recent A1c: 7.2% on 1/22/2020  (Goal: 7%)     Recent Readings 2/7/2020 2/7/2020 2/7/2020 2/6/2020 2/6/2020    Blood Glucose (mg/dL) 95 69 230 240 85                    Diet Screening   Breakfast is typically between. Yogurt.  Lunch is typically between. Salad.    Dinner is typically between. Fish, vegetables, beans.    He has the following dietary restrictions: low sodium diet and diabetic    Assigning the following patient goals: maintain low sodium diet and reduce carbs    Physical Activity Screening   No change to exercise routine.        Assigning the following patient goal(s): increase physical activity    Medication Adherence Screening   He did not miss a dose this month.  Patient knows purpose of medications.        SDOH

## 2020-02-10 ENCOUNTER — OFFICE VISIT (OUTPATIENT)
Dept: PODIATRY | Facility: CLINIC | Age: 70
End: 2020-02-10
Payer: MEDICARE

## 2020-02-10 VITALS
SYSTOLIC BLOOD PRESSURE: 120 MMHG | BODY MASS INDEX: 29.76 KG/M2 | RESPIRATION RATE: 16 BRPM | WEIGHT: 196.38 LBS | DIASTOLIC BLOOD PRESSURE: 62 MMHG | HEART RATE: 72 BPM | HEIGHT: 68 IN

## 2020-02-10 DIAGNOSIS — B35.1 ONYCHOMYCOSIS DUE TO DERMATOPHYTE: ICD-10-CM

## 2020-02-10 DIAGNOSIS — E11.42 TYPE 2 DIABETES MELLITUS WITH DIABETIC POLYNEUROPATHY, WITH LONG-TERM CURRENT USE OF INSULIN: Primary | ICD-10-CM

## 2020-02-10 DIAGNOSIS — Z79.4 TYPE 2 DIABETES MELLITUS WITH DIABETIC POLYNEUROPATHY, WITH LONG-TERM CURRENT USE OF INSULIN: Primary | ICD-10-CM

## 2020-02-10 PROCEDURE — 99999 PR PBB SHADOW E&M-EST. PATIENT-LVL IV: CPT | Mod: PBBFAC,HCNC,, | Performed by: PODIATRIST

## 2020-02-10 PROCEDURE — 99999 PR PBB SHADOW E&M-EST. PATIENT-LVL IV: ICD-10-PCS | Mod: PBBFAC,HCNC,, | Performed by: PODIATRIST

## 2020-02-10 PROCEDURE — 11721 PR DEBRIDEMENT OF NAILS, 6 OR MORE: ICD-10-PCS | Mod: Q9,HCNC,S$GLB, | Performed by: PODIATRIST

## 2020-02-10 PROCEDURE — 99499 NO LOS: ICD-10-PCS | Mod: HCNC,S$GLB,, | Performed by: PODIATRIST

## 2020-02-10 PROCEDURE — 99499 UNLISTED E&M SERVICE: CPT | Mod: HCNC,S$GLB,, | Performed by: PODIATRIST

## 2020-02-10 PROCEDURE — 11721 DEBRIDE NAIL 6 OR MORE: CPT | Mod: Q9,HCNC,S$GLB, | Performed by: PODIATRIST

## 2020-02-10 NOTE — PROGRESS NOTES
Subjective:      Patient ID: Roger Zavaleta Jr. is a 69 y.o. male.    Chief Complaint: Diabetic Foot Exam (B/L feet); Nail Care (B/L nail clipping); and PCP visit (Dr. Yanez last visit 01/29/2020)    Roger is a 69 y.o. male who presents to the clinic for evaluation and treatment of high risk feet. Roger has a past medical history of CAD (coronary artery disease) (50), Diabetes mellitus type II, and HLD (hyperlipidemia). The patient's chief complaint is long, thick toenails. This patient has documented high risk feet requiring routine maintenance secondary to diabetes mellitis and those secondary complications of diabetes, as mentioned..  No other complaints than painful and mycotic nails x 10. He tells me he has underlying DM neuropathy and sometimes he feels numbness and tingling.     PCP: Evelia Yanez MD    Date Last Seen by PCP: in epic     Current shoe gear:  Affected Foot: Casual shoes     Unaffected Foot: Casual shoes    Hemoglobin A1C   Date Value Ref Range Status   01/22/2020 7.2 (H) 4.0 - 5.6 % Final     Comment:     ADA Screening Guidelines:  5.7-6.4%  Consistent with prediabetes  >or=6.5%  Consistent with diabetes  High levels of fetal hemoglobin interfere with the HbA1C  assay. Heterozygous hemoglobin variants (HbS, HgC, etc)do  not significantly interfere with this assay.   However, presence of multiple variants may affect accuracy.     05/06/2019 9.3 (H) 4.0 - 5.6 % Final     Comment:     ADA Screening Guidelines:  5.7-6.4%  Consistent with prediabetes  >or=6.5%  Consistent with diabetes  High levels of fetal hemoglobin interfere with the HbA1C  assay. Heterozygous hemoglobin variants (HbS, HgC, etc)do  not significantly interfere with this assay.   However, presence of multiple variants may affect accuracy.     01/21/2019 9.3 (H) 4.0 - 5.6 % Final     Comment:     ADA Screening Guidelines:  5.7-6.4%  Consistent with prediabetes  >or=6.5%  Consistent with diabetes  High levels of fetal hemoglobin  interfere with the HbA1C  assay. Heterozygous hemoglobin variants (HbS, HgC, etc)do  not significantly interfere with this assay.   However, presence of multiple variants may affect accuracy.         Review of Systems   Constitution: Negative for decreased appetite, fever and malaise/fatigue.   HENT: Negative for congestion.    Cardiovascular: Negative for chest pain and leg swelling.   Respiratory: Negative for cough and shortness of breath.    Skin: Positive for color change. Negative for nail changes and rash.   Musculoskeletal: Negative for arthritis, joint pain, joint swelling and muscle weakness.   Gastrointestinal: Negative for bloating, abdominal pain, nausea and vomiting.   Neurological: Positive for numbness and sensory change. Negative for headaches and weakness.   Psychiatric/Behavioral: Negative for altered mental status.           Objective:      Physical Exam   Constitutional: He is oriented to person, place, and time. He appears well-developed and well-nourished. No distress.   Musculoskeletal: He exhibits no edema, tenderness or deformity.   Neurological: He is alert and oriented to person, place, and time.   Skin: Skin is warm. Capillary refill takes less than 2 seconds. No erythema.   Psychiatric: He has a normal mood and affect. His behavior is normal.     Vascular: Distal DP/PT pulses palpable 1/4 b/l. CRT < 3 sec to tips of toes. No edema noted to b/l LE. No vericosities noted to b/l LEs. Hair growth present b/l LE, warm to touch b/l LE and cool to touch to toes.    Dermatologic: No open lesions, lacerations or wounds. Interdigital spaces clean, dry and intact b/l. No erythema, rubor, calor noted to b/l LE  Toenails 1-5 toe left and right are discolored/yellowed, dystrophic, brittle with subungual debris. No incurvation. Mild xerosis noted, bilat. No open wounds.    Musculoskeletal: Equinus noted b/l ankles with < 10 deg DF noted b/l. MMT 5/5 in DF/PF/Inv/Ev resistance with no reproduction of  pain in any direction. Passive range of motion of ankle and pedal joints is painless b/l. No calf tenderness b/l LE, Compartments soft/compressible b/l.     Neurological:   Light touch, proprioception, and sharp/dull sensation are decreased b/l. Protective threshold with the [place] monofilament is decreased b/l. Vibratory sensation decreased b/l.         Assessment:       Encounter Diagnoses   Name Primary?    Type 2 diabetes mellitus with diabetic polyneuropathy, with long-term current use of insulin Yes    Onychomycosis due to dermatophyte          Plan:       Roger was seen today for diabetic foot exam, nail care and pcp visit.    Diagnoses and all orders for this visit:    Type 2 diabetes mellitus with diabetic polyneuropathy, with long-term current use of insulin    Onychomycosis due to dermatophyte      I counseled the patient on his conditions, their implications and medical management.    68 years old male with diabetic foot risk assessment, painful mycotic nail of the left foot 4th and 5th toe.    -nails x 10 sharply debrided with a nail Nipper.  Patient tolerated well. No complication   -It was discussed the importance of wearing shoes with adequate room in toe box to accommodate toe deformities. Recommended New Balance/[employer]ics shoe brands with adequate arch supports to alleviate abnormal pressure and improve stability of foot while walking. Avoid flat shoes and barefoot walking as these will exacerbate or worsen symptoms.   - Shoe inspection. General Foot Education. Patient reminded of the importance of good nutrition. Patient instructed on proper foot hygeine. We discussed wearing proper shoe gear, daily foot inspections, never walking without protective shoe gear, caution putting sharp instruments to feet   - Discussed general foot care:  Wear comfortable, proper fitting shoes. Wash feet daily. Dry well. After drying, apply moisturizer to feet (no lotion to webspaces). Inspect feet daily for  skin breaks, blisters, swelling, or redness. Wear cotton socks (preferably white)  Change socks every day. Do NOT walk barefoot. Do NOT use heating pads or warm/hot water soaks   -The nature of the condition, options for management, as well as potential risks and complications were discussed in detail with patient. Patient was amenable to my recommendations and left my office fully informed and will follow up as instructed or sooner if necessary.    -Patient was advised of signs and symptoms of infection including redness, drainage, purulence, odor, streaking, fever, chills and I advised patient to seek medical attention (ER or urgent care) if these symptoms arise.   -f/u 6 months     mb  Nails x 10  F/u 6 motnhs

## 2020-02-17 ENCOUNTER — PATIENT OUTREACH (OUTPATIENT)
Dept: OTHER | Facility: OTHER | Age: 70
End: 2020-02-17

## 2020-02-17 NOTE — PROGRESS NOTES
Digital Medicine: Health  Follow-Up    Patient states that he is not sure why his blood sugar dropped so much.  He states he had taken his injection about 45 minutes prior to his blood sugar dropping.  He was feeling hot around the neck a got dizzy when he stood up which prompted him to test his blood sugar.  He received the low reading and drank some juice and ate two starburst candies.  His follow up reading was in a normal range.    The history is provided by the patient. No  was used.     Follow Up  Follow-up reason(s): reading review      Alert received.   Care Team received low BG alert.  Patient is experiencing symptomsof hypoglycemia.        INTERVENTION(S)  denied resources and denied questions    PLAN  continue monitoring      There are no preventive care reminders to display for this patient.    Last 5 Patient Entered Readings                                      Current 30 Day Average: 123/67     Recent Readings 2/16/2020 2/11/2020 2/6/2020 2/2/2020 1/26/2020    SBP (mmHg) 124 130 112 129 131    DBP (mmHg) 70 67 64 69 68    Pulse 78 77 76 62 78        Last 6 Patient Entered Readings                                          Most Recent A1c: 7.2% on 1/22/2020  (Goal: 7%)     Recent Readings 2/17/2020 2/16/2020 2/16/2020 2/16/2020 2/15/2020    Blood Glucose (mg/dL) 152 150 126 215 112                Screenings    SDOH

## 2020-02-18 DIAGNOSIS — I10 HYPERTENSION, ESSENTIAL: ICD-10-CM

## 2020-02-19 ENCOUNTER — TELEPHONE (OUTPATIENT)
Dept: DIABETES | Facility: CLINIC | Age: 70
End: 2020-02-19

## 2020-02-20 RX ORDER — CHLORTHALIDONE 25 MG/1
TABLET ORAL
Qty: 90 TABLET | Refills: 1 | Status: SHIPPED | OUTPATIENT
Start: 2020-02-20 | End: 2020-08-24

## 2020-02-20 RX ORDER — METFORMIN HYDROCHLORIDE 1000 MG/1
1000 TABLET ORAL 2 TIMES DAILY WITH MEALS
Qty: 180 TABLET | Refills: 1 | Status: SHIPPED | OUTPATIENT
Start: 2020-02-20 | End: 2020-09-30 | Stop reason: SDUPTHER

## 2020-02-20 RX ORDER — IRBESARTAN 150 MG/1
TABLET ORAL
Qty: 90 TABLET | Refills: 1 | Status: SHIPPED | OUTPATIENT
Start: 2020-02-20 | End: 2020-05-28

## 2020-02-27 ENCOUNTER — PATIENT OUTREACH (OUTPATIENT)
Dept: OTHER | Facility: OTHER | Age: 70
End: 2020-02-27

## 2020-02-27 NOTE — PROGRESS NOTES
Digital Medicine: Health  Follow-Up    Called patient regarding low BG alert received 2/26/20. He denies any symptoms of hypoglycemia, but says he ate 4 starbust candies and retested about 30 minutes later.  BG was at a normal level.    The history is provided by the patient. No  was used.     Follow Up  Follow-up reason(s): reading review      Alert received.   Care Team received low BG alert.  Patient is not experiencing symptoms.  Routine Education Topics: meal planning        INTERVENTION(S)  recommended diet modifications, encouragement/support, denied resources and denied questions    PLAN  continue monitoring      There are no preventive care reminders to display for this patient.    Last 5 Patient Entered Readings                                      Current 30 Day Average: 128/68     Recent Readings 2/23/2020 2/19/2020 2/16/2020 2/11/2020 2/6/2020    SBP (mmHg) 133 138 124 130 112    DBP (mmHg) 61 74 70 67 64    Pulse 86 69 78 77 76        Last 6 Patient Entered Readings                                          Most Recent A1c: 7.2% on 1/22/2020  (Goal: 7%)     Recent Readings 2/27/2020 2/27/2020 2/26/2020 2/26/2020 2/26/2020    Blood Glucose (mg/dL) 128 160 106 60 225                    Diet Screening   Breakfast is typically between. Oatmeal, yogurt.  Lunch is typically between. [place], vegetable.    Dinner is typically between. Protein and vegetable .    He has the following dietary restrictions: low sodium diet and diabeticHe does not skip meals regularly.  He has no standard fasting periods.      Patient did not have times when they could not afford food or ran out.  He cooks for self and has meals prepared by family.    Patient does the shopping for groceries and lets family grocery shop.  He gets groceries from the grocery store.      Intervention(s): meal planning    Assigning the following patient goals: maintain low sodium diet    Physical Activity Screening   When asked  if exercising, patient responded: yes7 day(s) a week.      Patient participates in the following activities: walking    Patient says he has increased walking and has lot 10 lbs.    Medication Adherence Screening   He did not miss a dose this month.  Patient knows purpose of medications.        SDOH

## 2020-03-11 ENCOUNTER — PATIENT OUTREACH (OUTPATIENT)
Dept: OTHER | Facility: OTHER | Age: 70
End: 2020-03-11

## 2020-03-11 DIAGNOSIS — E11.42 TYPE 2 DIABETES MELLITUS WITH DIABETIC POLYNEUROPATHY, WITH LONG-TERM CURRENT USE OF INSULIN: ICD-10-CM

## 2020-03-11 DIAGNOSIS — Z79.4 TYPE 2 DIABETES MELLITUS WITH DIABETIC POLYNEUROPATHY, WITH LONG-TERM CURRENT USE OF INSULIN: ICD-10-CM

## 2020-03-11 RX ORDER — INSULIN ASPART 100 [IU]/ML
20 INJECTION, SOLUTION INTRAVENOUS; SUBCUTANEOUS
Qty: 10 ML | Refills: 5
Start: 2020-03-11 | End: 2021-01-14 | Stop reason: SDUPTHER

## 2020-03-11 NOTE — PROGRESS NOTES
Digital Medicine: Clinician Follow-Up    Called Mr. Roger Zavaleta . for hypertension and diabetes digital medicine follow-up. Patient reports adherence to medication regimen with no complaints. Home BG and BP readings have trended up slightly since last follow-up. Patient attributes elevated readings to use of steroids for bronchitis. Says that he was informed of therapies impact on his blood pressure and  Blood glucose and was advised to monitor closely. He will complete steroids next Wednesday.      Patient denies s/s of hypotension (lightheadedness, dizziness, nausea, fatigue) associated with low readings. Instructed patient to inform me if this occurs, patient confirms understanding.    Patient denies s/s of hypertension (SOB, CP, severe headaches, changes in vision) associated with high readings. Instructed patient to go to the ED if BP >180/110 and accompanied by hypertensive s/s, patient confirms understanding.    Patient denies s/s or episodes of hypoglycemia (weakness, dizziness, hunger, shakiness, nausea, headache, heart palpitations, sweating, fatigue, anxiety, etc.).    Patient denies s/s of hyperglycemia (headache, increased thirst, increased urination, fatigue, blurred vision).        The history is provided by the patient. No  was used.     Follow Up  Follow-up reason(s): reading review      Readings are trending up due to steroid therapy .            Assessment:  Patient's current 30-day average is approaching goal of <130/80 mmHg based on ACC/AHA HTN guidelines.     Diabetes control has improved based on patient's last A1C of 7.2%. Elevated post prandial readings secondary to steroid use.     Plan:  Continue current regimen.  Evaluate readings further after completion of steroids.   Patient's health , Yaneli Rojas, will follow-up as scheduled.    I will continue to monitor regularly and will follow-up in 4-6 weeks, sooner if blood pressure or blood glucose begins to  trend upward or downward.     Patient has my contact information and knows to call with any concerns or clinical changes.                  There are no preventive care reminders to display for this patient.    Last 5 Patient Entered Readings                                      Current 30 Day Average: 133/68     Recent Readings 3/11/2020 3/7/2020 3/4/2020 2/28/2020 2/23/2020    SBP (mmHg) 135 147 135 121 133    DBP (mmHg) 74 68 65 68 61    Pulse 66 69 69 69 86        Last 6 Patient Entered Readings                                          Most Recent A1c: 7.2% on 1/22/2020  (Goal: 7%)     Recent Readings 3/11/2020 3/11/2020 3/10/2020 3/10/2020 3/10/2020    Blood Glucose (mg/dL) 122 141 161 155 134           Hypertension Medications             chlorthalidone (HYGROTEN) 25 MG Tab TAKE 1 TABLET BY MOUTH ONCE DAILY    irbesartan (AVAPRO) 150 MG tablet TAKE 1 TABLET BY MOUTH ONCE DAILY    metoprolol succinate (TOPROL-XL) 50 MG 24 hr tablet Take 1 tablet (50 mg total) by mouth once daily.    nitroGLYCERIN (NITROSTAT) 0.4 MG SL tablet Place 0.4 mg under the tongue every 5 (five) minutes as needed.          Diabetes Medications             insulin aspart U-100 (NOVOLOG U-100 INSULIN ASPART) 100 unit/mL injection Inject 20 Units into the skin 3 (three) times daily before meals.    LEVEMIR FLEXTOUCH U-100 INSULN 100 unit/mL (3 mL) InPn pen INJECT 45 UNITS SUBCUTANEOUSLY ONCE DAILY    metFORMIN (GLUCOPHAGE) 1000 MG tablet Take 1 tablet (1,000 mg total) by mouth 2 (two) times daily with meals.    semaglutide (OZEMPIC) 1 mg/dose (2 mg/1.5 mL) PnIj Inject 1 mg into the skin every 7 days.               Screenings

## 2020-03-13 ENCOUNTER — PATIENT MESSAGE (OUTPATIENT)
Dept: FAMILY MEDICINE | Facility: CLINIC | Age: 70
End: 2020-03-13

## 2020-03-19 ENCOUNTER — OFFICE VISIT (OUTPATIENT)
Dept: FAMILY MEDICINE | Facility: CLINIC | Age: 70
End: 2020-03-19
Payer: MEDICARE

## 2020-03-19 VITALS
SYSTOLIC BLOOD PRESSURE: 120 MMHG | TEMPERATURE: 98 F | BODY MASS INDEX: 29.15 KG/M2 | DIASTOLIC BLOOD PRESSURE: 60 MMHG | OXYGEN SATURATION: 98 % | HEIGHT: 68 IN | WEIGHT: 192.31 LBS | HEART RATE: 62 BPM

## 2020-03-19 DIAGNOSIS — J30.9 ALLERGIC RHINITIS, UNSPECIFIED SEASONALITY, UNSPECIFIED TRIGGER: ICD-10-CM

## 2020-03-19 DIAGNOSIS — H65.01 RIGHT ACUTE SEROUS OTITIS MEDIA, RECURRENCE NOT SPECIFIED: ICD-10-CM

## 2020-03-19 DIAGNOSIS — J01.00 ACUTE MAXILLARY SINUSITIS, RECURRENCE NOT SPECIFIED: ICD-10-CM

## 2020-03-19 DIAGNOSIS — E11.42 TYPE 2 DIABETES MELLITUS WITH DIABETIC POLYNEUROPATHY, WITH LONG-TERM CURRENT USE OF INSULIN: Primary | ICD-10-CM

## 2020-03-19 DIAGNOSIS — Z79.4 TYPE 2 DIABETES MELLITUS WITH DIABETIC POLYNEUROPATHY, WITH LONG-TERM CURRENT USE OF INSULIN: Primary | ICD-10-CM

## 2020-03-19 DIAGNOSIS — J20.9 ACUTE BRONCHITIS, UNSPECIFIED ORGANISM: ICD-10-CM

## 2020-03-19 PROCEDURE — 99214 OFFICE O/P EST MOD 30 MIN: CPT | Mod: HCNC,S$GLB,, | Performed by: INTERNAL MEDICINE

## 2020-03-19 PROCEDURE — 2024F PR 7 FIELD PHOTOS WITH INTERP/ REVIEW: ICD-10-PCS | Mod: HCNC,S$GLB,, | Performed by: INTERNAL MEDICINE

## 2020-03-19 PROCEDURE — 3074F SYST BP LT 130 MM HG: CPT | Mod: HCNC,CPTII,S$GLB, | Performed by: INTERNAL MEDICINE

## 2020-03-19 PROCEDURE — 3078F DIAST BP <80 MM HG: CPT | Mod: HCNC,CPTII,S$GLB, | Performed by: INTERNAL MEDICINE

## 2020-03-19 PROCEDURE — 99999 PR PBB SHADOW E&M-EST. PATIENT-LVL IV: ICD-10-PCS | Mod: PBBFAC,HCNC,, | Performed by: INTERNAL MEDICINE

## 2020-03-19 PROCEDURE — 2024F 7 FLD RTA PHOTO EVC RTNOPTHY: CPT | Mod: HCNC,S$GLB,, | Performed by: INTERNAL MEDICINE

## 2020-03-19 PROCEDURE — 1159F PR MEDICATION LIST DOCUMENTED IN MEDICAL RECORD: ICD-10-PCS | Mod: HCNC,S$GLB,, | Performed by: INTERNAL MEDICINE

## 2020-03-19 PROCEDURE — 3078F PR MOST RECENT DIASTOLIC BLOOD PRESSURE < 80 MM HG: ICD-10-PCS | Mod: HCNC,CPTII,S$GLB, | Performed by: INTERNAL MEDICINE

## 2020-03-19 PROCEDURE — 1159F MED LIST DOCD IN RCRD: CPT | Mod: HCNC,S$GLB,, | Performed by: INTERNAL MEDICINE

## 2020-03-19 PROCEDURE — 1101F PT FALLS ASSESS-DOCD LE1/YR: CPT | Mod: HCNC,CPTII,S$GLB, | Performed by: INTERNAL MEDICINE

## 2020-03-19 PROCEDURE — 3074F PR MOST RECENT SYSTOLIC BLOOD PRESSURE < 130 MM HG: ICD-10-PCS | Mod: HCNC,CPTII,S$GLB, | Performed by: INTERNAL MEDICINE

## 2020-03-19 PROCEDURE — 3051F HG A1C>EQUAL 7.0%<8.0%: CPT | Mod: HCNC,CPTII,S$GLB, | Performed by: INTERNAL MEDICINE

## 2020-03-19 PROCEDURE — 1126F AMNT PAIN NOTED NONE PRSNT: CPT | Mod: HCNC,S$GLB,, | Performed by: INTERNAL MEDICINE

## 2020-03-19 PROCEDURE — 99214 PR OFFICE/OUTPT VISIT, EST, LEVL IV, 30-39 MIN: ICD-10-PCS | Mod: HCNC,S$GLB,, | Performed by: INTERNAL MEDICINE

## 2020-03-19 PROCEDURE — 99999 PR PBB SHADOW E&M-EST. PATIENT-LVL IV: CPT | Mod: PBBFAC,HCNC,, | Performed by: INTERNAL MEDICINE

## 2020-03-19 PROCEDURE — 1126F PR PAIN SEVERITY QUANTIFIED, NO PAIN PRESENT: ICD-10-PCS | Mod: HCNC,S$GLB,, | Performed by: INTERNAL MEDICINE

## 2020-03-19 PROCEDURE — 1101F PR PT FALLS ASSESS DOC 0-1 FALLS W/OUT INJ PAST YR: ICD-10-PCS | Mod: HCNC,CPTII,S$GLB, | Performed by: INTERNAL MEDICINE

## 2020-03-19 PROCEDURE — 3051F PR MOST RECENT HEMOGLOBIN A1C LEVEL 7.0 - < 8.0%: ICD-10-PCS | Mod: HCNC,CPTII,S$GLB, | Performed by: INTERNAL MEDICINE

## 2020-03-19 RX ORDER — CEFDINIR 300 MG/1
300 CAPSULE ORAL 2 TIMES DAILY
Qty: 20 CAPSULE | Refills: 0 | Status: SHIPPED | OUTPATIENT
Start: 2020-03-19 | End: 2020-03-29

## 2020-03-19 RX ORDER — FLUTICASONE PROPIONATE 50 MCG
1 SPRAY, SUSPENSION (ML) NASAL DAILY
Qty: 16 G | Refills: 3 | Status: SHIPPED | OUTPATIENT
Start: 2020-03-19

## 2020-03-19 NOTE — PATIENT INSTRUCTIONS
We have reviewed your prescription medications.   We will treat your sinus and bronchitis infections with omnicef.  I am also recommending that you use flonase nasal spray daily for allergies and congestion.   Watch your sugars. You are due for follow-up in may for your chronic conditions.

## 2020-03-25 NOTE — PROGRESS NOTES
Subjective:       Patient ID: Roger Zavaleta Jr. is a 69 y.o. male.    Chief Complaint: Bronchitis and Cough     I have reviewed the PMH,  and  for this patient    He  has a past medical history of CAD (coronary artery disease) (50), Diabetes mellitus type II, and HLD (hyperlipidemia).    He is here today for evaluation of coughing.  A nonproductive cough for about 3 weeks.  He also complains of a runny nose.  He had similar symptoms in January and was treated with doxycycline and a steroid shot.  He seemed to get better but then he got worse again a few weeks ago.  He is not having any fever.  He has not traveled recently.  His blood pressure looks good at 120/60.  His blood sugars have not been elevated.  He is due for follow-up of chronic conditions in May.  He has been taking his cholesterol medicine as recommended.  He has not been using Flonase nasal spray for allergies on a daily    The patient denies chest pain, shortness of breath, nausea, or dizziness.  Basis.    Cough   This is a recurrent problem. The current episode started more than 1 month ago. The problem has been waxing and waning. The problem occurs hourly. The cough is productive of sputum. Associated symptoms include chills, headaches, nasal congestion, postnasal drip, rhinorrhea, shortness of breath and wheezing. Pertinent negatives include no chest pain, ear congestion, ear pain, eye redness, fever, heartburn, hemoptysis, myalgias, rash, sore throat, sweats or weight loss. Nothing aggravates the symptoms. Risk factors for lung disease include animal exposure. He has tried OTC cough suppressant for the symptoms. The treatment provided mild relief. His past medical history is significant for bronchitis.     Active Ambulatory Problems     Diagnosis Date Noted    CAD (coronary artery disease)     HLD (hyperlipidemia)     Type 2 diabetes mellitus with diabetic polyneuropathy, with long-term current use of insulin 09/08/2014    Diabetic  "neuropathy 09/08/2014    BMI 30.0-30.9,adult 09/08/2014    Hypertension, essential 11/10/2014    Long-term insulin use 07/09/2018    Gastroesophageal reflux disease 07/09/2018    Epistaxis 10/15/2018    MELENDREZ (dyspnea on exertion) 10/15/2018    Bilateral hearing loss 01/29/2020     Resolved Ambulatory Problems     Diagnosis Date Noted    Diabetes mellitus, type 2     Trigger middle finger of left hand 04/17/2013    Dupuytren contracture 04/17/2013    Transaminasemia 04/17/2013    Finger pain 09/30/2013    Type II or unspecified type diabetes mellitus without mention of complication, not stated as uncontrolled 08/19/2014    Rib sprain 08/19/2014    Back strain 08/19/2014    Otitis externa of left ear 08/25/2014    BMI 35.0-35.9,adult 11/10/2014    Abnormal biliary HIDA scan 10/22/2015    RUQ abdominal pain 10/22/2015    Hypotension due to drugs 10/22/2015    Epigastric pain 06/08/2018    Chest pain 10/15/2018     Past Medical History:   Diagnosis Date    Diabetes mellitus type II          MEDICATIONS:  Current Outpatient Medications:     alpha lipoic acid 200 mg Cap, Take 1 capsule PO once daily, Disp: , Rfl:     aspirin 81 MG chewable tablet, Take 81 mg by mouth once daily.  , Disp: , Rfl:     atorvastatin (LIPITOR) 40 MG tablet, Take 1 tablet (40 mg total) by mouth once daily., Disp: 90 tablet, Rfl: 3    BD VEO INSULIN SYR HALF UNIT 0.3 mL 31 gauge x 15/64" Syrg, THREE TIMES DAILY AS DIRECTED, Disp: , Rfl: 1    BD VEO INSULIN SYRINGE UF 0.3 mL 31 gauge x 15/64" Syrg, THREE TIMES DAILY AS DIRECTED, Disp: 100 Syringe, Rfl: 3    blood sugar diagnostic (ACCU-CHEK SMARTVIEW TEST STRIP) Strp, USE 1 STRIP TO CHECK GLUCOSE THREE TIMES DAILY AS NEEDED, Disp: 100 strip, Rfl: 3    chlorthalidone (HYGROTEN) 25 MG Tab, TAKE 1 TABLET BY MOUTH ONCE DAILY, Disp: 90 tablet, Rfl: 1    insulin aspart U-100 (NOVOLOG U-100 INSULIN ASPART) 100 unit/mL injection, Inject 20 Units into the skin 3 (three) times " "daily before meals., Disp: 10 mL, Rfl: 5    irbesartan (AVAPRO) 150 MG tablet, TAKE 1 TABLET BY MOUTH ONCE DAILY, Disp: 90 tablet, Rfl: 1    LEVEMIR FLEXTOUCH U-100 INSULN 100 unit/mL (3 mL) InPn pen, INJECT 45 UNITS SUBCUTANEOUSLY ONCE DAILY, Disp: 15 mL, Rfl: 0    metFORMIN (GLUCOPHAGE) 1000 MG tablet, Take 1 tablet (1,000 mg total) by mouth 2 (two) times daily with meals., Disp: 180 tablet, Rfl: 1    metoprolol succinate (TOPROL-XL) 50 MG 24 hr tablet, Take 1 tablet (50 mg total) by mouth once daily., Disp: 30 tablet, Rfl: 5    nitroGLYCERIN (NITROSTAT) 0.4 MG SL tablet, Place 0.4 mg under the tongue every 5 (five) minutes as needed.  , Disp: , Rfl:     pen needle, diabetic 31 gauge x 5/16" Ndle, USE AS DIRECTED ONCE DAILY, Disp: 50 each, Rfl: 7    semaglutide (OZEMPIC) 1 mg/dose (2 mg/1.5 mL) PnIj, Inject 1 mg into the skin every 7 days., Disp: 3 mL, Rfl: 5    cefdinir (OMNICEF) 300 MG capsule, Take 1 capsule (300 mg total) by mouth 2 (two) times daily. for 10 days, Disp: 20 capsule, Rfl: 0    fluticasone propionate (FLONASE) 50 mcg/actuation nasal spray, 1 spray (50 mcg total) by Each Nostril route once daily., Disp: 16 g, Rfl: 3    lancets (ULTRA THIN LANCETS) 30 gauge Misc, 1 lancet by Misc.(Non-Drug; Combo Route) route 3 (three) times daily., Disp: 200 each, Rfl: 3      HEALTH MAINTENANCE:   Health Maintenance   Topic Date Due    Hemoglobin A1c  04/22/2020    Eye Exam  06/05/2020    Lipid Panel  01/22/2021    Foot Exam  02/10/2021    High Dose Statin  03/19/2021    Aspirin/Antiplatelet Therapy  03/19/2021    Colonoscopy  12/03/2023    TETANUS VACCINE  05/25/2027    Hepatitis C Screening  Completed    Pneumococcal Vaccine (65+ Low/Medium Risk)  Completed       Review of Systems   Constitutional: Positive for chills. Negative for fatigue, fever and weight loss.   HENT: Positive for postnasal drip and rhinorrhea. Negative for congestion, ear discharge, ear pain and sore throat.    Eyes: " Negative for discharge, redness and itching.   Respiratory: Positive for cough, shortness of breath and wheezing. Negative for hemoptysis and chest tightness.    Cardiovascular: Negative for chest pain, palpitations and leg swelling.   Gastrointestinal: Negative for abdominal pain, constipation, diarrhea, heartburn, nausea and vomiting.   Endocrine: Negative for cold intolerance and heat intolerance.   Genitourinary: Negative for dysuria, flank pain, frequency and hematuria.   Musculoskeletal: Negative for arthralgias, back pain, joint swelling and myalgias.   Skin: Negative for color change and rash.   Neurological: Positive for headaches. Negative for dizziness, tremors and numbness.   Psychiatric/Behavioral: Negative for dysphoric mood and sleep disturbance. The patient is not nervous/anxious.        Objective:          A1C:  Recent Labs   Lab 05/25/17  1000 06/08/18  1144 10/20/18  0802 01/21/19  0946 05/06/19  0703 01/22/20  0809   Hemoglobin A1C 8.1 H 8.6 H 8.4 H 9.3 H 9.3 H 7.2 H     CBC:  Recent Labs   Lab 05/25/17  1000 06/08/18  1144 10/20/18  0802 01/22/20  0809   WBC 9.81 9.13 9.80 12.25   RBC 4.90 5.00 4.80 4.88   Hemoglobin 13.9 L 14.3 13.7 L 14.6   Hematocrit 39.7 L 40.3 38.9 L 42.1   Platelets 246 241 209 255   Mean Corpuscular Volume 81 L 81 L 81 L 86   Mean Corpuscular Hemoglobin 28.4 28.6 28.5 29.9   Mean Corpuscular Hemoglobin Conc 35.0 35.5 35.2 34.7     CMP:  Recent Labs   Lab 05/25/17  1000 06/08/18  1144  01/22/20  0809   Glucose 157 H 186 H   < > 184 H   Calcium 9.5 9.3   < > 9.3   Albumin 4.1 4.0  --  4.1   Total Protein 7.8 7.6  --  7.7   Sodium 138 136   < > 139   Potassium 3.5 3.6   < > 4.3   CO2 32 H 28   < > 28   Chloride 96 97   < > 99   BUN, Bld 16 14   < > 16   Creatinine 0.98 0.85   < > 0.95   Alkaline Phosphatase 109 108  --  129 H   ALT 35 30  --  18   AST 29 37  --  29   Total Bilirubin 1.1 H 0.7  --  0.9    < > = values in this interval not displayed.     LIPIDS:  Recent Labs    Lab 06/08/18  1144 09/05/18  1505 01/21/19  0946 01/22/20  0809   TSH  --  1.990  --   --    HDL 30 L  --  41 36 L   Cholesterol 145  --  100 L 155   Triglycerides 94  --  76 123   LDL Cholesterol 96.2  --  43.8 L 94.4   Hdl/Cholesterol Ratio 20.7  --  41.0 23.2   Non-HDL Cholesterol 115  --  59 119   Total Cholesterol/HDL Ratio 4.8  --  2.4 4.3     TSH:  Recent Labs   Lab 09/05/18  1505   TSH 1.990           Physical Exam   Constitutional: He appears well-developed and well-nourished. He does not have a sickly appearance.   HENT:   Head: Normocephalic and atraumatic.   Right Ear: External ear normal. Tympanic membrane is erythematous. A middle ear effusion is present.   Left Ear: External ear normal. Tympanic membrane is not erythematous.  No middle ear effusion.   Nose: No rhinorrhea. Right sinus exhibits maxillary sinus tenderness. Right sinus exhibits no frontal sinus tenderness. Left sinus exhibits maxillary sinus tenderness. Left sinus exhibits no frontal sinus tenderness.   Mouth/Throat: Posterior oropharyngeal erythema present. No posterior oropharyngeal edema. No tonsillar exudate.   Eyes: Pupils are equal, round, and reactive to light. Conjunctivae and EOM are normal. Right eye exhibits no discharge. Left eye exhibits no discharge. Right conjunctiva is not injected. Left conjunctiva is not injected.   Neck: No thyromegaly present.   Cardiovascular: Normal rate, regular rhythm, normal heart sounds and intact distal pulses.   No murmur heard.  Pulmonary/Chest: Effort normal and breath sounds normal. He has no wheezes. He has no rhonchi. He has no rales.   Abdominal: Bowel sounds are normal. He exhibits no distension. There is no tenderness.   Musculoskeletal: He exhibits no edema or tenderness.   Lymphadenopathy:     He has no cervical adenopathy.   Neurological: He displays normal reflexes. No cranial nerve deficit.   Skin: Skin is warm and dry. No lesion and no rash noted.   Psychiatric: He has a normal  mood and affect. His behavior is normal. His mood appears not anxious. His speech is not rapid and/or pressured. He is not agitated and not aggressive. He does not exhibit a depressed mood.             Assessment and Plan:     Problem List Items Addressed This Visit        Endocrine    Type 2 diabetes mellitus with diabetic polyneuropathy, with long-term current use of insulin - Primary - blood sugars are good.  Elevated.  He takes his medicines as directed      Other Visit Diagnoses     Right acute serous otitis media, recurrence not specified    - we will treat with Omnicef.      Acute maxillary sinusitis, recurrence not specified    - we will treat with Omnicef.  Continue Flonase.      Acute bronchitis, unspecified organism    - we will treat  With Omnicef.    Allergic rhinitis, unspecified seasonality, unspecified trigger    - use Flonase daily especially for the next 3 months.          Orders Placed This Encounter    fluticasone propionate (FLONASE) 50 mcg/actuation nasal spray    cefdinir (OMNICEF) 300 MG capsule         Follow-up with me in 2 months.       Lazara Yanez MD,  FACP  Internal Medicine

## 2020-04-09 ENCOUNTER — PATIENT OUTREACH (OUTPATIENT)
Dept: OTHER | Facility: OTHER | Age: 70
End: 2020-04-09

## 2020-04-09 NOTE — PROGRESS NOTES
Digital Medicine: Health  Follow-Up    Patient states that the low BG reading on 3/28 was an error, but he says he did have a hypoglycemic event last week.  He states he was at a relatives house and began to feel bad so he took a reading with their cuff and it was 62.  He says he had some juice and his blood sugar went back to a normal range.    The history is provided by the patient. No  was used.     Follow Up  Follow-up reason(s): reading review      Alert received.   Care Team received low BG alert.  Patient is not experiencing symptoms.  Reading was invalid because not enough blood on the testing strip      Intervention/Plan    There are no preventive care reminders to display for this patient.    Last 5 Patient Entered Readings                                      Current 30 Day Average: 125/70     Recent Readings 4/6/2020 4/5/2020 4/3/2020 4/1/2020 3/30/2020    SBP (mmHg) 124 126 123 127 122    DBP (mmHg) 66 74 74 60 63    Pulse 71 67 68 64 75        Last 6 Patient Entered Readings                                          Most Recent A1c: 7.2% on 1/22/2020  (Goal: 8%)     Recent Readings 4/9/2020 4/8/2020 4/8/2020 4/8/2020 4/7/2020    Blood Glucose (mg/dL) 172 117 194 194 210                    Diet Screening   No change to diet.  Patient reports eating or drinking the following: juice, water, fresh vegetables and nuts, low fat dairy, lean proteinsHe has the following dietary restrictions: diabeticHe has meals prepared by family.    Patient lets family grocery shop.  He gets groceries from the grocery store.      Barriers to a Healthy Diet: no barriers to healthy eating    Patient states he has been sheltering in place and has not been eating out at all.    Assigning the following patient goals: reduce sugar, meal plan, maintain low sodium diet and reduce carbs    Physical Activity Screening   When asked if exercising, patient responded: yes7 day(s) a week.  His level of intensity  when exercising is low.    Patient participates in the following activities: yard/housework    He identified the following barriers to physical activity: no barriers to being active    Patient says he is making it a point to go outside and work in his yard to get sun and fresh air as well as activity.    Medication Adherence Screening   He did not miss a dose this month.  Patient knows purpose of medications.      Patient identified the following reasons for non-compliance: None    Patient says he is well stocked on test strips and medications.      SDOH

## 2020-04-15 RX ORDER — SYRING-NEEDL,DISP,INSUL,0.3 ML 31GX15/64"
SYRINGE, EMPTY DISPOSABLE MISCELLANEOUS
Qty: 100 SYRINGE | Refills: 5 | Status: SHIPPED | OUTPATIENT
Start: 2020-04-15 | End: 2020-12-02 | Stop reason: SDUPTHER

## 2020-05-18 ENCOUNTER — TELEPHONE (OUTPATIENT)
Dept: FAMILY MEDICINE | Facility: CLINIC | Age: 70
End: 2020-05-18

## 2020-05-28 ENCOUNTER — TELEPHONE (OUTPATIENT)
Dept: INTERNAL MEDICINE | Facility: CLINIC | Age: 70
End: 2020-05-28

## 2020-05-28 DIAGNOSIS — I10 HYPERTENSION, ESSENTIAL: ICD-10-CM

## 2020-05-28 RX ORDER — OLMESARTAN MEDOXOMIL 20 MG/1
20 TABLET ORAL DAILY
Qty: 90 TABLET | Refills: 1 | Status: SHIPPED | OUTPATIENT
Start: 2020-05-28 | End: 2020-05-28 | Stop reason: ALTCHOICE

## 2020-05-28 NOTE — TELEPHONE ENCOUNTER
----- Message from Charley Justin sent at 5/28/2020  9:55 AM CDT -----  Contact: [phone]/ Todd with [facility]   Todd with [facility] would like to speak with you in regards to medication irbesartan (AVAPRO) 150 MG tablet. She says the patient needs a new medication in place of this one because it is on back order. Please advise.

## 2020-05-28 NOTE — TELEPHONE ENCOUNTER
Pharmacy is sending a request for a new script because pt's irbesartan script is on back order. Please send in another script for this pt.

## 2020-05-29 RX ORDER — IRBESARTAN 150 MG/1
150 TABLET ORAL DAILY
Qty: 90 TABLET | Refills: 1 | Status: SHIPPED | OUTPATIENT
Start: 2020-05-29 | End: 2021-03-12 | Stop reason: SDUPTHER

## 2020-05-31 PROCEDURE — 99454 PR REMOTE MNTR, PHYS PARAM, INITIAL, EA 30 DAYS: ICD-10-PCS | Mod: S$GLB,,, | Performed by: INTERNAL MEDICINE

## 2020-05-31 PROCEDURE — 99454 REM MNTR PHYSIOL PARAM 16-30: CPT | Mod: S$GLB,,, | Performed by: INTERNAL MEDICINE

## 2020-06-05 ENCOUNTER — PATIENT OUTREACH (OUTPATIENT)
Dept: OTHER | Facility: OTHER | Age: 70
End: 2020-06-05

## 2020-06-05 NOTE — PROGRESS NOTES
Digital Medicine: Health  Follow-Up    Called patient for routine follow up.  He attributes higher BG readings to dietary indiscretions.  He says he is feeling well and denies any symptoms of hyper or hypotension and no symptoms of hyper or hypoglycemia.    The history is provided by the patient. No  was used.   Follow Up  Follow-up reason(s): reading review      Readings are trending up       INTERVENTION(S)  recommended diet modifications, encouragement/support, denied resources and denied questions    PLAN  continue monitoring          Topic    Hemoglobin A1C     Eye Exam        Last 5 Patient Entered Readings                                      Current 30 Day Average: 127/71     Recent Readings 6/2/2020 5/30/2020 5/24/2020 5/24/2020 5/19/2020    SBP (mmHg) 122 135 130 152 129    DBP (mmHg) 65 80 74 81 68    Pulse 67 63 66 63 64        Last 6 Patient Entered Readings                                          Most Recent A1c: 7.2% on 1/22/2020  (Goal: 7%)     Recent Readings 6/5/2020 6/4/2020 6/4/2020 6/4/2020 6/3/2020    Blood Glucose (mg/dL) 208 178 98 98 105                    Diet Screening   Patient reports eating or drinking the following: fruit, fresh vegetables and fish, Latasha has the following dietary restrictions: low sodium diet and diabeticHe cooks for self and has meals prepared by family.    Patient does the shopping for groceries and lets family grocery shop.  He gets groceries from the grocery store.      Barriers to a Healthy Diet: taste preference    Patient says they have been eating a lot of fish, vegetables, and fruit.  He says they mostly bake the fish, but occasionally will have it fried. Patient says he was eating a lot of watermelon, but noticed it was making his blood sugar spike.  He says he has cut back on the watermelon.  I reviewed carb counting with patient.    Intervention(s): portion control and carb reduction    Assigning the following patient goals:  reduce sugar, meal plan and maintain low sodium diet    Physical Activity Screening   When asked if exercising, patient responded: yes7 day(s) a week.      Patient participates in the following activities: walking and yard/housework    He identified the following barriers to physical activity: pain/injury/recent surgery    Patient says he was riding his bike, but it was hurting his hip so he is walking and working in his yard for exercise.    Assigning the following patient goal(s): participate in exercise weekly    Medication Adherence Screening   He did not miss a dose this month.  Patient knows purpose of medications.      Patient identified the following reasons for non-compliance: None    Patient says he does not need any refills today.      SDOH

## 2020-07-17 ENCOUNTER — PATIENT OUTREACH (OUTPATIENT)
Dept: OTHER | Facility: OTHER | Age: 70
End: 2020-07-17

## 2020-07-17 NOTE — PROGRESS NOTES
Digital Medicine: Health  Follow-Up    Called patient for health  follow up.  He states he is feeling fine with regard to his blood pressure and blood sugar.  He attributes improved readings to better dietary choices.      The history is provided by the patient.         Reason for review: Blood glucose at goal and Blood pressure at goal        Topics Covered on Call: Diet          Diet-Change    Patient reports eating or drinking the following: Low sodium/diabetic diet.  Dietary Improvements:Patient states he is eating more home cooked meals and snacks. He has reduced his consumption of processed foods.      Dietary Indiscretions:        Additional diet details:    Physical Activity-Change      He identified the following barriers to physical activity: Foot injury        He physical activity details: Patient states he stepped on a nail about a week ago. He did not feel it when it happened, but felt some stinging in his foot. He says when he took his shoe off, it was full of blood. He says it is healing and does not show any signs of infection.  Patient states he has not been walking as much because of this injury      Medication Adherence-Medication adherence was assessed.      Substance, Sleep, Stress-Not assessed    PLAN  Continue current diet/physical activity routine: increase exercise as tolerated  Provided patient education: wound care    Patient verbalizes understanding. Patient did not express questions or concerns and patient has contact information if needed.    Explained the importance of self-monitoring and medication adherence. Encouraged the patient to communicate with their health  for lifestyle modifications to help improve or maintain a healthy lifestyle.            Topic    Hemoglobin A1C     Eye Exam        Last 5 Patient Entered Readings                                      Current 30 Day Average: 120/68     Recent Readings 7/13/2020 7/6/2020 7/6/2020 6/30/2020 6/26/2020    SBP  (mmHg) 116 135 147 104 124    DBP (mmHg) 69 65 73 64 68    Pulse 69 84 83 72 76        Last 6 Patient Entered Readings                                          Most Recent A1c: 7.2% on 1/22/2020  (Goal: 7%)     Recent Readings 7/17/2020 7/16/2020 7/16/2020 7/16/2020 7/15/2020    Blood Glucose (mg/dL) 171 152 165 114 166

## 2020-07-23 ENCOUNTER — PATIENT MESSAGE (OUTPATIENT)
Dept: FAMILY MEDICINE | Facility: CLINIC | Age: 70
End: 2020-07-23

## 2020-07-24 ENCOUNTER — OFFICE VISIT (OUTPATIENT)
Dept: PODIATRY | Facility: CLINIC | Age: 70
End: 2020-07-24
Payer: MEDICARE

## 2020-07-24 VITALS
DIASTOLIC BLOOD PRESSURE: 66 MMHG | BODY MASS INDEX: 28.54 KG/M2 | SYSTOLIC BLOOD PRESSURE: 116 MMHG | HEART RATE: 68 BPM | WEIGHT: 188.31 LBS | HEIGHT: 68 IN

## 2020-07-24 DIAGNOSIS — T14.8XXA PUNCTURE WOUND: Primary | ICD-10-CM

## 2020-07-24 DIAGNOSIS — L03.032 CELLULITIS OF GREAT TOE OF LEFT FOOT: ICD-10-CM

## 2020-07-24 DIAGNOSIS — E11.49 TYPE II DIABETES MELLITUS WITH NEUROLOGICAL MANIFESTATIONS: ICD-10-CM

## 2020-07-24 PROCEDURE — 1101F PR PT FALLS ASSESS DOC 0-1 FALLS W/OUT INJ PAST YR: ICD-10-PCS | Mod: HCNC,CPTII,S$GLB, | Performed by: PODIATRIST

## 2020-07-24 PROCEDURE — 3078F PR MOST RECENT DIASTOLIC BLOOD PRESSURE < 80 MM HG: ICD-10-PCS | Mod: HCNC,CPTII,S$GLB, | Performed by: PODIATRIST

## 2020-07-24 PROCEDURE — 3074F PR MOST RECENT SYSTOLIC BLOOD PRESSURE < 130 MM HG: ICD-10-PCS | Mod: HCNC,CPTII,S$GLB, | Performed by: PODIATRIST

## 2020-07-24 PROCEDURE — 99999 PR PBB SHADOW E&M-EST. PATIENT-LVL IV: CPT | Mod: PBBFAC,HCNC,, | Performed by: PODIATRIST

## 2020-07-24 PROCEDURE — 3051F PR MOST RECENT HEMOGLOBIN A1C LEVEL 7.0 - < 8.0%: ICD-10-PCS | Mod: HCNC,CPTII,S$GLB, | Performed by: PODIATRIST

## 2020-07-24 PROCEDURE — 3074F SYST BP LT 130 MM HG: CPT | Mod: HCNC,CPTII,S$GLB, | Performed by: PODIATRIST

## 2020-07-24 PROCEDURE — 1125F PR PAIN SEVERITY QUANTIFIED, PAIN PRESENT: ICD-10-PCS | Mod: HCNC,S$GLB,, | Performed by: PODIATRIST

## 2020-07-24 PROCEDURE — 1159F PR MEDICATION LIST DOCUMENTED IN MEDICAL RECORD: ICD-10-PCS | Mod: HCNC,S$GLB,, | Performed by: PODIATRIST

## 2020-07-24 PROCEDURE — 1159F MED LIST DOCD IN RCRD: CPT | Mod: HCNC,S$GLB,, | Performed by: PODIATRIST

## 2020-07-24 PROCEDURE — 3078F DIAST BP <80 MM HG: CPT | Mod: HCNC,CPTII,S$GLB, | Performed by: PODIATRIST

## 2020-07-24 PROCEDURE — 1125F AMNT PAIN NOTED PAIN PRSNT: CPT | Mod: HCNC,S$GLB,, | Performed by: PODIATRIST

## 2020-07-24 PROCEDURE — 3008F BODY MASS INDEX DOCD: CPT | Mod: HCNC,CPTII,S$GLB, | Performed by: PODIATRIST

## 2020-07-24 PROCEDURE — 3051F HG A1C>EQUAL 7.0%<8.0%: CPT | Mod: HCNC,CPTII,S$GLB, | Performed by: PODIATRIST

## 2020-07-24 PROCEDURE — 99999 PR PBB SHADOW E&M-EST. PATIENT-LVL IV: ICD-10-PCS | Mod: PBBFAC,HCNC,, | Performed by: PODIATRIST

## 2020-07-24 PROCEDURE — 1101F PT FALLS ASSESS-DOCD LE1/YR: CPT | Mod: HCNC,CPTII,S$GLB, | Performed by: PODIATRIST

## 2020-07-24 PROCEDURE — 3008F PR BODY MASS INDEX (BMI) DOCUMENTED: ICD-10-PCS | Mod: HCNC,CPTII,S$GLB, | Performed by: PODIATRIST

## 2020-07-24 PROCEDURE — 99213 OFFICE O/P EST LOW 20 MIN: CPT | Mod: HCNC,S$GLB,, | Performed by: PODIATRIST

## 2020-07-24 PROCEDURE — 99213 PR OFFICE/OUTPT VISIT, EST, LEVL III, 20-29 MIN: ICD-10-PCS | Mod: HCNC,S$GLB,, | Performed by: PODIATRIST

## 2020-07-24 RX ORDER — CLINDAMYCIN HYDROCHLORIDE 300 MG/1
300 CAPSULE ORAL 3 TIMES DAILY
Qty: 42 CAPSULE | Refills: 0 | Status: SHIPPED | OUTPATIENT
Start: 2020-07-24 | End: 2020-08-07

## 2020-07-24 RX ORDER — CIPROFLOXACIN 500 MG/1
500 TABLET ORAL EVERY 12 HOURS
Qty: 28 TABLET | Refills: 0 | Status: SHIPPED | OUTPATIENT
Start: 2020-07-24 | End: 2020-08-07

## 2020-07-24 NOTE — PROGRESS NOTES
Subjective:      Patient ID: Roger Zavaleta Jr. is a 69 y.o. male.    Chief Complaint: Foot Injury (left foot, stepped on nail on 7/14/2020), Diabetes Mellitus, and PCP Visit (Dr. Yanez last visit 3/19/2020)    Roger is a 69 y.o. male who presents to the clinic for evaluation and treatment of high risk feet. Roger has a past medical history of CAD (coronary artery disease) (50), Diabetes mellitus type II, and HLD (hyperlipidemia). The patient's chief complaint is long, thick toenails. This patient has documented high risk feet requiring routine maintenance secondary to diabetes mellitis and those secondary complications of diabetes, as mentioned..  No other complaints than painful and mycotic nails x 10. He tells me he has underlying DM neuropathy and sometimes he feels numbness and tingling.     7/24/20 presenting with left hallux puncture wound. Stepped on a nail last week. Was wearing a shoe. Nails went through his shoe. Ambulating in [facility] today. Tetanus was updated in 2017. Has been cleaning with saline. Tender to touch. Denies n/v/f/c.       PCP: Evelia Yanez MD    Date Last Seen by PCP: in epic     Hemoglobin A1C   Date Value Ref Range Status   01/22/2020 7.2 (H) 4.0 - 5.6 % Final     Comment:     ADA Screening Guidelines:  5.7-6.4%  Consistent with prediabetes  >or=6.5%  Consistent with diabetes  High levels of fetal hemoglobin interfere with the HbA1C  assay. Heterozygous hemoglobin variants (HbS, HgC, etc)do  not significantly interfere with this assay.   However, presence of multiple variants may affect accuracy.     05/06/2019 9.3 (H) 4.0 - 5.6 % Final     Comment:     ADA Screening Guidelines:  5.7-6.4%  Consistent with prediabetes  >or=6.5%  Consistent with diabetes  High levels of fetal hemoglobin interfere with the HbA1C  assay. Heterozygous hemoglobin variants (HbS, HgC, etc)do  not significantly interfere with this assay.   However, presence of multiple variants may affect accuracy.      01/21/2019 9.3 (H) 4.0 - 5.6 % Final     Comment:     ADA Screening Guidelines:  5.7-6.4%  Consistent with prediabetes  >or=6.5%  Consistent with diabetes  High levels of fetal hemoglobin interfere with the HbA1C  assay. Heterozygous hemoglobin variants (HbS, HgC, etc)do  not significantly interfere with this assay.   However, presence of multiple variants may affect accuracy.         Review of Systems   Constitution: Negative for decreased appetite, fever and malaise/fatigue.   HENT: Negative for congestion.    Cardiovascular: Negative for chest pain and leg swelling.   Respiratory: Negative for cough and shortness of breath.    Skin: Positive for color change. Negative for nail changes and rash.   Musculoskeletal: Negative for arthritis, joint pain, joint swelling and muscle weakness.   Gastrointestinal: Negative for bloating, abdominal pain, nausea and vomiting.   Neurological: Positive for numbness and sensory change. Negative for headaches and weakness.   Psychiatric/Behavioral: Negative for altered mental status.           Objective:      Physical Exam  Constitutional:       General: He is not in acute distress.     Appearance: He is well-developed.   Musculoskeletal:         General: No tenderness or deformity.   Skin:     General: Skin is warm.      Capillary Refill: Capillary refill takes less than 2 seconds.      Findings: No erythema.   Neurological:      Mental Status: He is alert and oriented to person, place, and time.   Psychiatric:         Behavior: Behavior normal.       Vascular: Distal DP/PT pulses palpable 1/4 b/l. CRT < 3 sec to tips of toes. No vericosities noted to b/l LEs. Hair growth present b/l LE, warm to touch b/l LE and cool to touch to toes.  L foot: mild edema hallux     Dermatologic:   L foot: puncture wound plantar hallux. 0.8cm x 0.8cm x 0.2cm open wound with epidermolysis. No fluctuance, +rubor with some erythema, no pus, no drainage, no malodor.     Toenails 1-5 toe left and  right are discolored/yellowed, dystrophic, brittle with subungual debris.     Musculoskeletal: Equinus noted b/l ankles with < 10 deg DF noted b/l. MMT 5/5 in DF/PF/Inv/Ev resistance with no reproduction of pain in any direction. Passive range of motion of ankle and pedal joints is painless b/l. No calf tenderness b/l LE, Compartments soft/compressible b/l.     Neurological:   Light touch, proprioception, and sharp/dull sensation are decreased b/l. Protective threshold with the [place]-Wienstein monofilament is decreased b/l. Vibratory sensation decreased b/l.         Assessment:       Encounter Diagnoses   Name Primary?    Puncture wound - Left Foot Yes    Type II diabetes mellitus with neurological manifestations - Left Foot     Cellulitis of great toe of left foot          Plan:       Roger was seen today for foot injury, diabetes mellitus and pcp visit.    Diagnoses and all orders for this visit:    Puncture wound - Left Foot  -     X-Ray Foot Complete Left; Future    Type II diabetes mellitus with neurological manifestations - Left Foot    Cellulitis of great toe of left foot    Other orders  -     clindamycin (CLEOCIN) 300 MG capsule; Take 1 capsule (300 mg total) by mouth 3 (three) times daily. for 14 days  -     ciprofloxacin HCl (CIPRO) 500 MG tablet; Take 1 tablet (500 mg total) by mouth every 12 (twelve) hours. for 14 days      I counseled the patient on his conditions, their implications and medical management.    69 y.o.  male with DM with puncture wound with cellultis.    -wound cleaned with saline and inspected. No drainage, no collection of fluid but with rubor and erythema.   -rx. Clinda, cipro x 2 weeks.  -rx. L foot xray: no OM, no gas.     -The nature of the condition, options for management, as well as potential risks and complications were discussed in detail with patient. Patient was amenable to my recommendations and left my office fully informed and will follow up as instructed or sooner  if necessary.    -Patient was advised of signs and symptoms of infection including redness, drainage, purulence, odor, streaking, fever, chills and I advised patient to seek medical attention (ER or urgent care) if these symptoms arise.   -Advised for optimal glucose control and maintenance per primary care physician. Patient was also educated on healthy diet that is naturally rich in nutrients and low in fat and calories.   -f/u 2 weeks

## 2020-07-31 PROCEDURE — 99454 REM MNTR PHYSIOL PARAM 16-30: CPT | Mod: S$GLB,,, | Performed by: INTERNAL MEDICINE

## 2020-07-31 PROCEDURE — 99454 PR REMOTE MNTR, PHYS PARAM, INITIAL, EA 30 DAYS: ICD-10-PCS | Mod: S$GLB,,, | Performed by: INTERNAL MEDICINE

## 2020-08-03 PROCEDURE — 99454 REM MNTR PHYSIOL PARAM 16-30: CPT | Mod: S$GLB,,, | Performed by: INTERNAL MEDICINE

## 2020-08-03 PROCEDURE — 99454 PR REMOTE MNTR, PHYS PARAM, INITIAL, EA 30 DAYS: ICD-10-PCS | Mod: S$GLB,,, | Performed by: INTERNAL MEDICINE

## 2020-08-05 ENCOUNTER — PATIENT OUTREACH (OUTPATIENT)
Dept: ADMINISTRATIVE | Facility: OTHER | Age: 70
End: 2020-08-05

## 2020-08-05 NOTE — PROGRESS NOTES
Requested updates within Care Everywhere.  Patient's chart was reviewed for overdue RUTHIE topics.  Immunizations reconciled.    Orders placed:  Tasked appts:  Labs Linked:

## 2020-08-10 ENCOUNTER — OFFICE VISIT (OUTPATIENT)
Dept: PODIATRY | Facility: CLINIC | Age: 70
End: 2020-08-10
Payer: MEDICARE

## 2020-08-10 VITALS
HEIGHT: 68 IN | SYSTOLIC BLOOD PRESSURE: 102 MMHG | BODY MASS INDEX: 27.99 KG/M2 | DIASTOLIC BLOOD PRESSURE: 64 MMHG | WEIGHT: 184.69 LBS

## 2020-08-10 DIAGNOSIS — Z79.4 TYPE 2 DIABETES MELLITUS WITH DIABETIC POLYNEUROPATHY, WITH LONG-TERM CURRENT USE OF INSULIN: ICD-10-CM

## 2020-08-10 DIAGNOSIS — E11.621 DIABETIC ULCER OF LEFT GREAT TOE: Primary | ICD-10-CM

## 2020-08-10 DIAGNOSIS — E11.42 TYPE 2 DIABETES MELLITUS WITH DIABETIC POLYNEUROPATHY, WITH LONG-TERM CURRENT USE OF INSULIN: ICD-10-CM

## 2020-08-10 DIAGNOSIS — L97.529 DIABETIC ULCER OF LEFT GREAT TOE: Primary | ICD-10-CM

## 2020-08-10 PROCEDURE — 99499 UNLISTED E&M SERVICE: CPT | Mod: HCNC,S$GLB,, | Performed by: PODIATRIST

## 2020-08-10 PROCEDURE — 99999 PR PBB SHADOW E&M-EST. PATIENT-LVL IV: CPT | Mod: PBBFAC,HCNC,, | Performed by: PODIATRIST

## 2020-08-10 PROCEDURE — 11042 DBRDMT SUBQ TIS 1ST 20SQCM/<: CPT | Mod: HCNC,S$GLB,, | Performed by: PODIATRIST

## 2020-08-10 PROCEDURE — 11042 WOUND DEBRIDEMENT: ICD-10-PCS | Mod: HCNC,S$GLB,, | Performed by: PODIATRIST

## 2020-08-10 PROCEDURE — 99999 PR PBB SHADOW E&M-EST. PATIENT-LVL IV: ICD-10-PCS | Mod: PBBFAC,HCNC,, | Performed by: PODIATRIST

## 2020-08-10 PROCEDURE — 99499 NO LOS: ICD-10-PCS | Mod: HCNC,S$GLB,, | Performed by: PODIATRIST

## 2020-08-10 NOTE — PROCEDURES
Wound Debridement    Date/Time: 8/10/2020 8:00 AM  Performed by: Zena Patel DPM  Authorized by: Zena Patel DPM     Consent Done?:  Yes (Verbal)  Local anesthesia used?: No      Wound Details:    Location:  Left foot    Location:  Left 1st Toe    Type of Debridement:  Excisional       Length (cm):  0.5       Area (sq cm):  0.25       Width (cm):  0.5       Percent Debrided (%):  100       Depth (cm):  0.2       Total Area Debrided (sq cm):  0.25    Depth of debridement:  Subcutaneous tissue    Tissue debrided:  Hypergranulation and Subcutaneous    Devitalized tissue debrided:  Biofilm and Callus    Instruments:  Curette and Blade    Bleeding:  Minimal  Hemostasis Achieved: Yes    Method Used:  Pressure  Patient tolerance:  Patient tolerated the procedure well with no immediate complications     Time-out was performed with the patient the room

## 2020-08-10 NOTE — PROGRESS NOTES
Subjective:      Patient ID: Roger Zavaleta Jr. is a 69 y.o. male.    Chief Complaint: Follow-up (left foot, had stepped on nail. every now and then a sharp pain, no longer hurting constantly )    Roger is a 69 y.o. male who presents to the clinic for evaluation and treatment of high risk feet. Roger has a past medical history of CAD (coronary artery disease) (50), Diabetes mellitus type II, and HLD (hyperlipidemia). The patient's chief complaint is long, thick toenails. This patient has documented high risk feet requiring routine maintenance secondary to diabetes mellitis and those secondary complications of diabetes, as mentioned..  No other complaints than painful and mycotic nails x 10. He tells me he has underlying DM neuropathy and sometimes he feels numbness and tingling.     7/24/20 presenting with left hallux puncture wound. Stepped on a nail last week. Was wearing a shoe. Nails went through his shoe. Ambulating in [place] today. Tetanus was updated in 2017. Has been cleaning with saline. Tender to touch. Denies n/v/f/c.     8/10/20 follow-up left hallux puncture wound.  Almost finished with oral antibiotic (clindamycin, Cipro). Report mild tenderness left hallux wound. Healing uneventfully.       PCP: Evelia Yanez MD    Date Last Seen by PCP: in epic     Hemoglobin A1C   Date Value Ref Range Status   01/22/2020 7.2 (H) 4.0 - 5.6 % Final     Comment:     ADA Screening Guidelines:  5.7-6.4%  Consistent with prediabetes  >or=6.5%  Consistent with diabetes  High levels of fetal hemoglobin interfere with the HbA1C  assay. Heterozygous hemoglobin variants (HbS, HgC, etc)do  not significantly interfere with this assay.   However, presence of multiple variants may affect accuracy.     05/06/2019 9.3 (H) 4.0 - 5.6 % Final     Comment:     ADA Screening Guidelines:  5.7-6.4%  Consistent with prediabetes  >or=6.5%  Consistent with diabetes  High levels of fetal hemoglobin interfere with the HbA1C  assay.  Heterozygous hemoglobin variants (HbS, HgC, etc)do  not significantly interfere with this assay.   However, presence of multiple variants may affect accuracy.     01/21/2019 9.3 (H) 4.0 - 5.6 % Final     Comment:     ADA Screening Guidelines:  5.7-6.4%  Consistent with prediabetes  >or=6.5%  Consistent with diabetes  High levels of fetal hemoglobin interfere with the HbA1C  assay. Heterozygous hemoglobin variants (HbS, HgC, etc)do  not significantly interfere with this assay.   However, presence of multiple variants may affect accuracy.         Review of Systems   Constitution: Negative for decreased appetite, fever and malaise/fatigue.   HENT: Negative for congestion.    Cardiovascular: Negative for chest pain and leg swelling.   Respiratory: Negative for cough and shortness of breath.    Skin: Positive for color change. Negative for nail changes and rash.   Musculoskeletal: Negative for arthritis, joint pain, joint swelling and muscle weakness.   Gastrointestinal: Negative for bloating, abdominal pain, nausea and vomiting.   Neurological: Positive for numbness and sensory change. Negative for headaches and weakness.   Psychiatric/Behavioral: Negative for altered mental status.           Objective:      Physical Exam  Constitutional:       General: He is not in acute distress.     Appearance: He is well-developed.   Musculoskeletal:         General: No tenderness or deformity.   Skin:     General: Skin is warm.      Capillary Refill: Capillary refill takes less than 2 seconds.      Findings: No erythema.   Neurological:      Mental Status: He is alert and oriented to person, place, and time.   Psychiatric:         Behavior: Behavior normal.       Vascular: Distal DP/PT pulses palpable 1/4 b/l. CRT < 3 sec to tips of toes. No vericosities noted to b/l LEs. Hair growth present b/l LE, warm to touch b/l LE and cool to touch to toes.    Dermatologic:   L foot: ulcer plantar hallux less than 0.5cm in diameter. Mild  sinus tract, mild undermining, no rubor, no erythema, no drainage.     Musculoskeletal: Equinus noted b/l ankles with < 10 deg DF noted b/l. MMT 5/5 in DF/PF/Inv/Ev resistance with no reproduction of pain in any direction. Passive range of motion of ankle and pedal joints is painless b/l. No calf tenderness b/l LE, Compartments soft/compressible b/l.     Neurological:   Light touch, proprioception, and sharp/dull sensation are decreased b/l. Protective threshold with the [place]-Wienstein monofilament is decreased b/l. Vibratory sensation decreased b/l.         Assessment:       Encounter Diagnoses   Name Primary?    Type 2 diabetes mellitus with diabetic polyneuropathy, with long-term current use of insulin     Diabetic ulcer of left great toe Yes         Plan:       Roger was seen today for follow-up.    Diagnoses and all orders for this visit:    Diabetic ulcer of left great toe    Type 2 diabetes mellitus with diabetic polyneuropathy, with long-term current use of insulin      I counseled the patient on his conditions, their implications and medical management.    69 y.o.  male with DM with puncture wound.    -left foot x-ray reviewed.  No bony abnormality noted.    -His left hallux wound healing uneventfully.  Status post wound debridement.  See procedure note.  Patient tolerated well.  Recommend triple antibiotic ointment.     -The nature of the condition, options for management, as well as potential risks and complications were discussed in detail with patient. Patient was amenable to my recommendations and left my office fully informed and will follow up as instructed or sooner if necessary.    -Patient was advised of signs and symptoms of infection including redness, drainage, purulence, odor, streaking, fever, chills and I advised patient to seek medical attention (ER or urgent care) if these symptoms arise.   -Advised for optimal glucose control and maintenance per primary care physician. Patient was  also educated on healthy diet that is naturally rich in nutrients and low in fat and calories.   -f/u 3 weeks.

## 2020-08-31 ENCOUNTER — OFFICE VISIT (OUTPATIENT)
Dept: PODIATRY | Facility: CLINIC | Age: 70
End: 2020-08-31
Payer: MEDICARE

## 2020-08-31 VITALS
DIASTOLIC BLOOD PRESSURE: 60 MMHG | BODY MASS INDEX: 27.89 KG/M2 | HEART RATE: 70 BPM | HEIGHT: 68 IN | WEIGHT: 184 LBS | SYSTOLIC BLOOD PRESSURE: 104 MMHG

## 2020-08-31 DIAGNOSIS — E11.42 TYPE 2 DIABETES MELLITUS WITH DIABETIC POLYNEUROPATHY, WITH LONG-TERM CURRENT USE OF INSULIN: ICD-10-CM

## 2020-08-31 DIAGNOSIS — Z79.4 TYPE 2 DIABETES MELLITUS WITH DIABETIC POLYNEUROPATHY, WITH LONG-TERM CURRENT USE OF INSULIN: ICD-10-CM

## 2020-08-31 DIAGNOSIS — L97.522 SKIN ULCER OF LEFT FOOT WITH FAT LAYER EXPOSED: Primary | ICD-10-CM

## 2020-08-31 PROCEDURE — 99999 PR PBB SHADOW E&M-EST. PATIENT-LVL IV: CPT | Mod: PBBFAC,HCNC,, | Performed by: PODIATRIST

## 2020-08-31 PROCEDURE — 11042 WOUND DEBRIDEMENT: ICD-10-PCS | Mod: HCNC,S$GLB,, | Performed by: PODIATRIST

## 2020-08-31 PROCEDURE — 99999 PR PBB SHADOW E&M-EST. PATIENT-LVL IV: ICD-10-PCS | Mod: PBBFAC,HCNC,, | Performed by: PODIATRIST

## 2020-08-31 PROCEDURE — 99499 NO LOS: ICD-10-PCS | Mod: HCNC,S$GLB,, | Performed by: PODIATRIST

## 2020-08-31 PROCEDURE — 99499 UNLISTED E&M SERVICE: CPT | Mod: HCNC,S$GLB,, | Performed by: PODIATRIST

## 2020-08-31 PROCEDURE — 99454 REM MNTR PHYSIOL PARAM 16-30: CPT | Mod: S$GLB,,, | Performed by: INTERNAL MEDICINE

## 2020-08-31 PROCEDURE — 11042 DBRDMT SUBQ TIS 1ST 20SQCM/<: CPT | Mod: HCNC,S$GLB,, | Performed by: PODIATRIST

## 2020-08-31 PROCEDURE — 99454 PR REMOTE MNTR, PHYS PARAM, INITIAL, EA 30 DAYS: ICD-10-PCS | Mod: S$GLB,,, | Performed by: INTERNAL MEDICINE

## 2020-08-31 NOTE — PROCEDURES
"Wound Debridement    Date/Time: 8/31/2020 8:15 AM  Performed by: Zena Patel DPM  Authorized by: Zena Patel DPM     Time out: Immediately prior to procedure a "time out" was called to verify the correct patient, procedure, equipment, support staff and site/side marked as required.    Consent Done?:  Yes (Verbal)    Preparation: Patient was prepped and draped in usual sterile fashion    Local anesthesia used?: No      Wound Details:    Location:  Left foot    Location:  Left 1st Metatarsal Head    Type of Debridement:  Excisional       Length (cm):  0.5       Area (sq cm):  0.25       Width (cm):  0.5       Percent Debrided (%):  100       Depth (cm):  0.3       Total Area Debrided (sq cm):  0.25    Depth of debridement:  Subcutaneous tissue    Tissue debrided:  Subcutaneous    Devitalized tissue debrided:  Biofilm and Callus    Instruments:  Blade and Curette    Bleeding:  Minimal  Hemostasis Achieved: Yes    Method Used:  Pressure  Patient tolerance:  Patient tolerated the procedure well with no immediate complications     Timeout was performed w/ pt in the room       "

## 2020-08-31 NOTE — PROGRESS NOTES
Subjective:      Patient ID: Roger Zavaleta Jr. is a 69 y.o. male.    Chief Complaint: Follow-up (left foot)    Roger is a 69 y.o. male who presents to the clinic for evaluation and treatment of high risk feet. Roger has a past medical history of CAD (coronary artery disease) (50), Diabetes mellitus type II, and HLD (hyperlipidemia). The patient's chief complaint is long, thick toenails. This patient has documented high risk feet requiring routine maintenance secondary to diabetes mellitis and those secondary complications of diabetes, as mentioned..  No other complaints than painful and mycotic nails x 10. He tells me he has underlying DM neuropathy and sometimes he feels numbness and tingling.     7/24/20 presenting with left hallux puncture wound. Stepped on a nail last week. Was wearing a shoe. Nails went through his shoe. Ambulating in [place] today. Tetanus was updated in 2017. Has been cleaning with saline. Tender to touch. Denies n/v/f/c.     8/10/20 follow-up left hallux puncture wound.  Almost finished with oral antibiotic (clindamycin, Cipro). Report mild tenderness left hallux wound. Healing uneventfully.     8/31/20 f/u for L hallux puncture wound. Has been applying triple ointment. Reports some pain submet1 around the ulcer. Ambulating in tennis shoe. Last measured glucose was 145.     PCP: Evelia Yanez MD    Date Last Seen by PCP: in epic     Hemoglobin A1C   Date Value Ref Range Status   01/22/2020 7.2 (H) 4.0 - 5.6 % Final     Comment:     ADA Screening Guidelines:  5.7-6.4%  Consistent with prediabetes  >or=6.5%  Consistent with diabetes  High levels of fetal hemoglobin interfere with the HbA1C  assay. Heterozygous hemoglobin variants (HbS, HgC, etc)do  not significantly interfere with this assay.   However, presence of multiple variants may affect accuracy.     05/06/2019 9.3 (H) 4.0 - 5.6 % Final     Comment:     ADA Screening Guidelines:  5.7-6.4%  Consistent with prediabetes  >or=6.5%   Consistent with diabetes  High levels of fetal hemoglobin interfere with the HbA1C  assay. Heterozygous hemoglobin variants (HbS, HgC, etc)do  not significantly interfere with this assay.   However, presence of multiple variants may affect accuracy.     01/21/2019 9.3 (H) 4.0 - 5.6 % Final     Comment:     ADA Screening Guidelines:  5.7-6.4%  Consistent with prediabetes  >or=6.5%  Consistent with diabetes  High levels of fetal hemoglobin interfere with the HbA1C  assay. Heterozygous hemoglobin variants (HbS, HgC, etc)do  not significantly interfere with this assay.   However, presence of multiple variants may affect accuracy.         Review of Systems   Constitution: Negative for decreased appetite, fever and malaise/fatigue.   HENT: Negative for congestion.    Cardiovascular: Negative for chest pain and leg swelling.   Respiratory: Negative for cough and shortness of breath.    Skin: Positive for color change. Negative for nail changes and rash.   Musculoskeletal: Negative for arthritis, joint pain, joint swelling and muscle weakness.   Gastrointestinal: Negative for bloating, abdominal pain, nausea and vomiting.   Neurological: Positive for numbness and sensory change. Negative for headaches and weakness.   Psychiatric/Behavioral: Negative for altered mental status.           Objective:      Physical Exam  Constitutional:       General: He is not in acute distress.     Appearance: He is well-developed.   Musculoskeletal:         General: No tenderness or deformity.   Skin:     General: Skin is warm.      Capillary Refill: Capillary refill takes less than 2 seconds.      Findings: No erythema.   Neurological:      Mental Status: He is alert and oriented to person, place, and time.   Psychiatric:         Behavior: Behavior normal.       Vascular: Distal DP/PT pulses palpable 1/4 b/l. CRT < 3 sec to tips of toes. No vericosities noted to b/l LEs. Hair growth present b/l LE, warm to touch b/l LE and cool to touch to  toes.    Dermatologic:   L foot: ulcer plantar hallux 0.5cm x 0.5cm x 0.3cm in diameter. Mild sinus tract, mild undermining, no rubor, no erythema, no drainage. No signs of infection.     Musculoskeletal: Equinus noted b/l ankles with < 10 deg DF noted b/l. MMT 5/5 in DF/PF/Inv/Ev resistance with no reproduction of pain in any direction. Passive range of motion of ankle and pedal joints is painless b/l. No calf tenderness b/l LE, Compartments soft/compressible b/l.     Neurological:   Light touch, proprioception, and sharp/dull sensation are decreased b/l. Protective threshold with the [place]-Wienstein monofilament is decreased b/l. Vibratory sensation decreased b/l.         Assessment:       Encounter Diagnoses   Name Primary?    Type 2 diabetes mellitus with diabetic polyneuropathy, with long-term current use of insulin     Skin ulcer of left foot with fat layer exposed Yes         Plan:       Roger was seen today for follow-up.    Diagnoses and all orders for this visit:    Skin ulcer of left foot with fat layer exposed    Type 2 diabetes mellitus with diabetic polyneuropathy, with long-term current use of insulin      I counseled the patient on his conditions, their implications and medical management.    69 y.o.  male with DM with puncture wound at submet1.     -left foot x-ray reviewed.  No bony abnormality noted.    -Status post wound debridement.  See procedure note.  Patient tolerated well.  Recommend triple antibiotic ointment. Recommend callus pad (donut) pad.     -The nature of the condition, options for management, as well as potential risks and complications were discussed in detail with patient. Patient was amenable to my recommendations and left my office fully informed and will follow up as instructed or sooner if necessary.    -Patient was advised of signs and symptoms of infection including redness, drainage, purulence, odor, streaking, fever, chills and I advised patient to seek medical  attention (ER or urgent care) if these symptoms arise.   -Advised for optimal glucose control and maintenance per primary care physician. Patient was also educated on healthy diet that is naturally rich in nutrients and low in fat and calories.   -f/u 3 weeks.

## 2020-09-01 DIAGNOSIS — Z01.84 ENCOUNTER FOR ANTIBODY RESPONSE EXAMINATION: ICD-10-CM

## 2020-09-03 ENCOUNTER — PATIENT OUTREACH (OUTPATIENT)
Dept: OTHER | Facility: OTHER | Age: 70
End: 2020-09-03

## 2020-09-03 NOTE — PROGRESS NOTES
Digital Medicine: Health  Follow-Up    The history is provided by the patient.             Reason for review: Blood glucose not at goal and Blood pressure not at goal      Additional Follow-up details: Patient called and left  with update.  He states his glucometer broke and he is going to have to replace it. He says he has been using another glucometer to monitor is BG.  He states he is feeling well other than being tired.  He reports no change to diet or exercise.    Lifestyle  Plan      Topic    Hemoglobin A1C     Eye Exam        Last 5 Patient Entered Readings                                      Current 30 Day Average: 136/72     Recent Readings 8/31/2020 8/25/2020 8/21/2020 8/16/2020 8/13/2020    SBP (mmHg) 125 122 129 144 147    DBP (mmHg) 70 69 70 74 80    Pulse 73 65 73 66 66        Last 6 Patient Entered Readings                                          Most Recent A1c: 7.2% on 1/22/2020  (Goal: 7%)     Recent Readings 9/3/2020 9/2/2020 9/2/2020 9/2/2020 9/1/2020    Blood Glucose (mg/dL) 115 136 129 102 101

## 2020-09-30 ENCOUNTER — OFFICE VISIT (OUTPATIENT)
Dept: PODIATRY | Facility: CLINIC | Age: 70
End: 2020-09-30
Payer: MEDICARE

## 2020-09-30 VITALS
DIASTOLIC BLOOD PRESSURE: 73 MMHG | SYSTOLIC BLOOD PRESSURE: 123 MMHG | WEIGHT: 184.19 LBS | TEMPERATURE: 98 F | OXYGEN SATURATION: 97 % | HEART RATE: 67 BPM | HEIGHT: 68 IN | BODY MASS INDEX: 27.92 KG/M2

## 2020-09-30 DIAGNOSIS — L97.522 SKIN ULCER OF LEFT FOOT WITH FAT LAYER EXPOSED: ICD-10-CM

## 2020-09-30 DIAGNOSIS — E11.42 TYPE 2 DIABETES MELLITUS WITH DIABETIC POLYNEUROPATHY, WITH LONG-TERM CURRENT USE OF INSULIN: Primary | ICD-10-CM

## 2020-09-30 DIAGNOSIS — Z79.4 TYPE 2 DIABETES MELLITUS WITH DIABETIC POLYNEUROPATHY, WITH LONG-TERM CURRENT USE OF INSULIN: Primary | ICD-10-CM

## 2020-09-30 PROCEDURE — 3008F BODY MASS INDEX DOCD: CPT | Mod: HCNC,CPTII,S$GLB, | Performed by: PODIATRIST

## 2020-09-30 PROCEDURE — 99213 PR OFFICE/OUTPT VISIT, EST, LEVL III, 20-29 MIN: ICD-10-PCS | Mod: HCNC,S$GLB,, | Performed by: PODIATRIST

## 2020-09-30 PROCEDURE — 3078F DIAST BP <80 MM HG: CPT | Mod: HCNC,CPTII,S$GLB, | Performed by: PODIATRIST

## 2020-09-30 PROCEDURE — 3008F PR BODY MASS INDEX (BMI) DOCUMENTED: ICD-10-PCS | Mod: HCNC,CPTII,S$GLB, | Performed by: PODIATRIST

## 2020-09-30 PROCEDURE — 99213 OFFICE O/P EST LOW 20 MIN: CPT | Mod: HCNC,S$GLB,, | Performed by: PODIATRIST

## 2020-09-30 PROCEDURE — 99999 PR PBB SHADOW E&M-EST. PATIENT-LVL V: ICD-10-PCS | Mod: PBBFAC,HCNC,, | Performed by: PODIATRIST

## 2020-09-30 PROCEDURE — 1126F PR PAIN SEVERITY QUANTIFIED, NO PAIN PRESENT: ICD-10-PCS | Mod: HCNC,S$GLB,, | Performed by: PODIATRIST

## 2020-09-30 PROCEDURE — 3074F SYST BP LT 130 MM HG: CPT | Mod: HCNC,CPTII,S$GLB, | Performed by: PODIATRIST

## 2020-09-30 PROCEDURE — 3078F PR MOST RECENT DIASTOLIC BLOOD PRESSURE < 80 MM HG: ICD-10-PCS | Mod: HCNC,CPTII,S$GLB, | Performed by: PODIATRIST

## 2020-09-30 PROCEDURE — 1101F PR PT FALLS ASSESS DOC 0-1 FALLS W/OUT INJ PAST YR: ICD-10-PCS | Mod: HCNC,CPTII,S$GLB, | Performed by: PODIATRIST

## 2020-09-30 PROCEDURE — 99999 PR PBB SHADOW E&M-EST. PATIENT-LVL V: CPT | Mod: PBBFAC,HCNC,, | Performed by: PODIATRIST

## 2020-09-30 PROCEDURE — 1159F MED LIST DOCD IN RCRD: CPT | Mod: HCNC,S$GLB,, | Performed by: PODIATRIST

## 2020-09-30 PROCEDURE — 3051F HG A1C>EQUAL 7.0%<8.0%: CPT | Mod: HCNC,CPTII,S$GLB, | Performed by: PODIATRIST

## 2020-09-30 PROCEDURE — 3051F PR MOST RECENT HEMOGLOBIN A1C LEVEL 7.0 - < 8.0%: ICD-10-PCS | Mod: HCNC,CPTII,S$GLB, | Performed by: PODIATRIST

## 2020-09-30 PROCEDURE — 3074F PR MOST RECENT SYSTOLIC BLOOD PRESSURE < 130 MM HG: ICD-10-PCS | Mod: HCNC,CPTII,S$GLB, | Performed by: PODIATRIST

## 2020-09-30 PROCEDURE — 1101F PT FALLS ASSESS-DOCD LE1/YR: CPT | Mod: HCNC,CPTII,S$GLB, | Performed by: PODIATRIST

## 2020-09-30 PROCEDURE — 1159F PR MEDICATION LIST DOCUMENTED IN MEDICAL RECORD: ICD-10-PCS | Mod: HCNC,S$GLB,, | Performed by: PODIATRIST

## 2020-09-30 PROCEDURE — 1126F AMNT PAIN NOTED NONE PRSNT: CPT | Mod: HCNC,S$GLB,, | Performed by: PODIATRIST

## 2020-09-30 RX ORDER — METFORMIN HYDROCHLORIDE 1000 MG/1
1000 TABLET ORAL 2 TIMES DAILY WITH MEALS
Qty: 60 TABLET | Refills: 0 | Status: SHIPPED | OUTPATIENT
Start: 2020-09-30 | End: 2020-10-08

## 2020-09-30 NOTE — TELEPHONE ENCOUNTER
Please call pt. PCP is no longer at this practice and needs to establish with new PCP prior to refilling any medications or signing his diabetic shoe orders  1. Please schedule new PCP visit with an available provider  2. Please let that provider know that patient scheduled so that pre-visit labs can be ordered  Dr. Jane Almeida D.O.   Family Medicine

## 2020-09-30 NOTE — PROGRESS NOTES
Subjective:      Patient ID: Roger Zavaleta Jr. is a 69 y.o. male.    Chief Complaint: Follow-up (Left Foot Ulcer)      Roger is a 69 y.o. male who presents to the clinic for evaluation and treatment of high risk feet. Roger has a past medical history of CAD (coronary artery disease) (50), Diabetes mellitus type II, and HLD (hyperlipidemia). The patient's chief complaint is long, thick toenails. This patient has documented high risk feet requiring routine maintenance secondary to diabetes mellitis and those secondary complications of diabetes, as mentioned..  No other complaints than painful and mycotic nails x 10. He tells me he has underlying DM neuropathy and sometimes he feels numbness and tingling.     7/24/20 presenting with left hallux puncture wound. Stepped on a nail last week. Was wearing a shoe. Nails went through his shoe. Ambulating in Crocs today. Tetanus was updated in 2017. Has been cleaning with saline. Tender to touch. Denies n/v/f/c.     8/10/20 follow-up left hallux puncture wound.  Almost finished with oral antibiotic (clindamycin, Cipro). Report mild tenderness left hallux wound. Healing uneventfully.     8/31/20 f/u for L hallux puncture wound. Has been applying triple ointment. Reports some pain submet1 around the ulcer. Ambulating in tennis shoe. Last measured glucose was 145.    9/30/20 f/u for L hallux wound which today is completely closed. Ambulating in normal tennis shoe. Denies pain. Has been using callus pad intermittently to prevent reoccurrence.     PCP: Evelia Yanez MD (Inactive)    Date Last Seen by PCP: in epic     Hemoglobin A1C   Date Value Ref Range Status   01/22/2020 7.2 (H) 4.0 - 5.6 % Final     Comment:     ADA Screening Guidelines:  5.7-6.4%  Consistent with prediabetes  >or=6.5%  Consistent with diabetes  High levels of fetal hemoglobin interfere with the HbA1C  assay. Heterozygous hemoglobin variants (HbS, HgC, etc)do  not significantly interfere with this assay.    However, presence of multiple variants may affect accuracy.     05/06/2019 9.3 (H) 4.0 - 5.6 % Final     Comment:     ADA Screening Guidelines:  5.7-6.4%  Consistent with prediabetes  >or=6.5%  Consistent with diabetes  High levels of fetal hemoglobin interfere with the HbA1C  assay. Heterozygous hemoglobin variants (HbS, HgC, etc)do  not significantly interfere with this assay.   However, presence of multiple variants may affect accuracy.     01/21/2019 9.3 (H) 4.0 - 5.6 % Final     Comment:     ADA Screening Guidelines:  5.7-6.4%  Consistent with prediabetes  >or=6.5%  Consistent with diabetes  High levels of fetal hemoglobin interfere with the HbA1C  assay. Heterozygous hemoglobin variants (HbS, HgC, etc)do  not significantly interfere with this assay.   However, presence of multiple variants may affect accuracy.         Review of Systems   Constitution: Negative for decreased appetite, fever and malaise/fatigue.   HENT: Negative for congestion.    Cardiovascular: Negative for chest pain and leg swelling.   Respiratory: Negative for cough and shortness of breath.    Skin: Positive for color change. Negative for nail changes and rash.   Musculoskeletal: Negative for arthritis, joint pain, joint swelling and muscle weakness.   Gastrointestinal: Negative for bloating, abdominal pain, nausea and vomiting.   Neurological: Positive for numbness and sensory change. Negative for headaches and weakness.   Psychiatric/Behavioral: Negative for altered mental status.           Objective:      Physical Exam  Constitutional:       General: He is not in acute distress.     Appearance: He is well-developed.   Musculoskeletal:         General: No tenderness or deformity.   Skin:     General: Skin is warm.      Capillary Refill: Capillary refill takes less than 2 seconds.      Findings: No erythema.   Neurological:      Mental Status: He is alert and oriented to person, place, and time.   Psychiatric:         Behavior: Behavior  normal.       Vascular: Distal DP/PT pulses palpable 1/4 b/l. CRT < 3 sec to tips of toes. No vericosities noted to b/l LEs. Hair growth present b/l LE, warm to touch b/l LE and cool to touch to toes.    Dermatologic:   L foot: completely closed wound plantar hallux. +hyperkertotic lesion plantar hallux. No rubor, no erythema.     Musculoskeletal: Equinus noted b/l ankles with < 10 deg DF noted b/l. MMT 5/5 in DF/PF/Inv/Ev resistance with no reproduction of pain in any direction. Passive range of motion of ankle and pedal joints is painless b/l. No calf tenderness b/l LE, Compartments soft/compressible b/l.     Neurological:   Light touch, proprioception, and sharp/dull sensation are decreased b/l. Protective threshold with the Celestine-Wienstein monofilament is decreased b/l. Vibratory sensation decreased b/l.         Assessment:       Encounter Diagnoses   Name Primary?    Type 2 diabetes mellitus with diabetic polyneuropathy, with long-term current use of insulin Yes    Skin ulcer of left foot with fat layer exposed          Plan:       Roger was seen today for follow-up.    Diagnoses and all orders for this visit:    Type 2 diabetes mellitus with diabetic polyneuropathy, with long-term current use of insulin  -     DIABETIC SHOES FOR HOME USE    Skin ulcer of left foot with fat layer exposed      I counseled the patient on his conditions, their implications and medical management.    69 y.o.  male with DM with puncture wound at submet1 which is completely healed.     -rx. DM shoe. stressed the importance of off loading with DM shoe/custom insole to prevent reoccurrence.     -The nature of the condition, options for management, as well as potential risks and complications were discussed in detail with patient. Patient was amenable to my recommendations and left my office fully informed and will follow up as instructed or sooner if necessary.    -Patient was advised of signs and symptoms of infection including  redness, drainage, purulence, odor, streaking, fever, chills and I advised patient to seek medical attention (ER or urgent care) if these symptoms arise.   -Advised for optimal glucose control and maintenance per primary care physician. Patient was also educated on healthy diet that is naturally rich in nutrients and low in fat and calories.   -f/u 4 months

## 2020-09-30 NOTE — TELEPHONE ENCOUNTER
He needs to be seen earlier because last labs 1/2020 and overdue for his diabetes labs which were due 6/2020.  See if he can do a 7AM in person or virtual early AM or Friday  Recommend labs and order  Orders Placed This Encounter    Basic metabolic panel    Hemoglobin A1C    metFORMIN (GLUCOPHAGE) 1000 MG tablet     Dr. Jane Almeida D.O.   Family Medicine

## 2020-10-01 ENCOUNTER — PATIENT OUTREACH (OUTPATIENT)
Dept: ADMINISTRATIVE | Facility: HOSPITAL | Age: 70
End: 2020-10-01

## 2020-10-07 PROBLEM — R06.09 DOE (DYSPNEA ON EXERTION): Status: RESOLVED | Noted: 2018-10-15 | Resolved: 2020-10-07

## 2020-10-07 PROBLEM — R04.0 EPISTAXIS: Status: RESOLVED | Noted: 2018-10-15 | Resolved: 2020-10-07

## 2020-10-07 NOTE — PROGRESS NOTES
FAMILY MEDICINE  [facility]    Patient Active Problem List   Diagnosis    Mixed hyperlipidemia    Type 2 diabetes mellitus with diabetic polyneuropathy, with long-term current use of insulin    Diabetic neuropathy    Hypertension, essential    Long-term insulin use    Gastroesophageal reflux disease    Bilateral hearing loss       CC:   Chief Complaint   Patient presents with    Establish Care       HPI: Roger Zavaleta Jr. is a 69 y.o. male  has Mixed hyperlipidemia; Type 2 diabetes mellitus with diabetic polyneuropathy, with long-term current use of insulin; Diabetic neuropathy; Hypertension, essential; Long-term insulin use; Gastroesophageal reflux disease; Bilateral hearing loss; and history of left foot skin ulcer healed (healed 2019) on their problem list.  - presents to establish care. Last PCP Dr. Yanez    Podiatry Dr. Patel    1. Diabetes Type 2    Current treatment regimen:   insulin aspart U-100 (NOVOLOG U-100 INSULIN ASPART) 100 unit/mL injection, Inject 20 Units into the skin 3 (three) times daily before meals., Disp: 10 mL, Rfl: 5  LEVEMIR FLEXTOUCH U-100 INSULN 100 unit/mL (3 mL) InPn pen, INJECT 45 UNITS SUBCUTANEOUSLY ONCE DAILY, Disp: 15 mL, Rfl: 0  Ozempic 1 mg weekly  metFORMIN (GLUCOPHAGE) 1000 MG tablet, Take 1 tablet (1,000 mg total) by mouth 2 (two) times daily with meals., Disp: 60 tablet, Rfl: 0    Side effects from treatment: diarrhea from Metformin  Complications of diabetes: neuropathy    Glucometer: yes  Glucose monitoring: three times a days Digital DM program (see below)    Last A1C:   Hemoglobin A1C       Date                     Value               Ref Range           Status                10/02/2020               6.6 (H)             4.0 - 5.6 %         Final              Comment:    ADA Screening Guidelines:  5.7-6.4%  Consistent with prediabetes  >or=6.5%  Consistent with diabetes  High levels of fetal hemoglobin interfere with the HbA1C  assay. Heterozygous hemoglobin  variants (HbS, HgC, etc)do  not significantly interfere with this assay.   However, presence of multiple variants may affect accuracy.         01/22/2020               7.2 (H)             4.0 - 5.6 %         Final              Comment:    ADA Screening Guidelines:  5.7-6.4%  Consistent with prediabetes  >or=6.5%  Consistent with diabetes  High levels of fetal hemoglobin interfere with the HbA1C  assay. Heterozygous hemoglobin variants (HbS, HgC, etc)do  not significantly interfere with this assay.   However, presence of multiple variants may affect accuracy.         05/06/2019               9.3 (H)             4.0 - 5.6 %         Final              Comment:    ADA Screening Guidelines:  5.7-6.4%  Consistent with prediabetes  >or=6.5%  Consistent with diabetes  High levels of fetal hemoglobin interfere with the HbA1C  assay. Heterozygous hemoglobin variants (HbS, HgC, etc)do  not significantly interfere with this assay.   However, presence of multiple variants may affect accuracy.    ----------    Low dose statin: atorvastatin  Last eye exam: 6/5/2019 due planning with Dr. Yoo and David next month 11/2019  Last foot exam: 2/10/2020    Vaccines:   Influenza: due today 10/8/2020  Pneumovax: 23 10/5/18  Prevnar 13: 1/9/2017    2. Hypertension  - BP goal < 130/80    Current medication treatment:   chlorthalidone (HYGROTEN) 25 MG Tab, Take 1 tablet by mouth once daily, Disp: 90 tablet, Rfl: 0  irbesartan (AVAPRO) 150 MG tablet, Take 1 tablet (150 mg total) by mouth once daily., Disp: 90 tablet, Rfl: 1  metoprolol succinate (TOPROL-XL) 50 MG 24 hr tablet, Take 1 tablet by mouth once daily, Disp: 90 tablet, Rfl: 0    Home BP cuff: yes Digital Medicine      Hypertension Digital Medicine Flowsheet Readings  Roger's recent readings (past 60 days):  Time Taken Time Submitted Systolic Blood Pressure Diastolic Blood Pressure Pulse (bpm) Weight (lb) Steps Walked (steps)   10/6/2020  3:58 PM 10/6/2020  3:58  57 81      10/4/2020  6:24 PM 10/4/2020  6:24  72 72     9/28/2020  2:14 PM 9/28/2020  2:14  63 85     9/24/2020  1:00 PM 9/24/2020  1:02 PM  62 64     9/24/2020  1:00 PM 9/24/2020  1:02        9/22/2020  8:18 PM 9/22/2020  8:18  66 75     9/21/2020  6:17 PM 9/21/2020  6:17  69 69     9/13/2020  8:33 AM 9/13/2020  8:33  64 60     9/10/2020  5:54 AM 9/10/2020  5:54  75 60     9/6/2020  4:29 PM 9/6/2020  4:29  59 75     8/31/2020  7:02 PM 8/31/2020  7:02  70 73       Diabetes Digital Medicine Flowsheet Readings  Roger's recent readings (past 60 days):  Time Taken Time Submitted Glucose (mg/dL) Fasting Glucose (mg/dL) Glucose (Before Breakfast) (mg/dL) Glucose (After Breakfast) (mg/dL) Glucose (Before Lunch) (mg/dL) Glucose (After Lunch) (mg/dL) Glucose (Before Dinner) (mg/dL) Glucose (After Dinner) (mg/dL) Glucose (Off Schedule) (mg/dL) Steps Walked (steps) Weight (lb)   10/7/2020  8:50 AM 10/7/2020  8:50   117           10/6/2020  3:47 PM 10/6/2020  3:48 PM 90      90       10/5/2020  7:10 PM 10/5/2020  7:10        151      10/5/2020  3:24 PM 10/5/2020  3:24     121         10/4/2020  6:38 PM 10/4/2020  6:39        138      10/3/2020  9:42 PM 10/3/2020  9:43        163      10/2/2020 10:23 PM 10/2/2020 10:23 PM 78        78     10/2/2020  4:04 PM 10/2/2020  4:05      129        9/30/2020  7:41 PM 9/30/2020  7:41        133      9/30/2020  2:26 PM 9/30/2020  2:27      156        9/30/2020  8:06 AM 9/30/2020  8:06 AM    146          9/30/2020  8:06 AM 9/30/2020  8:06                    HEALTH MAINTENANCE:   Health Maintenance   Topic Date Due    Eye Exam  06/05/2020    Hemoglobin A1c  01/02/2021    Lipid Panel  01/22/2021    Foot Exam  02/10/2021    High Dose Statin  10/08/2021    Aspirin/Antiplatelet Therapy  10/08/2021    TETANUS VACCINE  05/25/2027    Hepatitis C Screening  Completed    Pneumococcal Vaccine  "(65+ Low/Medium Risk)  Completed     Health Maintenance Topics with due status: Not Due       Topic Last Completion Date    Colorectal Cancer Screening 12/03/2013    TETANUS VACCINE 05/25/2017    Lipid Panel 01/22/2020    Foot Exam 02/10/2020    Hemoglobin A1c 10/02/2020    High Dose Statin 10/08/2020    Aspirin/Antiplatelet Therapy 10/08/2020     Health Maintenance Due   Topic Date Due    Shingles Vaccine (2 of 3) 01/15/2015    Eye Exam  06/05/2020    Influenza Vaccine (1) 08/01/2020       ROS: Review of Systems    ALLERGIES:   Review of patient's allergies indicates:  No Known Allergies    MEDS:     Current Outpatient Medications:     alpha lipoic acid 200 mg Cap, Take 1 capsule PO once daily, Disp: , Rfl:     aspirin 81 MG chewable tablet, Take 81 mg by mouth once daily.  , Disp: , Rfl:     atorvastatin (LIPITOR) 40 MG tablet, Take 1 tablet (40 mg total) by mouth once daily., Disp: 90 tablet, Rfl: 3    BD VEO INSULIN SYR HALF UNIT 0.3 mL 31 gauge x 15/64" Syrg, THREE TIMES DAILY AS DIRECTED, Disp: , Rfl: 1    BD VEO INSULIN SYRINGE UF 1/2 mL 31 gauge x 15/64" Syrg, USE  SYRINGE THREE TIMES DAILY, Disp: 100 Syringe, Rfl: 5    blood sugar diagnostic (ACCU-CHEK SMARTVIEW TEST STRIP) Strp, USE 1 STRIP TO CHECK GLUCOSE THREE TIMES DAILY AS NEEDED, Disp: 100 strip, Rfl: 3    chlorthalidone (HYGROTEN) 25 MG Tab, Take 1 tablet by mouth once daily, Disp: 90 tablet, Rfl: 0    fluticasone propionate (FLONASE) 50 mcg/actuation nasal spray, 1 spray (50 mcg total) by Each Nostril route once daily., Disp: 16 g, Rfl: 3    insulin aspart U-100 (NOVOLOG U-100 INSULIN ASPART) 100 unit/mL injection, Inject 20 Units into the skin 3 (three) times daily before meals., Disp: 10 mL, Rfl: 5    irbesartan (AVAPRO) 150 MG tablet, Take 1 tablet (150 mg total) by mouth once daily., Disp: 90 tablet, Rfl: 1    LEVEMIR FLEXTOUCH U-100 INSULN 100 unit/mL (3 mL) InPn pen, INJECT 45 UNITS SUBCUTANEOUSLY ONCE DAILY, Disp: 15 mL, Rfl: " "0    metoprolol succinate (TOPROL-XL) 50 MG 24 hr tablet, Take 1 tablet by mouth once daily, Disp: 90 tablet, Rfl: 0    nitroGLYCERIN (NITROSTAT) 0.4 MG SL tablet, Place 0.4 mg under the tongue every 5 (five) minutes as needed.  , Disp: , Rfl:     OZEMPIC 1 mg/dose (2 mg/1.5 mL) PnIj, INJECT 1MG INTO THE SKIN ONCE EVERY 7 DAYSA AS DIRECTED, Disp: 4 mL, Rfl: 1    pen needle, diabetic 31 gauge x 5/16" Ndle, USE AS DIRECTED ONCE DAILY, Disp: 50 each, Rfl: 7    lancets (ULTRA THIN LANCETS) 30 gauge Misc, 1 lancet by Misc.(Non-Drug; Combo Route) route 3 (three) times daily., Disp: 200 each, Rfl: 3    varicella-zoster gE-AS01B, PF, (SHINGRIX, PF,) 50 mcg/0.5 mL injection, Inject 0.5mL IM at 0 and 2-6 months, Disp: 1 each, Rfl: 1    Past Medical History:   Diagnosis Date    CAD (coronary artery disease) 50    Diabetes mellitus type II     HLD (hyperlipidemia)        Past Surgical History:   Procedure Laterality Date    2 nasal surgery      HERNIA REPAIR      umbilical    rectal fissure      TONSILLECTOMY      TOTAL KNEE ARTHROPLASTY      TRIGGER FINGER RELEASE  9-30-13    left hand (middle finger)       Family History   Problem Relation Age of Onset    Heart disease Mother     Diabetes Mother     Alcohol abuse Father     Heart disease Unknown         premature    No Known Problems Daughter     No Known Problems Daughter     Cancer Neg Hx        Social History     Tobacco Use    Smoking status: Never Smoker    Smokeless tobacco: Never Used   Substance Use Topics    Alcohol use: No     Frequency: Never     Drinks per session: 1 or 2     Binge frequency: Never    Drug use: No       Social History     Social History Narrative    He is a retired  and teacher. He grew up in [place]. He moved to [place] after going to [place]. 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 He is a non-smoker and doesn't use alcohol. He is  for over 40 years. He lives in his own home " "with his wife. He has 2 adult daughters who live a few houses down from him. 1 granddaughter (2020 in DO school) and 1 grandson (2020 senior in high school)           OBJECTIVE:   Vitals:    10/08/20 0656   BP: 102/64   BP Location: Left arm   Patient Position: Sitting   BP Method: Medium (Manual)   Pulse: 61   Resp: 18   Temp: 98.3 °F (36.8 °C)   SpO2: 99%   Weight: 83.6 kg (184 lb 4.8 oz)   Height: 5' 8" (1.727 m)     Body mass index is 28.02 kg/m².    Physical Exam      PHQ4 = No data recorded    PERTINENT RESULTS:   Lab Visit on 10/02/2020   Component Date Value Ref Range Status    Sodium 10/02/2020 138  136 - 145 mmol/L Final    Potassium 10/02/2020 3.7  3.5 - 5.1 mmol/L Final    Chloride 10/02/2020 98  95 - 110 mmol/L Final    CO2 10/02/2020 29  23 - 29 mmol/L Final    Glucose 10/02/2020 160* 70 - 110 mg/dL Final    BUN, Bld 10/02/2020 20  2 - 20 mg/dL Final    Creatinine 10/02/2020 1.26  0.50 - 1.40 mg/dL Final    Calcium 10/02/2020 9.2  8.7 - 10.5 mg/dL Final    Anion Gap 10/02/2020 11  8 - 16 mmol/L Final    eGFR if African American 10/02/2020 >60.0  >60 mL/min/1.73 m^2 Final    eGFR if non African American 10/02/2020 57.0* >60 mL/min/1.73 m^2 Final    Comment: Calculation used to obtain the estimated glomerular filtration  rate (eGFR) is the CKD-EPI equation.       Hemoglobin A1C 10/02/2020 6.6* 4.0 - 5.6 % Final    Comment: ADA Screening Guidelines:  5.7-6.4%  Consistent with prediabetes  >or=6.5%  Consistent with diabetes  High levels of fetal hemoglobin interfere with the HbA1C  assay. Heterozygous hemoglobin variants (HbS, HgC, etc)do  not significantly interfere with this assay.   However, presence of multiple variants may affect accuracy.      Estimated Avg Glucose 10/02/2020 143* 68 - 131 mg/dL Final       ASSESSMENT/PLAN:  Problem List Items Addressed This Visit        Cardiac/Vascular    Hypertension, essential    Overview     - well controlled  - continue current medications         " Relevant Orders    Lipid Panel    Comprehensive Metabolic Panel    Mixed hyperlipidemia    Overview     Lab Results   Component Value Date    LDLCALC 94.4 01/22/2020   - well controlled  - continue current medication  - discussed recommendation for diet, exercise and weight loss  - counseling on lifestyle modifications for risk factor reduction  - repeat lipids           Relevant Orders    Lipid Panel    Comprehensive Metabolic Panel       Endocrine    Type 2 diabetes mellitus with diabetic polyneuropathy, with long-term current use of insulin - Primary    Overview     Lab Results   Component Value Date    HGBA1C 6.6 (H) 10/02/2020     - discussed recommendation for diet, exercise and weight loss  - counseling on lifestyle modifications for risk factor reduction  - well controlled  - pt not tolerating Metformin  - okay to stop Metformin and if glucose increases, increased Levemir to 45 units to 47 units daily  - pt agreeable with plan         Relevant Orders    Hemoglobin A1C    Lipid Panel    Comprehensive Metabolic Panel          ORDERS:   Orders Placed This Encounter    Hemoglobin A1C    Lipid Panel    Comprehensive Metabolic Panel    varicella-zoster gE-AS01B, PF, (SHINGRIX, PF,) 50 mcg/0.5 mL injection     Vaccines recommended: flu and shingles     Follow-up in 3 months with labs or sooner if any concerns.    Dr. Jane Almeida D.O.   Family Medicine

## 2020-10-08 ENCOUNTER — OFFICE VISIT (OUTPATIENT)
Dept: FAMILY MEDICINE | Facility: CLINIC | Age: 70
End: 2020-10-08
Payer: MEDICARE

## 2020-10-08 VITALS
BODY MASS INDEX: 27.93 KG/M2 | RESPIRATION RATE: 18 BRPM | TEMPERATURE: 98 F | OXYGEN SATURATION: 99 % | HEART RATE: 61 BPM | SYSTOLIC BLOOD PRESSURE: 102 MMHG | DIASTOLIC BLOOD PRESSURE: 64 MMHG | HEIGHT: 68 IN | WEIGHT: 184.31 LBS

## 2020-10-08 DIAGNOSIS — Z79.4 TYPE 2 DIABETES MELLITUS WITH DIABETIC POLYNEUROPATHY, WITH LONG-TERM CURRENT USE OF INSULIN: Primary | ICD-10-CM

## 2020-10-08 DIAGNOSIS — I10 HYPERTENSION, ESSENTIAL: ICD-10-CM

## 2020-10-08 DIAGNOSIS — E11.42 TYPE 2 DIABETES MELLITUS WITH DIABETIC POLYNEUROPATHY, WITH LONG-TERM CURRENT USE OF INSULIN: Primary | ICD-10-CM

## 2020-10-08 DIAGNOSIS — E78.2 MIXED HYPERLIPIDEMIA: ICD-10-CM

## 2020-10-08 PROBLEM — L97.522 SKIN ULCER OF LEFT FOOT WITH FAT LAYER EXPOSED: Status: RESOLVED | Noted: 2020-08-31 | Resolved: 2020-10-08

## 2020-10-08 PROCEDURE — 3074F SYST BP LT 130 MM HG: CPT | Mod: HCNC,CPTII,S$GLB, | Performed by: FAMILY MEDICINE

## 2020-10-08 PROCEDURE — 1126F PR PAIN SEVERITY QUANTIFIED, NO PAIN PRESENT: ICD-10-PCS | Mod: HCNC,S$GLB,, | Performed by: FAMILY MEDICINE

## 2020-10-08 PROCEDURE — G0008 FLU VACCINE - QUADRIVALENT - ADJUVANTED: ICD-10-PCS | Mod: HCNC,S$GLB,, | Performed by: FAMILY MEDICINE

## 2020-10-08 PROCEDURE — 3008F PR BODY MASS INDEX (BMI) DOCUMENTED: ICD-10-PCS | Mod: HCNC,CPTII,S$GLB, | Performed by: FAMILY MEDICINE

## 2020-10-08 PROCEDURE — 3078F DIAST BP <80 MM HG: CPT | Mod: HCNC,CPTII,S$GLB, | Performed by: FAMILY MEDICINE

## 2020-10-08 PROCEDURE — 90694 FLU VACCINE - QUADRIVALENT - ADJUVANTED: ICD-10-PCS | Mod: HCNC,S$GLB,, | Performed by: FAMILY MEDICINE

## 2020-10-08 PROCEDURE — G0008 ADMIN INFLUENZA VIRUS VAC: HCPCS | Mod: HCNC,S$GLB,, | Performed by: FAMILY MEDICINE

## 2020-10-08 PROCEDURE — 1126F AMNT PAIN NOTED NONE PRSNT: CPT | Mod: HCNC,S$GLB,, | Performed by: FAMILY MEDICINE

## 2020-10-08 PROCEDURE — 99999 PR PBB SHADOW E&M-EST. PATIENT-LVL V: CPT | Mod: PBBFAC,HCNC,, | Performed by: FAMILY MEDICINE

## 2020-10-08 PROCEDURE — 3044F HG A1C LEVEL LT 7.0%: CPT | Mod: HCNC,CPTII,S$GLB, | Performed by: FAMILY MEDICINE

## 2020-10-08 PROCEDURE — 1159F PR MEDICATION LIST DOCUMENTED IN MEDICAL RECORD: ICD-10-PCS | Mod: HCNC,S$GLB,, | Performed by: FAMILY MEDICINE

## 2020-10-08 PROCEDURE — 1101F PR PT FALLS ASSESS DOC 0-1 FALLS W/OUT INJ PAST YR: ICD-10-PCS | Mod: HCNC,CPTII,S$GLB, | Performed by: FAMILY MEDICINE

## 2020-10-08 PROCEDURE — 3078F PR MOST RECENT DIASTOLIC BLOOD PRESSURE < 80 MM HG: ICD-10-PCS | Mod: HCNC,CPTII,S$GLB, | Performed by: FAMILY MEDICINE

## 2020-10-08 PROCEDURE — 99999 PR PBB SHADOW E&M-EST. PATIENT-LVL V: ICD-10-PCS | Mod: PBBFAC,HCNC,, | Performed by: FAMILY MEDICINE

## 2020-10-08 PROCEDURE — 3074F PR MOST RECENT SYSTOLIC BLOOD PRESSURE < 130 MM HG: ICD-10-PCS | Mod: HCNC,CPTII,S$GLB, | Performed by: FAMILY MEDICINE

## 2020-10-08 PROCEDURE — 99214 PR OFFICE/OUTPT VISIT, EST, LEVL IV, 30-39 MIN: ICD-10-PCS | Mod: HCNC,25,S$GLB, | Performed by: FAMILY MEDICINE

## 2020-10-08 PROCEDURE — 99214 OFFICE O/P EST MOD 30 MIN: CPT | Mod: HCNC,25,S$GLB, | Performed by: FAMILY MEDICINE

## 2020-10-08 PROCEDURE — 1101F PT FALLS ASSESS-DOCD LE1/YR: CPT | Mod: HCNC,CPTII,S$GLB, | Performed by: FAMILY MEDICINE

## 2020-10-08 PROCEDURE — 3008F BODY MASS INDEX DOCD: CPT | Mod: HCNC,CPTII,S$GLB, | Performed by: FAMILY MEDICINE

## 2020-10-08 PROCEDURE — 1159F MED LIST DOCD IN RCRD: CPT | Mod: HCNC,S$GLB,, | Performed by: FAMILY MEDICINE

## 2020-10-08 PROCEDURE — 3044F PR MOST RECENT HEMOGLOBIN A1C LEVEL <7.0%: ICD-10-PCS | Mod: HCNC,CPTII,S$GLB, | Performed by: FAMILY MEDICINE

## 2020-10-08 PROCEDURE — 90694 VACC AIIV4 NO PRSRV 0.5ML IM: CPT | Mod: HCNC,S$GLB,, | Performed by: FAMILY MEDICINE

## 2020-10-08 RX ORDER — ZOSTER VACCINE RECOMBINANT, ADJUVANTED 50 MCG/0.5
KIT INTRAMUSCULAR
Qty: 1 EACH | Refills: 1 | Status: SHIPPED | OUTPATIENT
Start: 2020-10-08 | End: 2021-01-14

## 2020-10-08 NOTE — PATIENT INSTRUCTIONS
1. Okay to stop Metformin  2. If AM glucose >140 mg/dL, increase Levemir to 47 units daily. Notify me if you make this change

## 2020-10-09 ENCOUNTER — PATIENT MESSAGE (OUTPATIENT)
Dept: FAMILY MEDICINE | Facility: CLINIC | Age: 70
End: 2020-10-09

## 2020-10-09 DIAGNOSIS — M25.561 CHRONIC PAIN OF BOTH KNEES: Primary | ICD-10-CM

## 2020-10-09 DIAGNOSIS — M25.562 CHRONIC PAIN OF BOTH KNEES: Primary | ICD-10-CM

## 2020-10-09 DIAGNOSIS — G89.29 CHRONIC PAIN OF BOTH KNEES: Primary | ICD-10-CM

## 2020-10-19 ENCOUNTER — PATIENT OUTREACH (OUTPATIENT)
Dept: ADMINISTRATIVE | Facility: OTHER | Age: 70
End: 2020-10-19

## 2020-10-19 ENCOUNTER — PATIENT OUTREACH (OUTPATIENT)
Dept: OTHER | Facility: OTHER | Age: 70
End: 2020-10-19

## 2020-10-19 ENCOUNTER — TELEPHONE (OUTPATIENT)
Dept: ORTHOPEDICS | Facility: CLINIC | Age: 70
End: 2020-10-19

## 2020-10-19 DIAGNOSIS — E11.42 TYPE 2 DIABETES MELLITUS WITH DIABETIC POLYNEUROPATHY, WITH LONG-TERM CURRENT USE OF INSULIN: Primary | ICD-10-CM

## 2020-10-19 DIAGNOSIS — M25.562 PAIN IN BOTH KNEES, UNSPECIFIED CHRONICITY: Primary | ICD-10-CM

## 2020-10-19 DIAGNOSIS — Z79.4 TYPE 2 DIABETES MELLITUS WITH DIABETIC POLYNEUROPATHY, WITH LONG-TERM CURRENT USE OF INSULIN: Primary | ICD-10-CM

## 2020-10-19 DIAGNOSIS — M25.561 PAIN IN BOTH KNEES, UNSPECIFIED CHRONICITY: Primary | ICD-10-CM

## 2020-10-19 NOTE — PROGRESS NOTES
Digital Medicine: Health  Follow-Up    The history is provided by the patient.             Reason for review: Blood glucose at goal and Blood pressure at goal        Topics Covered on Call: physical activity and Diet    Additional Follow-up details: Patient says he is feeling well except he reports that his knee gave out on him a couple of days ago.  He says he is seeing his doctor tomorrow to address it.  He denies any symptoms of hyper or hypotension and denies symptoms of hyper or hypoglycemia.  Patient says his digital glucometer takes readings, but they are not transmitting so he has been manually inputting the readings.  He says next time he is in the area, he will take it to the [facility].            Diet-no change to diet    No change to diet.  Patient reports eating or drinking the following: Diabetic, low sodium      Physical Activity-Change      He identified the following barriers to physical activity: knee injury        Additional physical activity details: Patient says he has not been able to do any physical activity the past couple of days.      Medication Adherence-Medication adherence was assessed.      Substance, Sleep, Stress-Not assessed      Continue current diet/physical activity routine.  Tech support needed. Patient will go to the [facility]       Addressed patient questions and patient has my contact information if needed prior to next outreach.   Explained the importance of self-monitoring and medication adherence. Encouraged the patient to communicate with their health  for lifestyle modifications to help improve or maintain a healthy lifestyle.                   Topic    Eye Exam          Last 5 Patient Entered Readings                                      Current 30 Day Average: 124/67     Recent Readings 10/19/2020 10/16/2020 10/11/2020 10/6/2020 10/4/2020    SBP (mmHg) 131 129 119 103 133    DBP (mmHg) 74 65 72 57 72    Pulse 61 66 60 81 72        Last 6 Patient Entered Readings                                           Most Recent A1c: 6.6% on 10/2/2020  (Goal: 7%)     Recent Readings 10/18/2020 10/17/2020 10/16/2020 10/16/2020 10/15/2020    Blood Glucose (mg/dL) 162 142 158 116 180

## 2020-10-19 NOTE — PROGRESS NOTES
Health Maintenance Due   Topic Date Due    Shingles Vaccine (2 of 3) 01/15/2015    Eye Exam  06/05/2020     Updates were requested from care everywhere.  Chart was reviewed for overdue [facility] ([facility]) topics (CRS, Breast Cancer Screening, Eye exam)  Health Maintenance has been updated.  LINKS not responding.  Order placed for diabetic eye screening photo.  Request sent to Ashlie for most recent eye exam.

## 2020-10-19 NOTE — LETTER
October 19, 2020    Elliot Finkelstein, OD             [facility]  [address]  [facility] [address]  Phone: [phone] October 19, 2020     Patient: Roger Zavaleta    YOB: 1950   Date of Visit: 10/19/2020     Roger Zavaleta is a patient of Dr. Almeida (PCP) at [facility]. While reviewing his/her chart, it has come to our attention that there is an outstanding lab/procedure. Please help keep our Health Maintenance records as accurate and as up to date as possible by supplying the following:     Diabetic eye exam                                                                             Please fax to [facility] @ [phone].    Thank you for your assistance in our patient's healthcare.     Sincerely,     Christy Garcia MA   [facility] Care Team  Panel Care Coordinator  [facility]

## 2020-10-20 ENCOUNTER — PATIENT MESSAGE (OUTPATIENT)
Dept: ORTHOPEDICS | Facility: CLINIC | Age: 70
End: 2020-10-20

## 2020-10-20 ENCOUNTER — OFFICE VISIT (OUTPATIENT)
Dept: ORTHOPEDICS | Facility: CLINIC | Age: 70
End: 2020-10-20
Payer: MEDICARE

## 2020-10-20 VITALS — WEIGHT: 184.31 LBS | HEIGHT: 68 IN | BODY MASS INDEX: 27.93 KG/M2

## 2020-10-20 DIAGNOSIS — G89.29 CHRONIC PAIN OF BOTH KNEES: ICD-10-CM

## 2020-10-20 DIAGNOSIS — M25.561 CHRONIC PAIN OF BOTH KNEES: ICD-10-CM

## 2020-10-20 DIAGNOSIS — M65.9 SYNOVITIS OF KNEE: Primary | ICD-10-CM

## 2020-10-20 DIAGNOSIS — M25.562 CHRONIC PAIN OF BOTH KNEES: ICD-10-CM

## 2020-10-20 LAB
APPEARANCE FLD: NORMAL
BODY FLD TYPE: NORMAL
BODY FLD TYPE: NORMAL
COLOR FLD: NORMAL
CRYSTALS FLD MICRO: NEGATIVE
LYMPHOCYTES NFR FLD MANUAL: 38 %
MONOS+MACROS NFR FLD MANUAL: 43 %
NEUTROPHILS NFR FLD MANUAL: 19 %
WBC # FLD: 1210 /CU MM

## 2020-10-20 PROCEDURE — 1159F MED LIST DOCD IN RCRD: CPT | Mod: HCNC,S$GLB,, | Performed by: ORTHOPAEDIC SURGERY

## 2020-10-20 PROCEDURE — 87075 CULTR BACTERIA EXCEPT BLOOD: CPT | Mod: HCNC

## 2020-10-20 PROCEDURE — 3008F BODY MASS INDEX DOCD: CPT | Mod: HCNC,CPTII,S$GLB, | Performed by: ORTHOPAEDIC SURGERY

## 2020-10-20 PROCEDURE — 20610 DRAIN/INJ JOINT/BURSA W/O US: CPT | Mod: HCNC,LT,S$GLB, | Performed by: ORTHOPAEDIC SURGERY

## 2020-10-20 PROCEDURE — 1159F PR MEDICATION LIST DOCUMENTED IN MEDICAL RECORD: ICD-10-PCS | Mod: HCNC,S$GLB,, | Performed by: ORTHOPAEDIC SURGERY

## 2020-10-20 PROCEDURE — 1101F PT FALLS ASSESS-DOCD LE1/YR: CPT | Mod: HCNC,CPTII,S$GLB, | Performed by: ORTHOPAEDIC SURGERY

## 2020-10-20 PROCEDURE — 1125F PR PAIN SEVERITY QUANTIFIED, PAIN PRESENT: ICD-10-PCS | Mod: HCNC,S$GLB,, | Performed by: ORTHOPAEDIC SURGERY

## 2020-10-20 PROCEDURE — 99203 PR OFFICE/OUTPT VISIT, NEW, LEVL III, 30-44 MIN: ICD-10-PCS | Mod: HCNC,25,S$GLB, | Performed by: ORTHOPAEDIC SURGERY

## 2020-10-20 PROCEDURE — 3008F PR BODY MASS INDEX (BMI) DOCUMENTED: ICD-10-PCS | Mod: HCNC,CPTII,S$GLB, | Performed by: ORTHOPAEDIC SURGERY

## 2020-10-20 PROCEDURE — 1125F AMNT PAIN NOTED PAIN PRSNT: CPT | Mod: HCNC,S$GLB,, | Performed by: ORTHOPAEDIC SURGERY

## 2020-10-20 PROCEDURE — 1101F PR PT FALLS ASSESS DOC 0-1 FALLS W/OUT INJ PAST YR: ICD-10-PCS | Mod: HCNC,CPTII,S$GLB, | Performed by: ORTHOPAEDIC SURGERY

## 2020-10-20 PROCEDURE — 89060 EXAM SYNOVIAL FLUID CRYSTALS: CPT | Mod: HCNC

## 2020-10-20 PROCEDURE — 99999 PR PBB SHADOW E&M-EST. PATIENT-LVL III: ICD-10-PCS | Mod: PBBFAC,HCNC,, | Performed by: ORTHOPAEDIC SURGERY

## 2020-10-20 PROCEDURE — 20610 PR DRAIN/INJECT LARGE JOINT/BURSA: ICD-10-PCS | Mod: HCNC,LT,S$GLB, | Performed by: ORTHOPAEDIC SURGERY

## 2020-10-20 PROCEDURE — 87205 SMEAR GRAM STAIN: CPT | Mod: HCNC

## 2020-10-20 PROCEDURE — 99999 PR PBB SHADOW E&M-EST. PATIENT-LVL III: CPT | Mod: PBBFAC,HCNC,, | Performed by: ORTHOPAEDIC SURGERY

## 2020-10-20 PROCEDURE — 99203 OFFICE O/P NEW LOW 30 MIN: CPT | Mod: HCNC,25,S$GLB, | Performed by: ORTHOPAEDIC SURGERY

## 2020-10-20 PROCEDURE — 87070 CULTURE OTHR SPECIMN AEROBIC: CPT | Mod: HCNC

## 2020-10-20 PROCEDURE — 89051 BODY FLUID CELL COUNT: CPT | Mod: HCNC

## 2020-10-20 NOTE — PROCEDURES
Procedures     After explaining the procedure, the patient gave verbal consent for left knee aspiration. The sight was identified and the skin was aseptically prepped.  Six cc's of clear, blood-tinged fluid was obtained.  The fluid was sent for cell count and cultures.

## 2020-10-20 NOTE — LETTER
October 20, 2020      Jane Almeida, DO  [address]  [address]   [facility] [address]           [facility]  [address], [address]  [facility] [address]  Phone: [phone]  Fax: [phone]          Patient: Roger Zavaleta Jr.   MR Number: [id number]   YOB: 1950   Date of Visit: 10/20/2020       Dear Dr. Jane Almeida:    Thank you for referring Roger Zavaleta to me for evaluation. Attached you will find relevant portions of my assessment and plan of care.    If you have questions, please do not hesitate to call me. I look forward to following Roger Zavaleta along with you.    Sincerely,    Elieser Martínez MD    Enclosure  CC:  No Recipients    If you would like to receive this communication electronically, please contact [email or web address] or [phone] to request more information on [employer] access.    For providers and/or their staff who would like to refer a patient to [facility], please contact us through our one-stop-shop provider referral line, [facility] [place], at [phone].    If you feel you have received this communication in error or would no longer like to receive these types of communications, please e-mail [email or web address]

## 2020-10-20 NOTE — PROGRESS NOTES
Subjective:      Patient ID: Roger Zavaleta Jr. is a 70 y.o. male.    Chief Complaint: Consult and Knee Pain (left )    HPI     They have experienced problems with their left knee over the past 3 weeks. The patient denies relevant history of injury/aggravation.  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  He miley from a table and when he pivoted he felt pain, and giving way.  He denies fever infectious symptoms.  Pain is located medially Associated symptoms include NA.  Symptoms are aggravated by no particular activity. They have been treated with a walking cane.  Symptoms have recently stayed the same. Ambulation reportedly has been impaired. Self care ADLs are not painful.     Review of Systems   Constitution: Negative for fever and weight loss.   HENT: Negative for congestion.    Eyes: Negative for visual disturbance.   Cardiovascular: Negative for chest pain.   Respiratory: Negative for shortness of breath.    Hematologic/Lymphatic: Negative for bleeding problem. Does not bruise/bleed easily.   Skin: Negative for poor wound healing.   Musculoskeletal: Positive for joint pain.   Gastrointestinal: Negative for abdominal pain.   Genitourinary: Negative for dysuria.   Neurological: Negative for seizures.   Psychiatric/Behavioral: Negative for altered mental status.   Allergic/Immunologic: Negative for persistent infections.         Objective:      Ortho/SPM Exam      Left knee    [unfilled]    The patient is not in acute distress.   Sclerae normal  Body habitus is normal.  Respiratory distress:  none   The patient walks with a slight limp  Hip irritability  negative.   The skin over the knee is has a healed scar.  Knee effusion 1+  Tendernes is located none  Range of motion- Flexion 130 deg, Extension 0 deg,   Ligament laxity exam:   MCL 1+   Popliteal cyst negative  Patellar crepitation absent.  Flexion/pinch negative  Pulses DP present, PT present.  Motor normal 5/5 strength in all tested  muscle groups.   Sensory normal.    I reviewed the relevant imaging for the patient's condition:  Left knee films show a well-positioned well-fixed total knee replacement.  There is subtle medial narrowing of the polyethylene joint space.        Assessment:       Encounter Diagnoses   Name Primary?   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      Plan:       Roger was seen today for consult and knee pain.    Diagnoses and all orders for this visit:    Synovitis of knee  -     Body fluid crystal Joint Fluid, Left Knee  -     WBC & Diff,Body Fluid Joint Fluid, Left Knee  -     CULTURE, FLUID  (AEROBIC)  -     CULTURE, ANAEROBE    Chronic pain of both knees  -     Ambulatory referral/consult to Orthopedics     I explained my diagnostic impression and the reasoning behind it in detail, using layman's terms.  Models and/or pictures were used to help in the explanation.    Aspiration recommended and consent given    If cultures are negative polyethylene exchange will be considered.

## 2020-10-21 ENCOUNTER — PATIENT OUTREACH (OUTPATIENT)
Dept: OTHER | Facility: OTHER | Age: 70
End: 2020-10-21

## 2020-10-21 DIAGNOSIS — E11.42 TYPE 2 DIABETES MELLITUS WITH DIABETIC POLYNEUROPATHY, WITH LONG-TERM CURRENT USE OF INSULIN: ICD-10-CM

## 2020-10-21 DIAGNOSIS — Z79.4 TYPE 2 DIABETES MELLITUS WITH DIABETIC POLYNEUROPATHY, WITH LONG-TERM CURRENT USE OF INSULIN: ICD-10-CM

## 2020-10-21 DIAGNOSIS — I10 HYPERTENSION, ESSENTIAL: Primary | ICD-10-CM

## 2020-10-21 LAB — PATH INTERP FLD-IMP: NORMAL

## 2020-10-21 RX ORDER — INSULIN DETEMIR 100 [IU]/ML
47 INJECTION, SOLUTION SUBCUTANEOUS DAILY
Qty: 42.3 ML | Refills: 1 | Status: SHIPPED | OUTPATIENT
Start: 2020-10-21 | End: 2021-02-22 | Stop reason: SDUPTHER

## 2020-10-21 NOTE — PROGRESS NOTES
Digital Medicine: Clinician Follow-Up    Mr. Zavaleta returned call for digital medicine follow up. He is doing well overall. Metformin was discontinued at last office visit due to intolerance. Patient instructed to take Levemir 47 units if AM blood sugars were >140 mg/dL and he has made this change without incident.     The history is provided by the patient.      Review of patient's allergies indicates:  No Known Allergies  Follow-up reason(s): medication change follow-up.     Hypertension    Patient's blood pressure is stable.   Diabetes    Patient's blood glucose is stable.   Patient did make medication change.    Is patient tolerating med change? yes            Last 5 Patient Entered Readings                                      Current 30 Day Average: 124/67     Recent Readings 10/19/2020 10/16/2020 10/11/2020 10/6/2020 10/4/2020    SBP (mmHg) 131 129 119 103 133    DBP (mmHg) 74 65 72 57 72    Pulse 61 66 60 81 72        Last 6 Patient Entered Readings                                          Most Recent A1c: 6.6% on 10/2/2020  (Goal: 7%)     Recent Readings 10/21/2020 10/21/2020 10/20/2020 10/20/2020 10/20/2020    Blood Glucose (mg/dL) 132 155 129 137 92               Depression Screening  Did not address depression screening.    Sleep Apnea Screening    Did not address sleep apnea screening.     Medication Affordability Screening  Did not address medication affordability screening.     Medication Adherence-Medication adherence was assessed.          ASSESSMENT(S)  Patients BP average is 124/67 mmHg, which is at goal. Patient's BP goal is less than or equal to 130/80.  Patient's A1C goal is less than or equal to 7. Patient's most recent A1C result is at goal. Lab Results    Component                Value               Date                     HGBA1C                   6.6 (H)             10/02/2020          .       Hypertension Plan  Continue current therapy.    Diabetes Plan  Continue current therapy.  Refilled Levemir with correct directions.   Provided patient education. Reviewed s/s of hypoglycemia and treatment protocol.        Addressed patient questions and patient has my contact information if needed prior to next outreach. Patient verbalizes understanding.                 Topic    Eye Exam      There are no preventive care reminders to display for this patient.      Hypertension Medications             chlorthalidone (HYGROTEN) 25 MG Tab Take 1 tablet by mouth once daily    irbesartan (AVAPRO) 150 MG tablet Take 1 tablet (150 mg total) by mouth once daily.    metoprolol succinate (TOPROL-XL) 50 MG 24 hr tablet Take 1 tablet by mouth once daily    nitroGLYCERIN (NITROSTAT) 0.4 MG SL tablet Place 0.4 mg under the tongue every 5 (five) minutes as needed.          Diabetes Medications             insulin aspart U-100 (NOVOLOG U-100 INSULIN ASPART) 100 unit/mL injection Inject 20 Units into the skin 3 (three) times daily before meals.    LEVEMIR FLEXTOUCH U-100 INSULN 100 unit/mL (3 mL) InPn pen INJECT 45 UNITS SUBCUTANEOUSLY ONCE DAILY    OZEMPIC 1 mg/dose (2 mg/1.5 mL) PnIj INJECT 1MG INTO THE SKIN ONCE EVERY 7 DAYSA AS DIRECTED

## 2020-10-22 ENCOUNTER — TELEPHONE (OUTPATIENT)
Dept: FAMILY MEDICINE | Facility: CLINIC | Age: 70
End: 2020-10-22

## 2020-10-22 NOTE — TELEPHONE ENCOUNTER
Please call patient. I stopped his metformin at his last visit, please check and see how his glucose has been since he stopped Metformin and let me know    Dr. Jane Almeida D.O.   [facility]

## 2020-10-22 NOTE — TELEPHONE ENCOUNTER
----- Message from Jane Almeida DO sent at 10/8/2020  7:19 AM CDT -----  Stopped metformin so follow-up glucose in 2 weeks

## 2020-10-22 NOTE — TELEPHONE ENCOUNTER
Spoke with pt, he stated he is feeling fine, no upset stomach. Pt states his glucose has been good this morning it was 122. He stated it does go up a little throughout the day but the highest  was 152.

## 2020-10-24 LAB
BACTERIA FLD AEROBE CULT: NO GROWTH
GRAM STN SPEC: NORMAL
GRAM STN SPEC: NORMAL

## 2020-10-27 ENCOUNTER — OFFICE VISIT (OUTPATIENT)
Dept: ORTHOPEDICS | Facility: CLINIC | Age: 70
End: 2020-10-27
Payer: MEDICARE

## 2020-10-27 VITALS — WEIGHT: 184.31 LBS | HEIGHT: 68 IN | BODY MASS INDEX: 27.93 KG/M2

## 2020-10-27 DIAGNOSIS — M25.561 PAIN IN BOTH KNEES, UNSPECIFIED CHRONICITY: ICD-10-CM

## 2020-10-27 DIAGNOSIS — M65.9 SYNOVITIS OF KNEE: Primary | ICD-10-CM

## 2020-10-27 DIAGNOSIS — M25.562 PAIN IN BOTH KNEES, UNSPECIFIED CHRONICITY: ICD-10-CM

## 2020-10-27 PROCEDURE — 1101F PT FALLS ASSESS-DOCD LE1/YR: CPT | Mod: HCNC,CPTII,S$GLB, | Performed by: ORTHOPAEDIC SURGERY

## 2020-10-27 PROCEDURE — 99213 OFFICE O/P EST LOW 20 MIN: CPT | Mod: HCNC,S$GLB,, | Performed by: ORTHOPAEDIC SURGERY

## 2020-10-27 PROCEDURE — 99999 PR PBB SHADOW E&M-EST. PATIENT-LVL III: CPT | Mod: PBBFAC,HCNC,, | Performed by: ORTHOPAEDIC SURGERY

## 2020-10-27 PROCEDURE — 3008F BODY MASS INDEX DOCD: CPT | Mod: HCNC,CPTII,S$GLB, | Performed by: ORTHOPAEDIC SURGERY

## 2020-10-27 PROCEDURE — 99999 PR PBB SHADOW E&M-EST. PATIENT-LVL III: ICD-10-PCS | Mod: PBBFAC,HCNC,, | Performed by: ORTHOPAEDIC SURGERY

## 2020-10-27 PROCEDURE — 99213 PR OFFICE/OUTPT VISIT, EST, LEVL III, 20-29 MIN: ICD-10-PCS | Mod: HCNC,S$GLB,, | Performed by: ORTHOPAEDIC SURGERY

## 2020-10-27 PROCEDURE — 1159F PR MEDICATION LIST DOCUMENTED IN MEDICAL RECORD: ICD-10-PCS | Mod: HCNC,S$GLB,, | Performed by: ORTHOPAEDIC SURGERY

## 2020-10-27 PROCEDURE — 1125F AMNT PAIN NOTED PAIN PRSNT: CPT | Mod: HCNC,S$GLB,, | Performed by: ORTHOPAEDIC SURGERY

## 2020-10-27 PROCEDURE — 3008F PR BODY MASS INDEX (BMI) DOCUMENTED: ICD-10-PCS | Mod: HCNC,CPTII,S$GLB, | Performed by: ORTHOPAEDIC SURGERY

## 2020-10-27 PROCEDURE — 1125F PR PAIN SEVERITY QUANTIFIED, PAIN PRESENT: ICD-10-PCS | Mod: HCNC,S$GLB,, | Performed by: ORTHOPAEDIC SURGERY

## 2020-10-27 PROCEDURE — 1159F MED LIST DOCD IN RCRD: CPT | Mod: HCNC,S$GLB,, | Performed by: ORTHOPAEDIC SURGERY

## 2020-10-27 PROCEDURE — 1101F PR PT FALLS ASSESS DOC 0-1 FALLS W/OUT INJ PAST YR: ICD-10-PCS | Mod: HCNC,CPTII,S$GLB, | Performed by: ORTHOPAEDIC SURGERY

## 2020-10-27 NOTE — PROGRESS NOTES
"Subjective:      Patient ID: Roger Zavaleta Jr. is a 70 y.o. male.    Chief Complaint: Knee Pain (left ) and Follow-up (1 week)      HPI:  Follow-up for aspiration of painful left total knee.  The patient feels 80% better.  He denies any generalized symptoms.  He does report occasional pain in his right knee which was replaced at the same time as the left.      Current Outpatient Medications:     alpha lipoic acid 200 mg Cap, Take 1 capsule PO once daily, Disp: , Rfl:     aspirin 81 MG chewable tablet, Take 81 mg by mouth once daily.  , Disp: , Rfl:     atorvastatin (LIPITOR) 40 MG tablet, Take 1 tablet (40 mg total) by mouth once daily., Disp: 90 tablet, Rfl: 3    BD VEO INSULIN SYR HALF UNIT 0.3 mL 31 gauge x 15/64" Syrg, THREE TIMES DAILY AS DIRECTED, Disp: , Rfl: 1    BD VEO INSULIN SYRINGE UF 1/2 mL 31 gauge x 15/64" Syrg, USE  SYRINGE THREE TIMES DAILY, Disp: 100 Syringe, Rfl: 5    blood sugar diagnostic (ACCU-CHEK SMARTVIEW TEST STRIP) Strp, USE 1 STRIP TO CHECK GLUCOSE THREE TIMES DAILY AS NEEDED, Disp: 100 strip, Rfl: 3    chlorthalidone (HYGROTEN) 25 MG Tab, Take 1 tablet by mouth once daily, Disp: 90 tablet, Rfl: 0    insulin aspart U-100 (NOVOLOG U-100 INSULIN ASPART) 100 unit/mL injection, Inject 20 Units into the skin 3 (three) times daily before meals., Disp: 10 mL, Rfl: 5    insulin detemir U-100 (LEVEMIR FLEXTOUCH U-100 INSULN) 100 unit/mL (3 mL) InPn pen, Inject 47 Units into the skin once daily., Disp: 42.3 mL, Rfl: 1    irbesartan (AVAPRO) 150 MG tablet, Take 1 tablet (150 mg total) by mouth once daily., Disp: 90 tablet, Rfl: 1    metoprolol succinate (TOPROL-XL) 50 MG 24 hr tablet, Take 1 tablet by mouth once daily, Disp: 90 tablet, Rfl: 0    OZEMPIC 1 mg/dose (2 mg/1.5 mL) PnIj, INJECT 1MG INTO THE SKIN ONCE EVERY 7 DAYSA AS DIRECTED, Disp: 4 mL, Rfl: 1    pen needle, diabetic 31 gauge x 5/16" Ndle, USE AS DIRECTED ONCE DAILY, Disp: 50 each, Rfl: 7    fluticasone propionate " "(FLONASE) 50 mcg/actuation nasal spray, 1 spray (50 mcg total) by Each Nostril route once daily. (Patient not taking: Reported on 10/20/2020), Disp: 16 g, Rfl: 3    lancets (ULTRA THIN LANCETS) 30 gauge Misc, 1 lancet by Misc.(Non-Drug; Combo Route) route 3 (three) times daily., Disp: 200 each, Rfl: 3    nitroGLYCERIN (NITROSTAT) 0.4 MG SL tablet, Place 0.4 mg under the tongue every 5 (five) minutes as needed.  , Disp: , Rfl:     varicella-zoster gE-AS01B, PF, (SHINGRIX, PF,) 50 mcg/0.5 mL injection, Inject 0.5mL IM at 0 and 2-6 months (Patient not taking: Reported on 10/20/2020), Disp: 1 each, Rfl: 1  Review of patient's allergies indicates:  No Known Allergies    Ht 5' 8" (1.727 m)   Wt 83.6 kg (184 lb 4.9 oz)   BMI 28.02 kg/m²     Review of Systems   Constitution: Negative for fever and weight loss.   HENT: Negative for congestion.    Eyes: Negative for visual disturbance.   Cardiovascular: Negative for chest pain.   Respiratory: Negative for shortness of breath.    Hematologic/Lymphatic: Negative for bleeding problem. Does not bruise/bleed easily.   Skin: Negative for poor wound healing.   Gastrointestinal: Negative for abdominal pain.   Genitourinary: Negative for dysuria.   Neurological: Negative for seizures.   Psychiatric/Behavioral: Negative for altered mental status.   Allergic/Immunologic: Negative for persistent infections.           Objective:    Ortho Exam        Left knee  Alert, oriented, no acute distress  Sclera-Normal  Respiratory distress-none  Gait no limp  Incision:  Normally healed  Range of motion: extension- 0 degrees; flexion- 130 degrees  Valgus/varus stability- 1+ valgus laxity  Swelling-none, no effusion  Distal perfusion-intact  Distal neurologic-intact    The right is identical  Imaging:  None today    Cultures are negative    Assessment:             1. Synovitis of knee    2. Pain in both knees, unspecified chronicity        The patient probably has polyethylene wear in both " knees enough that exchange should be considered.  I explained this as well as the typical process.        Plan:          No follow-ups on file.    The patient wishes to consider the above after the holidays.  I asked him to check his records to see if the brand of the implant can be ascertained.  I also will check at the hospital where the surgery was performed.    Consideration will also be given to revising both polyethylene inserts.        Subsequent investigation has revealed that the implants are [employer]

## 2020-10-28 LAB — BACTERIA SPEC ANAEROBE CULT: NORMAL

## 2020-11-20 PROCEDURE — 99454 REM MNTR PHYSIOL PARAM 16-30: CPT | Mod: S$GLB,,, | Performed by: FAMILY MEDICINE

## 2020-11-20 PROCEDURE — 99454 PR REMOTE MNTR, PHYS PARAM, INITIAL, EA 30 DAYS: ICD-10-PCS | Mod: S$GLB,,, | Performed by: FAMILY MEDICINE

## 2020-12-02 ENCOUNTER — PATIENT MESSAGE (OUTPATIENT)
Dept: FAMILY MEDICINE | Facility: CLINIC | Age: 70
End: 2020-12-02

## 2020-12-02 DIAGNOSIS — I10 HYPERTENSION, ESSENTIAL: ICD-10-CM

## 2020-12-02 DIAGNOSIS — E11.42 TYPE 2 DIABETES MELLITUS WITH DIABETIC POLYNEUROPATHY, WITH LONG-TERM CURRENT USE OF INSULIN: Primary | ICD-10-CM

## 2020-12-02 DIAGNOSIS — Z79.4 TYPE 2 DIABETES MELLITUS WITH DIABETIC POLYNEUROPATHY, WITH LONG-TERM CURRENT USE OF INSULIN: Primary | ICD-10-CM

## 2020-12-02 RX ORDER — METOPROLOL SUCCINATE 50 MG/1
50 TABLET, EXTENDED RELEASE ORAL DAILY
Qty: 90 TABLET | Refills: 3 | Status: SHIPPED | OUTPATIENT
Start: 2020-12-02 | End: 2021-12-26 | Stop reason: SDUPTHER

## 2020-12-02 RX ORDER — SYRINGE AND NEEDLE,INSULIN,1ML 31GX15/64"
SYRINGE, EMPTY DISPOSABLE MISCELLANEOUS
Qty: 100 SYRINGE | Refills: 11 | Status: SHIPPED | OUTPATIENT
Start: 2020-12-02 | End: 2022-01-04

## 2020-12-02 RX ORDER — CHLORTHALIDONE 25 MG/1
25 TABLET ORAL DAILY
Qty: 90 TABLET | Refills: 3 | Status: SHIPPED | OUTPATIENT
Start: 2020-12-02 | End: 2021-11-30 | Stop reason: SDUPTHER

## 2020-12-14 ENCOUNTER — PATIENT OUTREACH (OUTPATIENT)
Dept: OTHER | Facility: OTHER | Age: 70
End: 2020-12-14

## 2020-12-16 PROCEDURE — 99454 REM MNTR PHYSIOL PARAM 16-30: CPT | Mod: S$GLB,,, | Performed by: FAMILY MEDICINE

## 2020-12-16 PROCEDURE — 99454 PR REMOTE MNTR, PHYS PARAM, INITIAL, EA 30 DAYS: ICD-10-PCS | Mod: S$GLB,,, | Performed by: FAMILY MEDICINE

## 2020-12-17 ENCOUNTER — PES CALL (OUTPATIENT)
Dept: ADMINISTRATIVE | Facility: CLINIC | Age: 70
End: 2020-12-17

## 2020-12-22 NOTE — PROGRESS NOTES
Digital Medicine: Health  Follow-Up    The history is provided by the patient.             Reason for review: Blood glucose at goal and Blood pressure not at goal    Patient needs assistance troubleshooting: Patient lost charging cord for BP cuff, but found it and is charging it today..      Topics Covered on Call: physical activity and Diet    Additional Follow-up details: Patient says he is feeling well today.  He reports feeling dizzy one day, but BG was 117.  He did not check his BP at that time.  He reports no other episodes of dizziness.              Diet-no change to diet    No change to diet.  Patient reports eating or drinking the following: Patient reports a couple of dietary indiscretions, but no real change to his diabetic, low sodium diet.    Intervention(s): carb reduction      Physical Activity-no change to routine  No change to exercise routine.     Medication Adherence-Medication adherence was assessed.        Substance, Sleep, Stress-No change  stress-  Details:  Intervention(s):    Sleep-  Details:  Intervention(s):    Alcohol -  Details:  Intervention(s):    Tobacco-  Details:  Intervention(s):          Continue current diet/physical activity routine.  Provided patient education.       Addressed patient questions and patient has my contact information if needed prior to next outreach. Patient verbalizes understanding.      Explained the importance of self-monitoring and medication adherence. Encouraged the patient to communicate with their health  for lifestyle modifications to help improve or maintain a healthy lifestyle.                   Topic    Eye Exam          Last 5 Patient Entered Readings                                      Current 30 Day Average: 142/70     Recent Readings 12/12/2020 12/12/2020 12/6/2020 11/28/2020 11/28/2020    SBP (mmHg) 137 160 148 168 154    DBP (mmHg) 71 73 63 80 75    Pulse 70 66 62 64 64        Last 6 Patient Entered Readings                                           Most Recent A1c: 6.6% on 10/2/2020  (Goal: 7%)     Recent Readings 12/21/2020 12/21/2020 12/21/2020 12/20/2020 12/20/2020    Blood Glucose (mg/dL) 133 111 163 142 128

## 2021-01-03 ENCOUNTER — PATIENT MESSAGE (OUTPATIENT)
Dept: FAMILY MEDICINE | Facility: CLINIC | Age: 71
End: 2021-01-03

## 2021-01-09 ENCOUNTER — IMMUNIZATION (OUTPATIENT)
Dept: FAMILY MEDICINE | Facility: CLINIC | Age: 71
End: 2021-01-09
Payer: MEDICARE

## 2021-01-09 DIAGNOSIS — Z23 NEED FOR VACCINATION: ICD-10-CM

## 2021-01-09 PROCEDURE — 91300 COVID-19, MRNA, LNP-S, PF, 30 MCG/0.3 ML DOSE VACCINE: CPT | Mod: HCNC,PBBFAC | Performed by: FAMILY MEDICINE

## 2021-01-13 DIAGNOSIS — Z79.4 TYPE 2 DIABETES MELLITUS WITH DIABETIC POLYNEUROPATHY, WITH LONG-TERM CURRENT USE OF INSULIN: ICD-10-CM

## 2021-01-13 DIAGNOSIS — E11.42 TYPE 2 DIABETES MELLITUS WITH DIABETIC POLYNEUROPATHY, WITH LONG-TERM CURRENT USE OF INSULIN: ICD-10-CM

## 2021-01-13 RX ORDER — METFORMIN HYDROCHLORIDE 1000 MG/1
TABLET ORAL
Qty: 60 TABLET | Refills: 0 | OUTPATIENT
Start: 2021-01-13

## 2021-01-13 RX ORDER — METFORMIN HYDROCHLORIDE 500 MG/1
500 TABLET, EXTENDED RELEASE ORAL 2 TIMES DAILY WITH MEALS
Qty: 180 TABLET | Refills: 1 | Status: SHIPPED | OUTPATIENT
Start: 2021-01-13 | End: 2021-01-14

## 2021-01-14 ENCOUNTER — OFFICE VISIT (OUTPATIENT)
Dept: FAMILY MEDICINE | Facility: CLINIC | Age: 71
End: 2021-01-14
Payer: MEDICARE

## 2021-01-14 VITALS
WEIGHT: 200.38 LBS | HEART RATE: 60 BPM | RESPIRATION RATE: 18 BRPM | SYSTOLIC BLOOD PRESSURE: 120 MMHG | TEMPERATURE: 98 F | DIASTOLIC BLOOD PRESSURE: 76 MMHG | HEIGHT: 68 IN | OXYGEN SATURATION: 99 % | BODY MASS INDEX: 30.37 KG/M2

## 2021-01-14 DIAGNOSIS — Z79.4 TYPE 2 DIABETES MELLITUS WITH DIABETIC POLYNEUROPATHY, WITH LONG-TERM CURRENT USE OF INSULIN: Primary | ICD-10-CM

## 2021-01-14 DIAGNOSIS — E11.42 TYPE 2 DIABETES MELLITUS WITH DIABETIC POLYNEUROPATHY, WITH LONG-TERM CURRENT USE OF INSULIN: Primary | ICD-10-CM

## 2021-01-14 DIAGNOSIS — I10 HYPERTENSION, ESSENTIAL: ICD-10-CM

## 2021-01-14 DIAGNOSIS — E78.2 MIXED HYPERLIPIDEMIA: ICD-10-CM

## 2021-01-14 PROCEDURE — 3051F HG A1C>EQUAL 7.0%<8.0%: CPT | Mod: HCNC,CPTII,S$GLB, | Performed by: FAMILY MEDICINE

## 2021-01-14 PROCEDURE — 1126F AMNT PAIN NOTED NONE PRSNT: CPT | Mod: HCNC,S$GLB,, | Performed by: FAMILY MEDICINE

## 2021-01-14 PROCEDURE — 99499 UNLISTED E&M SERVICE: CPT | Mod: HCNC,S$GLB,, | Performed by: FAMILY MEDICINE

## 2021-01-14 PROCEDURE — 99214 PR OFFICE/OUTPT VISIT, EST, LEVL IV, 30-39 MIN: ICD-10-PCS | Mod: HCNC,S$GLB,, | Performed by: FAMILY MEDICINE

## 2021-01-14 PROCEDURE — 1159F PR MEDICATION LIST DOCUMENTED IN MEDICAL RECORD: ICD-10-PCS | Mod: HCNC,S$GLB,, | Performed by: FAMILY MEDICINE

## 2021-01-14 PROCEDURE — 3074F PR MOST RECENT SYSTOLIC BLOOD PRESSURE < 130 MM HG: ICD-10-PCS | Mod: HCNC,CPTII,S$GLB, | Performed by: FAMILY MEDICINE

## 2021-01-14 PROCEDURE — 3078F DIAST BP <80 MM HG: CPT | Mod: HCNC,CPTII,S$GLB, | Performed by: FAMILY MEDICINE

## 2021-01-14 PROCEDURE — 3008F BODY MASS INDEX DOCD: CPT | Mod: HCNC,CPTII,S$GLB, | Performed by: FAMILY MEDICINE

## 2021-01-14 PROCEDURE — 1100F PTFALLS ASSESS-DOCD GE2>/YR: CPT | Mod: HCNC,CPTII,S$GLB, | Performed by: FAMILY MEDICINE

## 2021-01-14 PROCEDURE — 1159F MED LIST DOCD IN RCRD: CPT | Mod: HCNC,S$GLB,, | Performed by: FAMILY MEDICINE

## 2021-01-14 PROCEDURE — 99214 OFFICE O/P EST MOD 30 MIN: CPT | Mod: HCNC,S$GLB,, | Performed by: FAMILY MEDICINE

## 2021-01-14 PROCEDURE — 3288F PR FALLS RISK ASSESSMENT DOCUMENTED: ICD-10-PCS | Mod: HCNC,CPTII,S$GLB, | Performed by: FAMILY MEDICINE

## 2021-01-14 PROCEDURE — 99999 PR PBB SHADOW E&M-EST. PATIENT-LVL V: ICD-10-PCS | Mod: PBBFAC,HCNC,, | Performed by: FAMILY MEDICINE

## 2021-01-14 PROCEDURE — 3288F FALL RISK ASSESSMENT DOCD: CPT | Mod: HCNC,CPTII,S$GLB, | Performed by: FAMILY MEDICINE

## 2021-01-14 PROCEDURE — 3074F SYST BP LT 130 MM HG: CPT | Mod: HCNC,CPTII,S$GLB, | Performed by: FAMILY MEDICINE

## 2021-01-14 PROCEDURE — 99999 PR PBB SHADOW E&M-EST. PATIENT-LVL V: CPT | Mod: PBBFAC,HCNC,, | Performed by: FAMILY MEDICINE

## 2021-01-14 PROCEDURE — 3078F PR MOST RECENT DIASTOLIC BLOOD PRESSURE < 80 MM HG: ICD-10-PCS | Mod: HCNC,CPTII,S$GLB, | Performed by: FAMILY MEDICINE

## 2021-01-14 PROCEDURE — 3008F PR BODY MASS INDEX (BMI) DOCUMENTED: ICD-10-PCS | Mod: HCNC,CPTII,S$GLB, | Performed by: FAMILY MEDICINE

## 2021-01-14 PROCEDURE — 3051F PR MOST RECENT HEMOGLOBIN A1C LEVEL 7.0 - < 8.0%: ICD-10-PCS | Mod: HCNC,CPTII,S$GLB, | Performed by: FAMILY MEDICINE

## 2021-01-14 PROCEDURE — 1100F PR PT FALLS ASSESS DOC 2+ FALLS/FALL W/INJURY/YR: ICD-10-PCS | Mod: HCNC,CPTII,S$GLB, | Performed by: FAMILY MEDICINE

## 2021-01-14 PROCEDURE — 99499 RISK ADDL DX/OHS AUDIT: ICD-10-PCS | Mod: HCNC,S$GLB,, | Performed by: FAMILY MEDICINE

## 2021-01-14 PROCEDURE — 1126F PR PAIN SEVERITY QUANTIFIED, NO PAIN PRESENT: ICD-10-PCS | Mod: HCNC,S$GLB,, | Performed by: FAMILY MEDICINE

## 2021-01-14 RX ORDER — INSULIN ASPART 100 [IU]/ML
10 INJECTION, SOLUTION INTRAVENOUS; SUBCUTANEOUS
Qty: 27 ML | Refills: 1 | Status: SHIPPED | OUTPATIENT
Start: 2021-01-14 | End: 2021-05-10

## 2021-01-14 RX ORDER — METFORMIN HYDROCHLORIDE 500 MG/1
500 TABLET, EXTENDED RELEASE ORAL 2 TIMES DAILY WITH MEALS
Qty: 180 TABLET | Refills: 1 | Status: CANCELLED | OUTPATIENT
Start: 2021-01-14 | End: 2021-07-13

## 2021-01-15 ENCOUNTER — PATIENT OUTREACH (OUTPATIENT)
Dept: ADMINISTRATIVE | Facility: HOSPITAL | Age: 71
End: 2021-01-15

## 2021-01-17 PROCEDURE — 99454 PR REMOTE MNTR, PHYS PARAM, INITIAL, EA 30 DAYS: ICD-10-PCS | Mod: S$GLB,,, | Performed by: FAMILY MEDICINE

## 2021-01-17 PROCEDURE — 99454 REM MNTR PHYSIOL PARAM 16-30: CPT | Mod: S$GLB,,, | Performed by: FAMILY MEDICINE

## 2021-01-30 ENCOUNTER — IMMUNIZATION (OUTPATIENT)
Dept: FAMILY MEDICINE | Facility: CLINIC | Age: 71
End: 2021-01-30
Payer: MEDICARE

## 2021-01-30 DIAGNOSIS — Z23 NEED FOR VACCINATION: Primary | ICD-10-CM

## 2021-01-30 PROCEDURE — 0002A COVID-19, MRNA, LNP-S, PF, 30 MCG/0.3 ML DOSE VACCINE: CPT | Mod: HCNC,PBBFAC | Performed by: FAMILY MEDICINE

## 2021-01-30 PROCEDURE — 91300 COVID-19, MRNA, LNP-S, PF, 30 MCG/0.3 ML DOSE VACCINE: CPT | Mod: HCNC,PBBFAC | Performed by: FAMILY MEDICINE

## 2021-02-01 ENCOUNTER — PATIENT OUTREACH (OUTPATIENT)
Dept: ADMINISTRATIVE | Facility: OTHER | Age: 71
End: 2021-02-01

## 2021-02-02 ENCOUNTER — OFFICE VISIT (OUTPATIENT)
Dept: ORTHOPEDICS | Facility: CLINIC | Age: 71
End: 2021-02-02
Payer: MEDICARE

## 2021-02-02 VITALS
RESPIRATION RATE: 20 BRPM | HEART RATE: 80 BPM | DIASTOLIC BLOOD PRESSURE: 70 MMHG | SYSTOLIC BLOOD PRESSURE: 112 MMHG | WEIGHT: 209 LBS | BODY MASS INDEX: 31.78 KG/M2

## 2021-02-02 DIAGNOSIS — M25.562 PAIN IN BOTH KNEES, UNSPECIFIED CHRONICITY: Primary | ICD-10-CM

## 2021-02-02 DIAGNOSIS — M25.561 PAIN IN BOTH KNEES, UNSPECIFIED CHRONICITY: Primary | ICD-10-CM

## 2021-02-02 PROCEDURE — 99999 PR PBB SHADOW E&M-EST. PATIENT-LVL III: ICD-10-PCS | Mod: PBBFAC,,, | Performed by: PHYSICIAN ASSISTANT

## 2021-02-02 PROCEDURE — 3074F PR MOST RECENT SYSTOLIC BLOOD PRESSURE < 130 MM HG: ICD-10-PCS | Mod: CPTII,S$GLB,, | Performed by: PHYSICIAN ASSISTANT

## 2021-02-02 PROCEDURE — 3074F SYST BP LT 130 MM HG: CPT | Mod: CPTII,S$GLB,, | Performed by: PHYSICIAN ASSISTANT

## 2021-02-02 PROCEDURE — 3078F PR MOST RECENT DIASTOLIC BLOOD PRESSURE < 80 MM HG: ICD-10-PCS | Mod: CPTII,S$GLB,, | Performed by: PHYSICIAN ASSISTANT

## 2021-02-02 PROCEDURE — 1159F MED LIST DOCD IN RCRD: CPT | Mod: S$GLB,,, | Performed by: PHYSICIAN ASSISTANT

## 2021-02-02 PROCEDURE — 99999 PR PBB SHADOW E&M-EST. PATIENT-LVL III: CPT | Mod: PBBFAC,,, | Performed by: PHYSICIAN ASSISTANT

## 2021-02-02 PROCEDURE — 3288F FALL RISK ASSESSMENT DOCD: CPT | Mod: CPTII,S$GLB,, | Performed by: PHYSICIAN ASSISTANT

## 2021-02-02 PROCEDURE — 3288F PR FALLS RISK ASSESSMENT DOCUMENTED: ICD-10-PCS | Mod: CPTII,S$GLB,, | Performed by: PHYSICIAN ASSISTANT

## 2021-02-02 PROCEDURE — 3008F PR BODY MASS INDEX (BMI) DOCUMENTED: ICD-10-PCS | Mod: CPTII,S$GLB,, | Performed by: PHYSICIAN ASSISTANT

## 2021-02-02 PROCEDURE — 1126F AMNT PAIN NOTED NONE PRSNT: CPT | Mod: S$GLB,,, | Performed by: PHYSICIAN ASSISTANT

## 2021-02-02 PROCEDURE — 1101F PT FALLS ASSESS-DOCD LE1/YR: CPT | Mod: CPTII,S$GLB,, | Performed by: PHYSICIAN ASSISTANT

## 2021-02-02 PROCEDURE — 1159F PR MEDICATION LIST DOCUMENTED IN MEDICAL RECORD: ICD-10-PCS | Mod: S$GLB,,, | Performed by: PHYSICIAN ASSISTANT

## 2021-02-02 PROCEDURE — 1101F PR PT FALLS ASSESS DOC 0-1 FALLS W/OUT INJ PAST YR: ICD-10-PCS | Mod: CPTII,S$GLB,, | Performed by: PHYSICIAN ASSISTANT

## 2021-02-02 PROCEDURE — 3008F BODY MASS INDEX DOCD: CPT | Mod: CPTII,S$GLB,, | Performed by: PHYSICIAN ASSISTANT

## 2021-02-02 PROCEDURE — 1126F PR PAIN SEVERITY QUANTIFIED, NO PAIN PRESENT: ICD-10-PCS | Mod: S$GLB,,, | Performed by: PHYSICIAN ASSISTANT

## 2021-02-02 PROCEDURE — 99213 OFFICE O/P EST LOW 20 MIN: CPT | Mod: S$GLB,,, | Performed by: PHYSICIAN ASSISTANT

## 2021-02-02 PROCEDURE — 3078F DIAST BP <80 MM HG: CPT | Mod: CPTII,S$GLB,, | Performed by: PHYSICIAN ASSISTANT

## 2021-02-02 PROCEDURE — 99213 PR OFFICE/OUTPT VISIT, EST, LEVL III, 20-29 MIN: ICD-10-PCS | Mod: S$GLB,,, | Performed by: PHYSICIAN ASSISTANT

## 2021-02-03 ENCOUNTER — PATIENT MESSAGE (OUTPATIENT)
Dept: FAMILY MEDICINE | Facility: CLINIC | Age: 71
End: 2021-02-03

## 2021-02-16 PROCEDURE — 99454 REM MNTR PHYSIOL PARAM 16-30: CPT | Mod: S$GLB,,, | Performed by: FAMILY MEDICINE

## 2021-02-16 PROCEDURE — 99454 PR REMOTE MNTR, PHYS PARAM, INITIAL, EA 30 DAYS: ICD-10-PCS | Mod: S$GLB,,, | Performed by: FAMILY MEDICINE

## 2021-03-02 ENCOUNTER — PATIENT OUTREACH (OUTPATIENT)
Dept: ADMINISTRATIVE | Facility: HOSPITAL | Age: 71
End: 2021-03-02

## 2021-03-09 ENCOUNTER — TELEPHONE (OUTPATIENT)
Dept: ADMINISTRATIVE | Facility: OTHER | Age: 71
End: 2021-03-09

## 2021-03-11 ENCOUNTER — PATIENT MESSAGE (OUTPATIENT)
Dept: FAMILY MEDICINE | Facility: CLINIC | Age: 71
End: 2021-03-11

## 2021-03-11 DIAGNOSIS — I10 HYPERTENSION, ESSENTIAL: ICD-10-CM

## 2021-03-12 RX ORDER — IRBESARTAN 150 MG/1
150 TABLET ORAL DAILY
Qty: 90 TABLET | Refills: 1 | Status: SHIPPED | OUTPATIENT
Start: 2021-03-12 | End: 2021-10-03 | Stop reason: SDUPTHER

## 2021-03-15 ENCOUNTER — PATIENT OUTREACH (OUTPATIENT)
Dept: ADMINISTRATIVE | Facility: HOSPITAL | Age: 71
End: 2021-03-15

## 2021-03-19 PROCEDURE — 99454 REM MNTR PHYSIOL PARAM 16-30: CPT | Mod: S$GLB,,, | Performed by: FAMILY MEDICINE

## 2021-03-19 PROCEDURE — 99454 PR REMOTE MNTR, PHYS PARAM, INITIAL, EA 30 DAYS: ICD-10-PCS | Mod: S$GLB,,, | Performed by: FAMILY MEDICINE

## 2021-03-21 ENCOUNTER — PATIENT MESSAGE (OUTPATIENT)
Dept: FAMILY MEDICINE | Facility: CLINIC | Age: 71
End: 2021-03-21

## 2021-03-23 ENCOUNTER — PATIENT MESSAGE (OUTPATIENT)
Dept: FAMILY MEDICINE | Facility: CLINIC | Age: 71
End: 2021-03-23

## 2021-03-24 RX ORDER — PEN NEEDLE, DIABETIC 30 GX3/16"
NEEDLE, DISPOSABLE MISCELLANEOUS
Qty: 50 EACH | Refills: 7 | Status: SHIPPED | OUTPATIENT
Start: 2021-03-24 | End: 2022-01-12 | Stop reason: SDUPTHER

## 2021-05-10 DIAGNOSIS — E11.42 TYPE 2 DIABETES MELLITUS WITH DIABETIC POLYNEUROPATHY, WITH LONG-TERM CURRENT USE OF INSULIN: ICD-10-CM

## 2021-05-10 DIAGNOSIS — Z79.4 TYPE 2 DIABETES MELLITUS WITH DIABETIC POLYNEUROPATHY, WITH LONG-TERM CURRENT USE OF INSULIN: ICD-10-CM

## 2021-05-10 RX ORDER — INSULIN ASPART 100 [IU]/ML
INJECTION, SOLUTION INTRAVENOUS; SUBCUTANEOUS
Qty: 20 ML | Refills: 0 | Status: SHIPPED | OUTPATIENT
Start: 2021-05-10 | End: 2021-07-19

## 2021-06-28 ENCOUNTER — OFFICE VISIT (OUTPATIENT)
Dept: PODIATRY | Facility: CLINIC | Age: 71
End: 2021-06-28
Payer: MEDICARE

## 2021-06-28 ENCOUNTER — PATIENT OUTREACH (OUTPATIENT)
Dept: ADMINISTRATIVE | Facility: OTHER | Age: 71
End: 2021-06-28

## 2021-06-28 VITALS
DIASTOLIC BLOOD PRESSURE: 62 MMHG | WEIGHT: 214.94 LBS | OXYGEN SATURATION: 98 % | SYSTOLIC BLOOD PRESSURE: 110 MMHG | BODY MASS INDEX: 32.58 KG/M2 | HEIGHT: 68 IN | HEART RATE: 78 BPM

## 2021-06-28 DIAGNOSIS — L03.031 CELLULITIS OF RIGHT TOE: ICD-10-CM

## 2021-06-28 DIAGNOSIS — E11.42 TYPE 2 DIABETES MELLITUS WITH DIABETIC POLYNEUROPATHY, WITH LONG-TERM CURRENT USE OF INSULIN: ICD-10-CM

## 2021-06-28 DIAGNOSIS — Z79.4 TYPE 2 DIABETES MELLITUS WITH DIABETIC POLYNEUROPATHY, WITH LONG-TERM CURRENT USE OF INSULIN: ICD-10-CM

## 2021-06-28 DIAGNOSIS — S91.109A OPEN WOUND OF TOE, INITIAL ENCOUNTER: Primary | ICD-10-CM

## 2021-06-28 PROCEDURE — 3288F PR FALLS RISK ASSESSMENT DOCUMENTED: ICD-10-PCS | Mod: HCNC,CPTII,S$GLB, | Performed by: PODIATRIST

## 2021-06-28 PROCEDURE — 1159F PR MEDICATION LIST DOCUMENTED IN MEDICAL RECORD: ICD-10-PCS | Mod: HCNC,S$GLB,, | Performed by: PODIATRIST

## 2021-06-28 PROCEDURE — 99213 PR OFFICE/OUTPT VISIT, EST, LEVL III, 20-29 MIN: ICD-10-PCS | Mod: HCNC,S$GLB,, | Performed by: PODIATRIST

## 2021-06-28 PROCEDURE — 99999 PR PBB SHADOW E&M-EST. PATIENT-LVL IV: CPT | Mod: PBBFAC,HCNC,, | Performed by: PODIATRIST

## 2021-06-28 PROCEDURE — 1101F PT FALLS ASSESS-DOCD LE1/YR: CPT | Mod: HCNC,CPTII,S$GLB, | Performed by: PODIATRIST

## 2021-06-28 PROCEDURE — 99499 UNLISTED E&M SERVICE: CPT | Mod: HCNC,S$GLB,, | Performed by: PODIATRIST

## 2021-06-28 PROCEDURE — 1101F PR PT FALLS ASSESS DOC 0-1 FALLS W/OUT INJ PAST YR: ICD-10-PCS | Mod: HCNC,CPTII,S$GLB, | Performed by: PODIATRIST

## 2021-06-28 PROCEDURE — 1159F MED LIST DOCD IN RCRD: CPT | Mod: HCNC,S$GLB,, | Performed by: PODIATRIST

## 2021-06-28 PROCEDURE — 99499 RISK ADDL DX/OHS AUDIT: ICD-10-PCS | Mod: HCNC,S$GLB,, | Performed by: PODIATRIST

## 2021-06-28 PROCEDURE — 3051F PR MOST RECENT HEMOGLOBIN A1C LEVEL 7.0 - < 8.0%: ICD-10-PCS | Mod: HCNC,CPTII,S$GLB, | Performed by: PODIATRIST

## 2021-06-28 PROCEDURE — 3008F BODY MASS INDEX DOCD: CPT | Mod: HCNC,CPTII,S$GLB, | Performed by: PODIATRIST

## 2021-06-28 PROCEDURE — 1125F PR PAIN SEVERITY QUANTIFIED, PAIN PRESENT: ICD-10-PCS | Mod: HCNC,S$GLB,, | Performed by: PODIATRIST

## 2021-06-28 PROCEDURE — 3288F FALL RISK ASSESSMENT DOCD: CPT | Mod: HCNC,CPTII,S$GLB, | Performed by: PODIATRIST

## 2021-06-28 PROCEDURE — 3051F HG A1C>EQUAL 7.0%<8.0%: CPT | Mod: HCNC,CPTII,S$GLB, | Performed by: PODIATRIST

## 2021-06-28 PROCEDURE — 99999 PR PBB SHADOW E&M-EST. PATIENT-LVL IV: ICD-10-PCS | Mod: PBBFAC,HCNC,, | Performed by: PODIATRIST

## 2021-06-28 PROCEDURE — 99213 OFFICE O/P EST LOW 20 MIN: CPT | Mod: HCNC,S$GLB,, | Performed by: PODIATRIST

## 2021-06-28 PROCEDURE — 3008F PR BODY MASS INDEX (BMI) DOCUMENTED: ICD-10-PCS | Mod: HCNC,CPTII,S$GLB, | Performed by: PODIATRIST

## 2021-06-28 PROCEDURE — 1125F AMNT PAIN NOTED PAIN PRSNT: CPT | Mod: HCNC,S$GLB,, | Performed by: PODIATRIST

## 2021-06-28 RX ORDER — DOXYCYCLINE HYCLATE 100 MG
100 TABLET ORAL EVERY 12 HOURS
Qty: 20 TABLET | Refills: 0 | Status: SHIPPED | OUTPATIENT
Start: 2021-06-28 | End: 2021-07-08

## 2021-07-14 ENCOUNTER — OFFICE VISIT (OUTPATIENT)
Dept: PODIATRY | Facility: CLINIC | Age: 71
End: 2021-07-14
Payer: MEDICARE

## 2021-07-14 VITALS
WEIGHT: 214 LBS | DIASTOLIC BLOOD PRESSURE: 64 MMHG | HEART RATE: 73 BPM | HEIGHT: 68 IN | OXYGEN SATURATION: 98 % | BODY MASS INDEX: 32.43 KG/M2 | SYSTOLIC BLOOD PRESSURE: 108 MMHG

## 2021-07-14 DIAGNOSIS — Z79.4 TYPE 2 DIABETES MELLITUS WITH DIABETIC POLYNEUROPATHY, WITH LONG-TERM CURRENT USE OF INSULIN: ICD-10-CM

## 2021-07-14 DIAGNOSIS — L03.031 CELLULITIS OF RIGHT TOE: Primary | ICD-10-CM

## 2021-07-14 DIAGNOSIS — E11.42 TYPE 2 DIABETES MELLITUS WITH DIABETIC POLYNEUROPATHY, WITH LONG-TERM CURRENT USE OF INSULIN: ICD-10-CM

## 2021-07-14 PROCEDURE — 3288F PR FALLS RISK ASSESSMENT DOCUMENTED: ICD-10-PCS | Mod: HCNC,CPTII,S$GLB, | Performed by: PODIATRIST

## 2021-07-14 PROCEDURE — 3288F FALL RISK ASSESSMENT DOCD: CPT | Mod: HCNC,CPTII,S$GLB, | Performed by: PODIATRIST

## 2021-07-14 PROCEDURE — 1126F AMNT PAIN NOTED NONE PRSNT: CPT | Mod: HCNC,S$GLB,, | Performed by: PODIATRIST

## 2021-07-14 PROCEDURE — 99999 PR PBB SHADOW E&M-EST. PATIENT-LVL IV: CPT | Mod: PBBFAC,HCNC,, | Performed by: PODIATRIST

## 2021-07-14 PROCEDURE — 3051F PR MOST RECENT HEMOGLOBIN A1C LEVEL 7.0 - < 8.0%: ICD-10-PCS | Mod: HCNC,CPTII,S$GLB, | Performed by: PODIATRIST

## 2021-07-14 PROCEDURE — 1126F PR PAIN SEVERITY QUANTIFIED, NO PAIN PRESENT: ICD-10-PCS | Mod: HCNC,S$GLB,, | Performed by: PODIATRIST

## 2021-07-14 PROCEDURE — 3008F BODY MASS INDEX DOCD: CPT | Mod: HCNC,CPTII,S$GLB, | Performed by: PODIATRIST

## 2021-07-14 PROCEDURE — 1101F PR PT FALLS ASSESS DOC 0-1 FALLS W/OUT INJ PAST YR: ICD-10-PCS | Mod: HCNC,CPTII,S$GLB, | Performed by: PODIATRIST

## 2021-07-14 PROCEDURE — 3051F HG A1C>EQUAL 7.0%<8.0%: CPT | Mod: HCNC,CPTII,S$GLB, | Performed by: PODIATRIST

## 2021-07-14 PROCEDURE — 99999 PR PBB SHADOW E&M-EST. PATIENT-LVL IV: ICD-10-PCS | Mod: PBBFAC,HCNC,, | Performed by: PODIATRIST

## 2021-07-14 PROCEDURE — 1159F PR MEDICATION LIST DOCUMENTED IN MEDICAL RECORD: ICD-10-PCS | Mod: HCNC,S$GLB,, | Performed by: PODIATRIST

## 2021-07-14 PROCEDURE — 99213 PR OFFICE/OUTPT VISIT, EST, LEVL III, 20-29 MIN: ICD-10-PCS | Mod: HCNC,S$GLB,, | Performed by: PODIATRIST

## 2021-07-14 PROCEDURE — 3008F PR BODY MASS INDEX (BMI) DOCUMENTED: ICD-10-PCS | Mod: HCNC,CPTII,S$GLB, | Performed by: PODIATRIST

## 2021-07-14 PROCEDURE — 99213 OFFICE O/P EST LOW 20 MIN: CPT | Mod: HCNC,S$GLB,, | Performed by: PODIATRIST

## 2021-07-14 PROCEDURE — 1101F PT FALLS ASSESS-DOCD LE1/YR: CPT | Mod: HCNC,CPTII,S$GLB, | Performed by: PODIATRIST

## 2021-07-14 PROCEDURE — 1159F MED LIST DOCD IN RCRD: CPT | Mod: HCNC,S$GLB,, | Performed by: PODIATRIST

## 2021-08-04 ENCOUNTER — PATIENT MESSAGE (OUTPATIENT)
Dept: ADMINISTRATIVE | Facility: HOSPITAL | Age: 71
End: 2021-08-04

## 2021-08-16 ENCOUNTER — OFFICE VISIT (OUTPATIENT)
Dept: FAMILY MEDICINE | Facility: CLINIC | Age: 71
End: 2021-08-16
Payer: MEDICARE

## 2021-08-16 VITALS — DIASTOLIC BLOOD PRESSURE: 72 MMHG | SYSTOLIC BLOOD PRESSURE: 116 MMHG

## 2021-08-16 DIAGNOSIS — E78.2 MIXED HYPERLIPIDEMIA: ICD-10-CM

## 2021-08-16 DIAGNOSIS — E11.22 TYPE 2 DIABETES MELLITUS WITH STAGE 2 CHRONIC KIDNEY DISEASE, WITH LONG-TERM CURRENT USE OF INSULIN: ICD-10-CM

## 2021-08-16 DIAGNOSIS — M54.42 ACUTE MIDLINE LOW BACK PAIN WITH LEFT-SIDED SCIATICA: ICD-10-CM

## 2021-08-16 DIAGNOSIS — N18.2 TYPE 2 DIABETES MELLITUS WITH STAGE 2 CHRONIC KIDNEY DISEASE, WITH LONG-TERM CURRENT USE OF INSULIN: ICD-10-CM

## 2021-08-16 DIAGNOSIS — I10 HYPERTENSION, ESSENTIAL: ICD-10-CM

## 2021-08-16 DIAGNOSIS — N18.2 CHRONIC KIDNEY DISEASE, STAGE 2 (MILD): ICD-10-CM

## 2021-08-16 DIAGNOSIS — Z79.4 TYPE 2 DIABETES MELLITUS WITH DIABETIC POLYNEUROPATHY, WITH LONG-TERM CURRENT USE OF INSULIN: Primary | ICD-10-CM

## 2021-08-16 DIAGNOSIS — E11.42 TYPE 2 DIABETES MELLITUS WITH DIABETIC POLYNEUROPATHY, WITH LONG-TERM CURRENT USE OF INSULIN: Primary | ICD-10-CM

## 2021-08-16 DIAGNOSIS — Z79.4 TYPE 2 DIABETES MELLITUS WITH STAGE 2 CHRONIC KIDNEY DISEASE, WITH LONG-TERM CURRENT USE OF INSULIN: ICD-10-CM

## 2021-08-16 PROCEDURE — 3074F SYST BP LT 130 MM HG: CPT | Mod: HCNC,CPTII,95, | Performed by: FAMILY MEDICINE

## 2021-08-16 PROCEDURE — 1160F PR REVIEW ALL MEDS BY PRESCRIBER/CLIN PHARMACIST DOCUMENTED: ICD-10-PCS | Mod: HCNC,CPTII,95, | Performed by: FAMILY MEDICINE

## 2021-08-16 PROCEDURE — 99499 RISK ADDL DX/OHS AUDIT: ICD-10-PCS | Mod: HCNC,95,, | Performed by: FAMILY MEDICINE

## 2021-08-16 PROCEDURE — 1125F AMNT PAIN NOTED PAIN PRSNT: CPT | Mod: HCNC,CPTII,95, | Performed by: FAMILY MEDICINE

## 2021-08-16 PROCEDURE — 1159F PR MEDICATION LIST DOCUMENTED IN MEDICAL RECORD: ICD-10-PCS | Mod: HCNC,CPTII,95, | Performed by: FAMILY MEDICINE

## 2021-08-16 PROCEDURE — 3074F PR MOST RECENT SYSTOLIC BLOOD PRESSURE < 130 MM HG: ICD-10-PCS | Mod: HCNC,CPTII,95, | Performed by: FAMILY MEDICINE

## 2021-08-16 PROCEDURE — 99499 UNLISTED E&M SERVICE: CPT | Mod: HCNC,95,, | Performed by: FAMILY MEDICINE

## 2021-08-16 PROCEDURE — 3051F HG A1C>EQUAL 7.0%<8.0%: CPT | Mod: HCNC,CPTII,95, | Performed by: FAMILY MEDICINE

## 2021-08-16 PROCEDURE — 99214 PR OFFICE/OUTPT VISIT, EST, LEVL IV, 30-39 MIN: ICD-10-PCS | Mod: HCNC,95,, | Performed by: FAMILY MEDICINE

## 2021-08-16 PROCEDURE — 3078F PR MOST RECENT DIASTOLIC BLOOD PRESSURE < 80 MM HG: ICD-10-PCS | Mod: HCNC,CPTII,95, | Performed by: FAMILY MEDICINE

## 2021-08-16 PROCEDURE — 99214 OFFICE O/P EST MOD 30 MIN: CPT | Mod: HCNC,95,, | Performed by: FAMILY MEDICINE

## 2021-08-16 PROCEDURE — 1160F RVW MEDS BY RX/DR IN RCRD: CPT | Mod: HCNC,CPTII,95, | Performed by: FAMILY MEDICINE

## 2021-08-16 PROCEDURE — 3078F DIAST BP <80 MM HG: CPT | Mod: HCNC,CPTII,95, | Performed by: FAMILY MEDICINE

## 2021-08-16 PROCEDURE — 3051F PR MOST RECENT HEMOGLOBIN A1C LEVEL 7.0 - < 8.0%: ICD-10-PCS | Mod: HCNC,CPTII,95, | Performed by: FAMILY MEDICINE

## 2021-08-16 PROCEDURE — 1125F PR PAIN SEVERITY QUANTIFIED, PAIN PRESENT: ICD-10-PCS | Mod: HCNC,CPTII,95, | Performed by: FAMILY MEDICINE

## 2021-08-16 PROCEDURE — 1159F MED LIST DOCD IN RCRD: CPT | Mod: HCNC,CPTII,95, | Performed by: FAMILY MEDICINE

## 2021-08-16 RX ORDER — METHOCARBAMOL 500 MG/1
500 TABLET, FILM COATED ORAL 3 TIMES DAILY PRN
Qty: 30 TABLET | Refills: 0 | Status: SHIPPED | OUTPATIENT
Start: 2021-08-16 | End: 2021-08-26 | Stop reason: SDUPTHER

## 2021-08-16 RX ORDER — SEMAGLUTIDE 1.34 MG/ML
0.25 INJECTION, SOLUTION SUBCUTANEOUS
Qty: 1 PEN | Refills: 0 | Status: SHIPPED | OUTPATIENT
Start: 2021-08-16 | End: 2021-08-25

## 2021-08-16 RX ORDER — PRAVASTATIN SODIUM 10 MG/1
10 TABLET ORAL DAILY
Qty: 90 TABLET | Refills: 3 | Status: SHIPPED | OUTPATIENT
Start: 2021-08-16 | End: 2023-05-29

## 2021-08-19 ENCOUNTER — TELEPHONE (OUTPATIENT)
Dept: FAMILY MEDICINE | Facility: CLINIC | Age: 71
End: 2021-08-19

## 2021-08-25 ENCOUNTER — PATIENT MESSAGE (OUTPATIENT)
Dept: FAMILY MEDICINE | Facility: CLINIC | Age: 71
End: 2021-08-25

## 2021-08-25 DIAGNOSIS — D64.9 NORMOCYTIC ANEMIA: Primary | ICD-10-CM

## 2021-08-25 DIAGNOSIS — E11.22 TYPE 2 DIABETES MELLITUS WITH STAGE 2 CHRONIC KIDNEY DISEASE, WITH LONG-TERM CURRENT USE OF INSULIN: ICD-10-CM

## 2021-08-25 DIAGNOSIS — Z79.4 TYPE 2 DIABETES MELLITUS WITH STAGE 2 CHRONIC KIDNEY DISEASE, WITH LONG-TERM CURRENT USE OF INSULIN: ICD-10-CM

## 2021-08-25 DIAGNOSIS — N18.2 TYPE 2 DIABETES MELLITUS WITH STAGE 2 CHRONIC KIDNEY DISEASE, WITH LONG-TERM CURRENT USE OF INSULIN: ICD-10-CM

## 2021-08-25 DIAGNOSIS — E11.42 TYPE 2 DIABETES MELLITUS WITH DIABETIC POLYNEUROPATHY, WITH LONG-TERM CURRENT USE OF INSULIN: Primary | ICD-10-CM

## 2021-08-25 DIAGNOSIS — Z79.4 TYPE 2 DIABETES MELLITUS WITH DIABETIC POLYNEUROPATHY, WITH LONG-TERM CURRENT USE OF INSULIN: Primary | ICD-10-CM

## 2021-08-25 RX ORDER — SEMAGLUTIDE 1.34 MG/ML
1 INJECTION, SOLUTION SUBCUTANEOUS
Qty: 2 PEN | Refills: 2 | Status: SHIPPED | OUTPATIENT
Start: 2021-08-25 | End: 2021-12-23

## 2021-08-26 ENCOUNTER — PATIENT MESSAGE (OUTPATIENT)
Dept: FAMILY MEDICINE | Facility: CLINIC | Age: 71
End: 2021-08-26

## 2021-08-26 DIAGNOSIS — M54.42 ACUTE MIDLINE LOW BACK PAIN WITH LEFT-SIDED SCIATICA: ICD-10-CM

## 2021-08-26 RX ORDER — METHOCARBAMOL 500 MG/1
500 TABLET, FILM COATED ORAL 3 TIMES DAILY PRN
Qty: 30 TABLET | Refills: 0 | Status: SHIPPED | OUTPATIENT
Start: 2021-08-26 | End: 2021-09-05

## 2021-10-03 DIAGNOSIS — I10 HYPERTENSION, ESSENTIAL: ICD-10-CM

## 2021-10-04 ENCOUNTER — PATIENT MESSAGE (OUTPATIENT)
Dept: ADMINISTRATIVE | Facility: HOSPITAL | Age: 71
End: 2021-10-04

## 2021-10-04 RX ORDER — IRBESARTAN 150 MG/1
150 TABLET ORAL DAILY
Qty: 90 TABLET | Refills: 3 | Status: SHIPPED | OUTPATIENT
Start: 2021-10-04 | End: 2022-08-09

## 2021-10-18 ENCOUNTER — PATIENT MESSAGE (OUTPATIENT)
Dept: ADMINISTRATIVE | Facility: HOSPITAL | Age: 71
End: 2021-10-18
Payer: MEDICARE

## 2021-10-28 ENCOUNTER — PATIENT MESSAGE (OUTPATIENT)
Dept: FAMILY MEDICINE | Facility: CLINIC | Age: 71
End: 2021-10-28
Payer: MEDICARE

## 2021-11-02 ENCOUNTER — IMMUNIZATION (OUTPATIENT)
Dept: FAMILY MEDICINE | Facility: CLINIC | Age: 71
End: 2021-11-02
Payer: MEDICARE

## 2021-11-02 DIAGNOSIS — Z23 NEED FOR VACCINATION: Primary | ICD-10-CM

## 2021-11-02 PROCEDURE — 91300 COVID-19, MRNA, LNP-S, PF, 30 MCG/0.3 ML DOSE VACCINE: CPT | Mod: HCNC,PBBFAC | Performed by: FAMILY MEDICINE

## 2021-11-10 ENCOUNTER — CLINICAL SUPPORT (OUTPATIENT)
Dept: FAMILY MEDICINE | Facility: CLINIC | Age: 71
End: 2021-11-10
Payer: MEDICARE

## 2021-11-10 PROCEDURE — G0008 FLU VACCINE - QUADRIVALENT - ADJUVANTED: ICD-10-PCS | Mod: HCNC,S$GLB,, | Performed by: FAMILY MEDICINE

## 2021-11-10 PROCEDURE — G0008 ADMIN INFLUENZA VIRUS VAC: HCPCS | Mod: HCNC,S$GLB,, | Performed by: FAMILY MEDICINE

## 2021-11-10 PROCEDURE — 90694 FLU VACCINE - QUADRIVALENT - ADJUVANTED: ICD-10-PCS | Mod: HCNC,S$GLB,, | Performed by: FAMILY MEDICINE

## 2021-11-10 PROCEDURE — 90694 VACC AIIV4 NO PRSRV 0.5ML IM: CPT | Mod: HCNC,S$GLB,, | Performed by: FAMILY MEDICINE

## 2021-11-17 ENCOUNTER — PATIENT MESSAGE (OUTPATIENT)
Dept: ADMINISTRATIVE | Facility: OTHER | Age: 71
End: 2021-11-17
Payer: MEDICARE

## 2021-12-02 ENCOUNTER — PATIENT MESSAGE (OUTPATIENT)
Dept: FAMILY MEDICINE | Facility: CLINIC | Age: 71
End: 2021-12-02
Payer: MEDICARE

## 2021-12-02 DIAGNOSIS — Z79.4 TYPE 2 DIABETES MELLITUS WITH STAGE 3B CHRONIC KIDNEY DISEASE, WITH LONG-TERM CURRENT USE OF INSULIN: ICD-10-CM

## 2021-12-02 DIAGNOSIS — E11.22 TYPE 2 DIABETES MELLITUS WITH STAGE 3B CHRONIC KIDNEY DISEASE, WITH LONG-TERM CURRENT USE OF INSULIN: ICD-10-CM

## 2021-12-02 DIAGNOSIS — N18.32 STAGE 3B CHRONIC KIDNEY DISEASE: Primary | ICD-10-CM

## 2021-12-02 DIAGNOSIS — N18.32 TYPE 2 DIABETES MELLITUS WITH STAGE 3B CHRONIC KIDNEY DISEASE, WITH LONG-TERM CURRENT USE OF INSULIN: ICD-10-CM

## 2021-12-21 DIAGNOSIS — Z79.4 TYPE 2 DIABETES MELLITUS WITH STAGE 2 CHRONIC KIDNEY DISEASE, WITH LONG-TERM CURRENT USE OF INSULIN: ICD-10-CM

## 2021-12-21 DIAGNOSIS — E11.22 TYPE 2 DIABETES MELLITUS WITH STAGE 2 CHRONIC KIDNEY DISEASE, WITH LONG-TERM CURRENT USE OF INSULIN: ICD-10-CM

## 2021-12-21 DIAGNOSIS — E11.42 TYPE 2 DIABETES MELLITUS WITH DIABETIC POLYNEUROPATHY, WITH LONG-TERM CURRENT USE OF INSULIN: ICD-10-CM

## 2021-12-21 DIAGNOSIS — N18.2 TYPE 2 DIABETES MELLITUS WITH STAGE 2 CHRONIC KIDNEY DISEASE, WITH LONG-TERM CURRENT USE OF INSULIN: ICD-10-CM

## 2021-12-21 DIAGNOSIS — Z79.4 TYPE 2 DIABETES MELLITUS WITH DIABETIC POLYNEUROPATHY, WITH LONG-TERM CURRENT USE OF INSULIN: ICD-10-CM

## 2021-12-23 RX ORDER — SEMAGLUTIDE 1.34 MG/ML
INJECTION, SOLUTION SUBCUTANEOUS
Qty: 3 PEN | Refills: 1 | Status: SHIPPED | OUTPATIENT
Start: 2021-12-23 | End: 2021-12-27 | Stop reason: SDUPTHER

## 2021-12-26 DIAGNOSIS — Z79.4 TYPE 2 DIABETES MELLITUS WITH DIABETIC POLYNEUROPATHY, WITH LONG-TERM CURRENT USE OF INSULIN: ICD-10-CM

## 2021-12-26 DIAGNOSIS — I10 HYPERTENSION, ESSENTIAL: ICD-10-CM

## 2021-12-26 DIAGNOSIS — E11.42 TYPE 2 DIABETES MELLITUS WITH DIABETIC POLYNEUROPATHY, WITH LONG-TERM CURRENT USE OF INSULIN: ICD-10-CM

## 2021-12-27 DIAGNOSIS — E11.22 TYPE 2 DIABETES MELLITUS WITH STAGE 2 CHRONIC KIDNEY DISEASE, WITH LONG-TERM CURRENT USE OF INSULIN: ICD-10-CM

## 2021-12-27 DIAGNOSIS — E11.42 TYPE 2 DIABETES MELLITUS WITH DIABETIC POLYNEUROPATHY, WITH LONG-TERM CURRENT USE OF INSULIN: ICD-10-CM

## 2021-12-27 DIAGNOSIS — Z79.4 TYPE 2 DIABETES MELLITUS WITH DIABETIC POLYNEUROPATHY, WITH LONG-TERM CURRENT USE OF INSULIN: ICD-10-CM

## 2021-12-27 DIAGNOSIS — N18.2 TYPE 2 DIABETES MELLITUS WITH STAGE 2 CHRONIC KIDNEY DISEASE, WITH LONG-TERM CURRENT USE OF INSULIN: ICD-10-CM

## 2021-12-27 DIAGNOSIS — Z79.4 TYPE 2 DIABETES MELLITUS WITH STAGE 2 CHRONIC KIDNEY DISEASE, WITH LONG-TERM CURRENT USE OF INSULIN: ICD-10-CM

## 2021-12-27 RX ORDER — INSULIN DETEMIR 100 [IU]/ML
47 INJECTION, SOLUTION SUBCUTANEOUS DAILY
Qty: 42.3 ML | Refills: 1 | Status: SHIPPED | OUTPATIENT
Start: 2021-12-27 | End: 2022-01-12

## 2021-12-27 RX ORDER — INSULIN ASPART 100 [IU]/ML
10 INJECTION, SOLUTION INTRAVENOUS; SUBCUTANEOUS
Qty: 27 ML | Refills: 3 | Status: SHIPPED | OUTPATIENT
Start: 2021-12-27 | End: 2022-01-12 | Stop reason: SDUPTHER

## 2021-12-27 RX ORDER — METOPROLOL SUCCINATE 50 MG/1
50 TABLET, EXTENDED RELEASE ORAL DAILY
Qty: 90 TABLET | Refills: 3 | Status: SHIPPED | OUTPATIENT
Start: 2021-12-27 | End: 2022-12-21

## 2021-12-28 ENCOUNTER — TELEPHONE (OUTPATIENT)
Dept: FAMILY MEDICINE | Facility: CLINIC | Age: 71
End: 2021-12-28
Payer: MEDICARE

## 2021-12-28 RX ORDER — SEMAGLUTIDE 1.34 MG/ML
1 INJECTION, SOLUTION SUBCUTANEOUS
Qty: 3 PEN | Refills: 1 | Status: SHIPPED | OUTPATIENT
Start: 2021-12-28 | End: 2022-01-12 | Stop reason: SDUPTHER

## 2021-12-29 ENCOUNTER — PATIENT MESSAGE (OUTPATIENT)
Dept: FAMILY MEDICINE | Facility: CLINIC | Age: 71
End: 2021-12-29
Payer: MEDICARE

## 2022-01-04 ENCOUNTER — PATIENT MESSAGE (OUTPATIENT)
Dept: FAMILY MEDICINE | Facility: CLINIC | Age: 72
End: 2022-01-04
Payer: MEDICARE

## 2022-01-04 ENCOUNTER — PATIENT MESSAGE (OUTPATIENT)
Dept: ADMINISTRATIVE | Facility: OTHER | Age: 72
End: 2022-01-04
Payer: MEDICARE

## 2022-01-04 DIAGNOSIS — Z79.4 TYPE 2 DIABETES MELLITUS WITH DIABETIC POLYNEUROPATHY, WITH LONG-TERM CURRENT USE OF INSULIN: ICD-10-CM

## 2022-01-04 DIAGNOSIS — E11.42 TYPE 2 DIABETES MELLITUS WITH DIABETIC POLYNEUROPATHY, WITH LONG-TERM CURRENT USE OF INSULIN: ICD-10-CM

## 2022-01-04 RX ORDER — SYRING-NEEDL,DISP,INSUL,0.3 ML 31GX15/64"
SYRINGE, EMPTY DISPOSABLE MISCELLANEOUS
Qty: 100 EACH | Refills: 11 | Status: SHIPPED | OUTPATIENT
Start: 2022-01-04 | End: 2022-04-25 | Stop reason: SDUPTHER

## 2022-01-04 NOTE — TELEPHONE ENCOUNTER
No new care gaps identified.  Powered by [employer]. Reference number: [id number].   1/04/2022 5:30:51 AM CST

## 2022-01-04 NOTE — TELEPHONE ENCOUNTER
----- Message from Jane Almeida DO sent at 1/3/2022  5:10 PM CST -----  Regarding: FW: Make sure patient scheduled for follow-up and labs in 4 months  Appt scheduled for 1/12/22. Please message patient and schedule labs for 1/10/22 since he is out of town until 1/9/22.   ----- Message -----  From: Jane Almeida DO  Sent: 1/3/2022  12:00 AM CST  To: Jane Almeida DO  Subject: Make sure patient scheduled for follow-up an#

## 2022-01-11 PROBLEM — N18.2 TYPE 2 DIABETES MELLITUS WITH STAGE 2 CHRONIC KIDNEY DISEASE, WITH LONG-TERM CURRENT USE OF INSULIN: Status: RESOLVED | Noted: 2021-08-16 | Resolved: 2022-01-11

## 2022-01-11 PROBLEM — Z79.4 TYPE 2 DIABETES MELLITUS WITH STAGE 2 CHRONIC KIDNEY DISEASE, WITH LONG-TERM CURRENT USE OF INSULIN: Status: RESOLVED | Noted: 2021-08-16 | Resolved: 2022-01-11

## 2022-01-11 PROBLEM — M54.42 ACUTE MIDLINE LOW BACK PAIN WITH LEFT-SIDED SCIATICA: Status: RESOLVED | Noted: 2021-08-16 | Resolved: 2022-01-11

## 2022-01-11 PROBLEM — E11.22 TYPE 2 DIABETES MELLITUS WITH STAGE 2 CHRONIC KIDNEY DISEASE, WITH LONG-TERM CURRENT USE OF INSULIN: Status: RESOLVED | Noted: 2021-08-16 | Resolved: 2022-01-11

## 2022-01-11 PROBLEM — N18.2 CHRONIC KIDNEY DISEASE, STAGE 2 (MILD): Status: RESOLVED | Noted: 2021-08-16 | Resolved: 2022-01-11

## 2022-01-11 NOTE — PROGRESS NOTES
FAMILY MEDICINE   [facility]     Reason for visit:   Chief Complaint   Patient presents with    Annual Exam     si    Diabetes       SUBJECTIVE: Roger Zavaleta Jr. is a 71 y.o. male  - with hyperlipidemia, type 2 diabetes with diabetic polyneuropathy, hypertension, GERD, bilateral hearing loss with hearing aids, and history of left diabetic foot ulcer (healed since 2019) presents to follow-up diabetes, hypertension and labs     Podiatry Dr. Jorge Martínez    Today he reports that he is doing well. He ran out of his Levemir because his insurance not longer cover thing brand. He is not longer using the digital diabetes meter and reports that it was no convenient for him. He would like to try the Freestyle Mara. He has a renal US already scheduled for 3/2022     1. Diabetes Type 2     Current treatment regimen:   insulin aspart U-100 (NOVOLOG) 100 unit/mL injection, Inject 10 Units into the skin 3 (three) times daily before meals., Disp: 27 mL, Rfl: 3  insulin detemir U-100 (LEVEMIR FLEXTOUCH U-100 INSULN) 100 unit/mL (3 mL) InPn pen, Inject 47 Units into the skin once daily., Disp: 42.3 mL, Rfl: 1  - ran out  semaglutide (OZEMPIC) 1 mg/dose (4 mg/3 mL), Inject 1 mg into the skin every 7 days., Disp: 3 pen, Rfl: 1    Prior medication:  Metformin 1000 mg twice a day  - discontinue last visit secondary to diarrhea  - feeling much better since stopping     Side effects from treatment: diarrhea from Metformin  Complications of diabetes: neuropathy, recent increase in glucose levels     Glucometer: yes  Glucose monitoring: Digital DM program   - he has not been monitoring his glucose on the digital medicine program because not convenient    Fasting 130-140 mg/dL     Lab Results       Component                Value               Date                       HGBA1C                   6.9 (H)             01/10/2022            ----------     Low dose statin: atorvastatin  Last eye exam: 5/17/21 Dr. Barbosa  - +  glaucoma  Last foot exam: 7/14/21     Vaccines:   Influenza: 11/10/21  Pneumovax: 23 10/5/18  Prevnar 13: 1/9/2017  COVID-19:  Vaccinated     2. Hypertension  - BP goal < 130/80     Current medication treatment:   chlorthalidone (HYGROTEN) 25 MG Tab, Take 1 tablet by mouth once daily, Disp: 90 tablet, Rfl: 0  irbesartan (AVAPRO) 150 MG tablet, Take 1 tablet (150 mg total) by mouth once daily., Disp: 90 tablet, Rfl: 1  metoprolol succinate (TOPROL-XL) 50 MG 24 hr tablet, Take 1 tablet by mouth once daily, Disp: 90 tablet, Rfl: 0     Home BP cuff: yes Digital Medicine but has not been monitoring and does not want to continue      Review of Systems   All other systems reviewed and are negative.        HISTORY:   Past Medical History:   Diagnosis Date    Acute midline low back pain with left-sided sciatica 8/16/2021    - no signs of neurological claudication based on symptoms - exam limited secondary to virtual visit - discussed symptoms with patient that would require urgent MRI including bowel or bladder changes, weakness left leg etc. - recommend start with stretches and add Robaxin 500 mg as needed - discuss if not improved in 2 weeks to notify me so we can get initial imaging and start physical therapy    CAD (coronary artery disease) 52    Diabetes mellitus type II     HLD (hyperlipidemia)        Past Surgical History:   Procedure Laterality Date    2 nasal surgery      HERNIA REPAIR      umbilical    rectal fissure      TONSILLECTOMY      TOTAL KNEE ARTHROPLASTY      TRIGGER FINGER RELEASE  9-30-13    left hand (middle finger)       Family History   Problem Relation Age of Onset    Heart disease Mother     Diabetes Mother     Alcohol abuse Father     Heart disease Unknown         premature    No Known Problems Daughter     No Known Problems Daughter     Cancer Neg Hx        Social History     Tobacco Use    Smoking status: Never Smoker    Smokeless tobacco: Never Used   Substance Use Topics     "Alcohol use: No    Drug use: No       Social History     Social History Narrative    He is a retired  and teacher. He grew up in [place] [facility]. He moved to [place] after going to [place]. 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 He is a non-smoker and doesn't use alcohol. He is  for over 40 years. He lives in his own home with his wife. He has 2 adult daughters who live a few houses down from him. 1 granddaughter (2020 in DO school) and 1 grandson (2020 senior in high school)           ALLERGIES:   Review of patient's allergies indicates:  No Known Allergies    MEDS:     Current Outpatient Medications:     aspirin 81 MG chewable tablet, Take 81 mg by mouth once daily., Disp: , Rfl:     BD VEO INSULIN SYR HALF UNIT 0.3 mL 31 gauge x 15/64" Syrg, THREE TIMES DAILY AS DIRECTED, Disp: , Rfl: 1    BD VEO INSULIN SYRINGE UF 1/2 mL 31 gauge x 15/64" Syrg, USE 1 SYRINGE THREE TIMES DAILY, Disp: 100 each, Rfl: 11    chlorthalidone (HYGROTEN) 25 MG Tab, Take 1 tablet by mouth once daily, Disp: 90 tablet, Rfl: 3    fluticasone propionate (FLONASE) 50 mcg/actuation nasal spray, 1 spray (50 mcg total) by Each Nostril route once daily., Disp: 16 g, Rfl: 3    irbesartan (AVAPRO) 150 MG tablet, Take 1 tablet (150 mg total) by mouth once daily., Disp: 90 tablet, Rfl: 3    metoprolol succinate (TOPROL-XL) 50 MG 24 hr tablet, Take 1 tablet (50 mg total) by mouth once daily., Disp: 90 tablet, Rfl: 3    nitroGLYCERIN (NITROSTAT) 0.4 MG SL tablet, Place 0.4 mg under the tongue every 5 (five) minutes as needed., Disp: , Rfl:     pravastatin (PRAVACHOL) 10 MG tablet, Take 1 tablet (10 mg total) by mouth once daily., Disp: 90 tablet, Rfl: 3    blood sugar diagnostic (ACCU-CHEK SMARTVIEW TEST STRIP) Strp, USE 1 STRIP TO CHECK GLUCOSE THREE TIMES DAILY AS NEEDED, Disp: 100 strip, Rfl: 3    flash glucose scanning reader ([employer] RAPHAEL 14 DAY READER) Misc, 1 kit by Misc.(Non-Drug; Combo Route) " "route once. for 1 dose, Disp: 1 each, Rfl: 0    flash glucose sensor (FREESTYLE RAPHAEL 14 DAY SENSOR) Kit, 1 each by Misc.(Non-Drug; Combo Route) route every 14 (fourteen) days., Disp: 2 kit, Rfl: 11    insulin (LANTUS SOLOSTAR U-100 INSULIN) glargine 100 units/mL (3mL) SubQ pen, Inject 47 Units into the skin every evening., Disp: 42.3 mL, Rfl: 3    insulin aspart U-100 (NOVOLOG) 100 unit/mL injection, Inject 10 Units into the skin 3 (three) times daily before meals., Disp: 27 mL, Rfl: 3    lancets (ULTRA THIN LANCETS) 30 gauge Misc, 1 lancet by Misc.(Non-Drug; Combo Route) route 3 (three) times daily., Disp: 200 each, Rfl: 3    pen needle, diabetic 31 gauge x 5/16" Ndle, USE AS DIRECTED ONCE DAILY, Disp: 50 each, Rfl: 7    semaglutide (OZEMPIC) 1 mg/dose (4 mg/3 mL), Inject 1 mg into the skin every 7 days., Disp: 3 pen, Rfl: 1    Vital signs:   Vitals:    01/12/22 0917   BP: 120/72   BP Location: Left arm   Patient Position: Sitting   BP Method: X-Large (Manual)   Pulse: 64   Resp: 18   SpO2: 97%   Weight: 92.8 kg (204 lb 8 oz)   Height: 5' 8" (1.727 m)     Body mass index is 31.09 kg/m².    PHYSICAL EXAM:     Physical Exam  Constitutional:       General: He is not in acute distress.  Cardiovascular:      Rate and Rhythm: Normal rate and regular rhythm.      Pulses: Normal pulses.           Dorsalis pedis pulses are 2+ on the right side and 2+ on the left side.        Posterior tibial pulses are 2+ on the right side and 2+ on the left side.      Heart sounds: Normal heart sounds. No murmur heard.  No friction rub. No gallop.    Pulmonary:      Effort: Pulmonary effort is normal. No respiratory distress.      Breath sounds: Normal breath sounds. No wheezing, rhonchi or rales.   Abdominal:      General: Bowel sounds are normal.      Palpations: Abdomen is soft.      Tenderness: There is no abdominal tenderness. There is no right CVA tenderness, left CVA tenderness, guarding or rebound.   Musculoskeletal:      " Cervical back: Neck supple.      Right lower leg: No edema.      Left lower leg: No edema.   Feet:      Right foot:      Protective Sensation: 5 sites tested. 5 sites sensed.      Skin integrity: Skin integrity normal.      Left foot:      Protective Sensation: 5 sites tested. 5 sites sensed.      Skin integrity: Skin integrity normal.   Skin:     General: Skin is warm.   Neurological:      Mental Status: He is alert.   Psychiatric:         Speech: Speech normal.           PHQ4 = Score: 0    PERTINENT RESULTS:   Lab Visit on 01/10/2022   Component Date Value Ref Range Status    WBC 01/10/2022 10.76  3.90 - 12.70 K/uL Final    RBC 01/10/2022 4.95  4.60 - 6.20 M/uL Final    Hemoglobin 01/10/2022 14.5  14.0 - 18.0 g/dL Final    Hematocrit 01/10/2022 42.7  40.0 - 54.0 % Final    MCV 01/10/2022 86  82 - 98 fL Final    MCH 01/10/2022 29.3  27.0 - 31.0 pg Final    MCHC 01/10/2022 34.0  32.0 - 36.0 g/dL Final    RDW 01/10/2022 12.8  11.5 - 14.5 % Final    Platelets 01/10/2022 248  150 - 450 K/uL Final    MPV 01/10/2022 8.9* 9.2 - 12.9 fL Final    Sodium 01/10/2022 136  136 - 145 mmol/L Final    Potassium 01/10/2022 4.1  3.5 - 5.1 mmol/L Final    Chloride 01/10/2022 98  95 - 110 mmol/L Final    CO2 01/10/2022 27  23 - 29 mmol/L Final    Glucose 01/10/2022 130* 70 - 110 mg/dL Final    BUN 01/10/2022 28* 2 - 20 mg/dL Final    Creatinine 01/10/2022 1.61* 0.50 - 1.40 mg/dL Final    Calcium 01/10/2022 9.4  8.7 - 10.5 mg/dL Final    Anion Gap 01/10/2022 11  8 - 16 mmol/L Final    eGFR if  01/10/2022 48.3* >60 mL/min/1.73 m^2 Final    eGFR if non  01/10/2022 41.8* >60 mL/min/1.73 m^2 Final    Comment: Calculation used to obtain the estimated glomerular filtration  rate (eGFR) is the CKD-EPI equation.       Hemoglobin A1C 01/10/2022 6.9* 4.0 - 5.6 % Final    Comment: ADA Screening Guidelines:  5.7-6.4%  Consistent with prediabetes  >or=6.5%  Consistent with diabetes    High  levels of fetal hemoglobin interfere with the HbA1C  assay. Heterozygous hemoglobin variants (HbS, HgC, etc)do  not significantly interfere with this assay.   However, presence of multiple variants may affect accuracy.      Estimated Avg Glucose 01/10/2022 151* 68 - 131 mg/dL Final     Lab Results   Component Value Date    HGBA1C 6.9 (H) 01/10/2022     Lab Results   Component Value Date    ALT 18 11/19/2021    AST 26 11/19/2021    ALKPHOS 107 11/19/2021    BILITOT 0.7 11/19/2021     Lab Results   Component Value Date    CHOL 171 11/19/2021    CHOL 177 01/11/2021    CHOL 155 01/22/2020     Lab Results   Component Value Date    HDL 40 11/19/2021    HDL 42 01/11/2021    HDL 36 (L) 01/22/2020     Lab Results   Component Value Date    LDLCALC 109.8 11/19/2021    LDLCALC 114.6 01/11/2021    LDLCALC 94.4 01/22/2020     Lab Results   Component Value Date    TRIG 106 11/19/2021    TRIG 102 01/11/2021    TRIG 123 01/22/2020     Lab Results   Component Value Date    CHOLHDL 23.4 11/19/2021    CHOLHDL 23.7 01/11/2021    CHOLHDL 23.2 01/22/2020         ASSESSMENT/PLAN:  Problem List Items Addressed This Visit        Cardiac/Vascular    Hyperlipidemia associated with type 2 diabetes mellitus    Overview     Lab Results   Component Value Date    LDLCALC 109.8 11/19/2021     - well controlled  - continue current medication         Relevant Medications    insulin aspart U-100 (NOVOLOG) 100 unit/mL injection    semaglutide (OZEMPIC) 1 mg/dose (4 mg/3 mL)    insulin (LANTUS SOLOSTAR U-100 INSULIN) glargine 100 units/mL (3mL) SubQ pen    Other Relevant Orders    Comprehensive Metabolic Panel    Microalbumin/Creatinine Ratio, Urine       Renal/    Stage 3b chronic kidney disease    Overview     Lab Results   Component Value Date    CREATININE 1.61 (H) 01/10/2022     - baseline creatinine 1-1.3  - baseline GFR 56 to greater than 60  - worsening renal function over the last year  - recommend renal ultrasound  - recommend Nephrology  "evaluation         Relevant Orders    CBC Without Differential    Comprehensive Metabolic Panel    Ambulatory referral/consult to Nephrology       Endocrine    Type 2 diabetes mellitus with diabetic polyneuropathy, with long-term current use of insulin - Primary    Overview     Lab Results   Component Value Date    HGBA1C 6.9 (H) 01/10/2022            Relevant Medications    insulin aspart U-100 (NOVOLOG) 100 unit/mL injection    semaglutide (OZEMPIC) 1 mg/dose (4 mg/3 mL)    insulin (LANTUS SOLOSTAR U-100 INSULIN) glargine 100 units/mL (3mL) SubQ pen    Other Relevant Orders    Comprehensive Metabolic Panel    Lipid Panel    Microalbumin/Creatinine Ratio, Urine    Hemoglobin A1C    Type 2 diabetes mellitus with stage 3b chronic kidney disease, with long-term current use of insulin    Overview     Lab Results   Component Value Date    HGBA1C 6.9 (H) 01/10/2022     - well controlled  - continue current medications         Relevant Medications    insulin aspart U-100 (NOVOLOG) 100 unit/mL injection    semaglutide (OZEMPIC) 1 mg/dose (4 mg/3 mL)    pen needle, diabetic 31 gauge x 5/16" Ndle    lancets (ULTRA THIN LANCETS) 30 gauge Misc    blood sugar diagnostic (ACCU-CHEK SMARTVIEW TEST STRIP) Strp    flash glucose scanning reader (FREESTYLE RAPHAEL 14 DAY READER) Misc    flash glucose sensor (FREESTYLE RAPHAEL 14 DAY SENSOR) Kit    insulin (LANTUS SOLOSTAR U-100 INSULIN) glargine 100 units/mL (3mL) SubQ pen    Other Relevant Orders    Comprehensive Metabolic Panel    Lipid Panel    Microalbumin/Creatinine Ratio, Urine    Hemoglobin A1C    Ambulatory referral/consult to Nephrology       Other    Hypertension associated with type 2 diabetes mellitus    Overview     - well controlled  - continue current medications         Relevant Medications    insulin aspart U-100 (NOVOLOG) 100 unit/mL injection    semaglutide (OZEMPIC) 1 mg/dose (4 mg/3 mL)    insulin (LANTUS SOLOSTAR U-100 INSULIN) glargine 100 units/mL (3mL) SubQ pen "    Other Relevant Orders    CBC Without Differential    Comprehensive Metabolic Panel    Lipid Panel    Microalbumin/Creatinine Ratio, Urine      Other Visit Diagnoses     Screening for prostate cancer        Relevant Orders    PSA, Screening          ORDERS:       Orders Placed This Encounter   Procedures    CBC Without Differential    Comprehensive Metabolic Panel    Lipid Panel    Microalbumin/Creatinine Ratio, Urine    Hemoglobin A1C    PSA, Screening    Ambulatory referral/consult to Nephrology       Vaccines recommended:  Up-to-date including COVID-19  -optional shingles vaccine available at pharmacy    Follow-up in 4 months with labs or sooner if any concerns.      This note is dictated using the M*Modal Fluency Direct word recognition program. There are word recognition mistakes that are occasionally missed on review.    Dr. Jane Almeida D.O.   [facility]

## 2022-01-12 ENCOUNTER — OFFICE VISIT (OUTPATIENT)
Dept: FAMILY MEDICINE | Facility: CLINIC | Age: 72
End: 2022-01-12
Payer: MEDICARE

## 2022-01-12 VITALS
BODY MASS INDEX: 30.99 KG/M2 | OXYGEN SATURATION: 97 % | RESPIRATION RATE: 18 BRPM | DIASTOLIC BLOOD PRESSURE: 72 MMHG | SYSTOLIC BLOOD PRESSURE: 120 MMHG | HEART RATE: 64 BPM | WEIGHT: 204.5 LBS | HEIGHT: 68 IN

## 2022-01-12 DIAGNOSIS — N18.32 STAGE 3B CHRONIC KIDNEY DISEASE: ICD-10-CM

## 2022-01-12 DIAGNOSIS — E11.42 TYPE 2 DIABETES MELLITUS WITH DIABETIC POLYNEUROPATHY, WITH LONG-TERM CURRENT USE OF INSULIN: Primary | ICD-10-CM

## 2022-01-12 DIAGNOSIS — Z79.4 TYPE 2 DIABETES MELLITUS WITH DIABETIC POLYNEUROPATHY, WITH LONG-TERM CURRENT USE OF INSULIN: Primary | ICD-10-CM

## 2022-01-12 DIAGNOSIS — E11.59 HYPERTENSION ASSOCIATED WITH TYPE 2 DIABETES MELLITUS: ICD-10-CM

## 2022-01-12 DIAGNOSIS — E78.5 HYPERLIPIDEMIA ASSOCIATED WITH TYPE 2 DIABETES MELLITUS: ICD-10-CM

## 2022-01-12 DIAGNOSIS — N18.32 TYPE 2 DIABETES MELLITUS WITH STAGE 3B CHRONIC KIDNEY DISEASE, WITH LONG-TERM CURRENT USE OF INSULIN: ICD-10-CM

## 2022-01-12 DIAGNOSIS — Z12.5 SCREENING FOR PROSTATE CANCER: ICD-10-CM

## 2022-01-12 DIAGNOSIS — Z79.4 TYPE 2 DIABETES MELLITUS WITH STAGE 3B CHRONIC KIDNEY DISEASE, WITH LONG-TERM CURRENT USE OF INSULIN: ICD-10-CM

## 2022-01-12 DIAGNOSIS — E11.22 TYPE 2 DIABETES MELLITUS WITH STAGE 3B CHRONIC KIDNEY DISEASE, WITH LONG-TERM CURRENT USE OF INSULIN: ICD-10-CM

## 2022-01-12 DIAGNOSIS — E11.69 HYPERLIPIDEMIA ASSOCIATED WITH TYPE 2 DIABETES MELLITUS: ICD-10-CM

## 2022-01-12 DIAGNOSIS — I15.2 HYPERTENSION ASSOCIATED WITH TYPE 2 DIABETES MELLITUS: ICD-10-CM

## 2022-01-12 PROCEDURE — 99214 OFFICE O/P EST MOD 30 MIN: CPT | Mod: HCNC,S$GLB,, | Performed by: FAMILY MEDICINE

## 2022-01-12 PROCEDURE — 1126F PR PAIN SEVERITY QUANTIFIED, NO PAIN PRESENT: ICD-10-PCS | Mod: HCNC,CPTII,S$GLB, | Performed by: FAMILY MEDICINE

## 2022-01-12 PROCEDURE — 99999 PR PBB SHADOW E&M-EST. PATIENT-LVL V: ICD-10-PCS | Mod: PBBFAC,HCNC,, | Performed by: FAMILY MEDICINE

## 2022-01-12 PROCEDURE — 3074F PR MOST RECENT SYSTOLIC BLOOD PRESSURE < 130 MM HG: ICD-10-PCS | Mod: HCNC,CPTII,S$GLB, | Performed by: FAMILY MEDICINE

## 2022-01-12 PROCEDURE — 99499 RISK ADDL DX/OHS AUDIT: ICD-10-PCS | Mod: HCNC,S$GLB,, | Performed by: FAMILY MEDICINE

## 2022-01-12 PROCEDURE — 1101F PT FALLS ASSESS-DOCD LE1/YR: CPT | Mod: HCNC,CPTII,S$GLB, | Performed by: FAMILY MEDICINE

## 2022-01-12 PROCEDURE — 1126F AMNT PAIN NOTED NONE PRSNT: CPT | Mod: HCNC,CPTII,S$GLB, | Performed by: FAMILY MEDICINE

## 2022-01-12 PROCEDURE — 3008F PR BODY MASS INDEX (BMI) DOCUMENTED: ICD-10-PCS | Mod: HCNC,CPTII,S$GLB, | Performed by: FAMILY MEDICINE

## 2022-01-12 PROCEDURE — 99214 PR OFFICE/OUTPT VISIT, EST, LEVL IV, 30-39 MIN: ICD-10-PCS | Mod: HCNC,S$GLB,, | Performed by: FAMILY MEDICINE

## 2022-01-12 PROCEDURE — 3044F HG A1C LEVEL LT 7.0%: CPT | Mod: HCNC,CPTII,S$GLB, | Performed by: FAMILY MEDICINE

## 2022-01-12 PROCEDURE — 3074F SYST BP LT 130 MM HG: CPT | Mod: HCNC,CPTII,S$GLB, | Performed by: FAMILY MEDICINE

## 2022-01-12 PROCEDURE — 99999 PR PBB SHADOW E&M-EST. PATIENT-LVL V: CPT | Mod: PBBFAC,HCNC,, | Performed by: FAMILY MEDICINE

## 2022-01-12 PROCEDURE — 1101F PR PT FALLS ASSESS DOC 0-1 FALLS W/OUT INJ PAST YR: ICD-10-PCS | Mod: HCNC,CPTII,S$GLB, | Performed by: FAMILY MEDICINE

## 2022-01-12 PROCEDURE — 99499 UNLISTED E&M SERVICE: CPT | Mod: HCNC,S$GLB,, | Performed by: FAMILY MEDICINE

## 2022-01-12 PROCEDURE — 4010F ACE/ARB THERAPY RXD/TAKEN: CPT | Mod: HCNC,CPTII,S$GLB, | Performed by: FAMILY MEDICINE

## 2022-01-12 PROCEDURE — 3008F BODY MASS INDEX DOCD: CPT | Mod: HCNC,CPTII,S$GLB, | Performed by: FAMILY MEDICINE

## 2022-01-12 PROCEDURE — 1159F MED LIST DOCD IN RCRD: CPT | Mod: HCNC,CPTII,S$GLB, | Performed by: FAMILY MEDICINE

## 2022-01-12 PROCEDURE — 3288F FALL RISK ASSESSMENT DOCD: CPT | Mod: HCNC,CPTII,S$GLB, | Performed by: FAMILY MEDICINE

## 2022-01-12 PROCEDURE — 3044F PR MOST RECENT HEMOGLOBIN A1C LEVEL <7.0%: ICD-10-PCS | Mod: HCNC,CPTII,S$GLB, | Performed by: FAMILY MEDICINE

## 2022-01-12 PROCEDURE — 3078F PR MOST RECENT DIASTOLIC BLOOD PRESSURE < 80 MM HG: ICD-10-PCS | Mod: HCNC,CPTII,S$GLB, | Performed by: FAMILY MEDICINE

## 2022-01-12 PROCEDURE — 1160F RVW MEDS BY RX/DR IN RCRD: CPT | Mod: HCNC,CPTII,S$GLB, | Performed by: FAMILY MEDICINE

## 2022-01-12 PROCEDURE — 3078F DIAST BP <80 MM HG: CPT | Mod: HCNC,CPTII,S$GLB, | Performed by: FAMILY MEDICINE

## 2022-01-12 PROCEDURE — 1160F PR REVIEW ALL MEDS BY PRESCRIBER/CLIN PHARMACIST DOCUMENTED: ICD-10-PCS | Mod: HCNC,CPTII,S$GLB, | Performed by: FAMILY MEDICINE

## 2022-01-12 PROCEDURE — 4010F PR ACE/ARB THEARPY RXD/TAKEN: ICD-10-PCS | Mod: HCNC,CPTII,S$GLB, | Performed by: FAMILY MEDICINE

## 2022-01-12 PROCEDURE — 3288F PR FALLS RISK ASSESSMENT DOCUMENTED: ICD-10-PCS | Mod: HCNC,CPTII,S$GLB, | Performed by: FAMILY MEDICINE

## 2022-01-12 PROCEDURE — 1159F PR MEDICATION LIST DOCUMENTED IN MEDICAL RECORD: ICD-10-PCS | Mod: HCNC,CPTII,S$GLB, | Performed by: FAMILY MEDICINE

## 2022-01-12 RX ORDER — INSULIN DETEMIR 100 [IU]/ML
47 INJECTION, SOLUTION SUBCUTANEOUS DAILY
Qty: 42.3 ML | Refills: 1 | Status: CANCELLED | OUTPATIENT
Start: 2022-01-12 | End: 2022-07-11

## 2022-01-12 RX ORDER — INSULIN ASPART 100 [IU]/ML
10 INJECTION, SOLUTION INTRAVENOUS; SUBCUTANEOUS
Qty: 27 ML | Refills: 3 | Status: SHIPPED | OUTPATIENT
Start: 2022-01-12 | End: 2022-04-18 | Stop reason: SDUPTHER

## 2022-01-12 RX ORDER — PEN NEEDLE, DIABETIC 30 GX3/16"
NEEDLE, DISPOSABLE MISCELLANEOUS
Qty: 50 EACH | Refills: 7 | Status: SHIPPED | OUTPATIENT
Start: 2022-01-12 | End: 2022-06-06 | Stop reason: SDUPTHER

## 2022-01-12 RX ORDER — SEMAGLUTIDE 1.34 MG/ML
1 INJECTION, SOLUTION SUBCUTANEOUS
Qty: 3 PEN | Refills: 1 | Status: SHIPPED | OUTPATIENT
Start: 2022-01-12 | End: 2022-06-06 | Stop reason: SDUPTHER

## 2022-01-12 RX ORDER — FLASH GLUCOSE SCANNING READER
1 EACH MISCELLANEOUS ONCE
Qty: 1 EACH | Refills: 0 | Status: SHIPPED | OUTPATIENT
Start: 2022-01-12 | End: 2022-01-12

## 2022-01-12 RX ORDER — INSULIN GLARGINE 100 [IU]/ML
47 INJECTION, SOLUTION SUBCUTANEOUS NIGHTLY
Qty: 42.3 ML | Refills: 3 | Status: SHIPPED | OUTPATIENT
Start: 2022-01-12 | End: 2022-10-14 | Stop reason: SDUPTHER

## 2022-01-12 RX ORDER — ZOSTER VACCINE RECOMBINANT, ADJUVANTED 50 MCG/0.5
KIT INTRAMUSCULAR
Qty: 1 EACH | Refills: 1 | Status: CANCELLED | OUTPATIENT
Start: 2022-01-12

## 2022-01-12 RX ORDER — BLOOD-GLUCOSE CONTROL, NORMAL
1 EACH MISCELLANEOUS 3 TIMES DAILY
Qty: 200 EACH | Refills: 3 | Status: SHIPPED | OUTPATIENT
Start: 2022-01-12 | End: 2024-02-01

## 2022-01-12 RX ORDER — FLASH GLUCOSE SENSOR
1 KIT MISCELLANEOUS
Qty: 2 KIT | Refills: 11 | Status: SHIPPED | OUTPATIENT
Start: 2022-01-12 | End: 2022-10-24

## 2022-01-18 ENCOUNTER — PATIENT MESSAGE (OUTPATIENT)
Dept: FAMILY MEDICINE | Facility: CLINIC | Age: 72
End: 2022-01-18
Payer: MEDICARE

## 2022-01-18 DIAGNOSIS — N18.32 STAGE 3B CHRONIC KIDNEY DISEASE: ICD-10-CM

## 2022-01-18 DIAGNOSIS — N28.89 RENAL MASS: Primary | ICD-10-CM

## 2022-01-26 ENCOUNTER — PATIENT OUTREACH (OUTPATIENT)
Dept: ADMINISTRATIVE | Facility: OTHER | Age: 72
End: 2022-01-26
Payer: MEDICARE

## 2022-01-26 NOTE — PROGRESS NOTES
Health Maintenance Due   Topic Date Due    Shingles Vaccine (2 of 3) 01/15/2015     Updates were requested from care everywhere.  Chart was reviewed for overdue Proactive [facility] Encounters (RT[facility]IE) topics (CRS, Breast Cancer Screening, Eye exam)  Health Maintenance has been updated.  LINKS immunization registry triggered.  Immunizations were reconciled.

## 2022-02-03 ENCOUNTER — OFFICE VISIT (OUTPATIENT)
Dept: PODIATRY | Facility: CLINIC | Age: 72
End: 2022-02-03
Payer: MEDICARE

## 2022-02-03 VITALS — HEIGHT: 68 IN | WEIGHT: 207.69 LBS | BODY MASS INDEX: 31.48 KG/M2

## 2022-02-03 DIAGNOSIS — R26.2 DIFFICULTY IN WALKING, NOT ELSEWHERE CLASSIFIED: ICD-10-CM

## 2022-02-03 DIAGNOSIS — E11.42 TYPE 2 DIABETES MELLITUS WITH DIABETIC POLYNEUROPATHY, WITH LONG-TERM CURRENT USE OF INSULIN: ICD-10-CM

## 2022-02-03 DIAGNOSIS — Z79.4 TYPE 2 DIABETES MELLITUS WITH DIABETIC POLYNEUROPATHY, WITH LONG-TERM CURRENT USE OF INSULIN: ICD-10-CM

## 2022-02-03 DIAGNOSIS — E11.49 TYPE II DIABETES MELLITUS WITH NEUROLOGICAL MANIFESTATIONS: ICD-10-CM

## 2022-02-03 DIAGNOSIS — B35.1 TINEA UNGUIUM: Primary | ICD-10-CM

## 2022-02-03 PROCEDURE — 1160F RVW MEDS BY RX/DR IN RCRD: CPT | Mod: HCNC,CPTII,S$GLB, | Performed by: PODIATRIST

## 2022-02-03 PROCEDURE — 11721 PR DEBRIDEMENT OF NAILS, 6 OR MORE: ICD-10-PCS | Mod: Q9,HCNC,S$GLB, | Performed by: PODIATRIST

## 2022-02-03 PROCEDURE — 99999 PR PBB SHADOW E&M-EST. PATIENT-LVL III: CPT | Mod: PBBFAC,HCNC,, | Performed by: PODIATRIST

## 2022-02-03 PROCEDURE — 1159F PR MEDICATION LIST DOCUMENTED IN MEDICAL RECORD: ICD-10-PCS | Mod: HCNC,CPTII,S$GLB, | Performed by: PODIATRIST

## 2022-02-03 PROCEDURE — 1126F AMNT PAIN NOTED NONE PRSNT: CPT | Mod: HCNC,CPTII,S$GLB, | Performed by: PODIATRIST

## 2022-02-03 PROCEDURE — 1160F PR REVIEW ALL MEDS BY PRESCRIBER/CLIN PHARMACIST DOCUMENTED: ICD-10-PCS | Mod: HCNC,CPTII,S$GLB, | Performed by: PODIATRIST

## 2022-02-03 PROCEDURE — 3044F PR MOST RECENT HEMOGLOBIN A1C LEVEL <7.0%: ICD-10-PCS | Mod: HCNC,CPTII,S$GLB, | Performed by: PODIATRIST

## 2022-02-03 PROCEDURE — 1101F PR PT FALLS ASSESS DOC 0-1 FALLS W/OUT INJ PAST YR: ICD-10-PCS | Mod: HCNC,CPTII,S$GLB, | Performed by: PODIATRIST

## 2022-02-03 PROCEDURE — 1101F PT FALLS ASSESS-DOCD LE1/YR: CPT | Mod: HCNC,CPTII,S$GLB, | Performed by: PODIATRIST

## 2022-02-03 PROCEDURE — 3044F HG A1C LEVEL LT 7.0%: CPT | Mod: HCNC,CPTII,S$GLB, | Performed by: PODIATRIST

## 2022-02-03 PROCEDURE — 99499 RISK ADDL DX/OHS AUDIT: ICD-10-PCS | Mod: HCNC,S$GLB,, | Performed by: PODIATRIST

## 2022-02-03 PROCEDURE — 4010F ACE/ARB THERAPY RXD/TAKEN: CPT | Mod: HCNC,CPTII,S$GLB, | Performed by: PODIATRIST

## 2022-02-03 PROCEDURE — 99499 UNLISTED E&M SERVICE: CPT | Mod: HCNC,S$GLB,, | Performed by: PODIATRIST

## 2022-02-03 PROCEDURE — 4010F PR ACE/ARB THEARPY RXD/TAKEN: ICD-10-PCS | Mod: HCNC,CPTII,S$GLB, | Performed by: PODIATRIST

## 2022-02-03 PROCEDURE — 3008F BODY MASS INDEX DOCD: CPT | Mod: HCNC,CPTII,S$GLB, | Performed by: PODIATRIST

## 2022-02-03 PROCEDURE — 3288F PR FALLS RISK ASSESSMENT DOCUMENTED: ICD-10-PCS | Mod: HCNC,CPTII,S$GLB, | Performed by: PODIATRIST

## 2022-02-03 PROCEDURE — 11721 DEBRIDE NAIL 6 OR MORE: CPT | Mod: Q9,HCNC,S$GLB, | Performed by: PODIATRIST

## 2022-02-03 PROCEDURE — 1159F MED LIST DOCD IN RCRD: CPT | Mod: HCNC,CPTII,S$GLB, | Performed by: PODIATRIST

## 2022-02-03 PROCEDURE — 3008F PR BODY MASS INDEX (BMI) DOCUMENTED: ICD-10-PCS | Mod: HCNC,CPTII,S$GLB, | Performed by: PODIATRIST

## 2022-02-03 PROCEDURE — 1126F PR PAIN SEVERITY QUANTIFIED, NO PAIN PRESENT: ICD-10-PCS | Mod: HCNC,CPTII,S$GLB, | Performed by: PODIATRIST

## 2022-02-03 PROCEDURE — 99999 PR PBB SHADOW E&M-EST. PATIENT-LVL III: ICD-10-PCS | Mod: PBBFAC,HCNC,, | Performed by: PODIATRIST

## 2022-02-03 PROCEDURE — 3288F FALL RISK ASSESSMENT DOCD: CPT | Mod: HCNC,CPTII,S$GLB, | Performed by: PODIATRIST

## 2022-02-03 NOTE — PROGRESS NOTES
Subjective:      Patient ID: Roger Zavaleta Jr. is a 71 y.o. male.    Chief Complaint: Follow-up (cellulitis)      Roger is a 71 y.o. male who presents to the clinic for evaluation and treatment of high risk feet. Roger has a past medical history of Acute midline low back pain with left-sided sciatica (8/16/2021), CAD (coronary artery disease) (52), Diabetes mellitus type II, and HLD (hyperlipidemia). The patient's chief complaint is long, thick toenails. This patient has documented high risk feet requiring routine maintenance secondary to diabetes mellitis and those secondary complications of diabetes, as mentioned..  No other complaints than painful and mycotic nails x 10. He tells me he has underlying DM neuropathy and sometimes he feels numbness and tingling.     7/24/20 presenting with left hallux puncture wound. Stepped on a nail last week. Was wearing a shoe. Nails went through his shoe. Ambulating in [facility] today. Tetanus was updated in 2017. Has been cleaning with saline. Tender to touch. Denies n/v/f/c.     8/10/20 follow-up left hallux puncture wound.  Almost finished with oral antibiotic (clindamycin, Cipro). Report mild tenderness left hallux wound. Healing uneventfully.     8/31/20 f/u for L hallux puncture wound. Has been applying triple ointment. Reports some pain submet1 around the ulcer. Ambulating in tennis shoe. Last measured glucose was 145.    9/30/20 f/u for L hallux wound which today is completely closed. Ambulating in normal tennis shoe. Denies pain. Has been using callus pad intermittently to prevent reoccurrence.     6/28/21 presenting with right foot hallux redness. Stubbed his R great toe on Friday. Got inflamed and painful. Tells me redness improving over the weekend. Minimal tenderness on R hallux today.     7/14/21 f/u R hallux redness. R hallux toenail fell off the other day. Denies pan. Redness is gone. Finished 10 days of doxycycline. In [facility] today.    2/3/22 presenting for  diabetic foot risk assessment. Denies any pedal complaints other than elongated, mycotic, painful toenails x 10. In regular tennis shoe. Glucose is well controlled. Runs around 150s per patient.       PCP: Jane Almeida, DO    Date Last Seen by PCP: in epic     Hemoglobin A1C   Date Value Ref Range Status   01/10/2022 6.9 (H) 4.0 - 5.6 % Final     Comment:     ADA Screening Guidelines:  5.7-6.4%  Consistent with prediabetes  >or=6.5%  Consistent with diabetes    High levels of fetal hemoglobin interfere with the HbA1C  assay. Heterozygous hemoglobin variants (HbS, HgC, etc)do  not significantly interfere with this assay.   However, presence of multiple variants may affect accuracy.     11/19/2021 7.0 (H) 4.0 - 5.6 % Final     Comment:     ADA Screening Guidelines:  5.7-6.4%  Consistent with prediabetes  >or=6.5%  Consistent with diabetes    High levels of fetal hemoglobin interfere with the HbA1C  assay. Heterozygous hemoglobin variants (HbS, HgC, etc)do  not significantly interfere with this assay.   However, presence of multiple variants may affect accuracy.     08/23/2021 9.8 (H) 4.0 - 5.6 % Final     Comment:     ADA Screening Guidelines:  5.7-6.4%  Consistent with prediabetes  >or=6.5%  Consistent with diabetes    High levels of fetal hemoglobin interfere with the HbA1C  assay. Heterozygous hemoglobin variants (HbS, HgC, etc)do  not significantly interfere with this assay.   However, presence of multiple variants may affect accuracy.         Review of Systems   Constitutional: Negative for decreased appetite, fever and malaise/fatigue.   HENT: Negative for congestion.    Cardiovascular: Negative for chest pain and leg swelling.   Respiratory: Negative for cough and shortness of breath.    Skin: Positive for color change. Negative for nail changes and rash.   Musculoskeletal: Negative for arthritis, joint pain, joint swelling and muscle weakness.   Gastrointestinal: Negative for bloating, abdominal pain, nausea  and vomiting.   Neurological: Positive for numbness and sensory change. Negative for headaches and weakness.   Psychiatric/Behavioral: Negative for altered mental status.           Objective:      Physical Exam  Constitutional:       General: He is not in acute distress.     Appearance: He is well-developed.   Musculoskeletal:         General: No tenderness or deformity.   Skin:     General: Skin is warm.      Capillary Refill: Capillary refill takes less than 2 seconds.      Findings: No erythema.   Neurological:      Mental Status: He is alert and oriented to person, place, and time.   Psychiatric:         Behavior: Behavior normal.       Vascular: Distal DP/PT pulses palpable 1/4 b/l. CRT < 3 sec to tips of toes. No vericosities noted to b/l LEs. Hair growth present b/l LE, warm to touch b/l LE and cool to touch to toes.     Dermatologic:   Toenails 1-5 bilaterally are discolored/yellowed, dystrophic, brittle. No incurvation. Mild xerosis noted with some skin pigmentation changes.     Musculoskeletal: Equinus noted b/l ankles with < 10 deg DF noted b/l. MMT 5/5 in DF/PF/Inv/Ev resistance with no reproduction of pain in any direction. Passive range of motion of ankle and pedal joints is painless b/l. No calf tenderness b/l LE, Compartments soft/compressible b/l.     Neurological:   Light touch, proprioception, and sharp/dull sensation are decreased b/l. Protective threshold with the [place]-Wienstein monofilament is decreased b/l. Vibratory sensation decreased b/l.         Assessment:       Encounter Diagnoses   Name Primary?    Tinea unguium Yes    Difficulty in walking, not elsewhere classified     Type 2 diabetes mellitus with diabetic polyneuropathy, with long-term current use of insulin     Type II diabetes mellitus with neurological manifestations          Plan:       Roger was seen today for follow-up.    Diagnoses and all orders for this visit:    Tinea unguium    Difficulty in walking, not elsewhere  classified    Type 2 diabetes mellitus with diabetic polyneuropathy, with long-term current use of insulin    Type II diabetes mellitus with neurological manifestations      I counseled the patient on his conditions, their implications and medical management.    71 y.o.  male with diabetic foot risk assessment, diabetic neuropathy.     -nails x 10 sharply debrided with nail nipper. Tolerated well.     -It was discussed the importance of wearing shoes with adequate room in toe box to accommodate toe deformities. Recommended New Balance/[employer]ics shoe brands with adequate arch supports to alleviate abnormal pressure and improve stability of foot while walking. Avoid flat shoes and barefoot walking as these will exacerbate or worsen symptoms.   -The nature of the condition, options for management, as well as potential risks and complications were discussed in detail with patient. Patient was amenable to my recommendations and left my office fully informed and will follow up as instructed or sooner if necessary.    -Patient was advised of signs and symptoms of infection including redness, drainage, purulence, odor, streaking, fever, chills and I advised patient to seek medical attention (ER or urgent care) if these symptoms arise.   -Advised for optimal glucose control and maintenance per primary care physician. Patient was also educated on healthy diet that is naturally rich in nutrients and low in fat and calories.   -f/u 6 months

## 2022-02-26 PROCEDURE — 99454 PR REMOTE MNTR, PHYS PARAM, INITIAL, EA 30 DAYS: ICD-10-PCS | Mod: S$GLB,,, | Performed by: FAMILY MEDICINE

## 2022-02-26 PROCEDURE — 99454 REM MNTR PHYSIOL PARAM 16-30: CPT | Mod: S$GLB,,, | Performed by: FAMILY MEDICINE

## 2022-03-04 ENCOUNTER — PATIENT MESSAGE (OUTPATIENT)
Dept: OTHER | Facility: OTHER | Age: 72
End: 2022-03-04
Payer: MEDICARE

## 2022-04-18 ENCOUNTER — PATIENT MESSAGE (OUTPATIENT)
Dept: FAMILY MEDICINE | Facility: CLINIC | Age: 72
End: 2022-04-18
Payer: MEDICARE

## 2022-04-18 DIAGNOSIS — Z79.4 TYPE 2 DIABETES MELLITUS WITH STAGE 3B CHRONIC KIDNEY DISEASE, WITH LONG-TERM CURRENT USE OF INSULIN: ICD-10-CM

## 2022-04-18 DIAGNOSIS — E11.42 TYPE 2 DIABETES MELLITUS WITH DIABETIC POLYNEUROPATHY, WITH LONG-TERM CURRENT USE OF INSULIN: ICD-10-CM

## 2022-04-18 DIAGNOSIS — E11.22 TYPE 2 DIABETES MELLITUS WITH STAGE 3B CHRONIC KIDNEY DISEASE, WITH LONG-TERM CURRENT USE OF INSULIN: ICD-10-CM

## 2022-04-18 DIAGNOSIS — Z79.4 TYPE 2 DIABETES MELLITUS WITH DIABETIC POLYNEUROPATHY, WITH LONG-TERM CURRENT USE OF INSULIN: ICD-10-CM

## 2022-04-18 DIAGNOSIS — N18.32 TYPE 2 DIABETES MELLITUS WITH STAGE 3B CHRONIC KIDNEY DISEASE, WITH LONG-TERM CURRENT USE OF INSULIN: ICD-10-CM

## 2022-04-18 RX ORDER — INSULIN ASPART 100 [IU]/ML
10 INJECTION, SOLUTION INTRAVENOUS; SUBCUTANEOUS
Qty: 27 ML | Refills: 0 | Status: SHIPPED | OUTPATIENT
Start: 2022-04-18 | End: 2022-09-16 | Stop reason: SDUPTHER

## 2022-04-18 NOTE — TELEPHONE ENCOUNTER
Care Due:                  Date            Visit Type   Department     Provider  --------------------------------------------------------------------------------                                EP -                              PRIMARY [facility]  Last Visit: 01-      [facility]                               -                              [facility]  Next Visit: 05-      [facility] ([facility])   [facility]                                                            Last  Test          Frequency    Reason                     Performed    Due Date  --------------------------------------------------------------------------------    HBA1C.......  6 months...  insulin, semaglutide.....  01-   07-    Powered by [employer]. Reference number: [id number].   4/18/2022 5:06:48 PM CDT

## 2022-04-25 DIAGNOSIS — E11.42 TYPE 2 DIABETES MELLITUS WITH DIABETIC POLYNEUROPATHY, WITH LONG-TERM CURRENT USE OF INSULIN: ICD-10-CM

## 2022-04-25 DIAGNOSIS — Z79.4 TYPE 2 DIABETES MELLITUS WITH DIABETIC POLYNEUROPATHY, WITH LONG-TERM CURRENT USE OF INSULIN: ICD-10-CM

## 2022-04-25 RX ORDER — SYRINGE AND NEEDLE,INSULIN,1ML 31GX15/64"
1 SYRINGE, EMPTY DISPOSABLE MISCELLANEOUS 3 TIMES DAILY
Qty: 100 EACH | Refills: 11 | Status: SHIPPED | OUTPATIENT
Start: 2022-04-25 | End: 2022-05-03 | Stop reason: SDUPTHER

## 2022-04-25 NOTE — TELEPHONE ENCOUNTER
No new care gaps identified.  Powered by [employer]. Reference number: [id number].   4/25/2022 10:15:08 AM CDT

## 2022-07-06 DIAGNOSIS — E11.22 TYPE 2 DIABETES MELLITUS WITH STAGE 3B CHRONIC KIDNEY DISEASE, WITH LONG-TERM CURRENT USE OF INSULIN: ICD-10-CM

## 2022-07-06 DIAGNOSIS — N18.32 TYPE 2 DIABETES MELLITUS WITH STAGE 3B CHRONIC KIDNEY DISEASE, WITH LONG-TERM CURRENT USE OF INSULIN: ICD-10-CM

## 2022-07-06 DIAGNOSIS — Z79.4 TYPE 2 DIABETES MELLITUS WITH STAGE 3B CHRONIC KIDNEY DISEASE, WITH LONG-TERM CURRENT USE OF INSULIN: ICD-10-CM

## 2022-07-06 RX ORDER — BLOOD SUGAR DIAGNOSTIC
STRIP MISCELLANEOUS
Qty: 100 EACH | Refills: 1 | Status: SHIPPED | OUTPATIENT
Start: 2022-07-06 | End: 2022-09-13

## 2022-07-06 NOTE — TELEPHONE ENCOUNTER
Refill Decision Note   Roger Zavaleta  is requesting a refill authorization.  Brief Assessment and Rationale for Refill:  Approve    -Medication-Related Problems Identified: Requires labs  Medication Therapy Plan:       Medication Reconciliation Completed: No   Comments:     No Care Gaps recommended.     Note composed:1:39 PM 07/06/2022

## 2022-07-06 NOTE — TELEPHONE ENCOUNTER
Care Due:                  Date            Visit Type   Department     Provider  --------------------------------------------------------------------------------                                EP -                              PRIMARY      [facility]  Last Visit: 01-      CARE ([facility])   [facility]  Next Visit: None Scheduled  None         None Found                                                            Last  Test          Frequency    Reason                     Performed    Due Date  --------------------------------------------------------------------------------    HBA1C.......  6 months...  insulin, semaglutide.....  01-   07-    [facility] Embedded Care Gaps. Reference number: [id number]. 7/06/2022   1:33:54 PM CDT

## 2022-08-03 ENCOUNTER — OFFICE VISIT (OUTPATIENT)
Dept: PODIATRY | Facility: CLINIC | Age: 72
End: 2022-08-03
Payer: MEDICARE

## 2022-08-03 VITALS — WEIGHT: 210 LBS | HEIGHT: 68 IN | BODY MASS INDEX: 31.83 KG/M2

## 2022-08-03 DIAGNOSIS — B35.1 TINEA UNGUIUM: ICD-10-CM

## 2022-08-03 DIAGNOSIS — E11.9 COMPREHENSIVE DIABETIC FOOT EXAMINATION, TYPE 2 DM, ENCOUNTER FOR: ICD-10-CM

## 2022-08-03 DIAGNOSIS — E11.42 TYPE 2 DIABETES MELLITUS WITH DIABETIC POLYNEUROPATHY, WITH LONG-TERM CURRENT USE OF INSULIN: Primary | ICD-10-CM

## 2022-08-03 DIAGNOSIS — E11.49 TYPE II DIABETES MELLITUS WITH NEUROLOGICAL MANIFESTATIONS: ICD-10-CM

## 2022-08-03 DIAGNOSIS — Z79.4 TYPE 2 DIABETES MELLITUS WITH DIABETIC POLYNEUROPATHY, WITH LONG-TERM CURRENT USE OF INSULIN: Primary | ICD-10-CM

## 2022-08-03 PROCEDURE — 11721 PR DEBRIDEMENT OF NAILS, 6 OR MORE: ICD-10-PCS | Mod: Q9,S$GLB,, | Performed by: PODIATRIST

## 2022-08-03 PROCEDURE — 3008F PR BODY MASS INDEX (BMI) DOCUMENTED: ICD-10-PCS | Mod: CPTII,S$GLB,, | Performed by: PODIATRIST

## 2022-08-03 PROCEDURE — 1159F MED LIST DOCD IN RCRD: CPT | Mod: CPTII,S$GLB,, | Performed by: PODIATRIST

## 2022-08-03 PROCEDURE — 99999 PR PBB SHADOW E&M-EST. PATIENT-LVL II: CPT | Mod: PBBFAC,,, | Performed by: PODIATRIST

## 2022-08-03 PROCEDURE — 3288F PR FALLS RISK ASSESSMENT DOCUMENTED: ICD-10-PCS | Mod: CPTII,S$GLB,, | Performed by: PODIATRIST

## 2022-08-03 PROCEDURE — 1101F PR PT FALLS ASSESS DOC 0-1 FALLS W/OUT INJ PAST YR: ICD-10-PCS | Mod: CPTII,S$GLB,, | Performed by: PODIATRIST

## 2022-08-03 PROCEDURE — 3008F BODY MASS INDEX DOCD: CPT | Mod: CPTII,S$GLB,, | Performed by: PODIATRIST

## 2022-08-03 PROCEDURE — 3044F HG A1C LEVEL LT 7.0%: CPT | Mod: CPTII,S$GLB,, | Performed by: PODIATRIST

## 2022-08-03 PROCEDURE — 99499 NO LOS: ICD-10-PCS | Mod: S$GLB,,, | Performed by: PODIATRIST

## 2022-08-03 PROCEDURE — 1101F PT FALLS ASSESS-DOCD LE1/YR: CPT | Mod: CPTII,S$GLB,, | Performed by: PODIATRIST

## 2022-08-03 PROCEDURE — 1126F PR PAIN SEVERITY QUANTIFIED, NO PAIN PRESENT: ICD-10-PCS | Mod: CPTII,S$GLB,, | Performed by: PODIATRIST

## 2022-08-03 PROCEDURE — 4010F ACE/ARB THERAPY RXD/TAKEN: CPT | Mod: CPTII,S$GLB,, | Performed by: PODIATRIST

## 2022-08-03 PROCEDURE — 3044F PR MOST RECENT HEMOGLOBIN A1C LEVEL <7.0%: ICD-10-PCS | Mod: CPTII,S$GLB,, | Performed by: PODIATRIST

## 2022-08-03 PROCEDURE — 3288F FALL RISK ASSESSMENT DOCD: CPT | Mod: CPTII,S$GLB,, | Performed by: PODIATRIST

## 2022-08-03 PROCEDURE — 99999 PR PBB SHADOW E&M-EST. PATIENT-LVL II: ICD-10-PCS | Mod: PBBFAC,,, | Performed by: PODIATRIST

## 2022-08-03 PROCEDURE — 11721 DEBRIDE NAIL 6 OR MORE: CPT | Mod: Q9,S$GLB,, | Performed by: PODIATRIST

## 2022-08-03 PROCEDURE — 4010F PR ACE/ARB THEARPY RXD/TAKEN: ICD-10-PCS | Mod: CPTII,S$GLB,, | Performed by: PODIATRIST

## 2022-08-03 PROCEDURE — 1159F PR MEDICATION LIST DOCUMENTED IN MEDICAL RECORD: ICD-10-PCS | Mod: CPTII,S$GLB,, | Performed by: PODIATRIST

## 2022-08-03 PROCEDURE — 1160F PR REVIEW ALL MEDS BY PRESCRIBER/CLIN PHARMACIST DOCUMENTED: ICD-10-PCS | Mod: CPTII,S$GLB,, | Performed by: PODIATRIST

## 2022-08-03 PROCEDURE — 1160F RVW MEDS BY RX/DR IN RCRD: CPT | Mod: CPTII,S$GLB,, | Performed by: PODIATRIST

## 2022-08-03 PROCEDURE — 99499 UNLISTED E&M SERVICE: CPT | Mod: S$GLB,,, | Performed by: PODIATRIST

## 2022-08-03 PROCEDURE — 1126F AMNT PAIN NOTED NONE PRSNT: CPT | Mod: CPTII,S$GLB,, | Performed by: PODIATRIST

## 2022-08-03 NOTE — PROGRESS NOTES
Subjective:      Patient ID: Roger Zavaleta Jr. is a 71 y.o. male.    Chief Complaint: Foot Pain and Nail Care (Pt is coming in for nail care, mentions pain in the bottom of his right foot)      Roger is a 71 y.o. male who presents to the clinic for evaluation and treatment of high risk feet. Roger has a past medical history of Acute midline low back pain with left-sided sciatica (8/16/2021), CAD (coronary artery disease) (52), Diabetes mellitus type II, and HLD (hyperlipidemia). The patient's chief complaint is long, thick toenails. This patient has documented high risk feet requiring routine maintenance secondary to diabetes mellitis and those secondary complications of diabetes, as mentioned..  No other complaints than painful and mycotic nails x 10. He tells me he has underlying DM neuropathy and sometimes he feels numbness and tingling.     7/24/20 presenting with left hallux puncture wound. Stepped on a nail last week. Was wearing a shoe. Nails went through his shoe. Ambulating in [facility] today. Tetanus was updated in 2017. Has been cleaning with saline. Tender to touch. Denies n/v/f/c.     8/10/20 follow-up left hallux puncture wound.  Almost finished with oral antibiotic (clindamycin, Cipro). Report mild tenderness left hallux wound. Healing uneventfully.     8/31/20 f/u for L hallux puncture wound. Has been applying triple ointment. Reports some pain submet1 around the ulcer. Ambulating in tennis shoe. Last measured glucose was 145.    9/30/20 f/u for L hallux wound which today is completely closed. Ambulating in normal tennis shoe. Denies pain. Has been using callus pad intermittently to prevent reoccurrence.     6/28/21 presenting with right foot hallux redness. Stubbed his R great toe on Friday. Got inflamed and painful. Tells me redness improving over the weekend. Minimal tenderness on R hallux today.     7/14/21 f/u R hallux redness. R hallux toenail fell off the other day. Denies pan. Redness is gone.  Finished 10 days of doxycycline. In [facility] today.    2/3/22 presenting for diabetic foot risk assessment. Denies any pedal complaints other than elongated, mycotic, painful toenails x 10. In regular tennis shoe. Glucose is well controlled. Runs around 150s per patient.     8/4/22 presenting for diabetic foot risk assessment.  Denies any pedal complaints other than elongated, mycotic, painful toenails times 10. Patient also tells me his glucose has been well controlled.    PCP: Jane Almeida, DO    Date Last Seen by PCP: in epic     Hemoglobin A1C   Date Value Ref Range Status   01/10/2022 6.9 (H) 4.0 - 5.6 % Final     Comment:     ADA Screening Guidelines:  5.7-6.4%  Consistent with prediabetes  >or=6.5%  Consistent with diabetes    High levels of fetal hemoglobin interfere with the HbA1C  assay. Heterozygous hemoglobin variants (HbS, HgC, etc)do  not significantly interfere with this assay.   However, presence of multiple variants may affect accuracy.     11/19/2021 7.0 (H) 4.0 - 5.6 % Final     Comment:     ADA Screening Guidelines:  5.7-6.4%  Consistent with prediabetes  >or=6.5%  Consistent with diabetes    High levels of fetal hemoglobin interfere with the HbA1C  assay. Heterozygous hemoglobin variants (HbS, HgC, etc)do  not significantly interfere with this assay.   However, presence of multiple variants may affect accuracy.     08/23/2021 9.8 (H) 4.0 - 5.6 % Final     Comment:     ADA Screening Guidelines:  5.7-6.4%  Consistent with prediabetes  >or=6.5%  Consistent with diabetes    High levels of fetal hemoglobin interfere with the HbA1C  assay. Heterozygous hemoglobin variants (HbS, HgC, etc)do  not significantly interfere with this assay.   However, presence of multiple variants may affect accuracy.       Review of Systems   Constitutional: Negative for decreased appetite, fever and malaise/fatigue.   HENT: Negative for congestion.    Cardiovascular: Negative for chest pain and leg swelling.   Respiratory:  Negative for cough and shortness of breath.    Skin: Positive for color change. Negative for nail changes and rash.   Musculoskeletal: Negative for arthritis, joint pain, joint swelling and muscle weakness.   Gastrointestinal: Negative for bloating, abdominal pain, nausea and vomiting.   Neurological: Positive for numbness and sensory change. Negative for headaches and weakness.   Psychiatric/Behavioral: Negative for altered mental status.         Objective:      Physical Exam  Constitutional:       General: He is not in acute distress.     Appearance: He is well-developed.   Musculoskeletal:         General: No tenderness or deformity.   Skin:     General: Skin is warm.      Capillary Refill: Capillary refill takes less than 2 seconds.      Findings: No erythema.   Neurological:      Mental Status: He is alert and oriented to person, place, and time.   Psychiatric:         Behavior: Behavior normal.       Vascular: Distal DP/PT pulses palpable 1/4 b/l. CRT < 3 sec to tips of toes. No vericosities noted to b/l LEs. Hair growth present b/l LE, warm to touch b/l LE and cool to touch to toes.     Dermatologic:   Toenails 1-5 bilaterally are discolored/yellowed, dystrophic, brittle. No incurvation. Mild xerosis noted with some skin pigmentation changes.     Musculoskeletal: Equinus noted b/l ankles with < 10 deg DF noted b/l. MMT 5/5 in DF/PF/Inv/Ev resistance with no reproduction of pain in any direction. Passive range of motion of ankle and pedal joints is painless b/l. No calf tenderness b/l LE, Compartments soft/compressible b/l.     Neurological:   Light touch, proprioception, and sharp/dull sensation are decreased b/l. Protective threshold with the [place]-Wienstein monofilament is decreased b/l. Vibratory sensation decreased b/l.         Assessment:       Encounter Diagnoses   Name Primary?    Type 2 diabetes mellitus with diabetic polyneuropathy, with long-term current use of insulin Yes    Tinea unguium      Type II diabetes mellitus with neurological manifestations     Comprehensive diabetic foot examination, type 2 DM, encounter for          Plan:       Roger was seen today for foot pain and nail care.    Diagnoses and all orders for this visit:    Type 2 diabetes mellitus with diabetic polyneuropathy, with long-term current use of insulin    Tinea unguium    Type II diabetes mellitus with neurological manifestations    Comprehensive diabetic foot examination, type 2 DM, encounter for      I counseled the patient on his conditions, their implications and medical management.    71 y.o.  male with diabetic foot risk assessment, diabetic neuropathy.     -nails x 10 sharply debrided with nail nipper. Tolerated well.     -It was discussed the importance of wearing shoes with adequate room in toe box to accommodate toe deformities. Recommended New Balance/[employer]ics shoe brands with adequate arch supports to alleviate abnormal pressure and improve stability of foot while walking. Avoid flat shoes and barefoot walking as these will exacerbate or worsen symptoms.   -The nature of the condition, options for management, as well as potential risks and complications were discussed in detail with patient. Patient was amenable to my recommendations and left my office fully informed and will follow up as instructed or sooner if necessary.    -Patient was advised of signs and symptoms of infection including redness, drainage, purulence, odor, streaking, fever, chills and I advised patient to seek medical attention (ER or urgent care) if these symptoms arise.   -Advised for optimal glucose control and maintenance per primary care physician. Patient was also educated on healthy diet that is naturally rich in nutrients and low in fat and calories.   -f/u 6 months

## 2022-08-24 ENCOUNTER — PATIENT MESSAGE (OUTPATIENT)
Dept: ADMINISTRATIVE | Facility: HOSPITAL | Age: 72
End: 2022-08-24
Payer: MEDICARE

## 2022-09-16 DIAGNOSIS — Z79.4 TYPE 2 DIABETES MELLITUS WITH DIABETIC POLYNEUROPATHY, WITH LONG-TERM CURRENT USE OF INSULIN: ICD-10-CM

## 2022-09-16 DIAGNOSIS — E11.42 TYPE 2 DIABETES MELLITUS WITH DIABETIC POLYNEUROPATHY, WITH LONG-TERM CURRENT USE OF INSULIN: ICD-10-CM

## 2022-09-16 NOTE — TELEPHONE ENCOUNTER
No new care gaps identified.  [facility] Embedded Care Gaps. Reference number: [id number]. 9/16/2022   4:31:05 PM CDT

## 2022-09-16 NOTE — TELEPHONE ENCOUNTER
Refill Routing Note   Medication(s) are not appropriate for processing by [facility] for the following reason(s):      - Required laboratory values are outdated  - Required laboratory values are abnormal    [facility] action(s):  Defer          Medication reconciliation completed: No     Appointments  past 12m or future 3m with PCP    Date Provider   Last Visit   Visit date not found Lauren Alves MD   Next Visit   Visit date not found Lauren Alves MD   ED visits in past 90 days: 0        Note composed:5:19 PM 09/16/2022

## 2022-09-19 RX ORDER — INSULIN ASPART 100 [IU]/ML
10 INJECTION, SOLUTION INTRAVENOUS; SUBCUTANEOUS
Qty: 27 ML | Refills: 0 | Status: SHIPPED | OUTPATIENT
Start: 2022-09-19 | End: 2023-01-30 | Stop reason: SDUPTHER

## 2022-09-27 ENCOUNTER — TELEPHONE (OUTPATIENT)
Dept: FAMILY MEDICINE | Facility: CLINIC | Age: 72
End: 2022-09-27
Payer: MEDICARE

## 2022-10-06 ENCOUNTER — PATIENT MESSAGE (OUTPATIENT)
Dept: FAMILY MEDICINE | Facility: CLINIC | Age: 72
End: 2022-10-06
Payer: MEDICARE

## 2022-10-07 ENCOUNTER — PATIENT MESSAGE (OUTPATIENT)
Dept: FAMILY MEDICINE | Facility: CLINIC | Age: 72
End: 2022-10-07
Payer: MEDICARE

## 2022-10-08 ENCOUNTER — PATIENT MESSAGE (OUTPATIENT)
Dept: ADMINISTRATIVE | Facility: HOSPITAL | Age: 72
End: 2022-10-08
Payer: MEDICARE

## 2022-10-10 ENCOUNTER — TELEPHONE (OUTPATIENT)
Dept: FAMILY MEDICINE | Facility: CLINIC | Age: 72
End: 2022-10-10
Payer: MEDICARE

## 2022-10-10 ENCOUNTER — PATIENT MESSAGE (OUTPATIENT)
Dept: ADMINISTRATIVE | Facility: HOSPITAL | Age: 72
End: 2022-10-10
Payer: MEDICARE

## 2022-10-10 NOTE — TELEPHONE ENCOUNTER
----- Message from Mark Higuera sent at 10/10/2022  9:51 AM CDT -----  Type:  Same Day Appointment Request    Caller is requesting a same day appointment.  Caller declined first available appointment listed below.      Name of Caller: Zack[facility]  When is the first available appointment? NA, Epic shows no available appointments.   Symptoms:  Calling for appointment for cataract surgery clearance. Patient is scheduled to have the surgery on 10/12/2022  Best Call Back Number: [phone] or [phone]  Additional Information:  Please assist, thank you!

## 2022-10-10 NOTE — TELEPHONE ENCOUNTER
Spoke with patient.  Informed him that no available appointments are available before Wednesday with any of providers.  He will reschedule his surgery.

## 2022-10-10 NOTE — TELEPHONE ENCOUNTER
----- Message from Mark Higuera sent at 10/10/2022  9:51 AM CDT -----  Type:  Same Day Appointment Request    Caller is requesting a same day appointment.  Caller declined first available appointment listed below.      Name of Caller: [facility]  When is the first available appointment? NA, Epic shows no available appointments.   Symptoms:  Calling for appointment for cataract surgery clearance. Patient is scheduled to have the surgery on 10/12/2022  Best Call Back Number: [phone] or [phone]  Additional Information:  Please assist, thank you!

## 2022-10-10 NOTE — TELEPHONE ENCOUNTER
Roger Zavaleta Jr. Was scheduled with Dr. Lewis 10/7/22 and canceled his preop appt. He canceled labs and appt with me 5/2022. We are booked today.     Please assist him with scheduling with an available provider.    Dr. Jane Almeida D.O.   [facility]

## 2022-10-13 ENCOUNTER — TELEPHONE (OUTPATIENT)
Dept: FAMILY MEDICINE | Facility: CLINIC | Age: 72
End: 2022-10-13
Payer: MEDICARE

## 2022-10-13 NOTE — TELEPHONE ENCOUNTER
Spoke with patient. Informed him he already as an appointment on Oct 24 at 12 pm with Dr. Fiore to establish care and can do his pre op then.

## 2022-10-24 ENCOUNTER — OFFICE VISIT (OUTPATIENT)
Dept: FAMILY MEDICINE | Facility: CLINIC | Age: 72
End: 2022-10-24
Payer: MEDICARE

## 2022-10-24 VITALS
DIASTOLIC BLOOD PRESSURE: 62 MMHG | HEIGHT: 68 IN | SYSTOLIC BLOOD PRESSURE: 110 MMHG | BODY MASS INDEX: 31.07 KG/M2 | OXYGEN SATURATION: 99 % | WEIGHT: 205 LBS | HEART RATE: 76 BPM

## 2022-10-24 DIAGNOSIS — E11.9 TYPE 2 DIABETES MELLITUS WITHOUT COMPLICATION, WITHOUT LONG-TERM CURRENT USE OF INSULIN: ICD-10-CM

## 2022-10-24 DIAGNOSIS — N18.31 STAGE 3A CHRONIC KIDNEY DISEASE: ICD-10-CM

## 2022-10-24 DIAGNOSIS — Z01.818 PRE-OPERATIVE CLEARANCE: ICD-10-CM

## 2022-10-24 DIAGNOSIS — E78.2 MIXED HYPERLIPIDEMIA: ICD-10-CM

## 2022-10-24 PROCEDURE — 1126F AMNT PAIN NOTED NONE PRSNT: CPT | Mod: CPTII,S$GLB,, | Performed by: STUDENT IN AN ORGANIZED HEALTH CARE EDUCATION/TRAINING PROGRAM

## 2022-10-24 PROCEDURE — 3288F PR FALLS RISK ASSESSMENT DOCUMENTED: ICD-10-PCS | Mod: CPTII,S$GLB,, | Performed by: STUDENT IN AN ORGANIZED HEALTH CARE EDUCATION/TRAINING PROGRAM

## 2022-10-24 PROCEDURE — 4010F PR ACE/ARB THEARPY RXD/TAKEN: ICD-10-PCS | Mod: CPTII,S$GLB,, | Performed by: STUDENT IN AN ORGANIZED HEALTH CARE EDUCATION/TRAINING PROGRAM

## 2022-10-24 PROCEDURE — 3044F PR MOST RECENT HEMOGLOBIN A1C LEVEL <7.0%: ICD-10-PCS | Mod: CPTII,S$GLB,, | Performed by: STUDENT IN AN ORGANIZED HEALTH CARE EDUCATION/TRAINING PROGRAM

## 2022-10-24 PROCEDURE — 3078F PR MOST RECENT DIASTOLIC BLOOD PRESSURE < 80 MM HG: ICD-10-PCS | Mod: CPTII,S$GLB,, | Performed by: STUDENT IN AN ORGANIZED HEALTH CARE EDUCATION/TRAINING PROGRAM

## 2022-10-24 PROCEDURE — 1101F PT FALLS ASSESS-DOCD LE1/YR: CPT | Mod: CPTII,S$GLB,, | Performed by: STUDENT IN AN ORGANIZED HEALTH CARE EDUCATION/TRAINING PROGRAM

## 2022-10-24 PROCEDURE — 3044F HG A1C LEVEL LT 7.0%: CPT | Mod: CPTII,S$GLB,, | Performed by: STUDENT IN AN ORGANIZED HEALTH CARE EDUCATION/TRAINING PROGRAM

## 2022-10-24 PROCEDURE — 1160F RVW MEDS BY RX/DR IN RCRD: CPT | Mod: CPTII,S$GLB,, | Performed by: STUDENT IN AN ORGANIZED HEALTH CARE EDUCATION/TRAINING PROGRAM

## 2022-10-24 PROCEDURE — 99213 OFFICE O/P EST LOW 20 MIN: CPT | Mod: S$GLB,,, | Performed by: STUDENT IN AN ORGANIZED HEALTH CARE EDUCATION/TRAINING PROGRAM

## 2022-10-24 PROCEDURE — 1159F PR MEDICATION LIST DOCUMENTED IN MEDICAL RECORD: ICD-10-PCS | Mod: CPTII,S$GLB,, | Performed by: STUDENT IN AN ORGANIZED HEALTH CARE EDUCATION/TRAINING PROGRAM

## 2022-10-24 PROCEDURE — 99999 PR PBB SHADOW E&M-EST. PATIENT-LVL IV: ICD-10-PCS | Mod: PBBFAC,,, | Performed by: STUDENT IN AN ORGANIZED HEALTH CARE EDUCATION/TRAINING PROGRAM

## 2022-10-24 PROCEDURE — 1126F PR PAIN SEVERITY QUANTIFIED, NO PAIN PRESENT: ICD-10-PCS | Mod: CPTII,S$GLB,, | Performed by: STUDENT IN AN ORGANIZED HEALTH CARE EDUCATION/TRAINING PROGRAM

## 2022-10-24 PROCEDURE — 3288F FALL RISK ASSESSMENT DOCD: CPT | Mod: CPTII,S$GLB,, | Performed by: STUDENT IN AN ORGANIZED HEALTH CARE EDUCATION/TRAINING PROGRAM

## 2022-10-24 PROCEDURE — 1101F PR PT FALLS ASSESS DOC 0-1 FALLS W/OUT INJ PAST YR: ICD-10-PCS | Mod: CPTII,S$GLB,, | Performed by: STUDENT IN AN ORGANIZED HEALTH CARE EDUCATION/TRAINING PROGRAM

## 2022-10-24 PROCEDURE — 3078F DIAST BP <80 MM HG: CPT | Mod: CPTII,S$GLB,, | Performed by: STUDENT IN AN ORGANIZED HEALTH CARE EDUCATION/TRAINING PROGRAM

## 2022-10-24 PROCEDURE — 99999 PR PBB SHADOW E&M-EST. PATIENT-LVL IV: CPT | Mod: PBBFAC,,, | Performed by: STUDENT IN AN ORGANIZED HEALTH CARE EDUCATION/TRAINING PROGRAM

## 2022-10-24 PROCEDURE — 3074F PR MOST RECENT SYSTOLIC BLOOD PRESSURE < 130 MM HG: ICD-10-PCS | Mod: CPTII,S$GLB,, | Performed by: STUDENT IN AN ORGANIZED HEALTH CARE EDUCATION/TRAINING PROGRAM

## 2022-10-24 PROCEDURE — 3074F SYST BP LT 130 MM HG: CPT | Mod: CPTII,S$GLB,, | Performed by: STUDENT IN AN ORGANIZED HEALTH CARE EDUCATION/TRAINING PROGRAM

## 2022-10-24 PROCEDURE — 4010F ACE/ARB THERAPY RXD/TAKEN: CPT | Mod: CPTII,S$GLB,, | Performed by: STUDENT IN AN ORGANIZED HEALTH CARE EDUCATION/TRAINING PROGRAM

## 2022-10-24 PROCEDURE — 1159F MED LIST DOCD IN RCRD: CPT | Mod: CPTII,S$GLB,, | Performed by: STUDENT IN AN ORGANIZED HEALTH CARE EDUCATION/TRAINING PROGRAM

## 2022-10-24 PROCEDURE — 1160F PR REVIEW ALL MEDS BY PRESCRIBER/CLIN PHARMACIST DOCUMENTED: ICD-10-PCS | Mod: CPTII,S$GLB,, | Performed by: STUDENT IN AN ORGANIZED HEALTH CARE EDUCATION/TRAINING PROGRAM

## 2022-10-24 PROCEDURE — 99213 PR OFFICE/OUTPT VISIT, EST, LEVL III, 20-29 MIN: ICD-10-PCS | Mod: S$GLB,,, | Performed by: STUDENT IN AN ORGANIZED HEALTH CARE EDUCATION/TRAINING PROGRAM

## 2022-10-24 NOTE — PROGRESS NOTES
[facility] Note    Chief Complaint      Chief Complaint   Patient presents with    Follow-up    Pre-op Exam     History of Present Illness      HPI    Roger Zavaleta Jr. is a 72 y.o. male with h/o well controlled T2DM (6.9 in January), sHTN, HLD, CKD stage 3b and cataract for pre-op clearance and establishment of care. No tentative date for the surgery established yet. He endorses no complaints today. Home BS readings range 110-140s fasting and PP respectively, he reports compliance with all medications.    All prior labs, imaging and medications reviewed and findings discussed with patient.    Problem List Addressed This Visit:  1. Pre-operative clearance    2. Type 2 diabetes mellitus without complication, without long-term current use of insulin  Overview:  Lab Results   Component Value Date    HGBA1C 6.9 (H) 01/10/2022       Orders:  -     HEMOGLOBIN A1C; Future; Expected date: 10/24/2022  -     Microalbumin/Creatinine Ratio, Urine; Future; Expected date: 10/24/2022    3. Stage 3a chronic kidney disease  -     Basic Metabolic Panel; Future; Expected date: 10/24/2022    4. Mixed hyperlipidemia  Overview:  Lab Results   Component Value Date    LDLCALC 109.8 11/19/2021     - well controlled  - continue current medication    Orders:  -     LIPID PANEL; Future; Expected date: 10/24/2022    5. BMI 31.0-31.9,adult       Health Maintenance   Topic Date Due    Low Dose Statin  Never done    Hemoglobin A1c  07/10/2022    Lipid Panel  11/19/2022    Eye Exam  04/20/2023    Foot Exam  08/03/2023    TETANUS VACCINE  05/25/2027    Hepatitis C Screening  Completed       Past Medical History:   Diagnosis Date    Acute midline low back pain with left-sided sciatica 8/16/2021    - no signs of neurological claudication based on symptoms - exam limited secondary to virtual visit - discussed symptoms with patient that would require urgent MRI including bowel or bladder changes, weakness left leg etc. -  "recommend start with stretches and add Robaxin 500 mg as needed - discuss if not improved in 2 weeks to notify me so we can get initial imaging and start physical therapy    CAD (coronary artery disease) 52    Diabetes mellitus type II     HLD (hyperlipidemia)        Past Surgical History:   Procedure Laterality Date    2 nasal surgery      HERNIA REPAIR      umbilical    rectal fissure      TONSILLECTOMY      TOTAL KNEE ARTHROPLASTY      TRIGGER FINGER RELEASE  9-30-13    left hand (middle finger)       family history includes Alcohol abuse in his father; Diabetes in his mother; Heart disease in his mother and unknown relative; No Known Problems in his daughter and daughter.    Social History     Tobacco Use    Smoking status: Never    Smokeless tobacco: Never   Substance Use Topics    Alcohol use: No    Drug use: No       Review of Systems   Constitutional:  Negative for chills, fatigue and fever.   Respiratory:  Negative for cough and shortness of breath.    Cardiovascular:  Negative for chest pain, palpitations and leg swelling.   Endocrine: Negative for polyphagia and polyuria.   Genitourinary:  Negative for dysuria, flank pain and frequency.   Musculoskeletal:  Negative for joint swelling.     Outpatient Encounter Medications as of 10/24/2022   Medication Sig Dispense Refill    aspirin 81 MG chewable tablet Take 81 mg by mouth once daily.      BD VEO INSULIN SYR HALF UNIT 0.3 mL 31 gauge x 15/64" Syrg THREE TIMES DAILY AS DIRECTED  1    chlorthalidone (HYGROTEN) 25 MG Tab Take 1 tablet by mouth once daily 90 tablet 3    fluticasone propionate (FLONASE) 50 mcg/actuation nasal spray 1 spray (50 mcg total) by Each Nostril route once daily. 16 g 3    insulin (LANTUS SOLOSTAR U-100 INSULIN) glargine 100 units/mL SubQ pen Inject 47 Units into the skin every evening. 42.3 mL 3    insulin aspart U-100 (NOVOLOG U-100 INSULIN ASPART) 100 unit/mL injection Inject 10 Units into the skin 3 (three) times daily before meals. " "27 mL 0    insulin syringe-needle U-100 (BD INSULIN SYRINGE ULTRA-FINE) 1 mL 31 gauge x 5/16 Syrg Use 3 (three) times daily. 100 each 11    irbesartan (AVAPRO) 150 MG tablet Take 1 tablet by mouth once daily 90 tablet 0    metoprolol succinate (TOPROL-XL) 50 MG 24 hr tablet Take 1 tablet (50 mg total) by mouth once daily. 90 tablet 3    nitroGLYCERIN (NITROSTAT) 0.4 MG SL tablet Place 0.4 mg under the tongue every 5 (five) minutes as needed.      pravastatin (PRAVACHOL) 10 MG tablet Take 1 tablet (10 mg total) by mouth once daily. 90 tablet 3    blood sugar diagnostic (ACCU-CHEK GUIDE TEST STRIPS) Strp USE 1 STRIP THREE TIMES DAILY AS NEEDED 300 strip 3    lancets (ULTRA THIN LANCETS) 30 gauge Misc 1 lancet by Misc.(Non-Drug; Combo Route) route 3 (three) times daily. 200 each 3    pen needle, diabetic 31 gauge x 5/16" Ndle 1 each by Misc.(Non-Drug; Combo Route) route once daily at 6am. USE AS DIRECTED ONCE DAILY 100 each 3    [DISCONTINUED] flash glucose sensor (FREESTYLE RAPHAEL 14 DAY SENSOR) Kit 1 each by Misc.(Non-Drug; Combo Route) route every 14 (fourteen) days. 2 kit 11    [DISCONTINUED] insulin syringe-needle U-100 (BD VEO INSULIN SYRINGE UF) 1/2 mL 31 gauge x 15/64" Syrg 1 each by Misc.(Non-Drug; Combo Route) route 3 (three) times daily. 100 each 11    [DISCONTINUED] semaglutide (OZEMPIC) 1 mg/dose (4 mg/3 mL) Inject 1 mg into the skin every 7 days. (Patient not taking: No sig reported) 3 pen 3     No facility-administered encounter medications on file as of 10/24/2022.        Review of patient's allergies indicates:  No Known Allergies    Physical Exam      Vital Signs  Pulse: 76  SpO2: 99 %  BP: 110/62  BP Location: Left arm  Patient Position: Sitting  Pain Score: 0-No pain  Height and Weight  Height: 5' 8" (172.7 cm)  Weight: 93 kg (205 lb 0.4 oz)  BSA (Calculated - sq m): 2.11 sq meters  BMI (Calculated): 31.2  Weight in (lb) to have BMI = 25: 164.1]    Physical Exam  Vitals reviewed.   Constitutional:  "      Appearance: He is obese.   HENT:      Head: Normocephalic and atraumatic.      Right Ear: Tympanic membrane normal.      Left Ear: Tympanic membrane normal.      Mouth/Throat:      Mouth: Mucous membranes are moist.   Eyes:      Extraocular Movements: Extraocular movements intact.      Pupils: Pupils are equal, round, and reactive to light.   Cardiovascular:      Rate and Rhythm: Normal rate and regular rhythm.      Pulses: Normal pulses.   Pulmonary:      Breath sounds: Normal breath sounds.   Abdominal:      General: There is no distension.      Palpations: There is no mass.   Skin:     General: Skin is warm.   Neurological:      General: No focal deficit present.      Mental Status: He is alert.   Psychiatric:         Mood and Affect: Mood normal.        Laboratory:  CBC:  No results for input(s): WBC, RBC, HGB, HCT, PLT, MCV, MCH, MCHC in the last 2160 hours.  CMP:  No results for input(s): GLU, CALCIUM, ALBUMIN, PROT, NA, K, CO2, CL, BUN, ALKPHOS, ALT, AST, BILITOT in the last 2160 hours.    Invalid input(s): CREATININ  URINALYSIS:  No results for input(s): COLORU, CLARITYU, SPECGRAV, PHUR, PROTEINUA, GLUCOSEU, BILIRUBINCON, BLOODU, WBCU, RBCU, BACTERIA, MUCUS, NITRITE, LEUKOCYTESUR, UROBILINOGEN, HYALINECASTS in the last 2160 hours.   LIPIDS:  No results for input(s): TSH, HDL, CHOL, TRIG, LDLCALC, CHOLHDL, NONHDLCHOL, TOTALCHOLEST in the last 2160 hours.  TSH:  No results for input(s): TSH in the last 2160 hours.  A1C:  No results for input(s): HGBA1C in the last 2160 hours.    Radiology:      Assessment/Plan     Roger Zavaleta Jr. is a 72 y.o.male with:    1. Pre-operative clearance for Non-cardiac surgery  -rCRI with 1point--> 6% 30-day risk of cardiac event  -given the low risk surgical procedure planned, he will be cleared for surgery  -counseled against use of alpha-blocker to prevent floppy iris syndrome  -can use other medications until day of procedure    2. Type 2 diabetes mellitus without  complication, without long-term current use of insulin  -recent hbA1c @ 6.9  -well controlled on current regimen   -Keep BS log  - HEMOGLOBIN A1C; Future  - Microalbumin/Creatinine Ratio, Urine; Future    3. Stage 3a chronic kidney disease  -likely 2/2 diabetic nephropathy  - Basic Metabolic Panel; Future  -c/w irbesartan  -f/u micro/albumin    4. Mixed hyperlipidemia  - LIPID PANEL; Future  -c/w statin    5. BMI 31.0-31.9,adult  -therapeutic life style  changes reinforced    Continue current medications and maintain follow up with specialists.      Patient verbalizes understanding and agrees with current treatment plan.      Oluwakemi Adeyanju, MD [facility]

## 2022-10-25 ENCOUNTER — TELEPHONE (OUTPATIENT)
Dept: FAMILY MEDICINE | Facility: CLINIC | Age: 72
End: 2022-10-25
Payer: MEDICARE

## 2022-10-25 ENCOUNTER — PATIENT MESSAGE (OUTPATIENT)
Dept: FAMILY MEDICINE | Facility: CLINIC | Age: 72
End: 2022-10-25
Payer: MEDICARE

## 2022-11-07 ENCOUNTER — PATIENT MESSAGE (OUTPATIENT)
Dept: FAMILY MEDICINE | Facility: CLINIC | Age: 72
End: 2022-11-07
Payer: MEDICARE

## 2022-11-09 ENCOUNTER — PATIENT MESSAGE (OUTPATIENT)
Dept: FAMILY MEDICINE | Facility: CLINIC | Age: 72
End: 2022-11-09
Payer: MEDICARE

## 2022-11-10 ENCOUNTER — PATIENT MESSAGE (OUTPATIENT)
Dept: FAMILY MEDICINE | Facility: CLINIC | Age: 72
End: 2022-11-10
Payer: MEDICARE

## 2022-11-11 ENCOUNTER — CLINICAL SUPPORT (OUTPATIENT)
Dept: FAMILY MEDICINE | Facility: CLINIC | Age: 72
End: 2022-11-11
Payer: MEDICARE

## 2022-11-11 PROCEDURE — G0008 ADMIN INFLUENZA VIRUS VAC: HCPCS | Mod: S$GLB,,, | Performed by: STUDENT IN AN ORGANIZED HEALTH CARE EDUCATION/TRAINING PROGRAM

## 2022-11-11 PROCEDURE — 90694 FLU VACCINE - QUADRIVALENT - ADJUVANTED: ICD-10-PCS | Mod: S$GLB,,, | Performed by: STUDENT IN AN ORGANIZED HEALTH CARE EDUCATION/TRAINING PROGRAM

## 2022-11-11 PROCEDURE — G0008 FLU VACCINE - QUADRIVALENT - ADJUVANTED: ICD-10-PCS | Mod: S$GLB,,, | Performed by: STUDENT IN AN ORGANIZED HEALTH CARE EDUCATION/TRAINING PROGRAM

## 2022-11-11 PROCEDURE — 90694 VACC AIIV4 NO PRSRV 0.5ML IM: CPT | Mod: S$GLB,,, | Performed by: STUDENT IN AN ORGANIZED HEALTH CARE EDUCATION/TRAINING PROGRAM

## 2022-11-22 DIAGNOSIS — N18.2 TYPE 2 DIABETES MELLITUS WITH STAGE 2 CHRONIC KIDNEY DISEASE, WITH LONG-TERM CURRENT USE OF INSULIN: ICD-10-CM

## 2022-11-22 DIAGNOSIS — Z79.4 TYPE 2 DIABETES MELLITUS WITH STAGE 2 CHRONIC KIDNEY DISEASE, WITH LONG-TERM CURRENT USE OF INSULIN: ICD-10-CM

## 2022-11-22 DIAGNOSIS — E11.22 TYPE 2 DIABETES MELLITUS WITH STAGE 2 CHRONIC KIDNEY DISEASE, WITH LONG-TERM CURRENT USE OF INSULIN: ICD-10-CM

## 2022-11-22 DIAGNOSIS — Z79.4 TYPE 2 DIABETES MELLITUS WITH DIABETIC POLYNEUROPATHY, WITH LONG-TERM CURRENT USE OF INSULIN: ICD-10-CM

## 2022-11-22 DIAGNOSIS — E11.42 TYPE 2 DIABETES MELLITUS WITH DIABETIC POLYNEUROPATHY, WITH LONG-TERM CURRENT USE OF INSULIN: ICD-10-CM

## 2022-11-23 RX ORDER — SEMAGLUTIDE 1.34 MG/ML
1 INJECTION, SOLUTION SUBCUTANEOUS
Refills: 0 | OUTPATIENT
Start: 2022-11-23 | End: 2023-05-22

## 2022-11-23 NOTE — TELEPHONE ENCOUNTER
Refill Routing Note   Medication(s) are not appropriate for processing by [facility] for the following reason(s):      - Non-participating provider    [facility] action(s):  Route          Medication reconciliation completed: No     Appointments  past 12m or future 3m with PCP    Date Provider   Last Visit   10/24/2022 Conchita Chávez MD   Next Visit   Visit date not found Conchita Chávez MD   ED visits in past 90 days: 0        Note composed:9:50 AM 11/23/2022

## 2023-01-17 DIAGNOSIS — M79.674 PAIN IN RIGHT TOE(S): ICD-10-CM

## 2023-01-17 DIAGNOSIS — M79.671 RIGHT FOOT PAIN: Primary | ICD-10-CM

## 2023-01-18 ENCOUNTER — OFFICE VISIT (OUTPATIENT)
Dept: PODIATRY | Facility: CLINIC | Age: 73
End: 2023-01-18
Payer: MEDICARE

## 2023-01-18 VITALS — BODY MASS INDEX: 30.92 KG/M2 | WEIGHT: 204 LBS | HEIGHT: 68 IN

## 2023-01-18 DIAGNOSIS — M79.89 PAIN AND SWELLING OF TOE, UNSPECIFIED LATERALITY: ICD-10-CM

## 2023-01-18 DIAGNOSIS — M79.674 TOE PAIN, RIGHT: Primary | ICD-10-CM

## 2023-01-18 DIAGNOSIS — E11.42 TYPE 2 DIABETES MELLITUS WITH DIABETIC POLYNEUROPATHY, WITH LONG-TERM CURRENT USE OF INSULIN: ICD-10-CM

## 2023-01-18 DIAGNOSIS — M79.676 PAIN AND SWELLING OF TOE, UNSPECIFIED LATERALITY: ICD-10-CM

## 2023-01-18 DIAGNOSIS — Z79.4 TYPE 2 DIABETES MELLITUS WITH DIABETIC POLYNEUROPATHY, WITH LONG-TERM CURRENT USE OF INSULIN: ICD-10-CM

## 2023-01-18 PROCEDURE — 99213 PR OFFICE/OUTPT VISIT, EST, LEVL III, 20-29 MIN: ICD-10-PCS | Mod: HCNC,S$GLB,, | Performed by: PODIATRIST

## 2023-01-18 PROCEDURE — 1125F PR PAIN SEVERITY QUANTIFIED, PAIN PRESENT: ICD-10-PCS | Mod: HCNC,CPTII,S$GLB, | Performed by: PODIATRIST

## 2023-01-18 PROCEDURE — 1159F PR MEDICATION LIST DOCUMENTED IN MEDICAL RECORD: ICD-10-PCS | Mod: HCNC,CPTII,S$GLB, | Performed by: PODIATRIST

## 2023-01-18 PROCEDURE — 1159F MED LIST DOCD IN RCRD: CPT | Mod: HCNC,CPTII,S$GLB, | Performed by: PODIATRIST

## 2023-01-18 PROCEDURE — 99999 PR PBB SHADOW E&M-EST. PATIENT-LVL IV: ICD-10-PCS | Mod: PBBFAC,HCNC,, | Performed by: PODIATRIST

## 2023-01-18 PROCEDURE — 3008F BODY MASS INDEX DOCD: CPT | Mod: HCNC,CPTII,S$GLB, | Performed by: PODIATRIST

## 2023-01-18 PROCEDURE — 99999 PR PBB SHADOW E&M-EST. PATIENT-LVL IV: CPT | Mod: PBBFAC,HCNC,, | Performed by: PODIATRIST

## 2023-01-18 PROCEDURE — 1101F PT FALLS ASSESS-DOCD LE1/YR: CPT | Mod: HCNC,CPTII,S$GLB, | Performed by: PODIATRIST

## 2023-01-18 PROCEDURE — 99213 OFFICE O/P EST LOW 20 MIN: CPT | Mod: HCNC,S$GLB,, | Performed by: PODIATRIST

## 2023-01-18 PROCEDURE — 3008F PR BODY MASS INDEX (BMI) DOCUMENTED: ICD-10-PCS | Mod: HCNC,CPTII,S$GLB, | Performed by: PODIATRIST

## 2023-01-18 PROCEDURE — 1160F RVW MEDS BY RX/DR IN RCRD: CPT | Mod: HCNC,CPTII,S$GLB, | Performed by: PODIATRIST

## 2023-01-18 PROCEDURE — 99499 RISK ADDL DX/OHS AUDIT: ICD-10-PCS | Mod: HCNC,S$GLB,, | Performed by: PODIATRIST

## 2023-01-18 PROCEDURE — 1101F PR PT FALLS ASSESS DOC 0-1 FALLS W/OUT INJ PAST YR: ICD-10-PCS | Mod: HCNC,CPTII,S$GLB, | Performed by: PODIATRIST

## 2023-01-18 PROCEDURE — 1125F AMNT PAIN NOTED PAIN PRSNT: CPT | Mod: HCNC,CPTII,S$GLB, | Performed by: PODIATRIST

## 2023-01-18 PROCEDURE — 3288F FALL RISK ASSESSMENT DOCD: CPT | Mod: HCNC,CPTII,S$GLB, | Performed by: PODIATRIST

## 2023-01-18 PROCEDURE — 99499 UNLISTED E&M SERVICE: CPT | Mod: HCNC,S$GLB,, | Performed by: PODIATRIST

## 2023-01-18 PROCEDURE — 1160F PR REVIEW ALL MEDS BY PRESCRIBER/CLIN PHARMACIST DOCUMENTED: ICD-10-PCS | Mod: HCNC,CPTII,S$GLB, | Performed by: PODIATRIST

## 2023-01-18 PROCEDURE — 3288F PR FALLS RISK ASSESSMENT DOCUMENTED: ICD-10-PCS | Mod: HCNC,CPTII,S$GLB, | Performed by: PODIATRIST

## 2023-01-18 NOTE — PROGRESS NOTES
Subjective:      Patient ID: Roger Zavaleta Jr. is a 72 y.o. male.    Chief Complaint: Foot Pain (right) and Toe Pain (Right foot)      Roger is a 72 y.o. male who presents to the clinic for evaluation and treatment of high risk feet. Roger has a past medical history of Acute midline low back pain with left-sided sciatica (8/16/2021), CAD (coronary artery disease) (52), Diabetes mellitus type II, and HLD (hyperlipidemia). The patient's chief complaint is long, thick toenails. This patient has documented high risk feet requiring routine maintenance secondary to diabetes mellitis and those secondary complications of diabetes, as mentioned..  No other complaints than painful and mycotic nails x 10. He tells me he has underlying DM neuropathy and sometimes he feels numbness and tingling.     7/24/20 presenting with left hallux puncture wound. Stepped on a nail last week. Was wearing a shoe. Nails went through his shoe. Ambulating in [facility] today. Tetanus was updated in 2017. Has been cleaning with saline. Tender to touch. Denies n/v/f/c.     8/10/20 follow-up left hallux puncture wound.  Almost finished with oral antibiotic (clindamycin, Cipro). Report mild tenderness left hallux wound. Healing uneventfully.     8/31/20 f/u for L hallux puncture wound. Has been applying triple ointment. Reports some pain submet1 around the ulcer. Ambulating in tennis shoe. Last measured glucose was 145.    9/30/20 f/u for L hallux wound which today is completely closed. Ambulating in normal tennis shoe. Denies pain. Has been using callus pad intermittently to prevent reoccurrence.     6/28/21 presenting with right foot hallux redness. Stubbed his R great toe on Friday. Got inflamed and painful. Tells me redness improving over the weekend. Minimal tenderness on R hallux today.     7/14/21 f/u R hallux redness. R hallux toenail fell off the other day. Denies pan. Redness is gone. Finished 10 days of doxycycline. In [facility] today.    2/3/22  presenting for diabetic foot risk assessment. Denies any pedal complaints other than elongated, mycotic, painful toenails x 10. In regular tennis shoe. Glucose is well controlled. Runs around 150s per patient.     8/4/22 presenting for diabetic foot risk assessment.  Denies any pedal complaints other than elongated, mycotic, painful toenails times 10. Patient also tells me his glucose has been well controlled.    1/18/23 presenting with right 2nd toe pain.  Points dorsal aspect of the 2nd toe area for pain.  Describes pain as throbbing and aching.  Patient 1st noticed pain about 2 wks ago but symptoms have been improving. Denies acute foot injury. In crocs today. Pain tends to get worse with pressure.       PCP: Conchita Chávez MD    Date Last Seen by PCP: in epic     Hemoglobin A1C   Date Value Ref Range Status   10/24/2022 6.2 (H) 4.0 - 5.6 % Final     Comment:     ADA Screening Guidelines:  5.7-6.4%  Consistent with prediabetes  >or=6.5%  Consistent with diabetes    High levels of fetal hemoglobin interfere with the HbA1C  assay. Heterozygous hemoglobin variants (HbS, HgC, etc)do  not significantly interfere with this assay.   However, presence of multiple variants may affect accuracy.     01/10/2022 6.9 (H) 4.0 - 5.6 % Final     Comment:     ADA Screening Guidelines:  5.7-6.4%  Consistent with prediabetes  >or=6.5%  Consistent with diabetes    High levels of fetal hemoglobin interfere with the HbA1C  assay. Heterozygous hemoglobin variants (HbS, HgC, etc)do  not significantly interfere with this assay.   However, presence of multiple variants may affect accuracy.     11/19/2021 7.0 (H) 4.0 - 5.6 % Final     Comment:     ADA Screening Guidelines:  5.7-6.4%  Consistent with prediabetes  >or=6.5%  Consistent with diabetes    High levels of fetal hemoglobin interfere with the HbA1C  assay. Heterozygous hemoglobin variants (HbS, HgC, etc)do  not significantly interfere with this assay.   However, presence of  multiple variants may affect accuracy.       Review of Systems   Constitutional: Negative for decreased appetite, fever and malaise/fatigue.   HENT:  Negative for congestion.    Cardiovascular:  Negative for chest pain and leg swelling.   Respiratory:  Negative for cough and shortness of breath.    Skin:  Positive for color change. Negative for nail changes and rash.   Musculoskeletal:  Negative for arthritis, joint pain, joint swelling and muscle weakness.        R foot pain    Gastrointestinal:  Negative for bloating, abdominal pain, nausea and vomiting.   Neurological:  Positive for numbness and sensory change. Negative for headaches and weakness.   Psychiatric/Behavioral:  Negative for altered mental status.        Objective:      Physical Exam  Constitutional:       General: He is not in acute distress.     Appearance: He is well-developed.   Musculoskeletal:         General: No tenderness or deformity.   Skin:     General: Skin is warm.      Capillary Refill: Capillary refill takes less than 2 seconds.      Findings: No erythema.   Neurological:      Mental Status: He is alert and oriented to person, place, and time.   Psychiatric:         Behavior: Behavior normal.     Vascular: Distal DP/PT pulses palpable 1/4 b/l. CRT < 3 sec to tips of toes. +varicosities noted to b/l LEs. warm to touch b/l LE and cool to touch to toes. Mild edema noted R 2nd toe.     Dermatologic:   Toenails 1-5 bilaterally are discolored/yellowed, dystrophic, brittle. No incurvation. Mild xerosis noted with some skin pigmentation changes.     Musculoskeletal: Equinus noted b/l ankles with < 10 deg DF noted b/l. MMT 5/5 in DF/PF/Inv/Ev resistance. No calf tenderness b/l LE, Compartments soft/compressible b/l.   R foot: ttp dorsal PIPJ of 2nd toe. Ttp 2nd MPJ. Limited ROM of 2nd MPJ. No pain at submet2.     Neurological:   Light touch, proprioception, and sharp/dull sensation are decreased b/l. Protective threshold with the  Scalf-Wienstein monofilament is decreased b/l. Vibratory sensation decreased b/l.         Assessment:       Encounter Diagnoses   Name Primary?    Toe pain, right - Right Foot Yes    Pain and swelling of toe, unspecified laterality     Type 2 diabetes mellitus with diabetic polyneuropathy, with long-term current use of insulin            Plan:       Roger was seen today for foot pain and toe pain.    Diagnoses and all orders for this visit:    Toe pain, right - Right Foot    Pain and swelling of toe, unspecified laterality    Type 2 diabetes mellitus with diabetic polyneuropathy, with long-term current use of insulin      I counseled the patient on his conditions, their implications and medical management.    72 y.o.  male with diabetic foot risk assessment, R 2nd toe pain.     -R foot xrays reviewed. No fracture. No acute osseous abnormalities.   -R foot 2nd toe pain and swelling continue to improve since when he first noticed. Unclear etiology. Continue to closely monitor. Will re evaluate in 4 weeks.     -It was discussed the importance of wearing shoes with adequate room in toe box to accommodate toe deformities. Recommended New Balance/[employer] shoe brands with adequate arch supports to alleviate abnormal pressure and improve stability of foot while walking. Avoid flat shoes and barefoot walking as these will exacerbate or worsen symptoms.   -The nature of the condition, options for management, as well as potential risks and complications were discussed in detail with patient. Patient was amenable to my recommendations and left my office fully informed and will follow up as instructed or sooner if necessary.    -Advised for optimal glucose control and maintenance per primary care physician. Patient was also educated on healthy diet that is naturally rich in nutrients and low in fat and calories.   -f/u 4 wks

## 2023-01-30 DIAGNOSIS — Z79.4 TYPE 2 DIABETES MELLITUS WITH DIABETIC POLYNEUROPATHY, WITH LONG-TERM CURRENT USE OF INSULIN: ICD-10-CM

## 2023-01-30 DIAGNOSIS — E11.42 TYPE 2 DIABETES MELLITUS WITH DIABETIC POLYNEUROPATHY, WITH LONG-TERM CURRENT USE OF INSULIN: ICD-10-CM

## 2023-01-30 RX ORDER — INSULIN ASPART 100 [IU]/ML
10 INJECTION, SOLUTION INTRAVENOUS; SUBCUTANEOUS
Qty: 27 ML | Refills: 0 | Status: SHIPPED | OUTPATIENT
Start: 2023-01-30 | End: 2023-04-12 | Stop reason: SDUPTHER

## 2023-02-09 DIAGNOSIS — Z00.00 ENCOUNTER FOR MEDICARE ANNUAL WELLNESS EXAM: ICD-10-CM

## 2023-03-15 ENCOUNTER — PES CALL (OUTPATIENT)
Dept: ADMINISTRATIVE | Facility: OTHER | Age: 73
End: 2023-03-15
Payer: MEDICARE

## 2023-04-11 ENCOUNTER — PATIENT MESSAGE (OUTPATIENT)
Dept: ADMINISTRATIVE | Facility: HOSPITAL | Age: 73
End: 2023-04-11
Payer: MEDICARE

## 2023-04-12 DIAGNOSIS — Z79.4 TYPE 2 DIABETES MELLITUS WITH DIABETIC POLYNEUROPATHY, WITH LONG-TERM CURRENT USE OF INSULIN: ICD-10-CM

## 2023-04-12 DIAGNOSIS — E11.42 TYPE 2 DIABETES MELLITUS WITH DIABETIC POLYNEUROPATHY, WITH LONG-TERM CURRENT USE OF INSULIN: ICD-10-CM

## 2023-04-12 RX ORDER — INSULIN ASPART 100 [IU]/ML
10 INJECTION, SOLUTION INTRAVENOUS; SUBCUTANEOUS
Qty: 27 ML | Refills: 0 | Status: SHIPPED | OUTPATIENT
Start: 2023-04-12 | End: 2023-07-26 | Stop reason: SDUPTHER

## 2023-04-26 ENCOUNTER — PATIENT MESSAGE (OUTPATIENT)
Dept: FAMILY MEDICINE | Facility: CLINIC | Age: 73
End: 2023-04-26
Payer: MEDICARE

## 2023-04-26 DIAGNOSIS — R04.0 EPISTAXIS: Primary | ICD-10-CM

## 2023-04-26 RX ORDER — OXYMETAZOLINE HCL 0.05 %
2 SPRAY, NON-AEROSOL (ML) NASAL 2 TIMES DAILY
Qty: 15 ML | Refills: 0 | Status: SHIPPED | OUTPATIENT
Start: 2023-04-26 | End: 2023-04-29

## 2023-05-10 DIAGNOSIS — E11.9 TYPE 2 DIABETES MELLITUS WITHOUT COMPLICATION: ICD-10-CM

## 2023-05-15 ENCOUNTER — PATIENT MESSAGE (OUTPATIENT)
Dept: ADMINISTRATIVE | Facility: HOSPITAL | Age: 73
End: 2023-05-15
Payer: MEDICARE

## 2023-05-17 ENCOUNTER — TELEPHONE (OUTPATIENT)
Dept: FAMILY MEDICINE | Facility: CLINIC | Age: 73
End: 2023-05-17
Payer: MEDICARE

## 2023-05-29 DIAGNOSIS — E78.2 MIXED HYPERLIPIDEMIA: ICD-10-CM

## 2023-05-29 DIAGNOSIS — E11.9 TYPE 2 DIABETES MELLITUS WITHOUT COMPLICATION, WITHOUT LONG-TERM CURRENT USE OF INSULIN: Primary | ICD-10-CM

## 2023-05-29 RX ORDER — PRAVASTATIN SODIUM 40 MG/1
40 TABLET ORAL DAILY
Qty: 90 TABLET | Refills: 3 | Status: SHIPPED | OUTPATIENT
Start: 2023-05-29 | End: 2024-05-28

## 2023-05-31 ENCOUNTER — OFFICE VISIT (OUTPATIENT)
Dept: FAMILY MEDICINE | Facility: CLINIC | Age: 73
End: 2023-05-31
Payer: MEDICARE

## 2023-05-31 VITALS
HEIGHT: 68 IN | DIASTOLIC BLOOD PRESSURE: 70 MMHG | BODY MASS INDEX: 32.25 KG/M2 | HEART RATE: 54 BPM | WEIGHT: 212.81 LBS | OXYGEN SATURATION: 97 % | SYSTOLIC BLOOD PRESSURE: 132 MMHG

## 2023-05-31 DIAGNOSIS — E11.69 HYPERLIPIDEMIA ASSOCIATED WITH TYPE 2 DIABETES MELLITUS: ICD-10-CM

## 2023-05-31 DIAGNOSIS — I83.813 VARICOSE VEINS OF BOTH LOWER EXTREMITIES WITH PAIN: ICD-10-CM

## 2023-05-31 DIAGNOSIS — R00.1 BRADYCARDIA: ICD-10-CM

## 2023-05-31 DIAGNOSIS — E78.5 HYPERLIPIDEMIA ASSOCIATED WITH TYPE 2 DIABETES MELLITUS: ICD-10-CM

## 2023-05-31 DIAGNOSIS — N18.32 STAGE 3B CHRONIC KIDNEY DISEASE: ICD-10-CM

## 2023-05-31 DIAGNOSIS — E11.9 TYPE 2 DIABETES MELLITUS WITHOUT COMPLICATION, WITHOUT LONG-TERM CURRENT USE OF INSULIN: Primary | ICD-10-CM

## 2023-05-31 DIAGNOSIS — E11.59 HYPERTENSION ASSOCIATED WITH TYPE 2 DIABETES MELLITUS: ICD-10-CM

## 2023-05-31 DIAGNOSIS — I15.2 HYPERTENSION ASSOCIATED WITH TYPE 2 DIABETES MELLITUS: ICD-10-CM

## 2023-05-31 PROCEDURE — 3075F SYST BP GE 130 - 139MM HG: CPT | Mod: CPTII,S$GLB,, | Performed by: STUDENT IN AN ORGANIZED HEALTH CARE EDUCATION/TRAINING PROGRAM

## 2023-05-31 PROCEDURE — 1126F AMNT PAIN NOTED NONE PRSNT: CPT | Mod: CPTII,S$GLB,, | Performed by: STUDENT IN AN ORGANIZED HEALTH CARE EDUCATION/TRAINING PROGRAM

## 2023-05-31 PROCEDURE — 3288F FALL RISK ASSESSMENT DOCD: CPT | Mod: CPTII,S$GLB,, | Performed by: STUDENT IN AN ORGANIZED HEALTH CARE EDUCATION/TRAINING PROGRAM

## 2023-05-31 PROCEDURE — 99214 OFFICE O/P EST MOD 30 MIN: CPT | Mod: S$GLB,,, | Performed by: STUDENT IN AN ORGANIZED HEALTH CARE EDUCATION/TRAINING PROGRAM

## 2023-05-31 PROCEDURE — 1160F RVW MEDS BY RX/DR IN RCRD: CPT | Mod: CPTII,S$GLB,, | Performed by: STUDENT IN AN ORGANIZED HEALTH CARE EDUCATION/TRAINING PROGRAM

## 2023-05-31 PROCEDURE — 4010F ACE/ARB THERAPY RXD/TAKEN: CPT | Mod: CPTII,S$GLB,, | Performed by: STUDENT IN AN ORGANIZED HEALTH CARE EDUCATION/TRAINING PROGRAM

## 2023-05-31 PROCEDURE — 99999 PR PBB SHADOW E&M-EST. PATIENT-LVL IV: CPT | Mod: PBBFAC,,, | Performed by: STUDENT IN AN ORGANIZED HEALTH CARE EDUCATION/TRAINING PROGRAM

## 2023-05-31 PROCEDURE — 3288F PR FALLS RISK ASSESSMENT DOCUMENTED: ICD-10-PCS | Mod: CPTII,S$GLB,, | Performed by: STUDENT IN AN ORGANIZED HEALTH CARE EDUCATION/TRAINING PROGRAM

## 2023-05-31 PROCEDURE — 1126F PR PAIN SEVERITY QUANTIFIED, NO PAIN PRESENT: ICD-10-PCS | Mod: CPTII,S$GLB,, | Performed by: STUDENT IN AN ORGANIZED HEALTH CARE EDUCATION/TRAINING PROGRAM

## 2023-05-31 PROCEDURE — 3078F DIAST BP <80 MM HG: CPT | Mod: CPTII,S$GLB,, | Performed by: STUDENT IN AN ORGANIZED HEALTH CARE EDUCATION/TRAINING PROGRAM

## 2023-05-31 PROCEDURE — 1160F PR REVIEW ALL MEDS BY PRESCRIBER/CLIN PHARMACIST DOCUMENTED: ICD-10-PCS | Mod: CPTII,S$GLB,, | Performed by: STUDENT IN AN ORGANIZED HEALTH CARE EDUCATION/TRAINING PROGRAM

## 2023-05-31 PROCEDURE — 1101F PR PT FALLS ASSESS DOC 0-1 FALLS W/OUT INJ PAST YR: ICD-10-PCS | Mod: CPTII,S$GLB,, | Performed by: STUDENT IN AN ORGANIZED HEALTH CARE EDUCATION/TRAINING PROGRAM

## 2023-05-31 PROCEDURE — 3075F PR MOST RECENT SYSTOLIC BLOOD PRESS GE 130-139MM HG: ICD-10-PCS | Mod: CPTII,S$GLB,, | Performed by: STUDENT IN AN ORGANIZED HEALTH CARE EDUCATION/TRAINING PROGRAM

## 2023-05-31 PROCEDURE — 3078F PR MOST RECENT DIASTOLIC BLOOD PRESSURE < 80 MM HG: ICD-10-PCS | Mod: CPTII,S$GLB,, | Performed by: STUDENT IN AN ORGANIZED HEALTH CARE EDUCATION/TRAINING PROGRAM

## 2023-05-31 PROCEDURE — 1159F PR MEDICATION LIST DOCUMENTED IN MEDICAL RECORD: ICD-10-PCS | Mod: CPTII,S$GLB,, | Performed by: STUDENT IN AN ORGANIZED HEALTH CARE EDUCATION/TRAINING PROGRAM

## 2023-05-31 PROCEDURE — 4010F PR ACE/ARB THEARPY RXD/TAKEN: ICD-10-PCS | Mod: CPTII,S$GLB,, | Performed by: STUDENT IN AN ORGANIZED HEALTH CARE EDUCATION/TRAINING PROGRAM

## 2023-05-31 PROCEDURE — 3008F BODY MASS INDEX DOCD: CPT | Mod: CPTII,S$GLB,, | Performed by: STUDENT IN AN ORGANIZED HEALTH CARE EDUCATION/TRAINING PROGRAM

## 2023-05-31 PROCEDURE — 1159F MED LIST DOCD IN RCRD: CPT | Mod: CPTII,S$GLB,, | Performed by: STUDENT IN AN ORGANIZED HEALTH CARE EDUCATION/TRAINING PROGRAM

## 2023-05-31 PROCEDURE — 1101F PT FALLS ASSESS-DOCD LE1/YR: CPT | Mod: CPTII,S$GLB,, | Performed by: STUDENT IN AN ORGANIZED HEALTH CARE EDUCATION/TRAINING PROGRAM

## 2023-05-31 PROCEDURE — 3008F PR BODY MASS INDEX (BMI) DOCUMENTED: ICD-10-PCS | Mod: CPTII,S$GLB,, | Performed by: STUDENT IN AN ORGANIZED HEALTH CARE EDUCATION/TRAINING PROGRAM

## 2023-05-31 PROCEDURE — 99999 PR PBB SHADOW E&M-EST. PATIENT-LVL IV: ICD-10-PCS | Mod: PBBFAC,,, | Performed by: STUDENT IN AN ORGANIZED HEALTH CARE EDUCATION/TRAINING PROGRAM

## 2023-05-31 PROCEDURE — 99214 PR OFFICE/OUTPT VISIT, EST, LEVL IV, 30-39 MIN: ICD-10-PCS | Mod: S$GLB,,, | Performed by: STUDENT IN AN ORGANIZED HEALTH CARE EDUCATION/TRAINING PROGRAM

## 2023-05-31 NOTE — PROGRESS NOTES
[facility] Note    Chief Complaint      Chief Complaint   Patient presents with    Follow-up    Diabetes     History of Present Illness      Follow-up  Pertinent negatives include no chest pain, chills, coughing, fatigue, fever or joint swelling.   Diabetes  Pertinent negatives for diabetes include no chest pain, no fatigue, no polyphagia and no polyuria.     Roger Zavaleta Jr. is a 72 y.o. male with well controlled T2DM , sHTN, HLD, CKD stage 3b and cataract s/p removal for f/u on chronic conditions. He endorses no complaints today, compliant with all his current regimen . Home BS readings range 110-140s fasting and PP respectively, he exercise daily but yet to improve his diet    Problem List Addressed This Visit:  1. Type 2 diabetes mellitus without complication, without long-term current use of insulin  Overview:  -HbA1C at goal  -c/w current regimen  -due for a repeat today, ordered  -UTD on eye, sees his ophthal qmonthly for shot in his left eye s/p cataract enucleation    Orders:  -     Comprehensive Metabolic Panel; Future; Expected date: 05/31/2023  -     HEMOGLOBIN A1C; Future; Expected date: 05/31/2023  -     TSH; Future; Expected date: 05/31/2023    2. Hypertension associated with type 2 diabetes mellitus  Overview:  - well controlled  - continue current medications    3. Varicose veins of both lower extremities with pain  -compression stockings advised  -leg elevation    4. Bradycardia  -asymptomatic  -watchful waiting for now    5. Stage 3b chronic kidney disease  -stable renal function  -     CBC auto differential; Future; Expected date: 05/31/2023  -     Comprehensive Metabolic Panel; Future; Expected date: 05/31/2023  -     TSH; Future; Expected date: 05/31/2023    6. Hyperlipidemia associated with type 2 diabetes mellitus  Overview:  Lab Results   Component Value Date    LDLCALC 109.8 11/19/2021     - well controlled  - continue current medication    Orders:  -     Lipid  Panel; Future; Expected date: 05/31/2023    7. BMI 32.0-32.9,adult  -counseled extensively on life style modifications         Health Maintenance   Topic Date Due    Hemoglobin A1c  04/24/2023    Foot Exam  08/03/2023    Lipid Panel  10/24/2023    Eye Exam  01/11/2024    Low Dose Statin  05/29/2024    TETANUS VACCINE  05/25/2027    Hepatitis C Screening  Completed       Past Medical History:   Diagnosis Date    Acute midline low back pain with left-sided sciatica 8/16/2021    - no signs of neurological claudication based on symptoms - exam limited secondary to virtual visit - discussed symptoms with patient that would require urgent MRI including bowel or bladder changes, weakness left leg etc. - recommend start with stretches and add Robaxin 500 mg as needed - discuss if not improved in 2 weeks to notify me so we can get initial imaging and start physical therapy    CAD (coronary artery disease) 52    Diabetes mellitus type II     HLD (hyperlipidemia)        Past Surgical History:   Procedure Laterality Date    2 nasal surgery      CATARACT EXTRACTION W/  INTRAOCULAR LENS IMPLANT Left 1/11/2023    Procedure: EXTRACTION, CATARACT, WITH IOL INSERTION;  Surgeon: Jalen An MD;  Location: [facility] OR;  Service: Ophthalmology;  Laterality: Left;  DiB00 +21.0D    HERNIA REPAIR      umbilical    PHACOEMULSIFICATION OF CATARACT Left 1/11/2023    Procedure: PHACOEMULSIFICATION, CATARACT;  Surgeon: Jalen An MD;  Location: [facility] OR;  Service: Ophthalmology;  Laterality: Left;    rectal fissure      TONSILLECTOMY      TOTAL KNEE ARTHROPLASTY      TRIGGER FINGER RELEASE  9-30-13    left hand (middle finger)       family history includes Alcohol abuse in his father; Diabetes in his mother; Heart disease in his mother and unknown relative; No Known Problems in his daughter and daughter.    Social History     Tobacco Use    Smoking status: Never    Smokeless tobacco: Never   Substance Use Topics    Alcohol use: No    Drug  "use: No       Review of Systems   Constitutional:  Negative for chills, fatigue and fever.   Respiratory:  Negative for cough and shortness of breath.    Cardiovascular:  Negative for chest pain, palpitations and leg swelling.   Endocrine: Negative for polyphagia and polyuria.   Genitourinary:  Negative for dysuria, flank pain and frequency.   Musculoskeletal:  Negative for joint swelling.     Outpatient Encounter Medications as of 5/31/2023   Medication Sig Dispense Refill    aspirin 81 MG chewable tablet Take 81 mg by mouth once daily.      BD VEO INSULIN SYR HALF UNIT 0.3 mL 31 gauge x 15/64" Syrg THREE TIMES DAILY AS DIRECTED  1    blood sugar diagnostic (ACCU-CHEK GUIDE TEST STRIPS) Strp USE 1 STRIP THREE TIMES DAILY AS NEEDED 300 strip 3    chlorthalidone (HYGROTEN) 25 MG Tab Take 1 tablet by mouth once daily 90 tablet 0    fluticasone propionate (FLONASE) 50 mcg/actuation nasal spray 1 spray (50 mcg total) by Each Nostril route once daily. 16 g 3    insulin (LANTUS SOLOSTAR U-100 INSULIN) glargine 100 units/mL SubQ pen Inject 47 Units into the skin every evening. 42.3 mL 3    insulin aspart U-100 (NOVOLOG U-100 INSULIN ASPART) 100 unit/mL injection Inject 10 Units into the skin 3 (three) times daily before meals. 27 mL 0    insulin syringe-needle U-100 (BD INSULIN SYRINGE ULTRA-FINE) 1 mL 31 gauge x 5/16 Syrg Use 3 (three) times daily. 100 each 11    lancets (ULTRA THIN LANCETS) 30 gauge Misc 1 lancet by Misc.(Non-Drug; Combo Route) route 3 (three) times daily. 200 each 3    metoprolol succinate (TOPROL-XL) 50 MG 24 hr tablet Take 1 tablet by mouth once daily 90 tablet 0    pen needle, diabetic 31 gauge x 5/16" Ndle 1 each by Misc.(Non-Drug; Combo Route) route once daily at 6am. USE AS DIRECTED ONCE DAILY 100 each 3    pravastatin (PRAVACHOL) 40 MG tablet Take 1 tablet (40 mg total) by mouth once daily. 90 tablet 3    nitroGLYCERIN (NITROSTAT) 0.4 MG SL tablet Place 0.4 mg under the tongue every 5 (five) " "minutes as needed.      [DISCONTINUED] insulin syringe-needle U-100 (BD VEO INSULIN SYRINGE UF) 1/2 mL 31 gauge x 15/64" Syrg 1 each by Misc.(Non-Drug; Combo Route) route 3 (three) times daily. 100 each 11     Facility-Administered Encounter Medications as of 5/31/2023   Medication Dose Route Frequency Provider Last Rate Last Admin    LIDOcaine (PF) 10 mg/ml (1%) injection 10 mg  1 mL Intradermal Once Jalen An MD        sodium chloride 0.9% flush 10 mL  10 mL Intravenous PRN Jalen An MD            Review of patient's allergies indicates:  No Known Allergies    Physical Exam      Vital Signs  Pulse: (!) 54  SpO2: 97 %  BP: 132/70  Pain Score: 0-No pain  Height and Weight  Height: 5' 8" (172.7 cm)  Weight: 96.5 kg (212 lb 12.8 oz)  BSA (Calculated - sq m): 2.15 sq meters  BMI (Calculated): 32.4  Weight in (lb) to have BMI = 25: 164.1]    Physical Exam  Vitals reviewed.   Constitutional:       Appearance: He is obese.   HENT:      Head: Normocephalic and atraumatic.      Right Ear: Tympanic membrane normal.      Left Ear: Tympanic membrane normal.      Mouth/Throat:      Mouth: Mucous membranes are moist.   Eyes:      Extraocular Movements: Extraocular movements intact.      Pupils: Pupils are equal, round, and reactive to light.   Cardiovascular:      Rate and Rhythm: Normal rate and regular rhythm.      Pulses: Normal pulses.      Heart sounds: Normal heart sounds.   Pulmonary:      Effort: Pulmonary effort is normal.      Breath sounds: Normal breath sounds.   Abdominal:      General: There is no distension (central obesity).      Palpations: Abdomen is soft. There is no mass.   Musculoskeletal:      Right lower leg: Edema (trace with bilateral varicose veins) present.      Left lower leg: Edema present.   Skin:     General: Skin is warm.   Neurological:      General: No focal deficit present.      Mental Status: He is alert.   Psychiatric:         Mood and Affect: Mood normal.    "     Laboratory:  CBC:  No results for input(s): WBC, RBC, HGB, HCT, PLT, MCV, MCH, MCHC in the last 2160 hours.  CMP:  No results for input(s): GLU, CALCIUM, ALBUMIN, PROT, NA, K, CO2, CL, BUN, ALKPHOS, ALT, AST, BILITOT in the last 2160 hours.    Invalid input(s): CREATININ  URINALYSIS:  No results for input(s): COLORU, CLARITYU, SPECGRAV, PHUR, PROTEINUA, GLUCOSEU, BILIRUBINCON, BLOODU, WBCU, RBCU, BACTERIA, MUCUS, NITRITE, LEUKOCYTESUR, UROBILINOGEN, HYALINECASTS in the last 2160 hours.   LIPIDS:  No results for input(s): TSH, HDL, CHOL, TRIG, LDLCALC, CHOLHDL, NONHDLCHOL, TOTALCHOLEST in the last 2160 hours.  TSH:  No results for input(s): TSH in the last 2160 hours.  A1C:  No results for input(s): HGBA1C in the last 2160 hours.    Radiology:      Assessment/Plan     Roger Zavaleta Jr. is a 72 y.o.male with:    1. Type 2 diabetes mellitus without complication, without long-term current use of insulin  Overview:  -HbA1C at goal  -c/w current regimen  -due for a repeat today, ordered  -UTD on eye, sees his ophthal qmonthly for shot in his left eye s/p cataract enucleation    Orders:  -     Comprehensive Metabolic Panel; Future; Expected date: 05/31/2023  -     HEMOGLOBIN A1C; Future; Expected date: 05/31/2023  -     TSH; Future; Expected date: 05/31/2023    2. Hypertension associated with type 2 diabetes mellitus  Overview:  - well controlled  - continue current medications    3. Varicose veins of both lower extremities with pain  -compression stockings advised  -leg elevation    4. Bradycardia  -asymptomatic  -watchful waiting for now    5. Stage 3b chronic kidney disease  -stable renal function  -     CBC auto differential; Future; Expected date: 05/31/2023  -     Comprehensive Metabolic Panel; Future; Expected date: 05/31/2023  -     TSH; Future; Expected date: 05/31/2023    6. Hyperlipidemia associated with type 2 diabetes mellitus  Overview:  Lab Results   Component Value Date    LDLCALC 109.8 11/19/2021      - well controlled  - continue current medication    Orders:  -     Lipid Panel; Future; Expected date: 05/31/2023    7. BMI 32.0-32.9,adult  -counseled extensively on life style modifications         Continue current medications and maintain follow up with specialists.      Patient verbalizes understanding and agrees with current treatment plan.      MD Kay [facility] [place]

## 2023-06-04 DIAGNOSIS — I10 HYPERTENSION, ESSENTIAL: ICD-10-CM

## 2023-06-05 RX ORDER — CHLORTHALIDONE 25 MG/1
TABLET ORAL
Qty: 90 TABLET | Refills: 0 | Status: SHIPPED | OUTPATIENT
Start: 2023-06-05 | End: 2023-08-28

## 2023-06-16 ENCOUNTER — PATIENT MESSAGE (OUTPATIENT)
Dept: PODIATRY | Facility: CLINIC | Age: 73
End: 2023-06-16
Payer: MEDICARE

## 2023-06-16 NOTE — TELEPHONE ENCOUNTER
Is he retaining fluid? Are his legs swelling?    Cosentyx Pregnancy And Lactation Text: This medication is Pregnancy Category B and is considered safe during pregnancy. It is unknown if this medication is excreted in breast milk. Topical Clindamycin Pregnancy And Lactation Text: This medication is Pregnancy Category B and is considered safe during pregnancy. It is unknown if it is excreted in breast milk. Albendazole Pregnancy And Lactation Text: This medication is Pregnancy Category C and it isn't known if it is safe during pregnancy. It is also excreted in breast milk. Doxycycline Pregnancy And Lactation Text: This medication is Pregnancy Category D and not consider safe during pregnancy. It is also excreted in breast milk but is considered safe for shorter treatment courses. Spironolactone Pregnancy And Lactation Text: This medication can cause feminization of the male fetus and should be avoided during pregnancy. The active metabolite is also found in breast milk. Colchicine Counseling:  Patient counseled regarding adverse effects including but not limited to stomach upset (nausea, vomiting, stomach pain, or diarrhea). Patient instructed to limit alcohol consumption while taking this medication. Colchicine may reduce blood counts especially with prolonged use. The patient understands that monitoring of kidney function and blood counts may be required, especially at baseline. The patient verbalized understanding of the proper use and possible adverse effects of colchicine. All of the patient's questions and concerns were addressed. Valtrex Counseling: I discussed with the patient the risks of valacyclovir including but not limited to kidney damage, nausea, vomiting and severe allergy. The patient understands that if the infection seems to be worsening or is not improving, they are to call. Cyclosporine Pregnancy And Lactation Text: This medication is Pregnancy Category C and it isn't know if it is safe during pregnancy. This medication is excreted in breast milk. 5-Fu Counseling: 5-Fluorouracil Counseling:  I discussed with the patient the risks of 5-fluorouracil including but not limited to erythema, scaling, itching, weeping, crusting, and pain. Terbinafine Pregnancy And Lactation Text: This medication is Pregnancy Category B and is considered safe during pregnancy. It is also excreted in breast milk and breast feeding isn't recommended. Solaraze Counseling:  I discussed with the patient the risks of Solaraze including but not limited to erythema, scaling, itching, weeping, crusting, and pain. Olanzapine Pregnancy And Lactation Text: This medication is pregnancy category C. There are no adequate and well controlled trials with olanzapine in pregnant females. Olanzapine should be used during pregnancy only if the potential benefit justifies the potential risk to the fetus. In a study in lactating healthy women, olanzapine was excreted in breast milk. It is recommended that women taking olanzapine should not breast feed. Xolair Pregnancy And Lactation Text: This medication is Pregnancy Category B and is considered safe during pregnancy. This medication is excreted in breast milk. Quinolones Pregnancy And Lactation Text: This medication is Pregnancy Category C and it isn't know if it is safe during pregnancy. It is also excreted in breast milk. Hydroxychloroquine Pregnancy And Lactation Text: This medication has been shown to cause fetal harm but it isn't assigned a Pregnancy Risk Category. There are small amounts excreted in breast milk. Skyrizi Pregnancy And Lactation Text: The risk during pregnancy and breastfeeding is uncertain with this medication. Wartpeel Counseling:  I discussed with the patient the risks of Wartpeel including but not limited to erythema, scaling, itching, weeping, crusting, and pain. Azelaic Acid Counseling: Patient counseled that medicine may cause skin irritation and to avoid applying near the eyes. In the event of skin irritation, the patient was advised to reduce the amount of the drug applied or use it less frequently. The patient verbalized understanding of the proper use and possible adverse effects of azelaic acid. All of the patient's questions and concerns were addressed. Bexarotene Counseling:  I discussed with the patient the risks of bexarotene including but not limited to hair loss, dry lips/skin/eyes, liver abnormalities, hyperlipidemia, pancreatitis, depression/suicidal ideation, photosensitivity, drug rash/allergic reactions, hypothyroidism, anemia, leukopenia, infection, cataracts, and teratogenicity. Patient understands that they will need regular blood tests to check lipid profile, liver function tests, white blood cell count, thyroid function tests and pregnancy test if applicable. Solaraze Pregnancy And Lactation Text: This medication is Pregnancy Category B and is considered safe. There is some data to suggest avoiding during the third trimester. It is unknown if this medication is excreted in breast milk. Rinvoq Counseling: I discussed with the patient the risks of Rinvoq therapy including but not limited to upper respiratory tract infections, shingles, cold sores, bronchitis, nausea, cough, fever, acne, and headache. Live vaccines should be avoided. This medication has been linked to serious infections; higher rate of mortality; malignancy and lymphoproliferative disorders; major adverse cardiovascular events; thrombosis; thrombocytopenia, anemia, and neutropenia; lipid elevations; liver enzyme elevations; and gastrointestinal perforations. Valtrex Pregnancy And Lactation Text: this medication is Pregnancy Category B and is considered safe during pregnancy. This medication is not directly found in breast milk but it's metabolite acyclovir is present. Methotrexate Counseling:  Patient counseled regarding adverse effects of methotrexate including but not limited to nausea, vomiting, abnormalities in liver function tests. Patients may develop mouth sores, rash, diarrhea, and abnormalities in blood counts. The patient understands that monitoring is required including LFT's and blood counts. There is a rare possibility of scarring of the liver and lung problems that can occur when taking methotrexate. Persistent nausea, loss of appetite, pale stools, dark urine, cough, and shortness of breath should be reported immediately. Patient advised to discontinue methotrexate treatment at least three months before attempting to become pregnant. I discussed the need for folate supplements while taking methotrexate. These supplements can decrease side effects during methotrexate treatment. The patient verbalized understanding of the proper use and possible adverse effects of methotrexate. All of the patient's questions and concerns were addressed. Opzelura Counseling:  I discussed with the patient the risks of Duey Pepper including but not limited to nasopharngitis, bronchitis, ear infection, eosinophila, hives, diarrhea, folliculitis, tonsillitis, and rhinorrhea. Taken orally, this medication has been linked to serious infections; higher rate of mortality; malignancy and lymphoproliferative disorders; major adverse cardiovascular events; thrombosis; thrombocytopenia, anemia, and neutropenia; and lipid elevations. Infliximab Counseling:  I discussed with the patient the risks of infliximab including but not limited to myelosuppression, immunosuppression, autoimmune hepatitis, demyelinating diseases, lymphoma, and serious infections. The patient understands that monitoring is required including a PPD at baseline and must alert us or the primary physician if symptoms of infection or other concerning signs are noted. Fluconazole Counseling:  Patient counseled regarding adverse effects of fluconazole including but not limited to headache, diarrhea, nausea, upset stomach, liver function test abnormalities, taste disturbance, and stomach pain. There is a rare possibility of liver failure that can occur when taking fluconazole. The patient understands that monitoring of LFTs and kidney function test may be required, especially at baseline. The patient verbalized understanding of the proper use and possible adverse effects of fluconazole. All of the patient's questions and concerns were addressed. Propranolol Counseling:  I discussed with the patient the risks of propranolol including but not limited to low heart rate, low blood pressure, low blood sugar, restlessness and increased cold sensitivity. They should call the office if they experience any of these side effects. Azithromycin Pregnancy And Lactation Text: This medication is considered safe during pregnancy and is also secreted in breast milk. Dutasteride Pregnancy And Lactation Text: This medication is absolutely contraindicated in women, especially during pregnancy and breast feeding. Feminization of male fetuses is possible if taking while pregnant. Hydroquinone Pregnancy And Lactation Text: This medication has not been assigned a Pregnancy Risk Category but animal studies failed to show danger with the topical medication. It is unknown if the medication is excreted in breast milk. Picato Counseling:  I discussed with the patient the risks of Picato including but not limited to erythema, scaling, itching, weeping, crusting, and pain. Imiquimod Counseling:  I discussed with the patient the risks of imiquimod including but not limited to erythema, scaling, itching, weeping, crusting, and pain. Patient understands that the inflammatory response to imiquimod is variable from person to person and was educated regarded proper titration schedule. If flu-like symptoms develop, patient knows to discontinue the medication and contact us. Topical Ketoconazole Counseling: Patient counseled that this medication may cause skin irritation or allergic reactions. In the event of skin irritation, the patient was advised to reduce the amount of the drug applied or use it less frequently. The patient verbalized understanding of the proper use and possible adverse effects of ketoconazole. All of the patient's questions and concerns were addressed. Ivermectin Counseling:  Patient instructed to take medication on an empty stomach with a full glass of water. Patient informed of potential adverse effects including but not limited to nausea, diarrhea, dizziness, itching, and swelling of the extremities or lymph nodes. The patient verbalized understanding of the proper use and possible adverse effects of ivermectin. All of the patient's questions and concerns were addressed. Cimetidine Counseling:  I discussed with the patient the risks of Cimetidine including but not limited to gynecomastia, headache, diarrhea, nausea, drowsiness, arrhythmias, pancreatitis, skin rashes, psychosis, bone marrow suppression and kidney toxicity. Finasteride Counseling:  I discussed with the patient the risks of use of finasteride including but not limited to decreased libido, decreased ejaculate volume, gynecomastia, and depression. Women should not handle medication. All of the patient's questions and concerns were addressed. Colchicine Pregnancy And Lactation Text: This medication is Pregnancy Category C and isn't considered safe during pregnancy. It is excreted in breast milk. Oral Minoxidil Counseling- I discussed with the patient the risks of oral minoxidil including but not limited to shortness of breath, swelling of the feet or ankles, dizziness, lightheadedness, unwanted hair growth and allergic reaction. The patient verbalized understanding of the proper use and possible adverse effects of oral minoxidil. All of the patient's questions and concerns were addressed. Zyclara Pregnancy And Lactation Text: This medication is Pregnancy Category C. It is unknown if this medication is excreted in breast milk. 5-Fu Pregnancy And Lactation Text: This medication is Pregnancy Category X and contraindicated in pregnancy and in women who may become pregnant. It is unknown if this medication is excreted in breast milk. Dupixent Counseling: I discussed with the patient the risks of dupilumab including but not limited to eye infection and irritation, cold sores, injection site reactions, worsening of asthma, allergic reactions and increased risk of parasitic infection. Live vaccines should be avoided while taking dupilumab. Dupilumab will also interact with certain medications such as warfarin and cyclosporine. The patient understands that monitoring is required and they must alert us or the primary physician if symptoms of infection or other concerning signs are noted. Rifampin Counseling: I discussed with the patient the risks of rifampin including but not limited to liver damage, kidney damage, red-orange body fluids, nausea/vomiting and severe allergy. Stelara Counseling:  I discussed with the patient the risks of ustekinumab including but not limited to immunosuppression, malignancy, posterior leukoencephalopathy syndrome, and serious infections. The patient understands that monitoring is required including a PPD at baseline and must alert us or the primary physician if symptoms of infection or other concerning signs are noted. Rinvoq Pregnancy And Lactation Text: Based on animal studies, Rinvoq may cause embryo-fetal harm when administered to pregnant women. The medication should not be used in pregnancy. Breastfeeding is not recommended during treatment and for 6 days after the last dose. Methotrexate Pregnancy And Lactation Text: This medication is Pregnancy Category X and is known to cause fetal harm. This medication is excreted in breast milk. Use Enhanced Medication Counseling?: No Soolantra Counseling: I discussed with the patients the risks of topial Soolantra. This is a medicine which decreases the number of mites and inflammation in the skin. You experience burning, stinging, eye irritation or allergic reactions. Please call our office if you develop any problems from using this medication. Azelaic Acid Pregnancy And Lactation Text: This medication is considered safe during pregnancy and breast feeding. Bexarotene Pregnancy And Lactation Text: This medication is Pregnancy Category X and should not be given to women who are pregnant or may become pregnant. This medication should not be used if you are breast feeding. Opzelura Pregnancy And Lactation Text: There is insufficient data to evaluate drug-associated risk for major birth defects, miscarriage, or other adverse maternal or fetal outcomes. There is a pregnancy registry that monitors pregnancy outcomes in pregnant persons exposed to the medication during pregnancy. It is unknown if this medication is excreted in breast milk. Do not breastfeed during treatment and for about 4 weeks after the last dose. Griseofulvin Pregnancy And Lactation Text: This medication is Pregnancy Category X and is known to cause serious birth defects. It is unknown if this medication is excreted in breast milk but breast feeding should be avoided. Azathioprine Counseling:  I discussed with the patient the risks of azathioprine including but not limited to myelosuppression, immunosuppression, hepatotoxicity, lymphoma, and infections. The patient understands that monitoring is required including baseline LFTs, Creatinine, possible TPMP genotyping and weekly CBCs for the first month and then every 2 weeks thereafter. The patient verbalized understanding of the proper use and possible adverse effects of azathioprine. All of the patient's questions and concerns were addressed. Propranolol Pregnancy And Lactation Text: This medication is Pregnancy Category C and it isn't known if it is safe during pregnancy. It is excreted in breast milk. Erythromycin Counseling:  I discussed with the patient the risks of erythromycin including but not limited to GI upset, allergic reaction, drug rash, diarrhea, increase in liver enzymes, and yeast infections. Bactrim Counseling:  I discussed with the patient the risks of sulfa antibiotics including but not limited to GI upset, allergic reaction, drug rash, diarrhea, dizziness, photosensitivity, and yeast infections. Rarely, more serious reactions can occur including but not limited to aplastic anemia, agranulocytosis, methemoglobinemia, blood dyscrasias, liver or kidney failure, lung infiltrates or desquamative/blistering drug rashes. Erivedge Counseling- I discussed with the patient the risks of Erivedge including but not limited to nausea, vomiting, diarrhea, constipation, weight loss, changes in the sense of taste, decreased appetite, muscle spasms, and hair loss. The patient verbalized understanding of the proper use and possible adverse effects of Erivedge. All of the patient's questions and concerns were addressed. Low Dose Naltrexone Counseling- I discussed with the patient the potential risks and side effects of low dose naltrexone including but not limited to: more vivid dreams, headaches, nausea, vomiting, abdominal pain, fatigue, dizziness, and anxiety. Dapsone Counseling: I discussed with the patient the risks of dapsone including but not limited to hemolytic anemia, agranulocytosis, rashes, methemoglobinemia, kidney failure, peripheral neuropathy, headaches, GI upset, and liver toxicity. Patients who start dapsone require monitoring including baseline LFTs and weekly CBCs for the first month, then every month thereafter. The patient verbalized understanding of the proper use and possible adverse effects of dapsone. All of the patient's questions and concerns were addressed. Opioid Counseling: I discussed with the patient the potential side effects of opioids including but not limited to addiction, altered mental status, and depression. I stressed avoiding alcohol, benzodiazepines, muscle relaxants and sleep aids unless specifically okayed by a physician. The patient verbalized understanding of the proper use and possible adverse effects of opioids. All of the patient's questions and concerns were addressed. They were instructed to flush the remaining pills down the toilet if they did not need them for pain. Itraconazole Counseling:  I discussed with the patient the risks of itraconazole including but not limited to liver damage, nausea/vomiting, neuropathy, and severe allergy. The patient understands that this medication is best absorbed when taken with acidic beverages such as non-diet cola or ginger ale. The patient understands that monitoring is required including baseline LFTs and repeat LFTs at intervals. The patient understands that they are to contact us or the primary physician if concerning signs are noted. Erythromycin Pregnancy And Lactation Text: This medication is Pregnancy Category B and is considered safe during pregnancy. It is also excreted in breast milk. Rituxan Counseling:  I discussed with the patient the risks of Rituxan infusions. Side effects can include infusion reactions, severe drug rashes including mucocutaneous reactions, reactivation of latent hepatitis and other infections and rarely progressive multifocal leukoencephalopathy. All of the patient's questions and concerns were addressed. Rifampin Pregnancy And Lactation Text: This medication is Pregnancy Category C and it isn't know if it is safe during pregnancy. It is also excreted in breast milk and should not be used if you are breast feeding. Oral Minoxidil Pregnancy And Lactation Text: This medication should only be used when clearly needed if you are pregnant, attempting to become pregnant or breast feeding. Detail Level: Simple Dupixent Pregnancy And Lactation Text: This medication likely crosses the placenta but the risk for the fetus is uncertain. This medication is excreted in breast milk. Drysol Counseling:  I discussed with the patient the risks of drysol/aluminum chloride including but not limited to skin rash, itching, irritation, burning. Soolantra Pregnancy And Lactation Text: This medication is Pregnancy Category C. This medication is considered safe during breast feeding. Sarecycline Counseling: Patient advised regarding possible photosensitivity and discoloration of the teeth, skin, lips, tongue and gums. Patient instructed to avoid sunlight, if possible. When exposed to sunlight, patients should wear protective clothing, sunglasses, and sunscreen. The patient was instructed to call the office immediately if the following severe adverse effects occur:  hearing changes, easy bruising/bleeding, severe headache, or vision changes. The patient verbalized understanding of the proper use and possible adverse effects of sarecycline. All of the patient's questions and concerns were addressed. Sotyktu Counseling:  I discussed the most common side effects of Sotyktu including: common cold, sore throat, sinus infections, cold sores, canker sores, folliculitis, and acne. Â  I also discussed more serious side effects of Sotyktu including but not limited to: serious allergic reactions; increased risk for infections such as TB; cancers such as lymphomas; rhabdomyolysis and elevated CPK; and elevated triglycerides and liver enzymes. Â  Prednisone Counseling:  I discussed with the patient the risks of prolonged use of prednisone including but not limited to weight gain, insomnia, osteoporosis, mood changes, diabetes, susceptibility to infection, glaucoma and high blood pressure. In cases where prednisone use is prolonged, patients should be monitored with blood pressure checks, serum glucose levels and an eye exam.  Additionally, the patient may need to be placed on GI prophylaxis, PCP prophylaxis, and calcium and vitamin D supplementation and/or a bisphosphonate. The patient verbalized understanding of the proper use and the possible adverse effects of prednisone. All of the patient's questions and concerns were addressed. Otezla Counseling: Dinah Cluck Counseling: The side effects of Dinah Cluck were discussed with the patient, including but not limited to worsening or new depression, weight loss, diarrhea, nausea, upper respiratory tract infection, and headache. Patient instructed to call the office should any adverse effect occur. The patient verbalized understanding of the proper use and possible adverse effects of Otezla. All the patient's questions and concerns were addressed. Protopic Counseling: Patient may experience a mild burning sensation during topical application. Protopic is not approved in children less than 3years of age. There have been case reports of hematologic and skin malignancies in patients using topical calcineurin inhibitors although causality is questionable. Isotretinoin Counseling: Patient should get monthly blood tests, not donate blood, not drive at night if vision affected, not share medication, and not undergo elective surgery for 6 months after tx completed. Side effects reviewed, pt to contact office should one occur. Winlevi Counseling:  I discussed with the patient the risks of topical clascoterone including but not limited to erythema, scaling, itching, and stinging. Patient voiced their understanding. Azathioprine Pregnancy And Lactation Text: This medication is Pregnancy Category D and isn't considered safe during pregnancy. It is unknown if this medication is excreted in breast milk. Finasteride Pregnancy And Lactation Text: This medication is absolutely contraindicated during pregnancy. It is unknown if it is excreted in breast milk. SSKI Counseling:  I discussed with the patient the risks of SSKI including but not limited to thyroid abnormalities, metallic taste, GI upset, fever, headache, acne, arthralgias, paraesthesias, lymphadenopathy, easy bleeding, arrhythmias, and allergic reaction. Bactrim Pregnancy And Lactation Text: This medication is Pregnancy Category D and is known to cause fetal risk. It is also excreted in breast milk. Low Dose Naltrexone Pregnancy And Lactation Text: Naltrexone is pregnancy category C. There have been no adequate and well-controlled studies in pregnant women. It should be used in pregnancy only if the potential benefit justifies the potential risk to the fetus. Limited data indicates that naltrexone is minimally excreted into breastmilk. Griseofulvin Counseling:  I discussed with the patient the risks of griseofulvin including but not limited to photosensitivity, cytopenia, liver damage, nausea/vomiting and severe allergy. The patient understands that this medication is best absorbed when taken with a fatty meal (e.g., ice cream or french fries). Erivedge Pregnancy And Lactation Text: This medication is Pregnancy Category X and is absolutely contraindicated during pregnancy. It is unknown if it is excreted in breast milk. Benzoyl Peroxide Counseling: Patient counseled that medicine may cause skin irritation and bleach clothing. In the event of skin irritation, the patient was advised to reduce the amount of the drug applied or use it less frequently. The patient verbalized understanding of the proper use and possible adverse effects of benzoyl peroxide. All of the patient's questions and concerns were addressed. Birth Control Pills Counseling: Birth Control Pill Counseling: I discussed with the patient the potential side effects of OCPs including but not limited to increased risk of stroke, heart attack, thrombophlebitis, deep venous thrombosis, hepatic adenomas, breast changes, GI upset, headaches, and depression. The patient verbalized understanding of the proper use and possible adverse effects of OCPs. All of the patient's questions and concerns were addressed. Cellcept Counseling:  I discussed with the patient the risks of mycophenolate mofetil including but not limited to infection/immunosuppression, GI upset, hypokalemia, hypercholesterolemia, bone marrow suppression, lymphoproliferative disorders, malignancy, GI ulceration/bleed/perforation, colitis, interstitial lung disease, kidney failure, progressive multifocal leukoencephalopathy, and birth defects. The patient understands that monitoring is required including a baseline creatinine and regular CBC testing. In addition, patient must alert us immediately if symptoms of infection or other concerning signs are noted. Rituxan Pregnancy And Lactation Text: This medication is Pregnancy Category C and it isn't know if it is safe during pregnancy. It is unknown if this medication is excreted in breast milk but similar antibodies are known to be excreted. Benzoyl Peroxide Pregnancy And Lactation Text: This medication is Pregnancy Category C. It is unknown if benzoyl peroxide is excreted in breast milk. Opioid Pregnancy And Lactation Text: These medications can lead to premature delivery and should be avoided during pregnancy. These medications are also present in breast milk in small amounts. Dapsone Pregnancy And Lactation Text: This medication is Pregnancy Category C and is not considered safe during pregnancy or breast feeding. Winlevi Pregnancy And Lactation Text: This medication is considered safe during pregnancy and breastfeeding. Klisyri Counseling:  I discussed with the patient the risks of Pito Nelda including but not limited to erythema, scaling, itching, weeping, crusting, and pain. Protopic Pregnancy And Lactation Text: This medication is Pregnancy Category C. It is unknown if this medication is excreted in breast milk when applied topically. Isotretinoin Pregnancy And Lactation Text: This medication is Pregnancy Category X and is considered extremely dangerous during pregnancy. It is unknown if it is excreted in breast milk. Sski Pregnancy And Lactation Text: This medication is Pregnancy Category D and isn't considered safe during pregnancy. It is excreted in breast milk. Metronidazole Counseling:  I discussed with the patient the risks of metronidazole including but not limited to seizures, nausea/vomiting, a metallic taste in the mouth, nausea/vomiting and severe allergy. Enbrel Counseling:  I discussed with the patient the risks of etanercept including but not limited to myelosuppression, immunosuppression, autoimmune hepatitis, demyelinating diseases, lymphoma, and infections. The patient understands that monitoring is required including a PPD at baseline and must alert us or the primary physician if symptoms of infection or other concerning signs are noted. Topical Metronidazole Counseling: Metronidazole is a topical antibiotic medication. You may experience burning, stinging, redness, or allergic reactions. Please call our office if you develop any problems from using this medication. Doxepin Counseling:  Patient advised that the medication is sedating and not to drive a car after taking this medication. Patient informed of potential adverse effects including but not limited to dry mouth, urinary retention, and blurry vision. The patient verbalized understanding of the proper use and possible adverse effects of doxepin. All of the patient's questions and concerns were addressed. Topical Retinoid counseling:  Patient advised to apply a pea-sized amount only at bedtime and wait 30 minutes after washing their face before applying. If too drying, patient may add a non-comedogenic moisturizer. The patient verbalized understanding of the proper use and possible adverse effects of retinoids. All of the patient's questions and concerns were addressed. Elidel Counseling: Patient may experience a mild burning sensation during topical application. Elidel is not approved in children less than 3years of age. There have been case reports of hematologic and skin malignancies in patients using topical calcineurin inhibitors although causality is questionable. Cephalexin Counseling: I counseled the patient regarding use of cephalexin as an antibiotic for prophylactic and/or therapeutic purposes. Cephalexin (commonly prescribed under brand name Keflex) is a cephalosporin antibiotic which is active against numerous classes of bacteria, including most skin bacteria. Side effects may include nausea, diarrhea, gastrointestinal upset, rash, hives, yeast infections, and in rare cases, hepatitis, kidney disease, seizures, fever, confusion, neurologic symptoms, and others. Patients with severe allergies to penicillin medications are cautioned that there is about a 10% incidence of cross-reactivity with cephalosporins. When possible, patients with penicillin allergies should use alternatives to cephalosporins for antibiotic therapy. Sarecycline Pregnancy And Lactation Text: This medication is Pregnancy Category D and not consider safe during pregnancy. It is also excreted in breast milk. Sotyktu Pregnancy And Lactation Text: There is insufficient data to evaluate whether or not Sotyktu is safe to use during pregnancy. Â  Â It is not known if Sotyktu passes into breast milk and whether or not it is safe to use when breastfeeding. Â Â  Adbry Counseling: I discussed with the patient the risks of tralokinumab including but not limited to eye infection and irritation, cold sores, injection site reactions, worsening of asthma, allergic reactions and increased risk of parasitic infection. Live vaccines should be avoided while taking tralokinumab. The patient understands that monitoring is required and they must alert us or the primary physician if symptoms of infection or other concerning signs are noted. Niacinamide Counseling: I recommended taking niacin or niacinamide, also know as vitamin B3, twice daily. Recent evidence suggests that taking vitamin B3 (500 mg twice daily) can reduce the risk of actinic keratoses and non-melanoma skin cancers. Side effects of vitamin B3 include flushing and headache. Libtayo Counseling- I discussed with the patient the risks of Libtayo including but not limited to nausea, vomiting, diarrhea, and bone or muscle pain. The patient verbalized understanding of the proper use and possible adverse effects of Libtayo. All of the patient's questions and concerns were addressed. Taltz Counseling: I discussed with the patient the risks of ixekizumab including but not limited to immunosuppression, serious infections, worsening of inflammatory bowel disease and drug reactions. The patient understands that monitoring is required including a PPD at baseline and must alert us or the primary physician if symptoms of infection or other concerning signs are noted. Libtayo Pregnancy And Lactation Text: This medication is contraindicated in pregnancy and when breast feeding. Metronidazole Pregnancy And Lactation Text: This medication is Pregnancy Category B and considered safe during pregnancy. It is also excreted in breast milk. Birth Control Pills Pregnancy And Lactation Text: This medication should be avoided if pregnant and for the first 30 days post-partum. VTAMA Counseling: I discussed with the patient that Melissa Snyder is not for use in the eyes, mouth or mouth. They should call the office if they develop any signs of allergic reactions to Melissa Snyder. The patient verbalized understanding of the proper use and possible adverse effects of VTAMA. All of the patient's questions and concerns were addressed. Siliq Counseling:  I discussed with the patient the risks of Siliq including but not limited to new or worsening depression, suicidal thoughts and behavior, immunosuppression, malignancy, posterior leukoencephalopathy syndrome, and serious infections. The patient understands that monitoring is required including a PPD at baseline and must alert us or the primary physician if symptoms of infection or other concerning signs are noted. There is also a special program designed to monitor depression which is required with Siliq. Carac Counseling:  I discussed with the patient the risks of Carac including but not limited to erythema, scaling, itching, weeping, crusting, and pain. High Dose Vitamin A Counseling: Side effects reviewed, pt to contact office should one occur. Qbrexza Counseling:  I discussed with the patient the risks of Jw Gomez including but not limited to headache, mydriasis, blurred vision, dry eyes, nasal dryness, dry mouth, dry throat, dry skin, urinary hesitation, and constipation. Local skin reactions including erythema, burning, stinging, and itching can also occur. Klisyri Pregnancy And Lactation Text: It is unknown if this medication can harm a developing fetus or if it is excreted in breast milk. Thalidomide Counseling: I discussed with the patient the risks of thalidomide including but not limited to birth defects, anxiety, weakness, chest pain, dizziness, cough and severe allergy. Gabapentin Counseling: I discussed with the patient the risks of gabapentin including but not limited to dizziness, somnolence, fatigue and ataxia. Topical Metronidazole Pregnancy And Lactation Text: This medication is Pregnancy Category B and considered safe during pregnancy. It is also considered safe to use while breastfeeding. Doxepin Pregnancy And Lactation Text: This medication is Pregnancy Category C and it isn't known if it is safe during pregnancy. It is also excreted in breast milk and breast feeding isn't recommended. Humira Counseling:  I discussed with the patient the risks of adalimumab including but not limited to myelosuppression, immunosuppression, autoimmune hepatitis, demyelinating diseases, lymphoma, and serious infections. The patient understands that monitoring is required including a PPD at baseline and must alert us or the primary physician if symptoms of infection or other concerning signs are noted. Niacinamide Pregnancy And Lactation Text: These medications are considered safe during pregnancy. Arava Counseling:  Patient counseled regarding adverse effects of Arava including but not limited to nausea, vomiting, abnormalities in liver function tests. Patients may develop mouth sores, rash, diarrhea, and abnormalities in blood counts. The patient understands that monitoring is required including LFTs and blood counts. There is a rare possibility of scarring of the liver and lung problems that can occur when taking methotrexate. Persistent nausea, loss of appetite, pale stools, dark urine, cough, and shortness of breath should be reported immediately. Patient advised to discontinue Arava treatment and consult with a physician prior to attempting conception. The patient will have to undergo a treatment to eliminate Arava from the body prior to conception. Adbry Pregnancy And Lactation Text: It is unknown if this medication will adversely affect pregnancy or breast feeding. Cephalexin Pregnancy And Lactation Text: This medication is Pregnancy Category B and considered safe during pregnancy. It is also excreted in breast milk but can be used safely for shorter doses. Xeljanz Counseling: [place]ikes Counseling: I discussed with the patient the risks of Sin Hikes therapy including increased risk of infection, liver issues, headache, diarrhea, or cold symptoms. Live vaccines should be avoided. They were instructed to call if they have any problems. Nsaids Counseling: NSAID Counseling: I discussed with the patient that NSAIDs should be taken with food. Prolonged use of NSAIDs can result in the development of stomach ulcers. Patient advised to stop taking NSAIDs if abdominal pain occurs. The patient verbalized understanding of the proper use and possible adverse effects of NSAIDs. All of the patient's questions and concerns were addressed. Odomzo Counseling- I discussed with the patient the risks of Odomzo including but not limited to nausea, vomiting, diarrhea, constipation, weight loss, changes in the sense of taste, decreased appetite, muscle spasms, and hair loss. The patient verbalized understanding of the proper use and possible adverse effects of Odomzo. All of the patient's questions and concerns were addressed. High Dose Vitamin A Pregnancy And Lactation Text: High dose vitamin A therapy is contraindicated during pregnancy and breast feeding. Vtama Pregnancy And Lactation Text: It is unknown if this medication can cause problems during pregnancy and breastfeeding. Xelgenevaz Pregnancy And Lactation Text: This medication is Pregnancy Category D and is not considered safe during pregnancy. The risk during breast feeding is also uncertain. Cimzia Counseling:  I discussed with the patient the risks of Cimzia including but not limited to immunosuppression, allergic reactions and infections. The patient understands that monitoring is required including a PPD at baseline and must alert us or the primary physician if symptoms of infection or other concerning signs are noted. Topical Steroids Counseling: I discussed with the patient that prolonged use of topical steroids can result in the increased appearance of superficial blood vessels (telangiectasias), lightening (hypopigmentation) and thinning of the skin (atrophy). Patient understands to avoid using high potency steroids in skin folds, the groin or the face. The patient verbalized understanding of the proper use and possible adverse effects of topical steroids. All of the patient's questions and concerns were addressed. Hydroxyzine Counseling: Patient advised that the medication is sedating and not to drive a car after taking this medication. Patient informed of potential adverse effects including but not limited to dry mouth, urinary retention, and blurry vision. The patient verbalized understanding of the proper use and possible adverse effects of hydroxyzine. All of the patient's questions and concerns were addressed. Minoxidil Counseling: Minoxidil is a topical medication which can increase blood flow where it is applied. It is uncertain how this medication increases hair growth. Side effects are uncommon and include stinging and allergic reactions. Minocycline Counseling: Patient advised regarding possible photosensitivity and discoloration of the teeth, skin, lips, tongue and gums. Patient instructed to avoid sunlight, if possible. When exposed to sunlight, patients should wear protective clothing, sunglasses, and sunscreen. The patient was instructed to call the office immediately if the following severe adverse effects occur:  hearing changes, easy bruising/bleeding, severe headache, or vision changes. The patient verbalized understanding of the proper use and possible adverse effects of minocycline. All of the patient's questions and concerns were addressed. Tetracycline Counseling: Patient counseled regarding possible photosensitivity and increased risk for sunburn. Patient instructed to avoid sunlight, if possible. When exposed to sunlight, patients should wear protective clothing, sunglasses, and sunscreen. The patient was instructed to call the office immediately if the following severe adverse effects occur:  hearing changes, easy bruising/bleeding, severe headache, or vision changes. The patient verbalized understanding of the proper use and possible adverse effects of tetracycline. All of the patient's questions and concerns were addressed. Patient understands to avoid pregnancy while on therapy due to potential birth defects. Cyclophosphamide Counseling:  I discussed with the patient the risks of cyclophosphamide including but not limited to hair loss, hormonal abnormalities, decreased fertility, abdominal pain, diarrhea, nausea and vomiting, bone marrow suppression and infection. The patient understands that monitoring is required while taking this medication. Cibinqo Counseling: I discussed with the patient the risks of Cibinqo therapy including but not limited to common cold, nausea, headache, cold sores, increased blood CPK levels, dizziness, UTIs, fatigue, acne, and vomitting. Live vaccines should be avoided. This medication has been linked to serious infections; higher rate of mortality; malignancy and lymphoproliferative disorders; major adverse cardiovascular events; thrombosis; thrombocytopenia and lymphopenia; lipid elevations; and retinal detachment. Ketoconazole Counseling:   Patient counseled regarding improving absorption with orange juice. Adverse effects include but are not limited to breast enlargement, headache, diarrhea, nausea, upset stomach, liver function test abnormalities, taste disturbance, and stomach pain. There is a rare possibility of liver failure that can occur when taking ketoconazole. The patient understands that monitoring of LFTs may be required, especially at baseline. The patient verbalized understanding of the proper use and possible adverse effects of ketoconazole. All of the patient's questions and concerns were addressed. Qbrexza Pregnancy And Lactation Text: There is no available data on Qbrexza use in pregnant women. There is no available data on Qbrexza use in lactation. Glycopyrrolate Counseling:  I discussed with the patient the risks of glycopyrrolate including but not limited to skin rash, drowsiness, dry mouth, difficulty urinating, and blurred vision. Topical Steroids Applications Pregnancy And Lactation Text: Most topical steroids are considered safe to use during pregnancy and lactation. Any topical steroid applied to the breast or nipple should be washed off before breastfeeding. Tremfya Counseling: I discussed with the patient the risks of guselkumab including but not limited to immunosuppression, serious infections, and drug reactions. The patient understands that monitoring is required including a PPD at baseline and must alert us or the primary physician if symptoms of infection or other concerning signs are noted. Eucrisa Counseling: Patient may experience a mild burning sensation during topical application. Clora Grise is not approved in children less than 1months of age. Tazorac Counseling:  Patient advised that medication is irritating and drying. Patient may need to apply sparingly and wash off after an hour before eventually leaving it on overnight. The patient verbalized understanding of the proper use and possible adverse effects of tazorac. All of the patient's questions and concerns were addressed. Clindamycin Counseling: I counseled the patient regarding use of clindamycin as an antibiotic for prophylactic and/or therapeutic purposes. Clindamycin is active against numerous classes of bacteria, including skin bacteria. Side effects may include nausea, diarrhea, gastrointestinal upset, rash, hives, yeast infections, and in rare cases, colitis. Rhofade Counseling: Rhofade is a topical medication which can decrease superficial blood flow where applied. Side effects are uncommon and include stinging, redness and allergic reactions. Tranexamic Acid Counseling:  Patient advised of the small risk of bleeding problems with tranexamic acid. They were also instructed to call if they developed any nausea, vomiting or diarrhea. All of the patient's questions and concerns were addressed. Cimzia Pregnancy And Lactation Text: This medication crosses the placenta but can be considered safe in certain situations. Cimzia may be excreted in breast milk. Tazorac Pregnancy And Lactation Text: This medication is not safe during pregnancy. It is unknown if this medication is excreted in breast milk. Nsaids Pregnancy And Lactation Text: These medications are considered safe up to 30 weeks gestation. It is excreted in breast milk. Calcipotriene Counseling:  I discussed with the patient the risks of calcipotriene including but not limited to erythema, scaling, itching, and irritation. Cibinqo Pregnancy And Lactation Text: It is unknown if this medication will adversely affect pregnancy or breast feeding. You should not take this medication if you are currently pregnant or planning a pregnancy or while breastfeeding. Otezla Pregnancy And Lactation Text: This medication is Pregnancy Category C and it isn't known if it is safe during pregnancy. It is unknown if it is excreted in breast milk. Hydroxyzine Pregnancy And Lactation Text: This medication is not safe during pregnancy and should not be taken. It is also excreted in breast milk and breast feeding isn't recommended. Cyclophosphamide Pregnancy And Lactation Text: This medication is Pregnancy Category D and it isn't considered safe during pregnancy. This medication is excreted in breast milk. Simponi Counseling:  I discussed with the patient the risks of golimumab including but not limited to myelosuppression, immunosuppression, autoimmune hepatitis, demyelinating diseases, lymphoma, and serious infections. The patient understands that monitoring is required including a PPD at baseline and must alert us or the primary physician if symptoms of infection or other concerning signs are noted. Ketoconazole Pregnancy And Lactation Text: This medication is Pregnancy Category C and it isn't know if it is safe during pregnancy. It is also excreted in breast milk and breast feeding isn't recommended. Zoryve Counseling:  I discussed with the patient that Alphonsus Bring is not for use in the eyes, mouth or vagina. The most commonly reported side effects include diarrhea, headache, insomnia, application site pain, upper respiratory tract infections, and urinary tract infections. All of the patient's questions and concerns were addressed. Oxybutynin Counseling:  I discussed with the patient the risks of oxybutynin including but not limited to skin rash, drowsiness, dry mouth, difficulty urinating, and blurred vision. Mirvaso Counseling: Stacy Amis is a topical medication which can decrease superficial blood flow where applied. Side effects are uncommon and include stinging, redness and allergic reactions. Glycopyrrolate Pregnancy And Lactation Text: This medication is Pregnancy Category B and is considered safe during pregnancy. It is unknown if it is excreted breast milk. Aklief counseling:  Patient advised to apply a pea-sized amount only at bedtime and wait 30 minutes after washing their face before applying. If too drying, patient may add a non-comedogenic moisturizer. The most commonly reported side effects including irritation, redness, scaling, dryness, stinging, burning, itching, and increased risk of sunburn. The patient verbalized understanding of the proper use and possible adverse effects of retinoids. All of the patient's questions and concerns were addressed. Acitretin Counseling:  I discussed with the patient the risks of acitretin including but not limited to hair loss, dry lips/skin/eyes, liver damage, hyperlipidemia, depression/suicidal ideation, photosensitivity. Serious rare side effects can include but are not limited to pancreatitis, pseudotumor cerebri, bony changes, clot formation/stroke/heart attack. Patient understands that alcohol is contraindicated since it can result in liver toxicity and significantly prolong the elimination of the drug by many years. Topical Sulfur Applications Counseling: Topical Sulfur Counseling: Patient counseled that this medication may cause skin irritation or allergic reactions. In the event of skin irritation, the patient was advised to reduce the amount of the drug applied or use it less frequently. The patient verbalized understanding of the proper use and possible adverse effects of topical sulfur application. All of the patient's questions and concerns were addressed. Clindamycin Pregnancy And Lactation Text: This medication can be used in pregnancy if certain situations. Clindamycin is also present in breast milk. Clofazimine Counseling:  I discussed with the patient the risks of clofazimine including but not limited to skin and eye pigmentation, liver damage, nausea/vomiting, gastrointestinal bleeding and allergy. Calcipotriene Pregnancy And Lactation Text: The use of this medication during pregnancy or lactation is not recommended as there is insufficient data. Ilumya Counseling: I discussed with the patient the risks of tildrakizumab including but not limited to immunosuppression, malignancy, posterior leukoencephalopathy syndrome, and serious infections. The patient understands that monitoring is required including a PPD at baseline and must alert us or the primary physician if symptoms of infection or other concerning signs are noted. Cosentyx Counseling:  I discussed with the patient the risks of Cosentyx including but not limited to worsening of Crohn's disease, immunosuppression, allergic reactions and infections. The patient understands that monitoring is required including a PPD at baseline and must alert us or the primary physician if symptoms of infection or other concerning signs are noted. Doxycycline Counseling:  Patient counseled regarding possible photosensitivity and increased risk for sunburn. Patient instructed to avoid sunlight, if possible. When exposed to sunlight, patients should wear protective clothing, sunglasses, and sunscreen. The patient was instructed to call the office immediately if the following severe adverse effects occur:  hearing changes, easy bruising/bleeding, severe headache, or vision changes. The patient verbalized understanding of the proper use and possible adverse effects of doxycycline. All of the patient's questions and concerns were addressed. Spironolactone Counseling: Patient advised regarding risks of diarrhea, abdominal pain, hyperkalemia, birth defects (for female patients), liver toxicity and renal toxicity. The patient may need blood work to monitor liver and kidney function and potassium levels while on therapy. The patient verbalized understanding of the proper use and possible adverse effects of spironolactone. All of the patient's questions and concerns were addressed. Quinolones Counseling:  I discussed with the patient the risks of fluoroquinolones including but not limited to GI upset, allergic reaction, drug rash, diarrhea, dizziness, photosensitivity, yeast infections, liver function test abnormalities, tendonitis/tendon rupture. Xolair Counseling:  Patient informed of potential adverse effects including but not limited to fever, muscle aches, rash and allergic reactions. The patient verbalized understanding of the proper use and possible adverse effects of Xolair. All of the patient's questions and concerns were addressed. Cyclosporine Counseling:  I discussed with the patient the risks of cyclosporine including but not limited to hypertension, gingival hyperplasia,myelosuppression, immunosuppression, liver damage, kidney damage, neurotoxicity, lymphoma, and serious infections. The patient understands that monitoring is required including baseline blood pressure, CBC, CMP, lipid panel and uric acid, and then 1-2 times monthly CMP and blood pressure. Cantharidin Counseling:  I discussed with the patient the risks of Cantharidin including but not limited to pain, redness, burning, itching, and blistering. Olumiant Counseling: I discussed with the patient the risks of Olumiant therapy including but not limited to upper respiratory tract infections, shingles, cold sores, and nausea. Live vaccines should be avoided. This medication has been linked to serious infections; higher rate of mortality; malignancy and lymphoproliferative disorders; major adverse cardiovascular events; thrombosis; gastrointestinal perforations; neutropenia; lymphopenia; anemia; liver enzyme elevations; and lipid elevations. Tranexamic Acid Pregnancy And Lactation Text: It is unknown if this medication is safe during pregnancy or breast feeding. Rhofade Pregnancy And Lactation Text: This medication has not been assigned a Pregnancy Risk Category. It is unknown if the medication is excreted in breast milk. Terbinafine Counseling: Patient counseling regarding adverse effects of terbinafine including but not limited to headache, diarrhea, rash, upset stomach, liver function test abnormalities, itching, taste/smell disturbance, nausea, abdominal pain, and flatulence. There is a rare possibility of liver failure that can occur when taking terbinafine. The patient understands that a baseline LFT and kidney function test may be required. The patient verbalized understanding of the proper use and possible adverse effects of terbinafine. All of the patient's questions and concerns were addressed. Olanzapine Counseling- I discussed with the patient the common side effects of olanzapine including but are not limited to: lack of energy, dry mouth, increased appetite, sleepiness, tremor, constipation, dizziness, changes in behavior, or restlessness. Explained that teenagers are more likely to experience headaches, abdominal pain, pain in the arms or legs, tiredness, and sleepiness. Serious side effects include but are not limited: increased risk of death in elderly patients who are confused, have memory loss, or dementia-related psychosis; hyperglycemia; increased cholesterol and triglycerides; and weight gain. Azithromycin Counseling:  I discussed with the patient the risks of azithromycin including but not limited to GI upset, allergic reaction, drug rash, diarrhea, and yeast infections. Hydroxychloroquine Counseling:  I discussed with the patient that a baseline ophthalmologic exam is needed at the start of therapy and every year thereafter while on therapy. A CBC may also be warranted for monitoring. The side effects of this medication were discussed with the patient, including but not limited to agranulocytosis, aplastic anemia, seizures, rashes, retinopathy, and liver toxicity. Patient instructed to call the office should any adverse effect occur. The patient verbalized understanding of the proper use and possible adverse effects of Plaquenil. All the patient's questions and concerns were addressed. Aklief Pregnancy And Lactation Text: It is unknown if this medication is safe to use during pregnancy. It is unknown if this medication is excreted in breast milk. Breastfeeding women should use the topical cream on the smallest area of the skin for the shortest time needed while breastfeeding. Do not apply to nipple and areola. Cantharidin Pregnancy And Lactation Text: This medication has not been proven safe during pregnancy. It is unknown if this medication is excreted in breast milk. Zyclara Counseling:  I discussed with the patient the risks of imiquimod including but not limited to erythema, scaling, itching, weeping, crusting, and pain. Patient understands that the inflammatory response to imiquimod is variable from person to person and was educated regarded proper titration schedule. If flu-like symptoms develop, patient knows to discontinue the medication and contact us. Skyrizi Counseling: I discussed with the patient the risks of risankizumab-rzaa including but not limited to immunosuppression, and serious infections. The patient understands that monitoring is required including a PPD at baseline and must alert us or the primary physician if symptoms of infection or other concerning signs are noted. Olumiant Pregnancy And Lactation Text: Based on animal studies, Fahad Chai may cause embryo-fetal harm when administered to pregnant women. The medication should not be used in pregnancy. Breastfeeding is not recommended during treatment. Topical Clindamycin Counseling: Patient counseled that this medication may cause skin irritation or allergic reactions. In the event of skin irritation, the patient was advised to reduce the amount of the drug applied or use it less frequently. The patient verbalized understanding of the proper use and possible adverse effects of clindamycin. All of the patient's questions and concerns were addressed. Albendazole Counseling:  I discussed with the patient the risks of albendazole including but not limited to cytopenia, kidney damage, nausea/vomiting and severe allergy. The patient understands that this medication is being used in an off-label manner. Acitretin Pregnancy And Lactation Text: This medication is Pregnancy Category X and should not be given to women who are pregnant or may become pregnant in the future. This medication is excreted in breast milk. Topical Sulfur Applications Pregnancy And Lactation Text: This medication is considered safe during pregnancy and breast feeding secondary to limited systemic absorption. Hydroquinone Counseling:  Patient advised that medication may result in skin irritation, lightening (hypopigmentation), dryness, and burning. In the event of skin irritation, the patient was advised to reduce the amount of the drug applied or use it less frequently. Rarely, spots that are treated with hydroquinone can become darker (pseudoochronosis). Should this occur, patient instructed to stop medication and call the office. The patient verbalized understanding of the proper use and possible adverse effects of hydroquinone. All of the patient's questions and concerns were addressed. Dutasteride Counseling: Dustasteride Counseling:  I discussed with the patient the risks of use of dutasteride including but not limited to decreased libido, decreased ejaculate volume, and gynecomastia. Women who can become pregnant should not handle medication. All of the patient's questions and concerns were addressed.

## 2023-06-18 DIAGNOSIS — I10 HYPERTENSION, ESSENTIAL: ICD-10-CM

## 2023-06-19 RX ORDER — METOPROLOL SUCCINATE 50 MG/1
TABLET, EXTENDED RELEASE ORAL
Qty: 90 TABLET | Refills: 3 | Status: SHIPPED | OUTPATIENT
Start: 2023-06-19 | End: 2023-10-09 | Stop reason: SDUPTHER

## 2023-06-27 DIAGNOSIS — E11.22 TYPE 2 DIABETES MELLITUS WITH STAGE 3B CHRONIC KIDNEY DISEASE, WITH LONG-TERM CURRENT USE OF INSULIN: ICD-10-CM

## 2023-06-27 DIAGNOSIS — N18.32 TYPE 2 DIABETES MELLITUS WITH STAGE 3B CHRONIC KIDNEY DISEASE, WITH LONG-TERM CURRENT USE OF INSULIN: ICD-10-CM

## 2023-06-27 DIAGNOSIS — Z79.4 TYPE 2 DIABETES MELLITUS WITH STAGE 3B CHRONIC KIDNEY DISEASE, WITH LONG-TERM CURRENT USE OF INSULIN: ICD-10-CM

## 2023-06-27 RX ORDER — PEN NEEDLE, DIABETIC 30 GX3/16"
1 NEEDLE, DISPOSABLE MISCELLANEOUS 2 TIMES DAILY
Qty: 100 EACH | Refills: 3 | Status: SHIPPED | OUTPATIENT
Start: 2023-06-27

## 2023-06-27 NOTE — TELEPHONE ENCOUNTER
"Refill Encounter    PCP Visits: Recent Visits  Date Type Provider Dept   23 Office Visit Conchita Chávez MD [facility]   10/24/22 Office Visit Conchita Chávez MD [facility]   Showing recent visits within past 360 days and meeting all other requirements  Future Appointments  Date Type Provider Dept   23 Appointment Conchita Chávez MD [facility]   Showing future appointments within next 720 days and meeting all other requirements     Last 3 Blood Pressure:   BP Readings from Last 3 Encounters:   23 132/70   23 (!) 129/59   10/24/22 110/62     Preferred Pharmacy:   [id number] - [place] - [id number]  [id number]  [place] [address]  Phone: [phone] Fax: [phone]    [facility]  [address]  [place] [address]  Phone: [phone] Fax: [phone]    Requested RX:  Requested Prescriptions     Pending Prescriptions Disp Refills    pen needle, diabetic 31 gauge x 5/16" Ndle 100 each 3     Si each by Misc.(Non-Drug; Combo Route) route. USE AS DIRECTED ONCE DAILY      RX Route: Normal    "

## 2023-07-17 DIAGNOSIS — E11.42 TYPE 2 DIABETES MELLITUS WITH DIABETIC POLYNEUROPATHY, WITH LONG-TERM CURRENT USE OF INSULIN: ICD-10-CM

## 2023-07-17 DIAGNOSIS — Z79.4 TYPE 2 DIABETES MELLITUS WITH DIABETIC POLYNEUROPATHY, WITH LONG-TERM CURRENT USE OF INSULIN: ICD-10-CM

## 2023-07-17 RX ORDER — INSULIN ASPART 100 [IU]/ML
10 INJECTION, SOLUTION INTRAVENOUS; SUBCUTANEOUS
Qty: 27 ML | Refills: 0 | OUTPATIENT
Start: 2023-07-17 | End: 2023-10-15

## 2023-07-24 DIAGNOSIS — E11.42 TYPE 2 DIABETES MELLITUS WITH DIABETIC POLYNEUROPATHY, WITH LONG-TERM CURRENT USE OF INSULIN: ICD-10-CM

## 2023-07-24 DIAGNOSIS — Z79.4 TYPE 2 DIABETES MELLITUS WITH DIABETIC POLYNEUROPATHY, WITH LONG-TERM CURRENT USE OF INSULIN: ICD-10-CM

## 2023-07-24 RX ORDER — INSULIN ASPART 100 [IU]/ML
10 INJECTION, SOLUTION INTRAVENOUS; SUBCUTANEOUS
Qty: 27 ML | Refills: 0 | OUTPATIENT
Start: 2023-07-24 | End: 2023-10-22

## 2023-07-26 DIAGNOSIS — E11.42 TYPE 2 DIABETES MELLITUS WITH DIABETIC POLYNEUROPATHY, WITH LONG-TERM CURRENT USE OF INSULIN: ICD-10-CM

## 2023-07-26 DIAGNOSIS — Z79.4 TYPE 2 DIABETES MELLITUS WITH DIABETIC POLYNEUROPATHY, WITH LONG-TERM CURRENT USE OF INSULIN: ICD-10-CM

## 2023-07-26 RX ORDER — INSULIN ASPART 100 [IU]/ML
10 INJECTION, SOLUTION INTRAVENOUS; SUBCUTANEOUS
Qty: 27 ML | Refills: 0 | Status: SHIPPED | OUTPATIENT
Start: 2023-07-26 | End: 2023-09-25 | Stop reason: SDUPTHER

## 2023-08-04 ENCOUNTER — PATIENT MESSAGE (OUTPATIENT)
Dept: FAMILY MEDICINE | Facility: CLINIC | Age: 73
End: 2023-08-04
Payer: MEDICARE

## 2023-08-04 DIAGNOSIS — N18.32 TYPE 2 DIABETES MELLITUS WITH STAGE 3B CHRONIC KIDNEY DISEASE, WITH LONG-TERM CURRENT USE OF INSULIN: ICD-10-CM

## 2023-08-04 DIAGNOSIS — Z79.4 TYPE 2 DIABETES MELLITUS WITH STAGE 3B CHRONIC KIDNEY DISEASE, WITH LONG-TERM CURRENT USE OF INSULIN: ICD-10-CM

## 2023-08-04 DIAGNOSIS — E11.22 TYPE 2 DIABETES MELLITUS WITH STAGE 3B CHRONIC KIDNEY DISEASE, WITH LONG-TERM CURRENT USE OF INSULIN: ICD-10-CM

## 2023-08-04 RX ORDER — INSULIN DETEMIR 100 [IU]/ML
47 INJECTION, SOLUTION SUBCUTANEOUS NIGHTLY
Qty: 45 ML | Refills: 3 | Status: SHIPPED | OUTPATIENT
Start: 2023-08-04 | End: 2023-09-24 | Stop reason: SDUPTHER

## 2023-08-04 RX ORDER — INSULIN GLARGINE 100 [IU]/ML
47 INJECTION, SOLUTION SUBCUTANEOUS NIGHTLY
Qty: 42.3 ML | Refills: 3 | Status: CANCELLED | OUTPATIENT
Start: 2023-08-04 | End: 2024-08-03

## 2023-08-12 NOTE — PROGRESS NOTES
HPI:  Called Mr. Roger Zavaleta  for hypertension follow-up. Patient reports adherence to medication regimen. Ankle swelling has improved since reducing amlodipine to 2.5 mg however, blood pressure remains uncontrolled.     Last 5 Patient Entered Readings                                      Current 30 Day Average: 159/84     Recent Readings 4/24/2019 4/17/2019 4/10/2019 4/9/2019 4/6/2019    SBP (mmHg) 162 160 158 160 164    DBP (mmHg) 83 81 83 81 84    Pulse 76 65 60 60 65          Patient denies s/s of hypotension (lightheadedness, dizziness, nausea, fatigue) associated with low readings. Instructed patient to inform me if this occurs, patient confirms understanding.    Patient denies s/s of hypertension (SOB, CP, severe headaches, changes in vision) associated with high readings. Instructed patient to go to the ED if BP >180/110 and accompanied by hypertensive s/s, patient confirms understanding.    Assessment:  Patient's current 30-day average is not at goal of <130/80 mmHg.       Plan:  Furosemide changed to chlorthalidone for additional BP control, continue other medications as prescribed.   I will continue to monitor regularly and will follow-up in 2 to 3 weeks, sooner if blood pressure begins to trend upward or downward.     Current medication regimen:  Hypertension Medications             amLODIPine (NORVASC) 5 MG tablet Take 0.5 tablets (2.5 mg total) by mouth once daily.    chlorthalidone (HYGROTEN) 25 MG Tab Take 1 tablet (25 mg total) by mouth once daily.    irbesartan (AVAPRO) 300 MG tablet Take 1 tablet (300 mg total) by mouth once daily.    metoprolol succinate (TOPROL-XL) 100 MG 24 hr tablet Take 1 tablet (100 mg total) by mouth once daily.    nitroGLYCERIN (NITROSTAT) 0.4 MG SL tablet Place 0.4 mg under the tongue every 5 (five) minutes as needed.          Patient has my contact information and knows to call with any concerns or clinical changes.         2

## 2023-08-28 DIAGNOSIS — I10 HYPERTENSION, ESSENTIAL: ICD-10-CM

## 2023-08-28 RX ORDER — CHLORTHALIDONE 25 MG/1
TABLET ORAL
Qty: 90 TABLET | Refills: 3 | Status: SHIPPED | OUTPATIENT
Start: 2023-08-28 | End: 2023-12-26 | Stop reason: SDUPTHER

## 2023-09-12 ENCOUNTER — TELEPHONE (OUTPATIENT)
Dept: PODIATRY | Facility: CLINIC | Age: 73
End: 2023-09-12
Payer: MEDICARE

## 2023-09-12 NOTE — TELEPHONE ENCOUNTER
Called pt to inform him that his appt has been changed to 9/27 at 10:30 left message on voicemail.

## 2023-09-17 ENCOUNTER — PATIENT MESSAGE (OUTPATIENT)
Dept: FAMILY MEDICINE | Facility: CLINIC | Age: 73
End: 2023-09-17
Payer: MEDICARE

## 2023-09-19 ENCOUNTER — OFFICE VISIT (OUTPATIENT)
Dept: FAMILY MEDICINE | Facility: CLINIC | Age: 73
End: 2023-09-19
Payer: MEDICARE

## 2023-09-19 VITALS
WEIGHT: 216.06 LBS | OXYGEN SATURATION: 97 % | DIASTOLIC BLOOD PRESSURE: 60 MMHG | HEIGHT: 68 IN | HEART RATE: 67 BPM | SYSTOLIC BLOOD PRESSURE: 110 MMHG | BODY MASS INDEX: 32.74 KG/M2

## 2023-09-19 DIAGNOSIS — M25.512 ACUTE PAIN OF LEFT SHOULDER: Primary | ICD-10-CM

## 2023-09-19 DIAGNOSIS — Z79.4 TYPE 2 DIABETES MELLITUS WITH DIABETIC POLYNEUROPATHY, WITH LONG-TERM CURRENT USE OF INSULIN: ICD-10-CM

## 2023-09-19 DIAGNOSIS — I15.2 HYPERTENSION ASSOCIATED WITH TYPE 2 DIABETES MELLITUS: ICD-10-CM

## 2023-09-19 DIAGNOSIS — E11.59 HYPERTENSION ASSOCIATED WITH TYPE 2 DIABETES MELLITUS: ICD-10-CM

## 2023-09-19 DIAGNOSIS — Z23 FLU VACCINE NEED: ICD-10-CM

## 2023-09-19 DIAGNOSIS — E11.42 TYPE 2 DIABETES MELLITUS WITH DIABETIC POLYNEUROPATHY, WITH LONG-TERM CURRENT USE OF INSULIN: ICD-10-CM

## 2023-09-19 DIAGNOSIS — N18.31 STAGE 3A CHRONIC KIDNEY DISEASE: ICD-10-CM

## 2023-09-19 PROCEDURE — 99999 PR PBB SHADOW E&M-EST. PATIENT-LVL IV: ICD-10-PCS | Mod: PBBFAC,HCNC,, | Performed by: STUDENT IN AN ORGANIZED HEALTH CARE EDUCATION/TRAINING PROGRAM

## 2023-09-19 PROCEDURE — 1159F MED LIST DOCD IN RCRD: CPT | Mod: HCNC,CPTII,S$GLB, | Performed by: STUDENT IN AN ORGANIZED HEALTH CARE EDUCATION/TRAINING PROGRAM

## 2023-09-19 PROCEDURE — 3044F PR MOST RECENT HEMOGLOBIN A1C LEVEL <7.0%: ICD-10-PCS | Mod: HCNC,CPTII,S$GLB, | Performed by: STUDENT IN AN ORGANIZED HEALTH CARE EDUCATION/TRAINING PROGRAM

## 2023-09-19 PROCEDURE — 99214 OFFICE O/P EST MOD 30 MIN: CPT | Mod: HCNC,S$GLB,, | Performed by: STUDENT IN AN ORGANIZED HEALTH CARE EDUCATION/TRAINING PROGRAM

## 2023-09-19 PROCEDURE — 3074F PR MOST RECENT SYSTOLIC BLOOD PRESSURE < 130 MM HG: ICD-10-PCS | Mod: HCNC,CPTII,S$GLB, | Performed by: STUDENT IN AN ORGANIZED HEALTH CARE EDUCATION/TRAINING PROGRAM

## 2023-09-19 PROCEDURE — 90694 FLU VACCINE - QUADRIVALENT - ADJUVANTED: ICD-10-PCS | Mod: HCNC,S$GLB,, | Performed by: STUDENT IN AN ORGANIZED HEALTH CARE EDUCATION/TRAINING PROGRAM

## 2023-09-19 PROCEDURE — 1125F AMNT PAIN NOTED PAIN PRSNT: CPT | Mod: HCNC,CPTII,S$GLB, | Performed by: STUDENT IN AN ORGANIZED HEALTH CARE EDUCATION/TRAINING PROGRAM

## 2023-09-19 PROCEDURE — 3008F BODY MASS INDEX DOCD: CPT | Mod: HCNC,CPTII,S$GLB, | Performed by: STUDENT IN AN ORGANIZED HEALTH CARE EDUCATION/TRAINING PROGRAM

## 2023-09-19 PROCEDURE — 90694 VACC AIIV4 NO PRSRV 0.5ML IM: CPT | Mod: HCNC,S$GLB,, | Performed by: STUDENT IN AN ORGANIZED HEALTH CARE EDUCATION/TRAINING PROGRAM

## 2023-09-19 PROCEDURE — 1125F PR PAIN SEVERITY QUANTIFIED, PAIN PRESENT: ICD-10-PCS | Mod: HCNC,CPTII,S$GLB, | Performed by: STUDENT IN AN ORGANIZED HEALTH CARE EDUCATION/TRAINING PROGRAM

## 2023-09-19 PROCEDURE — 3288F PR FALLS RISK ASSESSMENT DOCUMENTED: ICD-10-PCS | Mod: HCNC,CPTII,S$GLB, | Performed by: STUDENT IN AN ORGANIZED HEALTH CARE EDUCATION/TRAINING PROGRAM

## 2023-09-19 PROCEDURE — 1100F PTFALLS ASSESS-DOCD GE2>/YR: CPT | Mod: HCNC,CPTII,S$GLB, | Performed by: STUDENT IN AN ORGANIZED HEALTH CARE EDUCATION/TRAINING PROGRAM

## 2023-09-19 PROCEDURE — 99214 PR OFFICE/OUTPT VISIT, EST, LEVL IV, 30-39 MIN: ICD-10-PCS | Mod: HCNC,S$GLB,, | Performed by: STUDENT IN AN ORGANIZED HEALTH CARE EDUCATION/TRAINING PROGRAM

## 2023-09-19 PROCEDURE — 3288F FALL RISK ASSESSMENT DOCD: CPT | Mod: HCNC,CPTII,S$GLB, | Performed by: STUDENT IN AN ORGANIZED HEALTH CARE EDUCATION/TRAINING PROGRAM

## 2023-09-19 PROCEDURE — G0008 ADMIN INFLUENZA VIRUS VAC: HCPCS | Mod: HCNC,S$GLB,, | Performed by: STUDENT IN AN ORGANIZED HEALTH CARE EDUCATION/TRAINING PROGRAM

## 2023-09-19 PROCEDURE — 3078F DIAST BP <80 MM HG: CPT | Mod: HCNC,CPTII,S$GLB, | Performed by: STUDENT IN AN ORGANIZED HEALTH CARE EDUCATION/TRAINING PROGRAM

## 2023-09-19 PROCEDURE — 3044F HG A1C LEVEL LT 7.0%: CPT | Mod: HCNC,CPTII,S$GLB, | Performed by: STUDENT IN AN ORGANIZED HEALTH CARE EDUCATION/TRAINING PROGRAM

## 2023-09-19 PROCEDURE — 3074F SYST BP LT 130 MM HG: CPT | Mod: HCNC,CPTII,S$GLB, | Performed by: STUDENT IN AN ORGANIZED HEALTH CARE EDUCATION/TRAINING PROGRAM

## 2023-09-19 PROCEDURE — 4010F PR ACE/ARB THEARPY RXD/TAKEN: ICD-10-PCS | Mod: HCNC,CPTII,S$GLB, | Performed by: STUDENT IN AN ORGANIZED HEALTH CARE EDUCATION/TRAINING PROGRAM

## 2023-09-19 PROCEDURE — 99999 PR PBB SHADOW E&M-EST. PATIENT-LVL IV: CPT | Mod: PBBFAC,HCNC,, | Performed by: STUDENT IN AN ORGANIZED HEALTH CARE EDUCATION/TRAINING PROGRAM

## 2023-09-19 PROCEDURE — G0008 FLU VACCINE - QUADRIVALENT - ADJUVANTED: ICD-10-PCS | Mod: HCNC,S$GLB,, | Performed by: STUDENT IN AN ORGANIZED HEALTH CARE EDUCATION/TRAINING PROGRAM

## 2023-09-19 PROCEDURE — 1159F PR MEDICATION LIST DOCUMENTED IN MEDICAL RECORD: ICD-10-PCS | Mod: HCNC,CPTII,S$GLB, | Performed by: STUDENT IN AN ORGANIZED HEALTH CARE EDUCATION/TRAINING PROGRAM

## 2023-09-19 PROCEDURE — 1160F RVW MEDS BY RX/DR IN RCRD: CPT | Mod: HCNC,CPTII,S$GLB, | Performed by: STUDENT IN AN ORGANIZED HEALTH CARE EDUCATION/TRAINING PROGRAM

## 2023-09-19 PROCEDURE — 1160F PR REVIEW ALL MEDS BY PRESCRIBER/CLIN PHARMACIST DOCUMENTED: ICD-10-PCS | Mod: HCNC,CPTII,S$GLB, | Performed by: STUDENT IN AN ORGANIZED HEALTH CARE EDUCATION/TRAINING PROGRAM

## 2023-09-19 PROCEDURE — 3078F PR MOST RECENT DIASTOLIC BLOOD PRESSURE < 80 MM HG: ICD-10-PCS | Mod: HCNC,CPTII,S$GLB, | Performed by: STUDENT IN AN ORGANIZED HEALTH CARE EDUCATION/TRAINING PROGRAM

## 2023-09-19 PROCEDURE — 4010F ACE/ARB THERAPY RXD/TAKEN: CPT | Mod: HCNC,CPTII,S$GLB, | Performed by: STUDENT IN AN ORGANIZED HEALTH CARE EDUCATION/TRAINING PROGRAM

## 2023-09-19 PROCEDURE — 3008F PR BODY MASS INDEX (BMI) DOCUMENTED: ICD-10-PCS | Mod: HCNC,CPTII,S$GLB, | Performed by: STUDENT IN AN ORGANIZED HEALTH CARE EDUCATION/TRAINING PROGRAM

## 2023-09-19 PROCEDURE — 1100F PR PT FALLS ASSESS DOC 2+ FALLS/FALL W/INJURY/YR: ICD-10-PCS | Mod: HCNC,CPTII,S$GLB, | Performed by: STUDENT IN AN ORGANIZED HEALTH CARE EDUCATION/TRAINING PROGRAM

## 2023-09-19 RX ORDER — DICLOFENAC SODIUM 30 MG/G
GEL TOPICAL
Qty: 100 G | Refills: 0 | Status: SHIPPED | OUTPATIENT
Start: 2023-09-19 | End: 2024-02-01

## 2023-09-19 RX ORDER — IRBESARTAN 150 MG/1
150 TABLET ORAL
COMMUNITY
Start: 2023-06-14 | End: 2023-10-09

## 2023-09-19 RX ORDER — LIDOCAINE 50 MG/G
1 PATCH TOPICAL DAILY
Qty: 30 PATCH | Refills: 0 | Status: SHIPPED | OUTPATIENT
Start: 2023-09-19 | End: 2024-02-05

## 2023-09-19 RX ORDER — CEPHRADINE 500 MG
CAPSULE ORAL
COMMUNITY
End: 2024-02-01

## 2023-09-19 RX ORDER — LISINOPRIL AND HYDROCHLOROTHIAZIDE 20; 25 MG/1; MG/1
TABLET ORAL
COMMUNITY
End: 2023-09-19 | Stop reason: ALTCHOICE

## 2023-09-19 RX ORDER — LIRAGLUTIDE 6 MG/ML
INJECTION SUBCUTANEOUS
COMMUNITY
End: 2024-02-05

## 2023-09-19 NOTE — PROGRESS NOTES
[facility] Note    Chief Complaint      Chief Complaint   Patient presents with    Shoulder Pain     Fell twice. Left shoulder      History of Present Illness     Roger Zavaleta Jr. is a 72 y.o. male with well controlled T2DM , sHTN, HLD, CKD stage 3b and cataract s/p removal presents with c/o worsening left shoulder pain, rated 8-9 on pain scale with any ROM in the past 5 weeks after falling and tripping on the left side of the body on a watery floor with crocs on while visiting her daughter's home, he was recovering only for him to trip and fall again for 2 weeks ago while working in his kitchen , no preceding or associated head trauma or LOC, he has not been unable to reach over his shoulder since then or drive without pain necessitating his presentation here today.    Problem List Addressed This Visit:     As listed below        Health Maintenance   Topic Date Due    Foot Exam  08/03/2023    Colorectal Cancer Screening  12/03/2023    Hemoglobin A1c  11/30/2023    Low Dose Statin  05/31/2024    Lipid Panel  05/31/2024    Eye Exam  06/23/2024    TETANUS VACCINE  05/25/2027    Hepatitis C Screening  Completed    Shingles Vaccine  Completed       Past Medical History:   Diagnosis Date    Acute midline low back pain with left-sided sciatica 8/16/2021    - no signs of neurological claudication based on symptoms - exam limited secondary to virtual visit - discussed symptoms with patient that would require urgent MRI including bowel or bladder changes, weakness left leg etc. - recommend start with stretches and add Robaxin 500 mg as needed - discuss if not improved in 2 weeks to notify me so we can get initial imaging and start physical therapy    CAD (coronary artery disease) 50    Diabetes mellitus type II     HLD (hyperlipidemia)        Past Surgical History:   Procedure Laterality Date    2 nasal surgery      CATARACT EXTRACTION W/  INTRAOCULAR LENS IMPLANT Left 1/11/2023    Procedure:  "EXTRACTION, CATARACT, WITH IOL INSERTION;  Surgeon: Jalen An MD;  Location: [facility] OR;  Service: Ophthalmology;  Laterality: Left;  DiB00 +21.0D    HERNIA REPAIR      umbilical    PHACOEMULSIFICATION OF CATARACT Left 1/11/2023    Procedure: PHACOEMULSIFICATION, CATARACT;  Surgeon: Jalen An MD;  Location: [facility] OR;  Service: Ophthalmology;  Laterality: Left;    rectal fissure      TONSILLECTOMY      TOTAL KNEE ARTHROPLASTY      TRIGGER FINGER RELEASE  9-30-13    left hand (middle finger)       family history includes Alcohol abuse in his father; Diabetes in his mother; Heart disease in his mother and unknown relative; No Known Problems in his daughter and daughter.    Social History     Tobacco Use    Smoking status: Never    Smokeless tobacco: Never   Substance Use Topics    Alcohol use: No    Drug use: No       Review of Systems   Constitutional:  Negative for chills, fatigue and fever.   Respiratory:  Negative for cough and shortness of breath.    Cardiovascular:  Negative for chest pain, palpitations and leg swelling.   Endocrine: Negative for polyphagia and polyuria.   Genitourinary:  Negative for dysuria, flank pain and frequency.   Musculoskeletal:  Positive for arthralgias (left shoulder). Negative for joint swelling.       Outpatient Encounter Medications as of 9/19/2023   Medication Sig Dispense Refill    alpha lipoic acid 200 mg Cap Take 1 capsule twice a day by oral route.      aspirin 81 MG chewable tablet Take 81 mg by mouth once daily.      BD VEO INSULIN SYR HALF UNIT 0.3 mL 31 gauge x 15/64" Syrg THREE TIMES DAILY AS DIRECTED  1    blood sugar diagnostic (ACCU-CHEK GUIDE TEST STRIPS) Strp USE 1 STRIP THREE TIMES DAILY AS NEEDED 300 strip 3    chlorthalidone (HYGROTEN) 25 MG Tab Take 1 tablet by mouth once daily 90 tablet 3    fluticasone propionate (FLONASE) 50 mcg/actuation nasal spray 1 spray (50 mcg total) by Each Nostril route once daily. 16 g 3    insulin (LANTUS SOLOSTAR U-100 INSULIN) " "glargine 100 units/mL SubQ pen Inject 47 Units into the skin every evening. 42.3 mL 3    insulin aspart U-100 (NOVOLOG U-100 INSULIN ASPART) 100 unit/mL injection Inject 10 Units into the skin 3 (three) times daily before meals. 27 mL 0    insulin detemir U-100, Levemir, (LEVEMIR FLEXPEN) 100 unit/mL (3 mL) InPn pen Inject 47 Units into the skin every evening. 45 mL 3    insulin syringe-needle U-100 (BD INSULIN SYRINGE ULTRA-FINE) 1 mL 31 gauge x 5/16 Syrg Use 3 (three) times daily. 100 each 11    irbesartan (AVAPRO) 150 MG tablet Take 150 mg by mouth.      lancets (ULTRA THIN LANCETS) 30 gauge Misc 1 lancet by Misc.(Non-Drug; Combo Route) route 3 (three) times daily. 200 each 3    liraglutide 0.6 mg/0.1 mL, 18 mg/3 mL, subq PNIJ (VICTOZA 2-BEL) 0.6 mg/0.1 mL (18 mg/3 mL) PnIj pen Inject 1.2 mg every day by sub-q route for 30 days.      lisinopriL-hydrochlorothiazide (PRINZIDE,ZESTORETIC) 20-25 mg Tab Take 1 tablet every day by oral route for 30 days.      metoprolol succinate (TOPROL-XL) 50 MG 24 hr tablet Take 1 tablet by mouth once daily 90 tablet 3    nitroGLYCERIN (NITROSTAT) 0.4 MG SL tablet Place 0.4 mg under the tongue every 5 (five) minutes as needed.      pen needle, diabetic 31 gauge x 5/16" Ndle use 2 (two) times a day. 100 each 3    pravastatin (PRAVACHOL) 40 MG tablet Take 1 tablet (40 mg total) by mouth once daily. 90 tablet 3    diclofenac sodium (SOLARAZE) 3 % gel Apply to affected area twice a day for the next 7 days 100 g 0    LIDOcaine (LIDODERM) 5 % Place 1 patch onto the skin once daily. Remove & Discard patch within 12 hours or as directed by MD 30 patch 0    [DISCONTINUED] insulin syringe-needle U-100 (BD VEO INSULIN SYRINGE UF) 1/2 mL 31 gauge x 15/64" Syrg 1 each by Misc.(Non-Drug; Combo Route) route 3 (three) times daily. 100 each 11     Facility-Administered Encounter Medications as of 9/19/2023   Medication Dose Route Frequency Provider Last Rate Last Admin    LIDOcaine (PF) 10 mg/ml " "(1%) injection 10 mg  1 mL Intradermal Once Jalen An MD        sodium chloride 0.9% flush 10 mL  10 mL Intravenous PRN Jalen An MD            Review of patient's allergies indicates:  No Known Allergies    Physical Exam      Vital Signs  Pulse: 67  SpO2: 97 %  BP: 110/60  Pain Score:   5  Height and Weight  Height: 5' 8" (172.7 cm)  Weight: 98 kg (216 lb 0.8 oz)  BSA (Calculated - sq m): 2.17 sq meters  BMI (Calculated): 32.9  Weight in (lb) to have BMI = 25: 164.1]    Physical Exam  Vitals reviewed.   Constitutional:       Appearance: He is obese.   HENT:      Head: Normocephalic and atraumatic.      Right Ear: Tympanic membrane normal.      Left Ear: Tympanic membrane normal.      Mouth/Throat:      Mouth: Mucous membranes are moist.   Eyes:      Extraocular Movements: Extraocular movements intact.      Pupils: Pupils are equal, round, and reactive to light.   Cardiovascular:      Rate and Rhythm: Normal rate and regular rhythm.      Pulses: Normal pulses.      Heart sounds: Normal heart sounds.   Pulmonary:      Effort: Pulmonary effort is normal.      Breath sounds: Normal breath sounds.   Abdominal:      General: There is no distension (central obesity).      Palpations: Abdomen is soft. There is no mass.   Musculoskeletal:         General: Tenderness (at the left anterior and posterior deltoid area, with restricted ROM both actively and passively, unable to abduct above 120 degress) present.      Right lower leg: No edema (trace with bilateral varicose veins).      Left lower leg: No edema.   Skin:     General: Skin is warm.   Neurological:      General: No focal deficit present.      Mental Status: He is alert.   Psychiatric:         Mood and Affect: Mood normal.          Laboratory:  CBC:  No results for input(s): "WBC", "RBC", "HGB", "HCT", "PLT", "MCV", "MCH", "MCHC" in the last 2160 hours.  CMP:  No results for input(s): "GLU", "CALCIUM", "ALBUMIN", "PROT", "NA", "K", "CO2", "CL", "BUN", " ""ALKPHOS", "ALT", "AST", "BILITOT" in the last 2160 hours.    Invalid input(s): "CREATININ"  URINALYSIS:  No results for input(s): "COLORU", "CLARITYU", "SPECGRAV", "PHUR", "PROTEINUA", "GLUCOSEU", "BILIRUBINCON", "BLOODU", "WBCU", "RBCU", "BACTERIA", "MUCUS", "NITRITE", "LEUKOCYTESUR", "UROBILINOGEN", "HYALINECASTS" in the last 2160 hours.   LIPIDS:  No results for input(s): "TSH", "HDL", "CHOL", "TRIG", "LDLCALC", "CHOLHDL", "NONHDLCHOL", "TOTALCHOLEST" in the last 2160 hours.  TSH:  No results for input(s): "TSH" in the last 2160 hours.  A1C:  No results for input(s): "HGBA1C" in the last 2160 hours.    Radiology:      Assessment/Plan     Roger Zavaleta Jr. is a 72 y.o.male with:    1. Acute left shoulder pain  -likely due to rotator cuff tear Vs sprain 2/2 mechanical fall  -worsening  -MRI shoulder ordered  -in the mean time start on diclofenac gel topical BID, lidocaine patches daily  -further mgt to be determined by MRI findings    2. Type 2 diabetes mellitus without complication, with long-term current use of insulin  Overview:  -HbA1C at goal  -c/w current regimen  -due for a repeat today, ordered  -UTD on eye, sees his ophthal qmonthly for shot in his left eye s/p cataract enucleation    3. Hypertension associated with type 2 diabetes mellitus  Overview:  - well controlled  - continue current medications    4. Varicose veins of both lower extremities with pain  -compression stockings advised  -leg elevation    5. Stage 3b chronic kidney disease  -stable renal function  -counseled on avoidance of nephrotoxins    6. Hyperlipidemia associated with type 2 diabetes mellitus  Overview:  Lab Results   Component Value Date    LDLCALC 109.8 11/19/2021     - well controlled  - continue current medication    7. BMI 32.0-32.9,adult  -counseled extensively on life style modifications         Continue current medications and maintain follow up with specialists.      Patient verbalizes understanding and agrees with " current treatment plan.      Conchita Chávez MD  [facility] [place]

## 2023-09-25 DIAGNOSIS — E11.42 TYPE 2 DIABETES MELLITUS WITH DIABETIC POLYNEUROPATHY, WITH LONG-TERM CURRENT USE OF INSULIN: ICD-10-CM

## 2023-09-25 DIAGNOSIS — Z79.4 TYPE 2 DIABETES MELLITUS WITH DIABETIC POLYNEUROPATHY, WITH LONG-TERM CURRENT USE OF INSULIN: ICD-10-CM

## 2023-09-25 RX ORDER — INSULIN ASPART 100 [IU]/ML
10 INJECTION, SOLUTION INTRAVENOUS; SUBCUTANEOUS
Qty: 30 ML | Refills: 0 | Status: SHIPPED | OUTPATIENT
Start: 2023-09-25 | End: 2023-12-21 | Stop reason: SDUPTHER

## 2023-09-26 ENCOUNTER — PATIENT MESSAGE (OUTPATIENT)
Dept: FAMILY MEDICINE | Facility: CLINIC | Age: 73
End: 2023-09-26
Payer: MEDICARE

## 2023-09-26 DIAGNOSIS — M75.52 SUBCORACOID BURSITIS OF LEFT SHOULDER: ICD-10-CM

## 2023-09-26 DIAGNOSIS — M75.102 TEAR OF LEFT SUPRASPINATUS TENDON: Primary | ICD-10-CM

## 2023-09-27 ENCOUNTER — OFFICE VISIT (OUTPATIENT)
Dept: PODIATRY | Facility: CLINIC | Age: 73
End: 2023-09-27
Payer: MEDICARE

## 2023-09-27 VITALS — BODY MASS INDEX: 32.34 KG/M2 | WEIGHT: 213.38 LBS | HEIGHT: 68 IN

## 2023-09-27 DIAGNOSIS — N18.32 STAGE 3B CHRONIC KIDNEY DISEASE: ICD-10-CM

## 2023-09-27 DIAGNOSIS — B35.1 ONYCHOMYCOSIS DUE TO DERMATOPHYTE: ICD-10-CM

## 2023-09-27 DIAGNOSIS — M20.42 HAMMER TOES OF BOTH FEET: ICD-10-CM

## 2023-09-27 DIAGNOSIS — M20.41 HAMMER TOES OF BOTH FEET: ICD-10-CM

## 2023-09-27 DIAGNOSIS — L84 CALLUS OF FOOT: ICD-10-CM

## 2023-09-27 DIAGNOSIS — I87.2 VENOUS INSUFFICIENCY (CHRONIC) (PERIPHERAL): ICD-10-CM

## 2023-09-27 DIAGNOSIS — E11.49 TYPE II DIABETES MELLITUS WITH NEUROLOGICAL MANIFESTATIONS: Primary | ICD-10-CM

## 2023-09-27 PROCEDURE — 11056 PARNG/CUTG B9 HYPRKR LES 2-4: CPT | Mod: HCNC,S$GLB,, | Performed by: PODIATRIST

## 2023-09-27 PROCEDURE — 11056 ROUTINE FOOT CARE: ICD-10-PCS | Mod: HCNC,S$GLB,, | Performed by: PODIATRIST

## 2023-09-27 PROCEDURE — 3044F HG A1C LEVEL LT 7.0%: CPT | Mod: HCNC,CPTII,S$GLB, | Performed by: PODIATRIST

## 2023-09-27 PROCEDURE — 11721 ROUTINE FOOT CARE: ICD-10-PCS | Mod: 59,HCNC,S$GLB, | Performed by: PODIATRIST

## 2023-09-27 PROCEDURE — 99213 OFFICE O/P EST LOW 20 MIN: CPT | Mod: 25,HCNC,S$GLB, | Performed by: PODIATRIST

## 2023-09-27 PROCEDURE — 99999 PR PBB SHADOW E&M-EST. PATIENT-LVL IV: ICD-10-PCS | Mod: PBBFAC,HCNC,, | Performed by: PODIATRIST

## 2023-09-27 PROCEDURE — 11721 DEBRIDE NAIL 6 OR MORE: CPT | Mod: 59,HCNC,S$GLB, | Performed by: PODIATRIST

## 2023-09-27 PROCEDURE — 3044F PR MOST RECENT HEMOGLOBIN A1C LEVEL <7.0%: ICD-10-PCS | Mod: HCNC,CPTII,S$GLB, | Performed by: PODIATRIST

## 2023-09-27 PROCEDURE — 4010F ACE/ARB THERAPY RXD/TAKEN: CPT | Mod: HCNC,CPTII,S$GLB, | Performed by: PODIATRIST

## 2023-09-27 PROCEDURE — 1160F PR REVIEW ALL MEDS BY PRESCRIBER/CLIN PHARMACIST DOCUMENTED: ICD-10-PCS | Mod: HCNC,CPTII,S$GLB, | Performed by: PODIATRIST

## 2023-09-27 PROCEDURE — 1100F PTFALLS ASSESS-DOCD GE2>/YR: CPT | Mod: HCNC,CPTII,S$GLB, | Performed by: PODIATRIST

## 2023-09-27 PROCEDURE — 1159F PR MEDICATION LIST DOCUMENTED IN MEDICAL RECORD: ICD-10-PCS | Mod: HCNC,CPTII,S$GLB, | Performed by: PODIATRIST

## 2023-09-27 PROCEDURE — 99213 PR OFFICE/OUTPT VISIT, EST, LEVL III, 20-29 MIN: ICD-10-PCS | Mod: 25,HCNC,S$GLB, | Performed by: PODIATRIST

## 2023-09-27 PROCEDURE — 1100F PR PT FALLS ASSESS DOC 2+ FALLS/FALL W/INJURY/YR: ICD-10-PCS | Mod: HCNC,CPTII,S$GLB, | Performed by: PODIATRIST

## 2023-09-27 PROCEDURE — 3288F FALL RISK ASSESSMENT DOCD: CPT | Mod: HCNC,CPTII,S$GLB, | Performed by: PODIATRIST

## 2023-09-27 PROCEDURE — 3288F PR FALLS RISK ASSESSMENT DOCUMENTED: ICD-10-PCS | Mod: HCNC,CPTII,S$GLB, | Performed by: PODIATRIST

## 2023-09-27 PROCEDURE — 99999 PR PBB SHADOW E&M-EST. PATIENT-LVL IV: CPT | Mod: PBBFAC,HCNC,, | Performed by: PODIATRIST

## 2023-09-27 PROCEDURE — 3008F PR BODY MASS INDEX (BMI) DOCUMENTED: ICD-10-PCS | Mod: HCNC,CPTII,S$GLB, | Performed by: PODIATRIST

## 2023-09-27 PROCEDURE — 1159F MED LIST DOCD IN RCRD: CPT | Mod: HCNC,CPTII,S$GLB, | Performed by: PODIATRIST

## 2023-09-27 PROCEDURE — 1125F AMNT PAIN NOTED PAIN PRSNT: CPT | Mod: HCNC,CPTII,S$GLB, | Performed by: PODIATRIST

## 2023-09-27 PROCEDURE — 4010F PR ACE/ARB THEARPY RXD/TAKEN: ICD-10-PCS | Mod: HCNC,CPTII,S$GLB, | Performed by: PODIATRIST

## 2023-09-27 PROCEDURE — 1125F PR PAIN SEVERITY QUANTIFIED, PAIN PRESENT: ICD-10-PCS | Mod: HCNC,CPTII,S$GLB, | Performed by: PODIATRIST

## 2023-09-27 PROCEDURE — 1160F RVW MEDS BY RX/DR IN RCRD: CPT | Mod: HCNC,CPTII,S$GLB, | Performed by: PODIATRIST

## 2023-09-27 PROCEDURE — 3008F BODY MASS INDEX DOCD: CPT | Mod: HCNC,CPTII,S$GLB, | Performed by: PODIATRIST

## 2023-09-27 NOTE — PATIENT INSTRUCTIONS
Diabetes: Inspecting Your Feet      Diabetes increases your chances of developing foot problems. So inspect your feet every day. This helps you find small skin irritations before they become serious ulcers or infections. If you have trouble seeing the bottoms of your feet, use a mirror or ask a family member or friend to help.  How to check your feet  Below are tips to help you look for foot problems. Try to check your feet at the same time each day, such as when you get out of bed in the morning:  Check the top of each foot. The tops of toes, back of the heel, and outer edge of the foot can get a lot of rubbing from poor-fitting shoes.  Check the bottom of each foot. Daily wear and tear often leads to problems at pressure spots.  Check the toes and nails. Fungal infections often occur between toes. Toenail problems can also be a sign of fungal infections or lead to breaks in the skin.  Check your shoes, too. Loose objects inside a shoe can injure the foot. Use your hand to feel inside your shoes for things like gregory, loose stitching, or rough areas that could irritate your skin.  Warning signs  Look for any color changes in the foot. Redness with streaks can signal a severe infection, which needs immediate medical attention. Tell your healthcare provider right away if you have any of these problems:  Swelling, sometimes with color changes, may be a sign of poor blood flow or infection. Symptoms include tenderness and an increase in the size of your foot.  Warm or hot areas on your feet may be signs of infection. A foot that is cold may not be getting enough blood.  Sensations such as burning, tingling, or pins and needles can be signs of a problem. Also check for areas that may be numb.  Hot spots are caused by friction or pressure. Look for hot spots in areas that get a lot of rubbing. Hot spots can turn into blisters, calluses, or sores.  Cracks and sores are caused by dry or irritated skin. They are a sign  that the skin is breaking down, which can lead to infection.  Toenail problems to watch for include nails growing into the skin (ingrown toenail) and causing redness or pain. Thick, yellow, or discolored nails can signal a fungal infection.  Drainage and odor can develop from untreated sores and ulcers. Call your healthcare provider right away if you notice white or yellow drainage, bleeding, or unpleasant odor.   Date Last Reviewed: 6/1/2016 © 2000-2017 [employer] [address] [place] [id number]. All rights reserved. This information is not intended as a substitute for professional medical care. Always follow your healthcare professional's instructions.    Long-Term Complications of Diabetes    Diabetes can cause health problems over time. These are called complications. They are more likely to happen if your blood sugar is often too high. Over time, high blood sugar can damage blood vessels in your body. It is important to keep your blood sugar in your target range. This can help prevent or delay complications from diabetes.  Possible complications  Complications of diabetes include:    Eye problems, including damage to the blood vessels in the eyes (retinopathy), pressure in the eye (glaucoma), and clouding of the eye's lens (a cataract). Eye problems can eventually lead to irreversible blindness.   Tooth and gum problems (periodontal disease), causing loss of teeth and bone  Blood vessel (vascular) disease leading to circulation problems, heart attack or stroke, or a need for amputation of a limb   Problems with sexual function leading to erectile dysfunction in men and sexual discomfort in women   Kidney disease (nephropathy) can eventually lead to kidney failure, which may require dialysis or kidney transplant   Nerve problems (neuropathy), causing pain or loss of feeling in your feet and other parts of your body, potentially leading to an amputation of a limb   High blood pressure  (hypertension), putting strain on your heart and blood vessels  Serious infections, possibly leading to loss of toes, feet, or limbs    How to avoid complications  The serious consequences of these complications may be avoidable for most people with diabetes by managing your blood glucose, blood pressure, and cholesterol levels. This can help you feel better and stay healthy. You can manage diabetes by tracking your blood sugar. You can also eat healthy and exercise to avoid gaining weight. And you should take medicine if directed by your healthcare provider.     Diabetes Foot Care Guidelines  Diabetic foot care is essential as diabetes can be dangerous to your feet--even a small cut can produce serious consequences. Diabetes may cause nerve damage that takes away the feeling in your feet. Diabetes may also reduce blood flow to the feet, making it harder to heal an injury or resist infection. Because of these problems, you may not notice a foreign object in your shoe. As a result, you could develop a blister or a sore. This could lead to an infection or a nonhealing wound that could put you at risk for an amputation.    To avoid serious foot problems that could result in losing a toe, foot or leg, follow these guidelines.    Inspect your feet daily. Check for cuts, blisters, redness, swelling or nail problems. Use a magnifying hand mirror to look at the bottom of your feet. Call your doctor if you notice anything.    Bathe feet in lukewarm, never hot, water. Keep your feet clean by washing them daily. Use only lukewarm water--the temperature you would use on a  baby.    Be gentle when bathing your feet. Wash them using a soft washcloth or sponge. Dry by blotting or patting and carefully dry between the toes.    Moisturize your feet but not between your toes. Use a moisturizer daily to keep dry skin from itching or cracking. But don't moisturize between the toes--that could encourage a fungal infection.    Cut  nails carefully. Cut them straight across and file the edges. Dont cut nails too short, as this could lead to ingrown toenails. If you have concerns about your nails, consult your doctor.    Never treat corns or calluses yourself. No bathroom surgery or medicated pads. Visit your doctor for appropriate treatment.    Wear clean, dry socks. Change them daily.    Consider socks made specifically for patients living with diabetes. These socks have extra cushioning, do not have elastic tops, are higher than the ankle and are made from fibers that wick moisture away from the skin.    Wear socks to bed. If your feet get cold at night, wear socks. Never use a heating pad or a hot water bottle.    Shake out your shoes and feel the inside before wearing. Remember, your feet may not be able to feel a pebble or other foreign object, so always inspect your shoes before putting them on.    Keep your feet warm and dry. Dont let your feet get wet in snow or rain. Wear warm socks and shoes in winter.    Consider using an antiperspirant on the soles of your feet. This is helpful if you have excessive sweating of the feet.    Never walk barefoot. Not even at home! Always wear shoes or slippers. You could step on something and get a scratch or cut.    Take care of your diabetes. Keep your blood sugar levels under control.    Do not smoke. Smoking restricts blood flow in your feet.    Get periodic foot exams. Seeing your foot and ankle surgeon on a regular basis can help prevent the foot complications of diabetes.

## 2023-09-27 NOTE — PROGRESS NOTES
Subjective:      Patient ID: Roger Zavaleta Jr. is a 72 y.o. male.    Chief Complaint: Diabetic Foot Exam      Roger is a 72 y.o. male who presents to the clinic for evaluation and treatment of high risk feet. Roger has a past medical history of Acute midline low back pain with left-sided sciatica (8/16/2021), CAD (coronary artery disease) (50), Diabetes mellitus type II, and HLD (hyperlipidemia). The patient's chief complaint is long, thick toenails. This patient has documented high risk feet requiring routine maintenance secondary to diabetes mellitis and those secondary complications of diabetes, as mentioned..  No other complaints than painful and mycotic nails x 10. He tells me he has underlying DM neuropathy and sometimes he feels numbness and tingling.     8/4/22 presenting for diabetic foot risk assessment.  Denies any pedal complaints other than elongated, mycotic, painful toenails times 10. Patient also tells me his glucose has been well controlled.    9/27/23:  Hx as above. presenting for diabetic foot risk assessment.  Denies any pedal complaints other than elongated, mycotic, painful toenails times 10.     PCP: Conchita Chávez MD    Date Last Seen by PCP: in epic     Hemoglobin A1C   Date Value Ref Range Status   05/31/2023 6.8 (H) 4.0 - 5.6 % Final     Comment:     ADA Screening Guidelines:  5.7-6.4%  Consistent with prediabetes  >or=6.5%  Consistent with diabetes    High levels of fetal hemoglobin interfere with the HbA1C  assay. Heterozygous hemoglobin variants (HbS, HgC, etc)do  not significantly interfere with this assay.   However, presence of multiple variants may affect accuracy.     10/24/2022 6.2 (H) 4.0 - 5.6 % Final     Comment:     ADA Screening Guidelines:  5.7-6.4%  Consistent with prediabetes  >or=6.5%  Consistent with diabetes    High levels of fetal hemoglobin interfere with the HbA1C  assay. Heterozygous hemoglobin variants (HbS, HgC, etc)do  not significantly interfere with  this assay.   However, presence of multiple variants may affect accuracy.     01/10/2022 6.9 (H) 4.0 - 5.6 % Final     Comment:     ADA Screening Guidelines:  5.7-6.4%  Consistent with prediabetes  >or=6.5%  Consistent with diabetes    High levels of fetal hemoglobin interfere with the HbA1C  assay. Heterozygous hemoglobin variants (HbS, HgC, etc)do  not significantly interfere with this assay.   However, presence of multiple variants may affect accuracy.       Review of Systems   Constitutional: Negative for decreased appetite, fever and malaise/fatigue.   HENT:  Negative for congestion.    Cardiovascular:  Negative for chest pain and leg swelling.   Respiratory:  Negative for cough and shortness of breath.    Skin:  Positive for color change. Negative for nail changes and rash.   Musculoskeletal:  Negative for arthritis, joint pain, joint swelling and muscle weakness.   Gastrointestinal:  Negative for bloating, abdominal pain, nausea and vomiting.   Neurological:  Positive for numbness and sensory change. Negative for headaches and weakness.   Psychiatric/Behavioral:  Negative for altered mental status.          Objective:      Physical Exam  Constitutional:       General: He is not in acute distress.     Appearance: He is well-developed.   Musculoskeletal:         General: No tenderness or deformity.   Skin:     General: Skin is warm.      Capillary Refill: Capillary refill takes less than 2 seconds.      Findings: No erythema.   Neurological:      Mental Status: He is alert and oriented to person, place, and time.   Psychiatric:         Behavior: Behavior normal.       Vascular: Distal DP/PT pulses palpable 1/4 b/l. CRT < 3 sec to tips of toes. + vericosities noted to b/l LEs. Hair growth absent b/l LE, warm to touch b/l LE and cool to touch to toes.     Dermatologic:   Toenails 1-5 bilaterally are discolored/yellowed, dystrophic, brittle. No incurvation. Mild xerosis noted with some skin pigmentation changes.  "Skin has color and trophic changes present. Callus present bilateral.    Musculoskeletal: Equinus noted b/l ankles with < 10 deg DF noted b/l. MMT 5/5 in DF/PF/Inv/Ev resistance with no reproduction of pain in any direction. Passive range of motion of ankle and pedal joints is painless b/l. No calf tenderness b/l LE, Compartments soft/compressible b/l. Hammertoes present 2-5 b/l.     Neurological:   Light touch, proprioception, and sharp/dull sensation are decreased b/l. Protective threshold with the [place] monofilament is decreased b/l. Vibratory sensation decreased b/l.         Assessment:       Encounter Diagnoses   Name Primary?    Type II diabetes mellitus with neurological manifestations Yes    Onychomycosis due to dermatophyte     Hammer toes of both feet     Stage 3b chronic kidney disease     Venous insufficiency (chronic) (peripheral)     Callus of foot            Plan:       Roger was seen today for diabetic foot exam.    Diagnoses and all orders for this visit:    Type II diabetes mellitus with neurological manifestations  -     Routine Foot Care  -     Foot Exam Performed    Onychomycosis due to dermatophyte  -     Routine Foot Care  -     Foot Exam Performed    Hammer toes of both feet  -     Routine Foot Care  -     Foot Exam Performed    Stage 3b chronic kidney disease  -     Foot Exam Performed    Venous insufficiency (chronic) (peripheral)  -     Foot Exam Performed    Callus of foot  -     Routine Foot Care  -     Foot Exam Performed        I counseled the patient on his conditions, their implications and medical management.    72 y.o.  male with diabetic foot risk assessment, diabetic neuropathy.     Routine Foot Care    Date/Time: 9/27/2023 11:03 AM    Performed by: Robert Taylor Jr., DPM  Authorized by: Robert Taylor Jr., DPM    Time out: Immediately prior to procedure a "time out" was called to verify the correct patient, procedure, equipment, support staff and site/side " marked as required.    Consent Done?:  Yes (Verbal)  Hyperkeratotic Skin Lesions?: Yes    Number of trimmed lesions:  4  Location(s):  Left 1st Toe, Right 1st Toe, Left 1st Metatarsal Head and Right 1st Metatarsal Head    Nail Care Type:  Debride  Location(s): All  (Left 1st Toe, Left 3rd Toe, Left 2nd Toe, Left 4th Toe, Left 5th Toe, Right 1st Toe, Right 2nd Toe, Right 3rd Toe, Right 4th Toe and Right 5th Toe)  Patient tolerance:  Patient tolerated the procedure well with no immediate complications      -It was discussed the importance of wearing shoes with adequate room in toe box to accommodate toe deformities. Recommended New Balance/[employer]ics shoe brands with adequate arch supports to alleviate abnormal pressure and improve stability of foot while walking. Avoid flat shoes and barefoot walking as these will exacerbate or worsen symptoms.   -The nature of the condition, options for management, as well as potential risks and complications were discussed in detail with patient. Patient was amenable to my recommendations and left my office fully informed and will follow up as instructed or sooner if necessary.    -Patient was advised of signs and symptoms of infection including redness, drainage, purulence, odor, streaking, fever, chills and I advised patient to seek medical attention (ER or urgent care) if these symptoms arise.   -Advised for optimal glucose control and maintenance per primary care physician. Patient was also educated on healthy diet that is naturally rich in nutrients and low in fat and calories.     -f/u 6 months

## 2023-10-04 ENCOUNTER — PATIENT MESSAGE (OUTPATIENT)
Dept: FAMILY MEDICINE | Facility: CLINIC | Age: 73
End: 2023-10-04
Payer: MEDICARE

## 2023-10-06 ENCOUNTER — OFFICE VISIT (OUTPATIENT)
Dept: ORTHOPEDICS | Facility: CLINIC | Age: 73
End: 2023-10-06
Payer: MEDICARE

## 2023-10-06 ENCOUNTER — HOSPITAL ENCOUNTER (OUTPATIENT)
Dept: RADIOLOGY | Facility: HOSPITAL | Age: 73
Discharge: HOME | End: 2023-10-06
Attending: ORTHOPAEDIC SURGERY
Payer: MEDICARE

## 2023-10-06 VITALS
BODY MASS INDEX: 32.81 KG/M2 | WEIGHT: 216.5 LBS | HEIGHT: 68 IN | SYSTOLIC BLOOD PRESSURE: 145 MMHG | HEART RATE: 68 BPM | DIASTOLIC BLOOD PRESSURE: 80 MMHG

## 2023-10-06 DIAGNOSIS — M75.52 SUBCORACOID BURSITIS OF LEFT SHOULDER: Primary | ICD-10-CM

## 2023-10-06 DIAGNOSIS — S46.012A TRAUMATIC INCOMPLETE TEAR OF LEFT ROTATOR CUFF, INITIAL ENCOUNTER: Primary | ICD-10-CM

## 2023-10-06 DIAGNOSIS — M75.102 TEAR OF LEFT SUPRASPINATUS TENDON: ICD-10-CM

## 2023-10-06 DIAGNOSIS — M75.52 SUBCORACOID BURSITIS OF LEFT SHOULDER: ICD-10-CM

## 2023-10-06 PROCEDURE — 73000 X-RAY EXAM OF COLLAR BONE: CPT | Mod: 26,HCNC,LT, | Performed by: INTERNAL MEDICINE

## 2023-10-06 PROCEDURE — 1100F PTFALLS ASSESS-DOCD GE2>/YR: CPT | Mod: HCNC,CPTII,S$GLB, | Performed by: ORTHOPAEDIC SURGERY

## 2023-10-06 PROCEDURE — 3044F HG A1C LEVEL LT 7.0%: CPT | Mod: HCNC,CPTII,S$GLB, | Performed by: ORTHOPAEDIC SURGERY

## 2023-10-06 PROCEDURE — 3077F PR MOST RECENT SYSTOLIC BLOOD PRESSURE >= 140 MM HG: ICD-10-PCS | Mod: HCNC,CPTII,S$GLB, | Performed by: ORTHOPAEDIC SURGERY

## 2023-10-06 PROCEDURE — 3044F PR MOST RECENT HEMOGLOBIN A1C LEVEL <7.0%: ICD-10-PCS | Mod: HCNC,CPTII,S$GLB, | Performed by: ORTHOPAEDIC SURGERY

## 2023-10-06 PROCEDURE — 99999 PR PBB SHADOW E&M-EST. PATIENT-LVL V: CPT | Mod: PBBFAC,HCNC,, | Performed by: ORTHOPAEDIC SURGERY

## 2023-10-06 PROCEDURE — 3288F PR FALLS RISK ASSESSMENT DOCUMENTED: ICD-10-PCS | Mod: HCNC,CPTII,S$GLB, | Performed by: ORTHOPAEDIC SURGERY

## 2023-10-06 PROCEDURE — 1125F AMNT PAIN NOTED PAIN PRSNT: CPT | Mod: HCNC,CPTII,S$GLB, | Performed by: ORTHOPAEDIC SURGERY

## 2023-10-06 PROCEDURE — 73000 X-RAY EXAM OF COLLAR BONE: CPT | Mod: TC,HCNC,PN,LT

## 2023-10-06 PROCEDURE — 99999 PR PBB SHADOW E&M-EST. PATIENT-LVL V: ICD-10-PCS | Mod: PBBFAC,HCNC,, | Performed by: ORTHOPAEDIC SURGERY

## 2023-10-06 PROCEDURE — 4010F PR ACE/ARB THEARPY RXD/TAKEN: ICD-10-PCS | Mod: HCNC,CPTII,S$GLB, | Performed by: ORTHOPAEDIC SURGERY

## 2023-10-06 PROCEDURE — 3079F PR MOST RECENT DIASTOLIC BLOOD PRESSURE 80-89 MM HG: ICD-10-PCS | Mod: HCNC,CPTII,S$GLB, | Performed by: ORTHOPAEDIC SURGERY

## 2023-10-06 PROCEDURE — 1100F PR PT FALLS ASSESS DOC 2+ FALLS/FALL W/INJURY/YR: ICD-10-PCS | Mod: HCNC,CPTII,S$GLB, | Performed by: ORTHOPAEDIC SURGERY

## 2023-10-06 PROCEDURE — 3008F PR BODY MASS INDEX (BMI) DOCUMENTED: ICD-10-PCS | Mod: HCNC,CPTII,S$GLB, | Performed by: ORTHOPAEDIC SURGERY

## 2023-10-06 PROCEDURE — 3077F SYST BP >= 140 MM HG: CPT | Mod: HCNC,CPTII,S$GLB, | Performed by: ORTHOPAEDIC SURGERY

## 2023-10-06 PROCEDURE — 1159F MED LIST DOCD IN RCRD: CPT | Mod: HCNC,CPTII,S$GLB, | Performed by: ORTHOPAEDIC SURGERY

## 2023-10-06 PROCEDURE — 99213 PR OFFICE/OUTPT VISIT, EST, LEVL III, 20-29 MIN: ICD-10-PCS | Mod: HCNC,S$GLB,, | Performed by: ORTHOPAEDIC SURGERY

## 2023-10-06 PROCEDURE — 99213 OFFICE O/P EST LOW 20 MIN: CPT | Mod: HCNC,S$GLB,, | Performed by: ORTHOPAEDIC SURGERY

## 2023-10-06 PROCEDURE — 1159F PR MEDICATION LIST DOCUMENTED IN MEDICAL RECORD: ICD-10-PCS | Mod: HCNC,CPTII,S$GLB, | Performed by: ORTHOPAEDIC SURGERY

## 2023-10-06 PROCEDURE — 1125F PR PAIN SEVERITY QUANTIFIED, PAIN PRESENT: ICD-10-PCS | Mod: HCNC,CPTII,S$GLB, | Performed by: ORTHOPAEDIC SURGERY

## 2023-10-06 PROCEDURE — 3288F FALL RISK ASSESSMENT DOCD: CPT | Mod: HCNC,CPTII,S$GLB, | Performed by: ORTHOPAEDIC SURGERY

## 2023-10-06 PROCEDURE — 1160F PR REVIEW ALL MEDS BY PRESCRIBER/CLIN PHARMACIST DOCUMENTED: ICD-10-PCS | Mod: HCNC,CPTII,S$GLB, | Performed by: ORTHOPAEDIC SURGERY

## 2023-10-06 PROCEDURE — 4010F ACE/ARB THERAPY RXD/TAKEN: CPT | Mod: HCNC,CPTII,S$GLB, | Performed by: ORTHOPAEDIC SURGERY

## 2023-10-06 PROCEDURE — 3008F BODY MASS INDEX DOCD: CPT | Mod: HCNC,CPTII,S$GLB, | Performed by: ORTHOPAEDIC SURGERY

## 2023-10-06 PROCEDURE — 73000 XR CLAVICLE LEFT: ICD-10-PCS | Mod: 26,HCNC,LT, | Performed by: INTERNAL MEDICINE

## 2023-10-06 PROCEDURE — 3079F DIAST BP 80-89 MM HG: CPT | Mod: HCNC,CPTII,S$GLB, | Performed by: ORTHOPAEDIC SURGERY

## 2023-10-06 PROCEDURE — 1160F RVW MEDS BY RX/DR IN RCRD: CPT | Mod: HCNC,CPTII,S$GLB, | Performed by: ORTHOPAEDIC SURGERY

## 2023-10-06 RX ORDER — METFORMIN HYDROCHLORIDE 1000 MG/1
TABLET ORAL
COMMUNITY
End: 2024-02-01

## 2023-10-09 DIAGNOSIS — I10 HYPERTENSION, ESSENTIAL: ICD-10-CM

## 2023-10-09 RX ORDER — METOPROLOL SUCCINATE 50 MG/1
50 TABLET, EXTENDED RELEASE ORAL DAILY
Qty: 90 TABLET | Refills: 3 | Status: SHIPPED | OUTPATIENT
Start: 2023-10-09

## 2023-10-09 RX ORDER — IRBESARTAN 150 MG/1
150 TABLET ORAL
Qty: 90 TABLET | Refills: 3 | Status: SHIPPED | OUTPATIENT
Start: 2023-10-09 | End: 2024-04-03 | Stop reason: SDUPTHER

## 2023-10-09 NOTE — PROGRESS NOTES
"Patient ID:   Roger Zavaleta Jr. is a 72 y.o. male.    Chief Complaint:   Left shoulder pain    HPI:   Mr. Zavaleta is a 72 year old male who is presenting with left shoulder pain. He reports sustaining a fall about six weeks ago in which he fell on the left side. After the fall, he had difficulty raising the arm over his head. He reports pain with attempted overhead motions and with driving. He has experienced some improvement since the fall but still experiencing pain. The pain today is primarily over the distal end of the clavicle and anterior shoulder. He also has some pain over the trapezial region.     Medications:    Current Outpatient Medications:     alpha lipoic acid 200 mg Cap, Take 1 capsule twice a day by oral route., Disp: , Rfl:     aspirin 81 MG chewable tablet, Take 81 mg by mouth once daily., Disp: , Rfl:     BD VEO INSULIN SYR HALF UNIT 0.3 mL 31 gauge x 15/64" Syrg, THREE TIMES DAILY AS DIRECTED, Disp: , Rfl: 1    blood sugar diagnostic (ACCU-CHEK GUIDE TEST STRIPS) Strp, USE 1 STRIP THREE TIMES DAILY AS NEEDED, Disp: 300 strip, Rfl: 3    chlorthalidone (HYGROTEN) 25 MG Tab, Take 1 tablet by mouth once daily, Disp: 90 tablet, Rfl: 3    diclofenac sodium (SOLARAZE) 3 % gel, Apply to affected area twice a day for the next 7 days, Disp: 100 g, Rfl: 0    fluticasone propionate (FLONASE) 50 mcg/actuation nasal spray, 1 spray (50 mcg total) by Each Nostril route once daily., Disp: 16 g, Rfl: 3    insulin aspart U-100 (NOVOLOG U-100 INSULIN ASPART) 100 unit/mL injection, Inject 10 Units into the skin 3 (three) times daily before meals., Disp: 30 mL, Rfl: 0    insulin detemir U-100, Levemir, (LEVEMIR FLEXPEN) 100 unit/mL (3 mL) InPn pen, Inject 47 Units into the skin every evening., Disp: 45 mL, Rfl: 3    insulin syringe-needle U-100 (BD INSULIN SYRINGE ULTRA-FINE) 1 mL 31 gauge x 5/16 Syrg, Use 3 (three) times daily., Disp: 100 each, Rfl: 11    irbesartan (AVAPRO) 150 MG tablet, Take 150 mg by mouth., " "Disp: , Rfl:     LIDOcaine (LIDODERM) 5 %, Place 1 patch onto the skin once daily. Remove & Discard patch within 12 hours or as directed by MD, Disp: 30 patch, Rfl: 0    liraglutide 0.6 mg/0.1 mL, 18 mg/3 mL, subq PNIJ (VICTOZA 2-BEL) 0.6 mg/0.1 mL (18 mg/3 mL) PnIj pen, Inject 1.2 mg every day by sub-q route for 30 days., Disp: , Rfl:     metFORMIN (GLUCOPHAGE) 1000 MG tablet, Take 1 tablet twice a day by oral route for 30 days., Disp: , Rfl:     metoprolol succinate (TOPROL-XL) 50 MG 24 hr tablet, Take 1 tablet by mouth once daily, Disp: 90 tablet, Rfl: 3    nitroGLYCERIN (NITROSTAT) 0.4 MG SL tablet, Place 0.4 mg under the tongue every 5 (five) minutes as needed., Disp: , Rfl:     pen needle, diabetic 31 gauge x 5/16" Ndle, use 2 (two) times a day., Disp: 100 each, Rfl: 3    pravastatin (PRAVACHOL) 40 MG tablet, Take 1 tablet (40 mg total) by mouth once daily., Disp: 90 tablet, Rfl: 3    lancets (ULTRA THIN LANCETS) 30 gauge Misc, 1 lancet by Misc.(Non-Drug; Combo Route) route 3 (three) times daily., Disp: 200 each, Rfl: 3    Allergies:  Review of patient's allergies indicates:  No Known Allergies    Past Medical History:  Past Medical History:   Diagnosis Date    Acute midline low back pain with left-sided sciatica 8/16/2021    - no signs of neurological claudication based on symptoms - exam limited secondary to virtual visit - discussed symptoms with patient that would require urgent MRI including bowel or bladder changes, weakness left leg etc. - recommend start with stretches and add Robaxin 500 mg as needed - discuss if not improved in 2 weeks to notify me so we can get initial imaging and start physical therapy    CAD (coronary artery disease) 50    Diabetes mellitus type II     HLD (hyperlipidemia)         Past Surgical History:  Past Surgical History:   Procedure Laterality Date    2 nasal surgery      CATARACT EXTRACTION W/  INTRAOCULAR LENS IMPLANT Left 1/11/2023    Procedure: EXTRACTION, CATARACT, WITH " "IOL INSERTION;  Surgeon: Jalen An MD;  Location: [facility] OR;  Service: Ophthalmology;  Laterality: Left;  DiB00 +21.0D    HERNIA REPAIR      umbilical    PHACOEMULSIFICATION OF CATARACT Left 1/11/2023    Procedure: PHACOEMULSIFICATION, CATARACT;  Surgeon: Jalen An MD;  Location: [facility] OR;  Service: Ophthalmology;  Laterality: Left;    rectal fissure      TONSILLECTOMY      TOTAL KNEE ARTHROPLASTY      TRIGGER FINGER RELEASE  9-30-13    left hand (middle finger)       Social History:  Social History     Occupational History     Employer: RETIRED   Tobacco Use    Smoking status: Never    Smokeless tobacco: Never   Substance and Sexual Activity    Alcohol use: No    Drug use: No    Sexual activity: Yes     Partners: Female       Family History:  Family History   Problem Relation Age of Onset    Heart disease Mother     Diabetes Mother     Alcohol abuse Father     Heart disease Unknown         premature    No Known Problems Daughter     No Known Problems Daughter     Cancer Neg Hx         ROS:  Review of Systems   Musculoskeletal:  Positive for falls, joint pain, myalgias and stiffness.   Neurological:  Negative for numbness and paresthesias.   All other systems reviewed and are negative.      Vitals:  BP (!) 145/80   Pulse 68   Ht 5' 8" (1.727 m)   Wt 98.2 kg (216 lb 7.9 oz)   BMI 32.92 kg/m²     Physical Examination:  Comprehensive Orthopaedic Musculoskeletal Exam    General      Constitutional: appears stated age, mildly obese, well-developed and well-nourished    Scleral icterus: no    Labored breathing: no    Psychiatric: normal mood and affect and no acute distress    Neurological: alert and oriented x3    Skin: intact    Lymphadenopathy: none     Ortho Exam   Left shoulder exam:  Tenderness to palpation over the coracoid region and distal clavicle. No gross deformity or atrophy.   ROM: active elevation 150, passive elevation 170, ER at the side 30.  Rotator cuff strength: 4+/5 elevation, 4+/5 ER, 5/5 " IR.  Negative belly press.   Positive Vila. Positive Neer.   Negative CBA.    Imaging:  I have ordered and independently reviewed the following imaging studies performed at [facility] today    X-Ray Clavicle Left  Narrative: EXAMINATION:  XR CLAVICLE LEFT    CLINICAL HISTORY:  Bursitis of left shoulder    TECHNIQUE:  Two views of the left clavicle were performed.    COMPARISON:  MRI shoulder 09/25/2023    FINDINGS:  No fracture or dislocation.  Moderate degenerative changes of the acromioclavicular joint.  The glenohumeral joint is not profiled.  No focal soft tissue swelling.  Subtle curvilinear calcifications near the greater tuberosity suggesting calcific rotator cuff tendinopathy.  Impression: As above.    Electronically signed by: Reji Kearney  Date:    10/06/2023  Time:    11:12    Assessment:  1. Traumatic incomplete tear of left rotator cuff, initial encounter    2. Tear of left supraspinatus tendon    3. Subcoracoid bursitis of left shoulder      Plan:  I have reviewed the exam and imaging findings with the patient in detail. I discussed the option of PT and an injection. At this time, he would like to start PT. If no improvement, he will return for consideration of an injection.     Orders Placed This Encounter    Ambulatory referral/consult to Physical/Occupational Therapy     No follow-ups on file.

## 2023-11-07 PROBLEM — M25.60 DECREASED RANGE OF MOTION: Status: ACTIVE | Noted: 2023-11-07

## 2023-11-11 DIAGNOSIS — E11.22 TYPE 2 DIABETES MELLITUS WITH STAGE 3B CHRONIC KIDNEY DISEASE, WITH LONG-TERM CURRENT USE OF INSULIN: ICD-10-CM

## 2023-11-11 DIAGNOSIS — Z79.4 TYPE 2 DIABETES MELLITUS WITH STAGE 3B CHRONIC KIDNEY DISEASE, WITH LONG-TERM CURRENT USE OF INSULIN: ICD-10-CM

## 2023-11-11 DIAGNOSIS — N18.32 TYPE 2 DIABETES MELLITUS WITH STAGE 3B CHRONIC KIDNEY DISEASE, WITH LONG-TERM CURRENT USE OF INSULIN: ICD-10-CM

## 2023-11-11 DIAGNOSIS — Z79.4 TYPE 2 DIABETES MELLITUS WITH DIABETIC POLYNEUROPATHY, WITH LONG-TERM CURRENT USE OF INSULIN: ICD-10-CM

## 2023-11-11 DIAGNOSIS — E11.42 TYPE 2 DIABETES MELLITUS WITH DIABETIC POLYNEUROPATHY, WITH LONG-TERM CURRENT USE OF INSULIN: ICD-10-CM

## 2023-11-13 RX ORDER — SYRINGE,SAFETY WITH NEEDLE,1ML 25GX1"
1 SYRINGE (EA) MISCELLANEOUS 3 TIMES DAILY
Qty: 100 EACH | Refills: 11 | Status: SHIPPED | OUTPATIENT
Start: 2023-11-13

## 2023-11-22 ENCOUNTER — PATIENT MESSAGE (OUTPATIENT)
Dept: FAMILY MEDICINE | Facility: CLINIC | Age: 73
End: 2023-11-22
Payer: MEDICARE

## 2023-12-21 DIAGNOSIS — Z79.4 TYPE 2 DIABETES MELLITUS WITH STAGE 3B CHRONIC KIDNEY DISEASE, WITH LONG-TERM CURRENT USE OF INSULIN: ICD-10-CM

## 2023-12-21 DIAGNOSIS — N18.32 TYPE 2 DIABETES MELLITUS WITH STAGE 3B CHRONIC KIDNEY DISEASE, WITH LONG-TERM CURRENT USE OF INSULIN: ICD-10-CM

## 2023-12-21 DIAGNOSIS — E11.22 TYPE 2 DIABETES MELLITUS WITH STAGE 3B CHRONIC KIDNEY DISEASE, WITH LONG-TERM CURRENT USE OF INSULIN: ICD-10-CM

## 2023-12-21 DIAGNOSIS — Z79.4 TYPE 2 DIABETES MELLITUS WITH DIABETIC POLYNEUROPATHY, WITH LONG-TERM CURRENT USE OF INSULIN: ICD-10-CM

## 2023-12-21 DIAGNOSIS — E11.42 TYPE 2 DIABETES MELLITUS WITH DIABETIC POLYNEUROPATHY, WITH LONG-TERM CURRENT USE OF INSULIN: ICD-10-CM

## 2023-12-22 RX ORDER — INSULIN ASPART 100 [IU]/ML
10 INJECTION, SOLUTION INTRAVENOUS; SUBCUTANEOUS
Qty: 30 ML | Refills: 3 | Status: SHIPPED | OUTPATIENT
Start: 2023-12-22 | End: 2024-12-21

## 2023-12-22 NOTE — TELEPHONE ENCOUNTER
Refill Routing Note   Medication(s) are not appropriate for processing by [facility] for the following reason(s):        Required labs outdated  Required labs abnormal  Non-participating provider    [facility] action(s):  Route               Appointments  past 12m or future 3m with PCP    Date Provider   Last Visit   9/19/2023 Conchita Chávez MD   Next Visit   12/21/2023 Conchita Cháevz MD   ED visits in past 90 days: 0        Note composed:10:01 AM 12/22/2023

## 2023-12-26 DIAGNOSIS — I10 HYPERTENSION, ESSENTIAL: ICD-10-CM

## 2023-12-26 RX ORDER — CHLORTHALIDONE 25 MG/1
25 TABLET ORAL DAILY
Qty: 90 TABLET | Refills: 3 | Status: SHIPPED | OUTPATIENT
Start: 2023-12-26

## 2024-01-09 ENCOUNTER — PATIENT MESSAGE (OUTPATIENT)
Dept: FAMILY MEDICINE | Facility: CLINIC | Age: 74
End: 2024-01-09
Payer: MEDICARE

## 2024-01-18 ENCOUNTER — PATIENT MESSAGE (OUTPATIENT)
Dept: ADMINISTRATIVE | Facility: HOSPITAL | Age: 74
End: 2024-01-18
Payer: MEDICARE

## 2024-02-01 ENCOUNTER — OFFICE VISIT (OUTPATIENT)
Dept: FAMILY MEDICINE | Facility: CLINIC | Age: 74
End: 2024-02-01
Payer: MEDICARE

## 2024-02-01 VITALS
TEMPERATURE: 98 F | DIASTOLIC BLOOD PRESSURE: 68 MMHG | OXYGEN SATURATION: 99 % | HEART RATE: 58 BPM | BODY MASS INDEX: 32.55 KG/M2 | HEIGHT: 68 IN | WEIGHT: 214.75 LBS | SYSTOLIC BLOOD PRESSURE: 126 MMHG

## 2024-02-01 DIAGNOSIS — I15.2 HYPERTENSION ASSOCIATED WITH TYPE 2 DIABETES MELLITUS: ICD-10-CM

## 2024-02-01 DIAGNOSIS — E66.09 CLASS 1 OBESITY DUE TO EXCESS CALORIES WITH SERIOUS COMORBIDITY AND BODY MASS INDEX (BMI) OF 32.0 TO 32.9 IN ADULT: ICD-10-CM

## 2024-02-01 DIAGNOSIS — D64.9 NORMOCYTIC ANEMIA: ICD-10-CM

## 2024-02-01 DIAGNOSIS — E11.59 HYPERTENSION ASSOCIATED WITH TYPE 2 DIABETES MELLITUS: ICD-10-CM

## 2024-02-01 DIAGNOSIS — N18.32 STAGE 3B CHRONIC KIDNEY DISEASE: ICD-10-CM

## 2024-02-01 DIAGNOSIS — E78.2 MIXED HYPERLIPIDEMIA: ICD-10-CM

## 2024-02-01 DIAGNOSIS — E11.42 TYPE 2 DIABETES MELLITUS WITH DIABETIC POLYNEUROPATHY, WITH LONG-TERM CURRENT USE OF INSULIN: ICD-10-CM

## 2024-02-01 DIAGNOSIS — Z01.818 PRE-OPERATIVE CLEARANCE: ICD-10-CM

## 2024-02-01 DIAGNOSIS — R00.1 BRADYCARDIA: ICD-10-CM

## 2024-02-01 DIAGNOSIS — Z79.4 TYPE 2 DIABETES MELLITUS WITH DIABETIC POLYNEUROPATHY, WITH LONG-TERM CURRENT USE OF INSULIN: ICD-10-CM

## 2024-02-01 DIAGNOSIS — H34.8320 TRIBUTARY (BRANCH) RETINAL VEIN OCCLUSION, LEFT EYE, WITH MACULAR EDEMA: ICD-10-CM

## 2024-02-01 PROCEDURE — 99999 PR PBB SHADOW E&M-EST. PATIENT-LVL IV: CPT | Mod: PBBFAC,HCNC,, | Performed by: STUDENT IN AN ORGANIZED HEALTH CARE EDUCATION/TRAINING PROGRAM

## 2024-02-01 PROCEDURE — 3078F DIAST BP <80 MM HG: CPT | Mod: HCNC,CPTII,S$GLB, | Performed by: STUDENT IN AN ORGANIZED HEALTH CARE EDUCATION/TRAINING PROGRAM

## 2024-02-01 PROCEDURE — 1160F RVW MEDS BY RX/DR IN RCRD: CPT | Mod: HCNC,CPTII,S$GLB, | Performed by: STUDENT IN AN ORGANIZED HEALTH CARE EDUCATION/TRAINING PROGRAM

## 2024-02-01 PROCEDURE — 1126F AMNT PAIN NOTED NONE PRSNT: CPT | Mod: HCNC,CPTII,S$GLB, | Performed by: STUDENT IN AN ORGANIZED HEALTH CARE EDUCATION/TRAINING PROGRAM

## 2024-02-01 PROCEDURE — 1159F MED LIST DOCD IN RCRD: CPT | Mod: HCNC,CPTII,S$GLB, | Performed by: STUDENT IN AN ORGANIZED HEALTH CARE EDUCATION/TRAINING PROGRAM

## 2024-02-01 PROCEDURE — 99215 OFFICE O/P EST HI 40 MIN: CPT | Mod: HCNC,S$GLB,, | Performed by: STUDENT IN AN ORGANIZED HEALTH CARE EDUCATION/TRAINING PROGRAM

## 2024-02-01 PROCEDURE — 3074F SYST BP LT 130 MM HG: CPT | Mod: HCNC,CPTII,S$GLB, | Performed by: STUDENT IN AN ORGANIZED HEALTH CARE EDUCATION/TRAINING PROGRAM

## 2024-02-01 PROCEDURE — 3288F FALL RISK ASSESSMENT DOCD: CPT | Mod: HCNC,CPTII,S$GLB, | Performed by: STUDENT IN AN ORGANIZED HEALTH CARE EDUCATION/TRAINING PROGRAM

## 2024-02-01 PROCEDURE — 1101F PT FALLS ASSESS-DOCD LE1/YR: CPT | Mod: HCNC,CPTII,S$GLB, | Performed by: STUDENT IN AN ORGANIZED HEALTH CARE EDUCATION/TRAINING PROGRAM

## 2024-02-01 NOTE — PROGRESS NOTES
[facility] Note    Chief Complaint      Chief Complaint   Patient presents with    Pre-op Exam  F/u on chronic conditions     History of Present Illness     Roger Zavaleta Jr. is a 73 y.o. male with well controlled T2DM , sHTN, HLD, CKD stage 3b and cataract s/p removal  and chronic left shoulder pain 2/2 partial supraspinatus tear/tendinosis presents for pre op clearance for right eye cataract removal scheduled for 02/14/2024 with Dr Duarte . No new complaints today , compliant with all his current regimen. Left shoulder pain as improved since s/p PT .     Problem List Addressed This Visit:     As listed below        Health Maintenance   Topic Date Due    Hemoglobin A1c  11/30/2023    Colorectal Cancer Screening  12/03/2023    Lipid Panel  05/31/2024    Eye Exam  09/22/2024    Foot Exam  09/27/2024    TETANUS VACCINE  05/25/2027    Hepatitis C Screening  Completed    Shingles Vaccine  Completed       Past Medical History:   Diagnosis Date    Acute midline low back pain with left-sided sciatica 8/16/2021    - no signs of neurological claudication based on symptoms - exam limited secondary to virtual visit - discussed symptoms with patient that would require urgent MRI including bowel or bladder changes, weakness left leg etc. - recommend start with stretches and add Robaxin 500 mg as needed - discuss if not improved in 2 weeks to notify me so we can get initial imaging and start physical therapy    CAD (coronary artery disease) 52    Diabetes mellitus type II     HLD (hyperlipidemia)        Past Surgical History:   Procedure Laterality Date    2 nasal surgery      CATARACT EXTRACTION W/  INTRAOCULAR LENS IMPLANT Left 1/11/2023    Procedure: EXTRACTION, CATARACT, WITH IOL INSERTION;  Surgeon: Jalen An MD;  Location: [facility];  Service: Ophthalmology;  Laterality: Left;  DiB00 +21.0D    HERNIA REPAIR      umbilical    PHACOEMULSIFICATION OF CATARACT Left 1/11/2023    Procedure:  "PHACOEMULSIFICATION, CATARACT;  Surgeon: Jalen An MD;  Location: [facility];  Service: Ophthalmology;  Laterality: Left;    rectal fissure      TONSILLECTOMY      TOTAL KNEE ARTHROPLASTY      TRIGGER FINGER RELEASE  9-30-13    left hand (middle finger)       family history includes Alcohol abuse in his father; Diabetes in his mother; Heart disease in his mother and unknown relative; No Known Problems in his daughter and daughter.    Social History     Tobacco Use    Smoking status: Never    Smokeless tobacco: Never   Substance Use Topics    Alcohol use: No    Drug use: No       Review of Systems   Constitutional:  Negative for chills, fatigue and fever.   Respiratory:  Negative for cough and shortness of breath.    Cardiovascular:  Negative for chest pain, palpitations and leg swelling.   Endocrine: Negative for polyphagia and polyuria.   Genitourinary:  Negative for dysuria, flank pain and frequency.   Musculoskeletal:  Negative for arthralgias and joint swelling.       Outpatient Encounter Medications as of 2/1/2024   Medication Sig Dispense Refill    aspirin 81 MG chewable tablet Take 81 mg by mouth once daily.      BD VEO INSULIN SYR HALF UNIT 0.3 mL 31 gauge x 15/64" Syrg THREE TIMES DAILY AS DIRECTED  1    blood sugar diagnostic (ACCU-CHEK GUIDE TEST STRIPS) Strp Inject 1 each into the skin 2 (two) times daily as needed. 300 strip 3    chlorthalidone (HYGROTEN) 25 MG Tab Take 1 tablet (25 mg total) by mouth once daily. 90 tablet 3    fluticasone propionate (FLONASE) 50 mcg/actuation nasal spray 1 spray (50 mcg total) by Each Nostril route once daily. 16 g 3    insulin aspart U-100 (NOVOLOG U-100 INSULIN ASPART) 100 unit/mL injection Inject 10 Units into the skin 3 (three) times daily before meals. 30 mL 3    insulin detemir U-100, Levemir, (LEVEMIR FLEXPEN) 100 unit/mL (3 mL) InPn pen Inject 47 Units into the skin every evening. 45 mL 3    insulin syringe-needle U-100 (BD INSULIN SYRINGE ULTRA-FINE) 1 mL 31 " "gauge x 5/16 Syrg Use 3 (three) times daily. 100 each 11    irbesartan (AVAPRO) 150 MG tablet Take 1 tablet by mouth once daily 90 tablet 3    lancets (ULTRA THIN LANCETS) 30 gauge Misc 1 lancet by Misc.(Non-Drug; Combo Route) route 3 (three) times daily. 200 each 3    LIDOcaine (LIDODERM) 5 % Place 1 patch onto the skin once daily. Remove & Discard patch within 12 hours or as directed by MD 30 patch 0    liraglutide 0.6 mg/0.1 mL, 18 mg/3 mL, subq PNIJ (VICTOZA 2-BEL) 0.6 mg/0.1 mL (18 mg/3 mL) PnIj pen Inject 1.2 mg every day by sub-q route for 30 days.      metoprolol succinate (TOPROL-XL) 50 MG 24 hr tablet Take 1 tablet (50 mg total) by mouth once daily. 90 tablet 3    nitroGLYCERIN (NITROSTAT) 0.4 MG SL tablet Place 0.4 mg under the tongue every 5 (five) minutes as needed.      pen needle, diabetic 31 gauge x 5/16" Ndle use 2 (two) times a day. 100 each 3    pravastatin (PRAVACHOL) 40 MG tablet Take 1 tablet (40 mg total) by mouth once daily. 90 tablet 3    [DISCONTINUED] alpha lipoic acid 200 mg Cap Take 1 capsule twice a day by oral route.      [DISCONTINUED] insulin syringe-needle U-100 (BD VEO INSULIN SYRINGE UF) 1/2 mL 31 gauge x 15/64" Syrg 1 each by Misc.(Non-Drug; Combo Route) route 3 (three) times daily. 100 each 11    [DISCONTINUED] metFORMIN (GLUCOPHAGE) 1000 MG tablet Take 1 tablet twice a day by oral route for 30 days.      [DISCONTINUED] diclofenac sodium (SOLARAZE) 3 % gel Apply to affected area twice a day for the next 7 days (Patient not taking: Reported on 2/1/2024) 100 g 0     No facility-administered encounter medications on file as of 2/1/2024.        Review of patient's allergies indicates:  No Known Allergies    Physical Exam      Vital Signs  Temp: 97.5 °F (36.4 °C)  Temp Source: Temporal  Pulse: (!) 58  SpO2: 99 %  BP: 126/68  BP Location: Right arm  Patient Position: Sitting  Pain Score: 0-No pain  Height and Weight  Height: 5' 8" (172.7 cm)  Weight: 97.4 kg (214 lb 11.7 oz)  BSA " "(Calculated - sq m): 2.16 sq meters  BMI (Calculated): 32.7  Weight in (lb) to have BMI = 25: 164.1]    Physical Exam  Vitals reviewed.   Constitutional:       Appearance: He is obese.   HENT:      Head: Normocephalic and atraumatic.      Right Ear: Tympanic membrane normal.      Left Ear: Tympanic membrane normal.      Mouth/Throat:      Mouth: Mucous membranes are moist.   Eyes:      Extraocular Movements: Extraocular movements intact.      Pupils: Pupils are equal, round, and reactive to light.   Cardiovascular:      Rate and Rhythm: Normal rate and regular rhythm.      Pulses: Normal pulses.      Heart sounds: Normal heart sounds.   Pulmonary:      Effort: Pulmonary effort is normal.      Breath sounds: Normal breath sounds.   Abdominal:      General: There is no distension (central obesity).      Palpations: Abdomen is soft. There is no mass.   Musculoskeletal:      Right lower leg: No edema (trace with bilateral varicose veins).      Left lower leg: No edema.   Skin:     General: Skin is warm.   Neurological:      General: No focal deficit present.      Mental Status: He is alert.   Psychiatric:         Mood and Affect: Mood normal.          Laboratory:  CBC:  No results for input(s): "WBC", "RBC", "HGB", "HCT", "PLT", "MCV", "MCH", "MCHC" in the last 2160 hours.  CMP:  No results for input(s): "GLU", "CALCIUM", "ALBUMIN", "PROT", "NA", "K", "CO2", "CL", "BUN", "ALKPHOS", "ALT", "AST", "BILITOT" in the last 2160 hours.    Invalid input(s): "CREATININ"  URINALYSIS:  No results for input(s): "COLORU", "CLARITYU", "SPECGRAV", "PHUR", "PROTEINUA", "GLUCOSEU", "BILIRUBINCON", "BLOODU", "WBCU", "RBCU", "BACTERIA", "MUCUS", "NITRITE", "LEUKOCYTESUR", "UROBILINOGEN", "HYALINECASTS" in the last 2160 hours.   LIPIDS:  No results for input(s): "TSH", "HDL", "CHOL", "TRIG", "LDLCALC", "CHOLHDL", "NONHDLCHOL", "TOTALCHOLEST" in the last 2160 hours.  TSH:  No results for input(s): "TSH" in the last 2160 hours.  A1C:  No " "results for input(s): "HGBA1C" in the last 2160 hours.    Radiology:      Assessment/Plan     Roger Zavaleta Jr. is a 73 y.o.male with:    1. Pre op clearance for non-cardiac surgery  -right eye cataract removal scheduled with Dr Duarte (02/14/2024)  -rCRI with 1 point( 6% mortality) risk  -labs due ordered  -patient is medically cleared for scheduled procedure      2. Type 2 diabetes mellitus without complication, with long-term current use of insulin  Overview:  -HbA1C at goal  -c/w current regimen  -due for a repeat today, ordered  -UTD on eye, sees his ophthal qmonthly for shot in his left eye s/p cataract enucleation    3. Hypertension associated with type 2 diabetes mellitus  Overview:  - well controlled  - continue current medications    4. Varicose veins of both lower extremities with pain  -compression stockings advised  -leg elevation    5. Stage 3b chronic kidney disease  -stable renal function  -counseled on avoidance of nephrotoxins    6. Hyperlipidemia associated with type 2 diabetes mellitus  Overview:  Lab Results   Component Value Date    LDLCALC 109.8 11/19/2021     - well controlled  - continue current medication    7. BMI 32.0-32.9,adult  -counseled extensively on life style modifications    8. Sinus Bradycardia  -asymptomatic  -likely iatrogenic ( from BB)  -monitor for now         Continue current medications and maintain follow up with specialists.      Patient verbalizes understanding and agrees with current treatment plan.      Conchita Chávez MD  [facility]"

## 2024-02-28 ENCOUNTER — PATIENT MESSAGE (OUTPATIENT)
Dept: FAMILY MEDICINE | Facility: CLINIC | Age: 74
End: 2024-02-28
Payer: MEDICARE

## 2024-04-03 RX ORDER — IRBESARTAN 150 MG/1
150 TABLET ORAL DAILY
Qty: 90 TABLET | Refills: 3 | Status: SHIPPED | OUTPATIENT
Start: 2024-04-03

## 2024-05-17 DIAGNOSIS — Z79.4 TYPE 2 DIABETES MELLITUS WITH STAGE 2 CHRONIC KIDNEY DISEASE, WITH LONG-TERM CURRENT USE OF INSULIN: ICD-10-CM

## 2024-05-17 DIAGNOSIS — Z79.4 TYPE 2 DIABETES MELLITUS WITH DIABETIC POLYNEUROPATHY, WITH LONG-TERM CURRENT USE OF INSULIN: ICD-10-CM

## 2024-05-17 DIAGNOSIS — N18.2 TYPE 2 DIABETES MELLITUS WITH STAGE 2 CHRONIC KIDNEY DISEASE, WITH LONG-TERM CURRENT USE OF INSULIN: ICD-10-CM

## 2024-05-17 DIAGNOSIS — E11.42 TYPE 2 DIABETES MELLITUS WITH DIABETIC POLYNEUROPATHY, WITH LONG-TERM CURRENT USE OF INSULIN: ICD-10-CM

## 2024-05-17 DIAGNOSIS — E11.22 TYPE 2 DIABETES MELLITUS WITH STAGE 2 CHRONIC KIDNEY DISEASE, WITH LONG-TERM CURRENT USE OF INSULIN: ICD-10-CM

## 2024-05-17 NOTE — TELEPHONE ENCOUNTER
Care Due:                  Date            Visit Type   Department     Provider  --------------------------------------------------------------------------------                                             [facility]  Last Visit: 02-      PRE-OP       [facility]  Conchita Chávez  Next Visit: None Scheduled  None         None Found                                                            Last  Test          Frequency    Reason                     Performed    Due Date  --------------------------------------------------------------------------------    HBA1C.......  6 months...  insulin..................  02- 07-    Health [facility] Embedded Care Due Messages. Reference number: [id number].   5/17/2024 2:59:17 PM CDT

## 2024-05-19 RX ORDER — SYRINGE,SAFETY WITH NEEDLE,1ML 25GX1"
1 SYRINGE (EA) MISCELLANEOUS 3 TIMES DAILY
Qty: 300 EACH | Refills: 3 | Status: SHIPPED | OUTPATIENT
Start: 2024-05-19

## 2024-05-19 NOTE — TELEPHONE ENCOUNTER
Refill Routing Note   Medication(s) are not appropriate for processing by [facility] for the following reason(s):        New or recently adjusted medication    [facility] action(s):  Defer  Approve   Requires labs : Yes             Appointments  past 12m or future 3m with PCP    Date Provider   Last Visit   2/1/2024 Conchita Chávez MD   Next Visit   Visit date not found Conchita Chávez MD   ED visits in past 90 days: 0        Note composed:11:51 AM 05/19/2024

## 2024-05-20 RX ORDER — BLOOD-GLUCOSE SENSOR
1 EACH MISCELLANEOUS 4 TIMES DAILY
Qty: 6 EACH | Refills: 3 | Status: SHIPPED | OUTPATIENT
Start: 2024-05-20

## 2024-07-05 DIAGNOSIS — I10 HYPERTENSION, ESSENTIAL: ICD-10-CM

## 2024-07-06 NOTE — TELEPHONE ENCOUNTER
No care due was identified.  [facility] Embedded Care Due Messages. Reference number: [id number].   7/05/2024 11:26:28 PM CDT

## 2024-07-08 RX ORDER — IRBESARTAN 150 MG/1
150 TABLET ORAL DAILY
Qty: 90 TABLET | Refills: 3 | Status: SHIPPED | OUTPATIENT
Start: 2024-07-08

## 2024-07-08 RX ORDER — METOPROLOL SUCCINATE 50 MG/1
50 TABLET, EXTENDED RELEASE ORAL DAILY
Qty: 90 TABLET | Refills: 3 | Status: SHIPPED | OUTPATIENT
Start: 2024-07-08

## 2024-08-05 ENCOUNTER — OFFICE VISIT (OUTPATIENT)
Dept: PODIATRY | Facility: CLINIC | Age: 74
End: 2024-08-05
Payer: MEDICARE

## 2024-08-05 VITALS — BODY MASS INDEX: 32.52 KG/M2 | WEIGHT: 213.88 LBS

## 2024-08-05 DIAGNOSIS — M20.42 HAMMER TOES OF BOTH FEET: ICD-10-CM

## 2024-08-05 DIAGNOSIS — M20.41 HAMMER TOES OF BOTH FEET: ICD-10-CM

## 2024-08-05 DIAGNOSIS — L84 CALLUS OF FOOT: ICD-10-CM

## 2024-08-05 DIAGNOSIS — N18.32 STAGE 3B CHRONIC KIDNEY DISEASE: ICD-10-CM

## 2024-08-05 DIAGNOSIS — B35.1 ONYCHOMYCOSIS DUE TO DERMATOPHYTE: ICD-10-CM

## 2024-08-05 DIAGNOSIS — I87.2 VENOUS INSUFFICIENCY (CHRONIC) (PERIPHERAL): ICD-10-CM

## 2024-08-05 DIAGNOSIS — E11.49 TYPE II DIABETES MELLITUS WITH NEUROLOGICAL MANIFESTATIONS: Primary | ICD-10-CM

## 2024-08-05 PROCEDURE — 99999 PR PBB SHADOW E&M-EST. PATIENT-LVL IV: CPT | Mod: PBBFAC,HCNC,, | Performed by: PODIATRIST

## 2024-08-05 PROCEDURE — 3051F HG A1C>EQUAL 7.0%<8.0%: CPT | Mod: HCNC,CPTII,S$GLB, | Performed by: PODIATRIST

## 2024-08-05 PROCEDURE — 1160F RVW MEDS BY RX/DR IN RCRD: CPT | Mod: HCNC,CPTII,S$GLB, | Performed by: PODIATRIST

## 2024-08-05 PROCEDURE — 1101F PT FALLS ASSESS-DOCD LE1/YR: CPT | Mod: HCNC,CPTII,S$GLB, | Performed by: PODIATRIST

## 2024-08-05 PROCEDURE — 99213 OFFICE O/P EST LOW 20 MIN: CPT | Mod: 25,HCNC,S$GLB, | Performed by: PODIATRIST

## 2024-08-05 PROCEDURE — 1126F AMNT PAIN NOTED NONE PRSNT: CPT | Mod: HCNC,CPTII,S$GLB, | Performed by: PODIATRIST

## 2024-08-05 PROCEDURE — 3288F FALL RISK ASSESSMENT DOCD: CPT | Mod: HCNC,CPTII,S$GLB, | Performed by: PODIATRIST

## 2024-08-05 PROCEDURE — 11721 DEBRIDE NAIL 6 OR MORE: CPT | Mod: Q9,59,HCNC,S$GLB | Performed by: PODIATRIST

## 2024-08-05 PROCEDURE — 11056 PARNG/CUTG B9 HYPRKR LES 2-4: CPT | Mod: Q9,HCNC,S$GLB, | Performed by: PODIATRIST

## 2024-08-05 PROCEDURE — 1159F MED LIST DOCD IN RCRD: CPT | Mod: HCNC,CPTII,S$GLB, | Performed by: PODIATRIST

## 2024-08-17 DIAGNOSIS — N18.32 TYPE 2 DIABETES MELLITUS WITH STAGE 3B CHRONIC KIDNEY DISEASE, WITH LONG-TERM CURRENT USE OF INSULIN: ICD-10-CM

## 2024-08-17 DIAGNOSIS — E11.22 TYPE 2 DIABETES MELLITUS WITH STAGE 3B CHRONIC KIDNEY DISEASE, WITH LONG-TERM CURRENT USE OF INSULIN: ICD-10-CM

## 2024-08-17 DIAGNOSIS — Z79.4 TYPE 2 DIABETES MELLITUS WITH STAGE 3B CHRONIC KIDNEY DISEASE, WITH LONG-TERM CURRENT USE OF INSULIN: ICD-10-CM

## 2024-08-17 RX ORDER — PEN NEEDLE, DIABETIC 30 GX3/16"
1 NEEDLE, DISPOSABLE MISCELLANEOUS 2 TIMES DAILY
Qty: 100 EACH | Refills: 2 | Status: SHIPPED | OUTPATIENT
Start: 2024-08-17

## 2024-08-17 NOTE — TELEPHONE ENCOUNTER
Provider Staff:  Action required for this patient    Requires labs      Please see care gap opportunities below in Care Due Message.    Thanks!  [facility]     Appointments      Date Provider   Last Visit   2/1/2024 Conchita Chávez MD   Next Visit   Visit date not found Conchita Chávez MD     Refill Decision Note   Roger Zavaleta  is requesting a refill authorization.  Brief Assessment and Rationale for Refill:  Approve     Medication Therapy Plan:         Comments:     Note composed:2:21 PM 08/17/2024

## 2024-08-17 NOTE — TELEPHONE ENCOUNTER
Care Due:                  Date            Visit Type   Department     Provider  --------------------------------------------------------------------------------                                             [facility]  Last Visit: 02-      PRE-OP       HEALTH [facility]ENTER  Conchita Cáhvez  Next Visit: None Scheduled  None         None Found                                                            Last  Test          Frequency    Reason                     Performed    Due Date  --------------------------------------------------------------------------------    HBA1C.......  6 months...  insulin..................  02- 07-    Health [facility] Embedded Care Due Messages. Reference number: [id number].   8/17/2024 1:45:12 AM CDT

## 2024-09-06 NOTE — PROGRESS NOTES
This patient was seen at the request of the attending psychiatrist, Zee Sethi MD for history and physical medical consultation clearance.    History and Physical    Patient: Keon Lemon  Date of Service: 2024    :     1997  PCP: Sukhi Ballesteros PA-C       Subjective:     Chief Complaint: Psychosis    HPI: Patient is admitted for his bizarre behavior and disorganized thought at home, brought in by the family.  Also complains of feeling suicidal.  Denies any physical issues bothering him.  He has learning disability and severe anxiety and OCD.      Past Medical History:   Diagnosis Date    Asthma (CMD)     Depression     Essential (primary) hypertension     GERD (gastroesophageal reflux disease)     Mitral valve prolapse 2021    OCD (obsessive compulsive disorder)     Panic disorder     UTI (lower urinary tract infection)        Past Surgical History:   Procedure Laterality Date    Echocardiogram  2021    Esophagogastroduodenoscopy transoral flex diag  2023    Normal EGD.  Continue PPI medication and titrate to symptom control per GI.    Event monitor  2021    Nm hoa per rst/strs pharmacolo  2021    Past surgical history      None        Prior to Admission medications    Medication Sig Start Date End Date Taking? Authorizing Provider   risperiDONE (RisperDAL) 1 MG tablet Take 1 tablet by mouth nightly. 24  Yes Yan Oropeza MD   pantoprazole (PROTONIX) 40 MG tablet Take 1 tablet by mouth daily (before breakfast). Begin taking on 2024. 24  Yes Asha Gifford MD   metoPROLOL succinate (TOPROL-XL) 50 MG 24 hr tablet Take 1 tablet by mouth daily. 24 Yes Tiesha Yousif APNP   sertraline (ZOLOFT) 100 MG tablet TAKE 2 TABLETS BY MOUTH DAILY. 24   Yan Oropeza MD   clotrimazole (LOTRIMIN) 1 % cream Apply 1 application. topically as needed (flare ups).  Patient not taking: Reported on 2024    Provider, Outside  Updated responsible provider and placed new enrollment orders for HDMP and DDMP.      ipratropium (ATROVENT) 0.03 % nasal spray Spray 2 sprays in each nostril every 12 hours as needed for Rhinitis. 12/7/23   Paula Denney APNP       Current IP Meds (From admission, onward)      Start     Stop Status Route Frequency Ordered    09/05/24 2231  acetaminophen (TYLENOL) tablet 1,000 mg  (Patients 13 years and older (13-17 weighing 50 kg or greater))        Placed in \"Or\" Linked Group    -- Verified PO EVERY 6 HOURS PRN 09/05/24 2231 09/05/24 2231  acetaminophen (TYLENOL) tablet 500 mg  (Patients 13 years and older (13-17 weighing 50 kg or greater))        Placed in \"Or\" Linked Group    -- Verified PO EVERY 4 HOURS PRN 09/05/24 2231 09/05/24 2231  acetaminophen (TYLENOL) tablet 650 mg  (Patients 13 years and older (13-17 weighing 50 kg or greater))        Placed in \"Or\" Linked Group    -- Verified PO EVERY 4 HOURS PRN 09/05/24 2231 09/05/24 2231  acetaminophen (TYLENOL) tablet 650 mg  (Patients 13 years and older (13-17 weighing 50 kg or greater))        Placed in \"Or\" Linked Group    -- Verified PO EVERY 4 HOURS PRN 09/05/24 2231 09/05/24 2231  aluminum-magnesium hydroxide-simethicone (MAALOX) 200-200-20 MG/5ML suspension 30 mL         -- Verified PO EVERY 4 HOURS PRN 09/05/24 2231 09/05/24 2231  benztropine (COGENTIN) tablet 1 mg  (Benztropine IM or PO)        Placed in \"Or\" Linked Group    -- Verified PO EVERY 4 HOURS PRN 09/05/24 2231 09/05/24 2231  benztropine mesylate (COGENTIN) 1 MG/ML injection 1 mg  (Benztropine IM or PO)        Placed in \"Or\" Linked Group    -- Verified IM EVERY 4 HOURS PRN 09/05/24 2231 09/05/24 2231  bisacodyl (DULCOLAX) suppository 10 mg  (docusate sodium-sennosides (SENOKOT S), bisacodyl (DULCOLAX), and magnesium hydroxide (MILK OF MAGNESIA) for symptoms refractory to polyethylene glycol)         -- Verified RE DAILY PRN 09/05/24 2231 09/05/24 2231  docusate sodium-sennosides (SENOKOT S) 50-8.6 MG 2 tablet  (docusate sodium-sennosides (SENOKOT S),  bisacodyl (DULCOLAX), and magnesium hydroxide (MILK OF MAGNESIA) for symptoms refractory to polyethylene glycol)         -- Verified PO 2 TIMES DAILY PRN 09/05/24 2231 09/05/24 2231  haloperidol (HALDOL) tablet 10 mg  (Haloperidol IM or PO)        Placed in \"Or\" Linked Group    -- Dispensed PO EVERY 2 HOURS PRN 09/05/24 2231 09/05/24 2231  haloperidol lactate (HALDOL) 5 MG/ML short-acting injection 5 mg  (Haloperidol IM or PO)        Placed in \"Or\" Linked Group    -- Verified IM EVERY 2 HOURS PRN 09/05/24 2231 09/05/24 2231  hydrOXYzine (ATARAX) tablet 50 mg         -- Dispensed PO EVERY 4 HOURS PRN 09/05/24 2231 09/05/24 2231  loperamide (IMODIUM) capsule 2 mg         -- Verified PO PRN 09/05/24 2231 09/05/24 2231  LORazepam (ATIVAN) injection 2 mg  (Lorazepam IM or PO)        Placed in \"Or\" Linked Group    -- Verified IM EVERY 4 HOURS PRN 09/05/24 2231 09/05/24 2231  LORazepam (ATIVAN) tablet 2 mg  (Lorazepam IM or PO)        Placed in \"Or\" Linked Group    -- Dispensed PO EVERY 4 HOURS PRN 09/05/24 2231 09/05/24 2231  magnesium hydroxide (MILK OF MAGNESIA) 400 MG/5ML suspension 30 mL  (docusate sodium-sennosides (SENOKOT S), bisacodyl (DULCOLAX), and magnesium hydroxide (MILK OF MAGNESIA) for symptoms refractory to polyethylene glycol)         -- Verified PO DAILY PRN 09/05/24 2231 09/06/24 0900  metoPROLOL succinate (TOPROL-XL) ER tablet 50 mg        Note to Pharmacy: OP SIG:Take 1 tablet by mouth daily.      -- Dispensed PO DAILY 09/05/24 2231 09/05/24 2231  OLANZapine (ZyPREXA ZYDIS) disintegrating tablet 5 mg         -- Dispensed PO EVERY 2 HOURS PRN 09/05/24 2231 09/05/24 2231  ondansetron (ZOFRAN ODT) disintegrating tablet 4 mg         -- Verified PO 2 TIMES DAILY PRN 09/05/24 2231 09/06/24 0600  pantoprazole (PROTONIX) EC tablet 40 mg        Note to Pharmacy: OP SIG:Take 1 tablet by mouth daily (before breakfast). Begin taking on July 11, 2024.      -- Dispensed PO DAILY  BEFORE BREAKFAST 09/05/24 2231 09/05/24 2231  polyethylene glycol (MIRALAX) packet 17 g         -- Verified PO DAILY PRN 09/05/24 2231 09/05/24 2300  risperiDONE (RisperDAL) tablet 1 mg  Status:  Discontinued        Note to Pharmacy: OP SIG:Take 1 tablet by mouth nightly.      09/06/24 1317 Discontinued PO NIGHTLY 09/05/24 2231 09/06/24 2100  risperiDONE (RisperDAL) tablet 1 mg  Status:  Discontinued        Note to Pharmacy: OP SIG:Take 1 tablet by mouth nightly.      09/06/24 1318 Discontinued PO 2 times per day 09/06/24 1317    09/06/24 1400  risperiDONE (RisperDAL) tablet 1 mg        Note to Pharmacy: OP SIG:Take 1 tablet by mouth nightly.      -- Dispensed PO 2 times per day 09/06/24 1318    09/07/24 0900  sertraline (ZOLOFT) tablet 100 mg        Note to Pharmacy: OP SIG:TAKE 2 TABLETS BY MOUTH DAILY.      -- Verified PO DAILY 09/06/24 1317    09/06/24 0900  sertraline (ZOLOFT) tablet 200 mg  Status:  Discontinued        Note to Pharmacy: OP SIG:TAKE 2 TABLETS BY MOUTH DAILY.      09/06/24 1317 Discontinued PO DAILY 09/05/24 2231 09/05/24 2231  traZODone (DESYREL) tablet 50 mg         -- Dispensed PO NIGHTLY PRN 09/05/24 2231            ALLERGIES:   Allergen Reactions    Sulfa Drugs Cross Reactors HIVES       Family History   Problem Relation Age of Onset    Anxiety disorder Mother     Pulmonary Hypertension Mother     Patient is unaware of any medical problems Sister     Asthma Brother     Hypertension Maternal Grandmother     Atrial Fibrilliation Maternal Grandmother     Hypertension Maternal Grandfather     Other Maternal Grandfather         hydrocephalis    Heart disease Paternal Grandfather     Hyperlipidemia Neg Hx     Kidney disease Neg Hx         Social History     Tobacco Use    Smoking status: Never    Smokeless tobacco: Never   Vaping Use    Vaping status: never used   Substance Use Topics    Alcohol use: No    Drug use: No       Review of Systems  CONSTITUTIONAL: Denies unintentional  weight change, fatigue, fever, problematic headaches.  EYES:  Denies visual blurring, double vision.  ENT: Denies chronic sinus or nasal problems,dysphagia.    CV:  Denies chest discomfort with exertion, SOB, palpitations.  RESPIRATORY: Denies cough, SOB.  GI:  Denies abdominal pain, frequent nausea, vomiting, diarrhea, constipation, hematemesis, hematochezia, melena.  : Denies frequency, dysuria.  MSK: Denies joint pain, muscle aches.  NEURO:  Denies muscle weakness or paralysis, numbness or tingling, seizures.  PSYCH: See HPI above.  ENDOCRINE:  Denies polyphagia, polydipsia, polyuria.  HEME/LYMPH:  Denies abnormal bruising or bleeding, lumps or bumps.  ALLERGY/IMMUN: Denies chronic sinus problems, chronic nasal problems.    All other systems reviewed and negative.      Objective:     Visit Vitals  /68 (BP Location: RUE - Right upper extremity, Patient Position: Supine)   Pulse 66   Temp 97.5 °F (36.4 °C) (Oral)   Resp 17   Ht 5' 8\" (1.727 m)   Wt 59.5 kg (131 lb 2.8 oz)   SpO2 97%   BMI 19.94 kg/m²       General Appearance: Well developed male. Alert, cooperative, no distress, appears stated age.  HEENT: Normocephalic, without obvious abnormality. PERRL, no icterus, EOM's intact. Hearing appears intact. No nasal discharge.  Neck: Supple, symmetrical, trachea midline, no adenopathy. Thyroid: no enlargement/tenderness/nodules.  Back: ROM normal, no CVA tenderness.  Lungs: Clear to auscultation bilaterally, respirations unlabored.  Heart: Regular rate and rhythm, S1 and S2 normal, no murmur, rub or gallop.  Abdomen: Soft, non-tender, bowel sounds active, no masses, no organomegaly.  Extremities: Extremities normal, atraumatic, no cyanosis. No stiffness, swelling.   Skin: Skin color, texture, turgor normal, no rashes or lesions.  Lymph nodes: Cervical nodes normal.  Genitorectal: Deferred.  Neurologic: CRANIAL NERVE:  Alert and oriented x3.  No gross deficit of sensory or cranial nerves II through XII:  II:  Acuity and visual fields are normal.  III, IV, VI:  External ocular movements are normal, and there is no presence of any nystagmus or ptosis.  Pupils are of equal size, regularity, and react equally to the light and accommodation.  No evidence of divergent or convergent strabismus or diplopia when looking down or to either side.  V:   Facial sensation is intact and equal.  There is no deviation of the jaw or weakness of masseter or temporal muscles.  VII: Facial expression, nasolabial folds, forehead wrinkle are normal.  VIII: Hearing is intact.  There is no evidence of nystagmus and cerebellar function is intact.  IX:  Normal gag reflex.  X:   No evidence of deviation of the soft palate or laryngeal paralysis.    XI:  Shrug of shoulders is equal and strong.  XII: No deviation of the protruded tongue.  No evidence of atrophy or fine fibrillation.  Finger-nose test is normal.  Gait is normal. Rhomberg sign is negative.    Data Review:      CBC  Recent Labs   Lab 09/06/24  0642   WBC 8.3   RBC 4.45*   HGB 13.7   HCT 40.7   MCV 91.5   MCH 30.8   MCHC 33.7   RDWCV 12.8      NRBCRE 0   SEG 54   TLYMPH 34   PMON 10   PEOS 1   PBASO 0   ANEUT 4.5   ALYMS 2.9   LINWOOD 0.8   AEOS 0.1   ABASO 0.0       CMP  Recent Labs   Lab 09/06/24  0642   SODIUM 137   POTASSIUM 3.6   CHLORIDE 104   CO2 27   ANIONGAP 10   GLUCOSE 88   BUN 17   CREATININE 0.76   CALCIUM 9.6   ALBUMIN 4.2   BILIRUBIN 0.9   ALKPT 105   AST 13   GPT 21   GLOB 2.4   AGR 1.8       Lipid Panel  Recent Labs   Lab 09/06/24  0642   CHOLESTEROL 202*   CALCLDL 131*   HDL 57   TRIGLYCERIDE 71   NONHDL 145   CHOHDL 3.5       Urinalysis  No results found    Other  Recent Labs   Lab 09/06/24  0642   TSH 1.277        Assessment:     Mood disorder with psychosis  Suicidal ideation  Mitral valve prolapse  Learning disability    Plan:     Patient is advised a diet.  Continue the metoprolol for his MVP.  Patient is medically stable appropriate for inpatient.  Thank you  Dr. Barrios  Patient is cleared for treatment here, and advised to follow up with primary care provider for medical concerns.    Stanley Oliva MD  9/6/2024

## 2024-10-04 ENCOUNTER — OFFICE VISIT (OUTPATIENT)
Dept: FAMILY MEDICINE | Facility: CLINIC | Age: 74
End: 2024-10-04
Payer: MEDICARE

## 2024-10-04 VITALS
WEIGHT: 223.19 LBS | SYSTOLIC BLOOD PRESSURE: 126 MMHG | OXYGEN SATURATION: 99 % | BODY MASS INDEX: 33.83 KG/M2 | HEART RATE: 64 BPM | HEIGHT: 68 IN | DIASTOLIC BLOOD PRESSURE: 64 MMHG | TEMPERATURE: 98 F

## 2024-10-04 DIAGNOSIS — E88.1 LIPODYSTROPHY DUE TO INSULIN: ICD-10-CM

## 2024-10-04 DIAGNOSIS — E66.811 CLASS 1 OBESITY DUE TO EXCESS CALORIES WITH SERIOUS COMORBIDITY AND BODY MASS INDEX (BMI) OF 33.0 TO 33.9 IN ADULT: ICD-10-CM

## 2024-10-04 DIAGNOSIS — Z79.4 TYPE 2 DIABETES MELLITUS WITH STAGE 3B CHRONIC KIDNEY DISEASE, WITH LONG-TERM CURRENT USE OF INSULIN: ICD-10-CM

## 2024-10-04 DIAGNOSIS — E11.22 TYPE 2 DIABETES MELLITUS WITH STAGE 3B CHRONIC KIDNEY DISEASE, WITH LONG-TERM CURRENT USE OF INSULIN: ICD-10-CM

## 2024-10-04 DIAGNOSIS — Z12.11 ENCOUNTER FOR COLORECTAL CANCER SCREENING: ICD-10-CM

## 2024-10-04 DIAGNOSIS — Z12.12 ENCOUNTER FOR COLORECTAL CANCER SCREENING: ICD-10-CM

## 2024-10-04 DIAGNOSIS — E66.09 CLASS 1 OBESITY DUE TO EXCESS CALORIES WITH SERIOUS COMORBIDITY AND BODY MASS INDEX (BMI) OF 33.0 TO 33.9 IN ADULT: ICD-10-CM

## 2024-10-04 DIAGNOSIS — N18.32 TYPE 2 DIABETES MELLITUS WITH STAGE 3B CHRONIC KIDNEY DISEASE, WITH LONG-TERM CURRENT USE OF INSULIN: ICD-10-CM

## 2024-10-04 DIAGNOSIS — N62 GYNECOMASTIA: ICD-10-CM

## 2024-10-04 DIAGNOSIS — I10 HYPERTENSION, ESSENTIAL: ICD-10-CM

## 2024-10-04 DIAGNOSIS — E78.2 MIXED HYPERLIPIDEMIA: ICD-10-CM

## 2024-10-04 DIAGNOSIS — M94.0 ACUTE COSTOCHONDRITIS: ICD-10-CM

## 2024-10-04 DIAGNOSIS — Z23 FLU VACCINE NEED: ICD-10-CM

## 2024-10-04 PROCEDURE — 99999 PR PBB SHADOW E&M-EST. PATIENT-LVL IV: CPT | Mod: PBBFAC,HCNC,, | Performed by: STUDENT IN AN ORGANIZED HEALTH CARE EDUCATION/TRAINING PROGRAM

## 2024-10-04 RX ORDER — INSULIN PMP CART,AUT,G6/7,CNTR
1 EACH SUBCUTANEOUS
Qty: 10 EACH | Refills: 11 | Status: SHIPPED | OUTPATIENT
Start: 2024-10-04

## 2024-10-04 RX ORDER — PRAVASTATIN SODIUM 40 MG/1
40 TABLET ORAL DAILY
Qty: 90 TABLET | Refills: 3 | Status: SHIPPED | OUTPATIENT
Start: 2024-10-04 | End: 2025-10-04

## 2024-10-04 RX ORDER — MAGNESIUM HYDROXIDE 400 MG/5ML
1 SUSPENSION, ORAL (FINAL DOSE FORM) ORAL DAILY
COMMUNITY

## 2024-10-04 RX ORDER — PRAVASTATIN SODIUM 40 MG/1
40 TABLET ORAL DAILY
Qty: 90 TABLET | Refills: 3 | Status: SHIPPED | OUTPATIENT
Start: 2024-10-04 | End: 2024-10-04

## 2024-10-04 NOTE — PROGRESS NOTES
[facility] Note    Chief Complaint      F/u on chronic conditions   Left breast swelling  Erratic blood sugar levels   Left lower rib pain     History of Present Illness     Roger Zavaleta Jr. is a 73 y.o. male with T2DM , sHTN, HLD, CKD stage 3b and cataract s/p removal  and chronic left shoulder pain 2/2 partial supraspinatus tear/tendinosis presents with above complaints . Home BS readings range in the 40-160s especially in the eveing with symptomatic hypoglycemia ( shivering, palpitations, sweating )  on current regimen . He is compliant with all his current regimen. Left shoulder pain as improved since s/p PT .     Problem List Addressed This Visit:     As listed below        Health Maintenance   Topic Date Due    Low Dose Statin  Never done    Colorectal Cancer Screening  12/03/2023    Hemoglobin A1c  08/01/2024    Lipid Panel  02/01/2025    Eye Exam  05/28/2025    Foot Exam  10/04/2025    TETANUS VACCINE  05/25/2027    Hepatitis C Screening  Completed    Shingles Vaccine  Completed       Past Medical History:   Diagnosis Date    Acute midline low back pain with left-sided sciatica 8/16/2021    - no signs of neurological claudication based on symptoms - exam limited secondary to virtual visit - discussed symptoms with patient that would require urgent MRI including bowel or bladder changes, weakness left leg etc. - recommend start with stretches and add Robaxin 500 mg as needed - discuss if not improved in 2 weeks to notify me so we can get initial imaging and start physical therapy    CAD (coronary artery disease) 52    Diabetes mellitus type II     HLD (hyperlipidemia)        Past Surgical History:   Procedure Laterality Date    2 nasal surgery      CATARACT EXTRACTION W/  INTRAOCULAR LENS IMPLANT Left 01/11/2023    Procedure: EXTRACTION, CATARACT, WITH IOL INSERTION;  Surgeon: Jalen An MD;  [facility];  Service: Ophthalmology;  Laterality: Left;  DiB00 +21.0D     "EXTRACTION OF CATARACT Right 2/14/2024    Procedure: EXTRACTION, CATARACT;  Surgeon: Jalen An MD;  Location: [facility] OR;  Service: Ophthalmology;  Laterality: Right;  DiB00 +21.0D    HERNIA REPAIR      umbilical    PHACOEMULSIFICATION OF CATARACT Left 01/11/2023    Procedure: PHACOEMULSIFICATION, CATARACT;  Surgeon: Jalen An MD;  Location: [facility] OR;  Service: Ophthalmology;  Laterality: Left;    PHACOEMULSIFICATION, CATARACT, WITH IOL INSERTION Right 2/14/2024    Procedure: PHACOEMULSIFICATION, CATARACT, WITH IOL INSERTION;  Surgeon: Jalen An MD;  Location: [facility] OR;  Service: Ophthalmology;  Laterality: Right;    rectal fissure      TONSILLECTOMY      TOTAL KNEE ARTHROPLASTY Bilateral     TRIGGER FINGER RELEASE  09/30/2013    left hand (middle finger)       family history includes Alcohol abuse in his father; Diabetes in his mother; Heart disease in his mother and unknown relative; No Known Problems in his daughter and daughter.    Social History     Tobacco Use    Smoking status: Never    Smokeless tobacco: Never   Substance Use Topics    Alcohol use: Never    Drug use: Never       Review of Systems   Constitutional:  Negative for chills, fatigue and fever.   Respiratory:  Negative for cough and shortness of breath.    Cardiovascular:  Positive for chest pain (lower rib cage). Negative for palpitations and leg swelling.   Endocrine: Negative for polyphagia and polyuria.   Genitourinary:  Negative for dysuria, flank pain and frequency.   Musculoskeletal:  Positive for arthralgias. Negative for joint swelling.   Neurological:  Negative for weakness.       Outpatient Encounter Medications as of 10/4/2024   Medication Sig Note Dispense Refill    aspirin 81 MG chewable tablet Take 81 mg by mouth once daily.       BD VEO INSULIN SYR HALF UNIT 0.3 mL 31 gauge x 15/64" Syrg THREE TIMES DAILY AS DIRECTED   1    blood sugar diagnostic (ACCU-CHEK GUIDE TEST STRIPS) Strp Inject 1 each into the skin 2 (two) " "times daily as needed.  300 strip 3    blood-glucose sensor (DEXCOM G7 SENSOR) Olga Lidia Use as directed 4 times daily. Change every 10 days.  6 each 3    chlorthalidone (HYGROTEN) 25 MG Tab Take 1 tablet (25 mg total) by mouth once daily.  90 tablet 3    fluticasone propionate (FLONASE) 50 mcg/actuation nasal spray 1 spray (50 mcg total) by Each Nostril route once daily. 2/5/2024: Not taking   16 g 3    insulin aspart U-100 (NOVOLOG U-100 INSULIN ASPART) 100 unit/mL injection Inject 10 Units into the skin 3 (three) times daily before meals.  30 mL 3    insulin detemir U-100, Levemir, (LEVEMIR FLEXPEN) 100 unit/mL (3 mL) InPn pen Inject 47 Units into the skin every evening.  45 mL 3    insulin syringe-needle U-100 (BD INSULIN SYRINGE ULTRA-FINE) 1 mL 31 gauge x 5/16 Syrg Use 3 (three) times daily.  300 each 3    metoprolol succinate (TOPROL-XL) 50 MG 24 hr tablet Take 1 tablet (50 mg total) by mouth once daily.  90 tablet 3    nitroGLYCERIN (NITROSTAT) 0.4 MG SL tablet Place 0.4 mg under the tongue every 5 (five) minutes as needed.       pen needle, diabetic (BD ULTRA-FINE SHORT PEN NEEDLE) 31 gauge x 5/16" Ndle use 2 (two) times a day.  100 each 2    insulin pump cart,automated,BT (OMNIPOD 5 G6 PODS, GEN 5,) Crtg Inject 1 each into the skin Every 3 (three) days.  10 each 11    irbesartan (AVAPRO) 150 MG tablet Take 1 tablet (150 mg total) by mouth once daily. (Patient not taking: Reported on 10/4/2024)  90 tablet 3    lancets (ULTRA THIN LANCETS) 30 gauge Misc 1 lancet by Misc.(Non-Drug; Combo Route) route 3 (three) times daily.  200 each 3    potassium gluconate 595 mg (99 mg) Tab Take 1 tablet by mouth once daily.       pravastatin (PRAVACHOL) 40 MG tablet Take 1 tablet (40 mg total) by mouth once daily.  90 tablet 3    [DISCONTINUED] insulin syringe-needle U-100 (BD VEO INSULIN SYRINGE UF) 1/2 mL 31 gauge x 15/64" Syrg 1 each by Misc.(Non-Drug; Combo Route) route 3 (three) times daily.  100 each 11 " "    Facility-Administered Encounter Medications as of 10/4/2024   Medication Dose Route Frequency Provider Last Rate Last Admin    [COMPLETED] influenza (adjuvanted) (Fluad) 45 mcg/0.5 mL IM vaccine (> or = 64 yo) 0.5 mL  0.5 mL Intramuscular 1 time in Clinic/HOD    0.5 mL at 10/04/24 1452        Review of patient's allergies indicates:  No Known Allergies    Physical Exam      Vital Signs  Temp: 97.5 °F (36.4 °C)  Pulse: 64  SpO2: 99 %  BP: 130/67  BP Location: Right arm  Patient Position: Sitting  Pain Score:   4  Pain Loc: Chest  Height and Weight  Height: 5' 8" (172.7 cm)  Weight: 101.2 kg (223 lb 3.5 oz)  BSA (Calculated - sq m): 2.2 sq meters  BMI (Calculated): 33.9  Weight in (lb) to have BMI = 25: 164.1]    Physical Exam  Vitals reviewed.   Constitutional:       Appearance: He is obese.   HENT:      Head: Normocephalic and atraumatic.      Mouth/Throat:      Mouth: Mucous membranes are moist.   Eyes:      Extraocular Movements: Extraocular movements intact.      Pupils: Pupils are equal, round, and reactive to light.   Cardiovascular:      Rate and Rhythm: Normal rate and regular rhythm.      Pulses: Normal pulses.      Heart sounds: Normal heart sounds.   Pulmonary:      Effort: Pulmonary effort is normal.      Breath sounds: Normal breath sounds.   Abdominal:      General: There is no distension (central obesity).      Palpations: Abdomen is soft. There is no mass.   Musculoskeletal:         General: Swelling (bilateral gynecomastia, no palpable lumps) and tenderness (left lower lateral rib tenderness, no erythema, no swelling) present.      Right lower leg: No edema (trace with bilateral varicose veins).      Left lower leg: No edema.   Skin:     General: Skin is warm.   Neurological:      General: No focal deficit present.      Mental Status: He is alert.   Psychiatric:         Mood and Affect: Mood normal.          Laboratory:  CBC:  No results for input(s): "WBC", "RBC", "HGB", "HCT", "PLT", "MCV", " ""MCH", "MCHC" in the last 2160 hours.  CMP:  No results for input(s): "GLU", "CALCIUM", "ALBUMIN", "PROT", "NA", "K", "CO2", "CL", "BUN", "ALKPHOS", "ALT", "AST", "BILITOT" in the last 2160 hours.    Invalid input(s): "CREATININ"  URINALYSIS:  No results for input(s): "COLORU", "CLARITYU", "SPECGRAV", "PHUR", "PROTEINUA", "GLUCOSEU", "BILIRUBINCON", "BLOODU", "WBCU", "RBCU", "BACTERIA", "MUCUS", "NITRITE", "LEUKOCYTESUR", "UROBILINOGEN", "HYALINECASTS" in the last 2160 hours.   LIPIDS:  No results for input(s): "TSH", "HDL", "CHOL", "TRIG", "LDLCALC", "CHOLHDL", "NONHDLCHOL", "TOTALCHOLEST" in the last 2160 hours.  TSH:  No results for input(s): "TSH" in the last 2160 hours.  A1C:  No results for input(s): "HGBA1C" in the last 2160 hours.    Radiology:      Assessment/Plan     Roger Zavaleta Jr. is a 73 y.o.male with:    1. Lipodystrophy   -likely due to chronic insulin therapy  -patient reassured    2. Bilateral gynecomastia  -no alarming features  -patient reassured     3. Acute costochondritis  -apply topical voltaren gel  -lidocaine patch PRN    4. Type 2 diabetes mellitus without complication, with long-term current use of insulin  Overview:  -HbA1C slowly getting worse with erratic BS control  -already on CGM  -in the setting of erratic BS control, will benefit from insulin pump, ordered and sent to specialty pharmacy(omnipod 6)  -will d/c short acting and long acting once insulin pump on board   -due for a repeat today, ordered  -UTD on eye, sees his ophthal qmonthly for shot in his left eye s/p cataract enucleation    5. Hypertension associated with type 2 diabetes mellitus  Overview:  - well controlled  - continue current medications    6. Varicose veins of both lower extremities with pain  -compression stockings advised  -leg elevation    7. Stage 3b chronic kidney disease  -improved renal function  -counseled on avoidance of nephrotoxins    8. Hyperlipidemia associated with type 2 diabetes mellitus    - " well controlled  - continue current medication    9. BMI 33  -class I obesity  -counseled extensively on life style modifications    Continue current medications and maintain follow up with specialists.      Patient verbalizes understanding and agrees with current treatment plan.    43 minutes of total time spent on the encounter, which includes face to face time and non-face to face time preparing to see the patient (eg, review of tests), Obtaining and/or reviewing separately obtained history, Documenting clinical information in the electronic or other health record, Independently interpreting results (not separately reported) and communicating results to the patient or Care coordination (not separately reported).      Conchita Chávez MD  [facility]

## 2024-10-07 ENCOUNTER — TELEPHONE (OUTPATIENT)
Dept: FAMILY MEDICINE | Facility: CLINIC | Age: 74
End: 2024-10-07
Payer: MEDICARE

## 2024-10-07 DIAGNOSIS — E11.22 TYPE 2 DIABETES MELLITUS WITH STAGE 3A CHRONIC KIDNEY DISEASE, WITHOUT LONG-TERM CURRENT USE OF INSULIN: Primary | ICD-10-CM

## 2024-10-07 DIAGNOSIS — N18.31 TYPE 2 DIABETES MELLITUS WITH STAGE 3A CHRONIC KIDNEY DISEASE, WITHOUT LONG-TERM CURRENT USE OF INSULIN: Primary | ICD-10-CM

## 2024-10-07 NOTE — TELEPHONE ENCOUNTER
----- Message from Conchita Chávez MD sent at 10/7/2024 12:52 PM CDT -----  Please ask if specialty pharmacy has reached out to patient regarding insulin pump ?   His labs showed A1c improving but not at goal and kidney function slightly reduced . I have sent an additional medication to help his kidneys get better. Thanks

## 2024-10-08 ENCOUNTER — TELEPHONE (OUTPATIENT)
Dept: FAMILY MEDICINE | Facility: CLINIC | Age: 74
End: 2024-10-08
Payer: MEDICARE

## 2024-10-08 NOTE — TELEPHONE ENCOUNTER
----- Message from Zenaida sent at 10/7/2024  2:01 PM CDT -----  Regarding: call  Type:  Patient Returning Call    Who Called:pt  Who Left Message for Patient:office   Does the patient know what this is regarding?:return call   Would the patient rather a call back or a response via [facility]? call  Best Call Back Number:[phone]  Additional Information:

## 2024-10-09 ENCOUNTER — TELEPHONE (OUTPATIENT)
Dept: FAMILY MEDICINE | Facility: CLINIC | Age: 74
End: 2024-10-09
Payer: MEDICARE

## 2024-10-09 DIAGNOSIS — E11.22 TYPE 2 DIABETES MELLITUS WITH STAGE 3A CHRONIC KIDNEY DISEASE, WITHOUT LONG-TERM CURRENT USE OF INSULIN: Primary | ICD-10-CM

## 2024-10-09 DIAGNOSIS — N18.31 TYPE 2 DIABETES MELLITUS WITH STAGE 3A CHRONIC KIDNEY DISEASE, WITHOUT LONG-TERM CURRENT USE OF INSULIN: Primary | ICD-10-CM

## 2024-10-09 NOTE — TELEPHONE ENCOUNTER
----- Message from Zenaida sent at 10/8/2024 11:43 AM CDT -----  Regarding: call  Type:  Patient Returning Call    Who Called:pt  Who Left Message for Patient:Yana   Does the patient know what this is regarding?:return call   Would the patient rather a call back or a response via [facility]? Call   Best Call Back Number:[phone]  Additional Information:

## 2024-10-09 NOTE — TELEPHONE ENCOUNTER
Pt inform of drs bw results and drs rec.Pt states he did  Insulin pump yest.Pt agrees verbalize and understands.Pt also needs instructions on Insulin pump and will vome by on Monday so Bettye can give him instructions. debora

## 2024-10-19 DIAGNOSIS — Z79.4 TYPE 2 DIABETES MELLITUS WITH DIABETIC POLYNEUROPATHY, WITH LONG-TERM CURRENT USE OF INSULIN: ICD-10-CM

## 2024-10-19 DIAGNOSIS — E11.42 TYPE 2 DIABETES MELLITUS WITH DIABETIC POLYNEUROPATHY, WITH LONG-TERM CURRENT USE OF INSULIN: ICD-10-CM

## 2024-10-19 NOTE — TELEPHONE ENCOUNTER
No care due was identified.  [facility] Embedded Care Due Messages. Reference number: [id number].   10/19/2024 5:09:55 PM CDT

## 2024-10-20 RX ORDER — INSULIN DETEMIR 100 [IU]/ML
47 INJECTION, SOLUTION SUBCUTANEOUS NIGHTLY
Qty: 45 ML | Refills: 1 | Status: SHIPPED | OUTPATIENT
Start: 2024-10-20 | End: 2024-10-22

## 2024-10-20 RX ORDER — INSULIN ASPART 100 [IU]/ML
10 INJECTION, SOLUTION INTRAVENOUS; SUBCUTANEOUS
Qty: 30 ML | Refills: 1 | Status: SHIPPED | OUTPATIENT
Start: 2024-10-20

## 2024-10-20 NOTE — TELEPHONE ENCOUNTER
Refill Decision Note   Roger Waqar  is requesting a refill authorization.  Brief Assessment and Rationale for Refill:  Approve     Medication Therapy Plan:         Comments:     Note composed:3:01 PM 10/20/2024

## 2024-10-22 DIAGNOSIS — E11.22 TYPE 2 DIABETES MELLITUS WITH STAGE 3A CHRONIC KIDNEY DISEASE, WITHOUT LONG-TERM CURRENT USE OF INSULIN: Primary | ICD-10-CM

## 2024-10-22 DIAGNOSIS — Z79.4 TYPE 2 DIABETES MELLITUS WITH DIABETIC POLYNEUROPATHY, WITH LONG-TERM CURRENT USE OF INSULIN: ICD-10-CM

## 2024-10-22 DIAGNOSIS — E11.42 TYPE 2 DIABETES MELLITUS WITH DIABETIC POLYNEUROPATHY, WITH LONG-TERM CURRENT USE OF INSULIN: ICD-10-CM

## 2024-10-22 DIAGNOSIS — N18.31 TYPE 2 DIABETES MELLITUS WITH STAGE 3A CHRONIC KIDNEY DISEASE, WITHOUT LONG-TERM CURRENT USE OF INSULIN: Primary | ICD-10-CM

## 2024-10-22 RX ORDER — INSULIN GLARGINE 100 [IU]/ML
47 INJECTION, SOLUTION SUBCUTANEOUS DAILY
Qty: 45 ML | Refills: 3 | Status: SHIPPED | OUTPATIENT
Start: 2024-10-22

## 2024-11-15 ENCOUNTER — CLINICAL SUPPORT (OUTPATIENT)
Dept: DIABETES | Facility: CLINIC | Age: 74
End: 2024-11-15
Payer: MEDICARE

## 2024-11-15 VITALS — HEIGHT: 68 IN | WEIGHT: 220.13 LBS | BODY MASS INDEX: 33.36 KG/M2

## 2024-11-15 DIAGNOSIS — E11.22 TYPE 2 DIABETES MELLITUS WITH STAGE 3A CHRONIC KIDNEY DISEASE, WITHOUT LONG-TERM CURRENT USE OF INSULIN: ICD-10-CM

## 2024-11-15 DIAGNOSIS — N18.31 TYPE 2 DIABETES MELLITUS WITH STAGE 3A CHRONIC KIDNEY DISEASE, WITHOUT LONG-TERM CURRENT USE OF INSULIN: ICD-10-CM

## 2024-11-15 PROCEDURE — 99999 PR PBB SHADOW E&M-EST. PATIENT-LVL II: CPT | Mod: PBBFAC,HCNC,, | Performed by: DIETITIAN, REGISTERED

## 2024-11-15 NOTE — PROGRESS NOTES
"Diabetes Care Specialist Progress Note  Author: Fátima Pino RD  Date: 11/15/2024    Intake    Program Intake  Reason for Diabetes Program Visit:: Initial Diabetes Assessment  Current diabetes risk level:: low  In the last month, have you used the ER or been admitted to the hospital: No  Permission to speak with others about care:: no    Current Diabetes Treatment: Oral Medications, Insulin  Oral Medication Type/Dose: Jardiance 10mg  Method of insulin delivery?: Injections  Injection Type: Pens  Pen Type/Dose: Lantus 47u Qhs; Novolog 10u ac    Continuous Glucose Monitoring  Patient has CGM: Yes  Personal CGM type:: Dexcom G7  GMI Date: 11/15/24  GMI Value: 8.5 %    Lab Results   Component Value Date    HGBA1C 7.2 (H) 10/04/2024       Weight: 99.9 kg (220 lb 2.1 oz)   Height: 5' 8" (172.7 cm)   Body mass index is 33.47 kg/m².    Lifestyle Coping Support & Clinical    Lifestyle/Coping/Support  Does anyone in your family have diabetes or does anyone in your family support you in your diabetes care?: gets suppport at home  List anything about Diabetes that causes you stress?: nothing really  Learning Barriers:: None  Culture or Sabianism beliefs that may impact ability to access healthcare: No  Psychosocial/Coping Skills Assessment Completed: : No  Deffered due to:: Time  Area of need?: Deferred    Problem Review  Active Comorbidities: Hyperlipidemia/Dyslipidemia, Hypertension, Chronic Kidney Disease    Diabetes Self-Management Skills Assessment    Medication Skills Assessment  Patient is able to identify current diabetes medications, dosages, and appropriate timing of medications.: yes  Patient reports problems or concerns with current medication regimen.: yes  Medication regimen problems/concerns:: does not feel like regimen is working  Patient is  aware that some diabetes medications can cause low blood sugar?: Yes  Medication Skills Assessment Completed:: Yes  Assessment indicates:: Instruction Needed  Area of " need?: Yes    Diabetes Disease Process/Treatment Options  Diabetes Type?: Type II  If previous diabetes education, when/where:: doesn't remember - it was a long time ago  What are your goals for this education session?: start omnipod 5  Is patient aware of what causes diabetes?: No  Does patient understand the pathophysiology of diabetes?: No  Diabetes Disease Process/Treatment Options: Skills Assessment Completed: Yes  Assessment indicates:: Instruction Needed  Area of need?: Yes    Nutrition/Healthy Eating  Meal Plan 24 Hour Recall - Breakfast: cereal, water  Meal Plan 24 Hour Recall - Lunch: sandwich or salad, sweet tea  Meal Plan 24 Hour Recall - Dinner: chili with beans and ritz crackers, sweet tea  Meal Plan 24 Hour Recall - Snack: egg salad sandwich, sweet tea  Meal Plan 24 Hour Recall - Beverage: water, sweet tea all day long  Who shops/cooks?: wife  Patient can identify foods that impact blood sugar.: yes  Challenges to healthy eating:: portion control, snacking between meals and at night  Nutrition/Healthy Eating Skills Assessment Completed:: Yes  Assessment indicates:: Instruction Needed  Area of need?: Yes    Physical Activity/Exercise  Patient's daily activity level:: lightly active  Patient formally exercises outside of work.: yes  Frequency: four or more times a week  Patient can identify forms of physical activity.: yes  Physical Activity/Exercise Skills Assessment Completed: : Yes  Assessment indicates:: Adequate understanding  Area of need?: No    Home Blood Glucose Monitoring  Personal CGM type:: Dexcom G7  What is your A1c Target?: 7% or less  Home Blood Glucose Monitoring Skills Assessment Completed: : Yes  Assessment indicates:: Instruction Needed  Area of need?: Yes    Acute Complications  Acute Complications Skills Assessment Completed: : No  Area of need?: Deferred    Chronic Complications  Area of need?: Deferred      Assessment Summary and Plan    Based on today's diabetes care assessment,  the following areas of need were identified:      Identified Areas of Need      Medication/Current Diabetes Treatment: Yes, reviewed meds   Lifestyle Coping/Support: Deferred   Diabetes Disease Process/Treatment Options: Yes, reviewed pathos of DM   Nutrition/Healthy Eating: Yes, see care plan below    Physical Activity/Exercise: No    Home Blood Glucose Monitoring: Yes, see care plan below    Acute Complications: Deferred    Chronic Complications: Deferred       Today's interventions were provided through individual discussion, instruction, and written materials were provided.      Patient verbalized understanding of instruction and written materials.  Pt was able to return back demonstration of instructions today. Patient understood key points, needs reinforcement and further instruction.     Diabetes Self-Management Care Plan:    Today's Diabetes Self-Management Care Plan was developed with Roger's input. Roger has agreed to work toward the following goal(s) to improve his/her overall diabetes control.      Care Plan: Diabetes Management   Updates made since 11/16/2023 12:00 AM        Problem: Blood Glucose Self-Monitoring         Goal: Patient agrees to check and record blood sugars using Dexcom G7.    Start Date: 11/15/2024   Priority: High   Barriers: No Barriers Identified   Note:    11/15/24: Reviewed Dexcom data with pt and expectations for TIR and GMI.             Problem: Healthy Eating         Goal: Eat 3 meals daily with 45g/3 servings of Carbohydrate per meal.    Start Date: 11/15/2024   Expected End Date: 10/31/2025   Priority: High   Barriers: No Barriers Identified   Note:    11/15/24: Very pleasant pt. Is here to start Omnipod 5; I spoke to him on the phone a few weeks ago that he needed a pump eval done before he could start a pump, but he did not remember the conversation. I performed a pump eval at this appt, and have determined that he is not ready to start a pump. Pt did not realize all that  was involved in wearing a pump; he thought all you had to do was put it on and it would take care of itself.     He does not carb count or read food labels. We reviewed both these topics today and he seemed to catch on pretty quick. Pt does not understand why his BG runs so high. Diet recall shows he drinks sweet tea all day long. He did not know that he should not drink sugary drinks. He stated he will switch to unsweet tea and use a sugar substitute. Pt needs a referral to endo. Pt felt like his insulin regimen was not working but I think it is due to his nutrition intake.            Follow Up Plan     Follow up in about 1 month (around 12/15/2024). Assess carb conting skills; intake of sweet tea, weight, dexcom g7 data    Today's care plan and follow up schedule was discussed with patient.  Roger verbalized understanding of the care plan, goals, and agrees to follow up plan.        The patient was encouraged to communicate with his/her health care provider/physician and care team regarding his/her condition(s) and treatment.  I provided the patient with my contact information today and encouraged to contact me via phone or [facility] as needed.     Length of Visit   Total Time: 60 Minutes

## 2024-12-26 DIAGNOSIS — I10 HYPERTENSION, ESSENTIAL: ICD-10-CM

## 2024-12-26 RX ORDER — CHLORTHALIDONE 25 MG/1
25 TABLET ORAL DAILY
Qty: 90 TABLET | Refills: 3 | Status: SHIPPED | OUTPATIENT
Start: 2024-12-26

## 2024-12-26 NOTE — TELEPHONE ENCOUNTER
Care Due:                  Date            Visit Type   Department     Provider  --------------------------------------------------------------------------------                                MYCHART                              FOLLOWUP/OF  DESC FAMILY  Last Visit: 10-      [facility]ICE VISIT   [facility]                              EP -                              PRIMARY      DESC FAMILY  Next Visit: 04-      CARE ([facility])   [facility]                                                            Last  Test          Frequency    Reason                     Performed    Due Date  --------------------------------------------------------------------------------    Lipid Panel.  12 months..  pravastatin..............  02- 01-    [facility] Embedded Care Due Messages. Reference number: [id number].   12/26/2024 11:54:01 AM CST

## 2025-01-30 DIAGNOSIS — Z00.00 ENCOUNTER FOR MEDICARE ANNUAL WELLNESS EXAM: ICD-10-CM

## 2025-02-06 DIAGNOSIS — E11.22 TYPE 2 DIABETES MELLITUS WITH STAGE 2 CHRONIC KIDNEY DISEASE, WITH LONG-TERM CURRENT USE OF INSULIN: ICD-10-CM

## 2025-02-06 DIAGNOSIS — N18.2 TYPE 2 DIABETES MELLITUS WITH STAGE 2 CHRONIC KIDNEY DISEASE, WITH LONG-TERM CURRENT USE OF INSULIN: ICD-10-CM

## 2025-02-06 DIAGNOSIS — Z79.4 TYPE 2 DIABETES MELLITUS WITH STAGE 2 CHRONIC KIDNEY DISEASE, WITH LONG-TERM CURRENT USE OF INSULIN: ICD-10-CM

## 2025-02-06 RX ORDER — BLOOD-GLUCOSE SENSOR
1 EACH MISCELLANEOUS
Qty: 9 EACH | Refills: 3 | Status: SHIPPED | OUTPATIENT
Start: 2025-02-06

## 2025-02-06 NOTE — TELEPHONE ENCOUNTER
Provider Staff:  Action required for this patient    Requires labs      Please see care gap opportunities below in Care Due Message.    Thanks!  [facility]     Appointments      Date Provider   Last Visit   10/4/2024 Conchita Chávez MD   Next Visit   4/4/2025 Conchita Chávez MD     Refill Decision Note   Roger Zavaleta  is requesting a refill authorization.    Brief Assessment and Rationale for Refill:   Approve       Medication Therapy Plan:         Comments:     Note composed:2:57 PM 02/06/2025

## 2025-02-06 NOTE — TELEPHONE ENCOUNTER
Care Due:                  Date            Visit Type   Department     Provider  --------------------------------------------------------------------------------                                MYCHART                              FOLLOWUP/OF  DESC FAMILY  Last Visit: 10-      FICE VISIT   [facility]                              EP -                              PRIMARY      DESC FAMILY  Next Visit: 04-      CARE ([facility])   [facility]                                                            Last  Test          Frequency    Reason                     Performed    Due Date  --------------------------------------------------------------------------------    HBA1C.......  6 months...  empagliflozin, insulin...  10-   04-    [facility] Embedded Care Due Messages. Reference number: [id number].   2/06/2025 12:32:32 PM CST

## 2025-03-17 DIAGNOSIS — E11.42 TYPE 2 DIABETES MELLITUS WITH DIABETIC POLYNEUROPATHY, WITH LONG-TERM CURRENT USE OF INSULIN: ICD-10-CM

## 2025-03-17 DIAGNOSIS — Z79.4 TYPE 2 DIABETES MELLITUS WITH DIABETIC POLYNEUROPATHY, WITH LONG-TERM CURRENT USE OF INSULIN: ICD-10-CM

## 2025-03-18 RX ORDER — INSULIN ASPART 100 [IU]/ML
10 INJECTION, SOLUTION INTRAVENOUS; SUBCUTANEOUS
Qty: 30 ML | Refills: 0 | Status: SHIPPED | OUTPATIENT
Start: 2025-03-18

## 2025-03-18 NOTE — TELEPHONE ENCOUNTER
Provider Staff:  Action required for this patient    Requires labs      Please see care gap opportunities below in Care Due Message.    Thanks!  [facility]     Appointments      Date Provider   Last Visit   10/4/2024 Conchita Chávez MD   Next Visit   4/4/2025 Conchita Chávez MD     Refill Decision Note   Roger Zavaleta  is requesting a refill authorization.  Brief Assessment and Rationale for Refill:  Approve     Medication Therapy Plan:         Comments:     Note composed:12:40 PM 03/18/2025

## 2025-06-15 DIAGNOSIS — E11.22 TYPE 2 DIABETES MELLITUS WITH STAGE 3B CHRONIC KIDNEY DISEASE, WITH LONG-TERM CURRENT USE OF INSULIN: ICD-10-CM

## 2025-06-15 DIAGNOSIS — Z79.4 TYPE 2 DIABETES MELLITUS WITH DIABETIC POLYNEUROPATHY, WITH LONG-TERM CURRENT USE OF INSULIN: ICD-10-CM

## 2025-06-15 DIAGNOSIS — E11.42 TYPE 2 DIABETES MELLITUS WITH DIABETIC POLYNEUROPATHY, WITH LONG-TERM CURRENT USE OF INSULIN: ICD-10-CM

## 2025-06-15 DIAGNOSIS — Z79.4 TYPE 2 DIABETES MELLITUS WITH STAGE 3B CHRONIC KIDNEY DISEASE, WITH LONG-TERM CURRENT USE OF INSULIN: ICD-10-CM

## 2025-06-15 DIAGNOSIS — N18.32 TYPE 2 DIABETES MELLITUS WITH STAGE 3B CHRONIC KIDNEY DISEASE, WITH LONG-TERM CURRENT USE OF INSULIN: ICD-10-CM

## 2025-06-15 RX ORDER — PEN NEEDLE, DIABETIC 30 GX3/16"
1 NEEDLE, DISPOSABLE MISCELLANEOUS 2 TIMES DAILY
Qty: 200 EACH | Refills: 3 | Status: SHIPPED | OUTPATIENT
Start: 2025-06-15

## 2025-06-15 NOTE — TELEPHONE ENCOUNTER
Care Due:                  Date            Visit Type   Department     Provider  --------------------------------------------------------------------------------                                MYCHART                              FOLLOWUP/OF  DESC FAMILY  Last Visit: 10-      [facility]  Conchita Chávez  Next Visit: None Scheduled  None         None Found                                                            Last  Test          Frequency    Reason                     Performed    Due Date  --------------------------------------------------------------------------------    HBA1C.......  6 months...  empagliflozin, insulin...  10-   04-    Lipid Panel.  12 months..  pravastatin..............  02- 01-    [facility] Embedded Care Due Messages. Reference number: [id number].   6/15/2025 10:56:17 AM CDT

## 2025-06-16 RX ORDER — INSULIN ASPART 100 [IU]/ML
10 INJECTION, SOLUTION INTRAVENOUS; SUBCUTANEOUS
Qty: 30 ML | Refills: 0 | Status: SHIPPED | OUTPATIENT
Start: 2025-06-16

## 2025-06-16 NOTE — TELEPHONE ENCOUNTER
Refill Routing Note   Medication(s) are not appropriate for processing by [facility] for the following reason(s):        Required labs outdated    [facility] action(s):  Approve  Defer   Requires labs : Yes             Appointments  past 12m or future 3m with PCP    Date Provider   Last Visit   10/4/2024 Conchita Chávez MD   Next Visit   Visit date not found Conchita Chávez MD   ED visits in past 90 days: 0        Note composed:8:44 PM 06/15/2025

## 2025-07-03 ENCOUNTER — PATIENT MESSAGE (OUTPATIENT)
Dept: ADMINISTRATIVE | Facility: HOSPITAL | Age: 75
End: 2025-07-03
Payer: MEDICARE

## 2025-07-09 RX ORDER — IRBESARTAN 150 MG/1
150 TABLET ORAL
Qty: 90 TABLET | Refills: 0 | Status: SHIPPED | OUTPATIENT
Start: 2025-07-09

## 2025-07-09 NOTE — TELEPHONE ENCOUNTER
Care Due:                  Date            Visit Type   Department     Provider  --------------------------------------------------------------------------------                                MYCHART                              FOLLOWUP/OF  DESC FAMILY  Last Visit: 10-      [facility]  Conchita Chávez  Next Visit: None Scheduled  None         None Found                                                            Last  Test          Frequency    Reason                     Performed    Due Date  --------------------------------------------------------------------------------    CMP.........  12 months..  empagliflozin, insulin,    10-   09-                             irbesartan, pravastatin..    Health [facility] Embedded Care Due Messages. Reference number: [id number].   7/09/2025 12:10:17 AM CDT

## 2025-07-09 NOTE — TELEPHONE ENCOUNTER
Provider Staff:  Action required for this patient    Requires labs      Please see care gap opportunities below in Care Due Message.    Thanks!  [facility]     Appointments      Date Provider   Last Visit   10/4/2024 Conchita Chávez MD   Next Visit   Visit date not found Conchita Chávez MD     Refill Decision Note   Roger Zavaleta  is requesting a refill authorization.  Brief Assessment and Rationale for Refill:  Approve     Medication Therapy Plan:         Comments:     Note composed:7:16 AM 07/09/2025

## 2025-07-22 DIAGNOSIS — Z79.4 TYPE 2 DIABETES MELLITUS WITH DIABETIC POLYNEUROPATHY, WITH LONG-TERM CURRENT USE OF INSULIN: ICD-10-CM

## 2025-07-22 DIAGNOSIS — E11.42 TYPE 2 DIABETES MELLITUS WITH DIABETIC POLYNEUROPATHY, WITH LONG-TERM CURRENT USE OF INSULIN: ICD-10-CM

## 2025-07-22 RX ORDER — SYRINGE,SAFETY WITH NEEDLE,1ML 25GX1"
1 SYRINGE (EA) MISCELLANEOUS 3 TIMES DAILY
Qty: 300 EACH | Refills: 3 | Status: SHIPPED | OUTPATIENT
Start: 2025-07-22

## 2025-07-22 NOTE — TELEPHONE ENCOUNTER
No care due was identified.  [facility] Embedded Care Due Messages. Reference number: [id number].   7/22/2025 12:49:53 AM CDT

## 2025-07-22 NOTE — TELEPHONE ENCOUNTER
Refill Decision Note   Roger Waqar  is requesting a refill authorization.  Brief Assessment and Rationale for Refill:  Approve     Medication Therapy Plan:         Comments:     Note composed:12:21 PM 07/22/2025             30-Jul-2024 16:54

## 2025-07-25 ENCOUNTER — PATIENT OUTREACH (OUTPATIENT)
Dept: ADMINISTRATIVE | Facility: HOSPITAL | Age: 75
End: 2025-07-25
Payer: MEDICARE

## 2025-07-25 NOTE — PROGRESS NOTES
Health Maintenance Topic(s) Outreach Outcomes & Actions Taken:    Lab(s) - Outreach Outcomes & Actions Taken  : Portal message sent